# Patient Record
Sex: FEMALE | Race: WHITE | NOT HISPANIC OR LATINO | URBAN - METROPOLITAN AREA
[De-identification: names, ages, dates, MRNs, and addresses within clinical notes are randomized per-mention and may not be internally consistent; named-entity substitution may affect disease eponyms.]

---

## 2017-04-11 ENCOUNTER — EMERGENCY (EMERGENCY)
Facility: HOSPITAL | Age: 47
LOS: 1 days | Discharge: HOME | End: 2017-04-11
Attending: EMERGENCY MEDICINE | Admitting: EMERGENCY MEDICINE
Payer: COMMERCIAL

## 2017-04-11 VITALS
OXYGEN SATURATION: 97 % | DIASTOLIC BLOOD PRESSURE: 78 MMHG | RESPIRATION RATE: 16 BRPM | TEMPERATURE: 98 F | HEART RATE: 66 BPM | WEIGHT: 115.08 LBS | SYSTOLIC BLOOD PRESSURE: 119 MMHG

## 2017-04-11 VITALS
TEMPERATURE: 98 F | OXYGEN SATURATION: 97 % | DIASTOLIC BLOOD PRESSURE: 70 MMHG | SYSTOLIC BLOOD PRESSURE: 122 MMHG | RESPIRATION RATE: 15 BRPM | HEART RATE: 78 BPM
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
## 2017-04-11 DIAGNOSIS — S13.4XXA SPRAIN OF LIGAMENTS OF CERVICAL SPINE, INITIAL ENCOUNTER: ICD-10-CM
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
## 2017-04-11 DIAGNOSIS — S09.90XA UNSPECIFIED INJURY OF HEAD, INITIAL ENCOUNTER: ICD-10-CM

## 2017-04-11 DIAGNOSIS — F43.10 POST-TRAUMATIC STRESS DISORDER, UNSPECIFIED: ICD-10-CM

## 2017-04-11 DIAGNOSIS — Z41.1 ENCOUNTER FOR COSMETIC SURGERY: Chronic | ICD-10-CM

## 2017-04-11 DIAGNOSIS — K46.9 UNSPECIFIED ABDOMINAL HERNIA WITHOUT OBSTRUCTION OR GANGRENE: Chronic | ICD-10-CM

## 2017-04-11 DIAGNOSIS — Z98.89 OTHER SPECIFIED POSTPROCEDURAL STATES: Chronic | ICD-10-CM

## 2017-04-11 DIAGNOSIS — F41.9 ANXIETY DISORDER, UNSPECIFIED: ICD-10-CM

## 2017-04-11 PROCEDURE — 99284 EMERGENCY DEPT VISIT MOD MDM: CPT

## 2017-04-11 RX ORDER — ACETAMINOPHEN 500 MG
650 TABLET ORAL ONCE
Qty: 0 | Refills: 0 | Status: DISCONTINUED | OUTPATIENT
Start: 2017-04-11 | End: 2017-04-11

## 2017-04-11 RX ORDER — IBUPROFEN 200 MG
600 TABLET ORAL ONCE
Qty: 0 | Refills: 0 | Status: COMPLETED | OUTPATIENT
Start: 2017-04-11 | End: 2017-04-11

## 2017-04-11 RX ORDER — ACETAMINOPHEN 500 MG
650 TABLET ORAL ONCE
Qty: 0 | Refills: 0 | Status: COMPLETED | OUTPATIENT
Start: 2017-04-11 | End: 2017-04-11
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
## 2017-04-12 PROCEDURE — 70450 CT HEAD/BRAIN W/O DYE: CPT | Mod: 26

## 2017-04-12 PROCEDURE — 70450 CT HEAD/BRAIN W/O DYE: CPT

## 2017-04-12 PROCEDURE — 72125 CT NECK SPINE W/O DYE: CPT | Mod: 26

## 2017-04-12 PROCEDURE — 72125 CT NECK SPINE W/O DYE: CPT

## 2017-04-12 PROCEDURE — 99284 EMERGENCY DEPT VISIT MOD MDM: CPT | Mod: 25

## 2017-04-12 RX ADMIN — Medication 600 MILLIGRAM(S): at 00:03

## 2017-04-12 RX ADMIN — Medication 650 MILLIGRAM(S): at 00:03

## 2017-06-01 ENCOUNTER — APPOINTMENT (OUTPATIENT)
Dept: MRI IMAGING | Facility: CLINIC | Age: 47
End: 2017-06-01

## 2017-06-01 ENCOUNTER — OUTPATIENT (OUTPATIENT)
Dept: OUTPATIENT SERVICES | Facility: HOSPITAL | Age: 47
LOS: 1 days | End: 2017-06-01
Payer: COMMERCIAL

## 2017-06-01 DIAGNOSIS — Z00.8 ENCOUNTER FOR OTHER GENERAL EXAMINATION: ICD-10-CM

## 2017-06-01 DIAGNOSIS — Z41.1 ENCOUNTER FOR COSMETIC SURGERY: Chronic | ICD-10-CM

## 2017-06-01 DIAGNOSIS — K46.9 UNSPECIFIED ABDOMINAL HERNIA WITHOUT OBSTRUCTION OR GANGRENE: Chronic | ICD-10-CM

## 2017-06-01 DIAGNOSIS — Z98.89 OTHER SPECIFIED POSTPROCEDURAL STATES: Chronic | ICD-10-CM

## 2017-06-01 PROCEDURE — 73721 MRI JNT OF LWR EXTRE W/O DYE: CPT

## 2018-05-10 ENCOUNTER — OUTPATIENT (OUTPATIENT)
Dept: OUTPATIENT SERVICES | Facility: HOSPITAL | Age: 48
LOS: 1 days | End: 2018-05-10
Payer: MEDICAID

## 2018-05-10 ENCOUNTER — APPOINTMENT (OUTPATIENT)
Dept: MAMMOGRAPHY | Facility: CLINIC | Age: 48
End: 2018-05-10
Payer: MEDICAID

## 2018-05-10 ENCOUNTER — APPOINTMENT (OUTPATIENT)
Dept: ULTRASOUND IMAGING | Facility: CLINIC | Age: 48
End: 2018-05-10
Payer: MEDICAID

## 2018-05-10 DIAGNOSIS — R92.2 INCONCLUSIVE MAMMOGRAM: ICD-10-CM

## 2018-05-10 DIAGNOSIS — Z98.89 OTHER SPECIFIED POSTPROCEDURAL STATES: Chronic | ICD-10-CM

## 2018-05-10 DIAGNOSIS — K46.9 UNSPECIFIED ABDOMINAL HERNIA WITHOUT OBSTRUCTION OR GANGRENE: Chronic | ICD-10-CM

## 2018-05-10 DIAGNOSIS — Z00.00 ENCOUNTER FOR GENERAL ADULT MEDICAL EXAMINATION WITHOUT ABNORMAL FINDINGS: ICD-10-CM

## 2018-05-10 DIAGNOSIS — Z41.1 ENCOUNTER FOR COSMETIC SURGERY: Chronic | ICD-10-CM

## 2018-05-10 PROCEDURE — 77067 SCR MAMMO BI INCL CAD: CPT | Mod: 26

## 2018-05-10 PROCEDURE — 76641 ULTRASOUND BREAST COMPLETE: CPT | Mod: 26,50

## 2018-05-10 PROCEDURE — 76641 ULTRASOUND BREAST COMPLETE: CPT

## 2018-05-10 PROCEDURE — 77063 BREAST TOMOSYNTHESIS BI: CPT | Mod: 26

## 2018-05-10 PROCEDURE — 77063 BREAST TOMOSYNTHESIS BI: CPT

## 2018-05-10 PROCEDURE — 77067 SCR MAMMO BI INCL CAD: CPT

## 2019-01-22 ENCOUNTER — RESULT REVIEW (OUTPATIENT)
Age: 49
End: 2019-01-22

## 2019-04-11 ENCOUNTER — OUTPATIENT (OUTPATIENT)
Dept: OUTPATIENT SERVICES | Facility: HOSPITAL | Age: 49
LOS: 1 days | End: 2019-04-11
Payer: MEDICAID

## 2019-04-11 ENCOUNTER — APPOINTMENT (OUTPATIENT)
Dept: MRI IMAGING | Facility: CLINIC | Age: 49
End: 2019-04-11
Payer: MEDICAID

## 2019-04-11 DIAGNOSIS — K46.9 UNSPECIFIED ABDOMINAL HERNIA WITHOUT OBSTRUCTION OR GANGRENE: Chronic | ICD-10-CM

## 2019-04-11 DIAGNOSIS — Z98.89 OTHER SPECIFIED POSTPROCEDURAL STATES: Chronic | ICD-10-CM

## 2019-04-11 DIAGNOSIS — Z00.8 ENCOUNTER FOR OTHER GENERAL EXAMINATION: ICD-10-CM

## 2019-04-11 DIAGNOSIS — Z41.1 ENCOUNTER FOR COSMETIC SURGERY: Chronic | ICD-10-CM

## 2019-04-11 PROCEDURE — 70553 MRI BRAIN STEM W/O & W/DYE: CPT

## 2019-04-11 PROCEDURE — 70553 MRI BRAIN STEM W/O & W/DYE: CPT | Mod: 26

## 2019-04-11 PROCEDURE — A9585: CPT

## 2019-04-14 ENCOUNTER — EMERGENCY (EMERGENCY)
Facility: HOSPITAL | Age: 49
LOS: 1 days | Discharge: HOME | End: 2019-04-14
Attending: EMERGENCY MEDICINE | Admitting: EMERGENCY MEDICINE
Payer: MEDICAID

## 2019-04-14 VITALS
RESPIRATION RATE: 16 BRPM | TEMPERATURE: 98 F | SYSTOLIC BLOOD PRESSURE: 109 MMHG | OXYGEN SATURATION: 100 % | HEART RATE: 64 BPM | DIASTOLIC BLOOD PRESSURE: 69 MMHG

## 2019-04-14 VITALS
WEIGHT: 110.01 LBS | TEMPERATURE: 98 F | RESPIRATION RATE: 18 BRPM | OXYGEN SATURATION: 98 % | SYSTOLIC BLOOD PRESSURE: 104 MMHG | HEIGHT: 62 IN | HEART RATE: 80 BPM | DIASTOLIC BLOOD PRESSURE: 65 MMHG

## 2019-04-14 DIAGNOSIS — Z41.1 ENCOUNTER FOR COSMETIC SURGERY: Chronic | ICD-10-CM

## 2019-04-14 DIAGNOSIS — K46.9 UNSPECIFIED ABDOMINAL HERNIA WITHOUT OBSTRUCTION OR GANGRENE: Chronic | ICD-10-CM

## 2019-04-14 DIAGNOSIS — Z98.89 OTHER SPECIFIED POSTPROCEDURAL STATES: Chronic | ICD-10-CM

## 2019-04-14 PROCEDURE — 99283 EMERGENCY DEPT VISIT LOW MDM: CPT | Mod: 25

## 2019-04-14 PROCEDURE — 99283 EMERGENCY DEPT VISIT LOW MDM: CPT

## 2019-04-14 RX ADMIN — Medication 60 MILLIGRAM(S): at 23:16

## 2019-04-14 NOTE — ED PROVIDER NOTE - OBJECTIVE STATEMENT
49 yo female hx of anxiety, ptsd c/o right upper back rash and right chest rash, itchy and painful, states she was likely exposed to poison ivy [place] days ago, seen by PMD given calamine lotion with mild relief.  No steroid use.  No fever/chills.  Has had shingles before and states it does not feel like her previous shingles.

## 2019-04-14 NOTE — ED ADULT NURSE NOTE - NSIMPLEMENTINTERV_GEN_ALL_ED
Implemented All Universal Safety Interventions:  Ranger to call system. Call bell, personal items and telephone within reach. Instruct patient to call for assistance. Room bathroom lighting operational. Non-slip footwear when patient is off stretcher. Physically safe environment: no spills, clutter or unnecessary equipment. Stretcher in lowest position, wheels locked, appropriate side rails in place.

## 2019-04-14 NOTE — ED ADULT NURSE NOTE - OBJECTIVE STATEMENT
Patient complaining of poison ivy on chest and upper back x3 days. Patient stated she was seen in urgent care and prescribed benadryl and calamine lotion with no relief. Patient complaining of poison ivy on chest and upper back x3 days. Patient stated she was seen in urgent care and prescribed benadryl and calamine lotion with no relief. Pt. stated she was running through the woods at the time and " I think I strained my right thigh".

## 2019-04-14 NOTE — ED PROVIDER NOTE - NSFOLLOWUPINSTRUCTIONS_ED_ALL_ED_FT
1) Follow-up with your Primary Medical Doctor or referred doctor. Call today / next business day for prompt follow-up.  2) Return to Emergency room for any worsening or persistent pain, weakness, fever, or any other concerning symptoms.  3) See attached instruction sheets for additional information, including information regarding signs and symptoms to look out for, reasons to seek immediate care and other important instructions.  4) Take prednisone taper as prescribed.

## 2019-04-14 NOTE — ED PROVIDER NOTE - PHYSICAL EXAMINATION
Gen: Alert, NAD  Head/eyes: NC/AT, PERRL, EOMI, normal lids/conjunctiva, no scleral icterus  ENT: airway patent, no uvula swelling/midline  Neck: supple, no tenderness/meningismus/JVD, Trachea midline  Pulm/lung: Bilateral clear BS, normal resp effort, no wheeze/stridor/retractions  CV/heart: RRR, no M/R/G, +2 dist pulses (radial, pedal DP/PT, popliteal)  GI/Abd: soft, NT/ND, +BS, no guarding/rebound tenderness  Musculoskeletal: no edema/erythema/cyanosis, FROM in all extremities, no C/T/L spine ttp  Skin: +right upper back rash with vesicle/blister and right anterior chest vesicle/blister, not in dermatomal distribution  Neuro: AAOx3, CN 2-12 intact, normal sensation, 5/5 motor strength in all extremities, normal gait, no dysmetria

## 2019-08-16 ENCOUNTER — OUTPATIENT (OUTPATIENT)
Dept: OUTPATIENT SERVICES | Facility: HOSPITAL | Age: 49
LOS: 1 days | End: 2019-08-16
Payer: MEDICAID

## 2019-08-16 ENCOUNTER — APPOINTMENT (OUTPATIENT)
Dept: MRI IMAGING | Facility: CLINIC | Age: 49
End: 2019-08-16
Payer: MEDICAID

## 2019-08-16 DIAGNOSIS — M25.551 PAIN IN RIGHT HIP: ICD-10-CM

## 2019-08-16 DIAGNOSIS — K46.9 UNSPECIFIED ABDOMINAL HERNIA WITHOUT OBSTRUCTION OR GANGRENE: Chronic | ICD-10-CM

## 2019-08-16 DIAGNOSIS — Z98.89 OTHER SPECIFIED POSTPROCEDURAL STATES: Chronic | ICD-10-CM

## 2019-08-16 DIAGNOSIS — Z41.1 ENCOUNTER FOR COSMETIC SURGERY: Chronic | ICD-10-CM

## 2019-08-16 PROCEDURE — 73721 MRI JNT OF LWR EXTRE W/O DYE: CPT

## 2019-08-16 PROCEDURE — 73721 MRI JNT OF LWR EXTRE W/O DYE: CPT | Mod: 26,RT

## 2019-09-16 ENCOUNTER — APPOINTMENT (OUTPATIENT)
Dept: ORTHOPEDIC SURGERY | Facility: CLINIC | Age: 49
End: 2019-09-16
Payer: MEDICAID

## 2019-09-16 VITALS
HEIGHT: 62 IN | DIASTOLIC BLOOD PRESSURE: 65 MMHG | BODY MASS INDEX: 20.24 KG/M2 | WEIGHT: 110 LBS | HEART RATE: 65 BPM | SYSTOLIC BLOOD PRESSURE: 100 MMHG

## 2019-09-16 PROCEDURE — 73502 X-RAY EXAM HIP UNI 2-3 VIEWS: CPT | Mod: RT

## 2019-09-16 PROCEDURE — 99204 OFFICE O/P NEW MOD 45 MIN: CPT
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
## 2019-09-16 NOTE — ADDENDUM
[FreeTextEntry1] : I, Biju Alvarado , acted solely as a scribe for Dr. Kelvin Serrano on this date 09/16/2019.\par All medical record entries made by the Scribe were at my, Dr. Kelvin Serrano, direction and personally dictated by me on 09/16/2019. I have reviewed the chart and agree that the record accurately reflects my personal performance of the history, physical exam, assessment and plan. I have also personally directed, reviewed, and agreed with the chart.  none

## 2019-09-16 NOTE — DISCUSSION/SUMMARY
[de-identified] : The patient presents with right hip OA and pain. The conditions and treatment options have been discussed with the patient. At this time, the patient is a candidate for surgery based on xrays and quality of life. We have discussed the risks, benefits, and alternatives to surgery. Before proceeding with surgery the pt would like to try hyaluronate injections into her right hip.\par \par \par \par Long discussion was had with the patient about total hip replacements. I feel he would greatly benefit from right total hip. A long discussion was had with the patient as what the total joint replacement would entail.  A model was used to demonstrate the operation.  The hospitalization and rehabilitation were discussed. The risks, benefits and alternatives to surgical intervention were discussed at length with the patient. Specific risks discussed included: infection, wound breakdown, numbness and damage to nerves, tendon, muscle, arteries or other blood vessels.  The possibility of recurrent pain, no improvement in pain and actual worsening of the pain were also mentioned in conversation with the patient. Medical complications related to the patient's general medical health including deep vein thrombosis, pulmonary embolus, heart attack, stroke, death and other complications from anesthesia were discussed as well. The patient was told that we will take steps to minimize these risks by using sterile technique, antibiotics and DVT prophylaxis when appropriate and following the patient postoperatively in the clinic setting to monitor progress. The benefits of surgery were discussed with the patient including the potential to improve the current clinical condition throughout operative intervention. Alternatives to surgical intervention include continued conservative management which may yield less than optimal results this particular patient. All questions were answered to the satisfaction of the patient. \par \par \par \par This is an addendum to support documentation for NAT THOMASON. she was seen in my office regarding right hip pain. As part of that visit, i recommended treatment with a cryo ROM Hip Orthosis( ) for  restrictions my patient is experiencing with functional limitations of daily activities.\par \par This ROM Hip Orthosis, in conjunction with other previously prescribed regimens will assist in overall treatment of my patient's condition.

## 2019-09-16 NOTE — PHYSICAL EXAM
[de-identified] : Well-nourished, in no acute distress\par Alert and oriented to time, place and person\par Skin: no lesions discoloration\par Respirations: unlabored\par Cardiac: no leg swelling\par Lymphatic: no groin adenopathy \par Right Lower Extremity\par o Buttock : no tenderness, swelling or deformities\par o Right Hip :\par ¦ Inspection/Palpation : no tenderness, no swelling or deformities, leg lengths equal \par ¦ Range of Motion : 0-130 ° , abduction 60 ° , adduction 30 ° , internal rotation 30 ° , external rotation 50 °Provokes pain\par ¦ Stability : joint stability intact\par ¦ Strength : extension, flexion, adduction 5/5, abduction 5/5, internal rotation and external rotation 5/5 \par Tests: John’s test negative \par \par  [de-identified] : AP pelvis right hip AP lateral demonstrates degenerative joint space narrowing and focal superolateral bone on bone contact\par \par \par MRI of the right hip taken on 08/16/2019 at [facility] in [place] reveal\par 1. Moderate right hip arthrosis. \par 2. Moderate joint effusion with synovitis. \par 3. Mild strain of the right iliopsoas muscle \par 4. Mild gluteus minimus tendinosis. \par \par

## 2019-09-18 ENCOUNTER — APPOINTMENT (OUTPATIENT)
Dept: ORTHOPEDIC SURGERY | Facility: CLINIC | Age: 49
End: 2019-09-18
Payer: MEDICAID

## 2019-09-18 VITALS
DIASTOLIC BLOOD PRESSURE: 66 MMHG | SYSTOLIC BLOOD PRESSURE: 102 MMHG | BODY MASS INDEX: 20.24 KG/M2 | HEIGHT: 62 IN | WEIGHT: 110 LBS | HEART RATE: 89 BPM

## 2019-09-18 DIAGNOSIS — Z82.61 FAMILY HISTORY OF ARTHRITIS: ICD-10-CM

## 2019-09-18 DIAGNOSIS — R56.9 UNSPECIFIED CONVULSIONS: ICD-10-CM

## 2019-09-18 DIAGNOSIS — Z87.39 PERSONAL HISTORY OF OTHER DISEASES OF THE MUSCULOSKELETAL SYSTEM AND CONNECTIVE TISSUE: ICD-10-CM

## 2019-09-18 DIAGNOSIS — Z78.9 OTHER SPECIFIED HEALTH STATUS: ICD-10-CM

## 2019-09-18 DIAGNOSIS — M16.11 UNILATERAL PRIMARY OSTEOARTHRITIS, RIGHT HIP: ICD-10-CM

## 2019-09-18 PROCEDURE — 99204 OFFICE O/P NEW MOD 45 MIN: CPT

## 2019-09-18 RX ORDER — LAMOTRIGINE 150 MG/1
TABLET ORAL
Refills: 0 | Status: ACTIVE | COMMUNITY

## 2019-09-18 RX ORDER — ESCITALOPRAM OXALATE 20 MG/1
20 TABLET, FILM COATED ORAL
Refills: 0 | Status: ACTIVE | COMMUNITY

## 2019-09-18 RX ORDER — HYALURONATE SODIUM 20 MG/2 ML
20 SYRINGE (ML) INTRAARTICULAR
Qty: 1 | Refills: 0 | Status: ACTIVE | OUTPATIENT
Start: 2019-09-18

## 2019-09-18 RX ORDER — IBUPROFEN 200 MG/1
CAPSULE, LIQUID FILLED ORAL
Refills: 0 | Status: ACTIVE | COMMUNITY

## 2019-09-18 NOTE — PHYSICAL EXAM
[de-identified] : Constitutional: Well-nourished, well-developed, No acute distress\par Respiratory:  Good respiratory effort, no SOB\par Lymphatic: No regional lymphadenopathy, no lymphedema\par Psychiatric: Pleasant and normal affect, alert and oriented x3\par Skin: Clean dry and intact B/L UE/LE\par Musculoskeletal: normal except where as noted in regional exam\par \par \par Left Hip:\par \par APPEARANCE: no marked deformities, no swelling or malalignment\par POSITIVE TENDERNESS: none\par NONTENDER greater trochanter, TFL, gluteal region, ischium/proximal hamstring region, hip flexor region, sartorius and pubic symphysis. \par ROM full, painless all planes. \par RESISTIVE TESTING: painless resisted ER/IR/SLR/abduction/adduction. \par SPECIAL TESTS: painless loaded flexion & scouring\par PULSES: 2+ DP/PT pulses\par Neuro:  NL sensation of thigh and lower extremity, DTRs 2+/4 patella and achilles\par \par \par B/L Knees: No asymmetry, malalignment, or swelling, Full ROM, 5/5 strength in flexion/ext, Joints stable\par B/L Ankles: No asymmetry, malalignment, or swelling, Full ROM, 5/5 strength in DF/PF/Inv/Ev, Joints stable\par BIOMECHANICAL EXAM: no marked leg length discrepancy, mild hip abductor weakness b/l, no marked foot pronation, tight hams and ITB b/l.  Normal gait and station\par \par Right Hip:\par \par APPEARANCE: no marked deformities, no swelling or malalignment\par POSITIVE TENDERNESS: Hip flexor region, sartorius, proximal rectus femoris\par NONTENDER: greater trochanter, TFL, gluteal region, ischium/proximal hamstring region, and pubic symphysis. \par ROM: Limited in all planes due to pain. \par RESISTIVE TESTING: MMT 4+/5 and mild pain with hip flexion, painless resisted ER/IR/SLR/abduction/adduction. \par SPECIAL TESTS: + FADIR, neg SHAWN, mild pain with loaded flexion & scouring\par NEURO: Normal sensation of LE, DTRs 2+/4 patella and achilles\par PULSES: 2+ DP/PT pulses

## 2019-09-18 NOTE — HISTORY OF PRESENT ILLNESS
[de-identified] : Patient is here for right hip pain. She was evaluated by Dr. Serrano who determined that she had hip arthritis. She has had several opinions regarding her treatment and was recommended to try and put off THR until later in life. She has had 2 cortisone injections in the past which provided relief for about 2 weeks. She has done PT in the past as well. Dr. Serrano recommended MAGANA injections. There has been no significant change in her condition since her last evaluation. \par \par The patient's past medical history, past surgical history, medications and allergies were reviewed by me today and documented accordingly. In addition, the patient's family and social history, which were noncontributory to this visit, were reviewed also. The patient has no family history of arthritis.

## 2019-09-18 NOTE — DISCUSSION/SUMMARY
[de-identified] : Patient was seen today for evaluation and management of chronic right hip pain. She was recently seen by orthopedic surgeon and deemed to have moderate to early severe osteoarthritis, after discussing options with the patient she elected to proceed with nonsurgical management and he referred her here for continued nonsurgical management. Patient has been seen by pain management, she has had 2 injections into her hip, she believes they were cortisone injections, they both occurred within the last 6 weeks and did not provide much relief. I advised I would not recommend a repeat cortisone injection as these 2 injections are considered a failed response to cortisone at this time. We discussed that hyaluronic acid could be tried as a more long-term therapeutic option, patient will like to proceed with insurance authorization for this injection therapy. I advised the patient that we do also offer PRP injections, however I would recommend trying hyaluronic acid first, and if she does not have any positive response to hyaluronic acid, would not recommend PRP with failed response to the cortisone and hyaluronic acid prior. 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 This is the underlying etiology of her right hip osteoarthritis. At this time patient is recommended to start a course of physical therapy to focus on hip stabilization exercises and the goal of physical therapy would be to teach her a home exercise program to be maintained.  Patient appreciates and agrees with current plan.\par \par This note was generated using dragon medical dictation software.  A reasonable effort has been made for proofreading its contents, but typos may still remain.  If there are any questions or points of clarification needed please notify my office.\par \par Recommendation: Trial of Viscosupplementation. Will obtain preauthorization prior to injection therapy.\par \par Followup: Once approved for injection therapy.\par \par \par **NB: Medication was Issued under circumstances where the provider (Dr. Jamin Ndiaye) reasonably determined that it would be impractical for the patient to obtain substances prescribed by electronic prescription (e-prescribe) given need for pre-authorization, and such delay would adversely impact the patient's medical condition. An exception letter will be mailed to the [facility] during the authorization process.**\par

## 2019-09-18 NOTE — REASON FOR VISIT
[Initial Visit] : an initial visit for [Family Member] : family member [FreeTextEntry2] : right hip pain

## 2019-09-26 ENCOUNTER — APPOINTMENT (OUTPATIENT)
Dept: ORTHOPEDIC SURGERY | Facility: CLINIC | Age: 49
End: 2019-09-26
Payer: MEDICAID

## 2019-09-26 PROCEDURE — 20611 DRAIN/INJ JOINT/BURSA W/US: CPT | Mod: RT

## 2019-09-26 PROCEDURE — 99213 OFFICE O/P EST LOW 20 MIN: CPT | Mod: 25

## 2019-09-26 RX ORDER — FERRIC OXIDE RED 80; 80 MG/ML; MG/ML
8-8 LOTION TOPICAL
Qty: 177 | Refills: 0 | Status: ACTIVE | COMMUNITY
Start: 2019-04-12

## 2019-09-26 RX ORDER — GABAPENTIN 100 MG/1
100 CAPSULE ORAL
Qty: 90 | Refills: 0 | Status: ACTIVE | COMMUNITY
Start: 2019-04-16

## 2019-09-26 RX ORDER — VALACYCLOVIR 1 G/1
1 TABLET, FILM COATED ORAL
Qty: 20 | Refills: 0 | Status: ACTIVE | COMMUNITY
Start: 2019-04-16

## 2019-09-26 RX ORDER — TRIAMCINOLONE ACETONIDE 1 MG/G
0.1 CREAM TOPICAL
Qty: 30 | Refills: 0 | Status: ACTIVE | COMMUNITY
Start: 2019-05-03

## 2019-09-26 RX ORDER — DIPHENHYDRAMINE HYDROCHLORIDE 25 MG/1
25 CAPSULE ORAL
Qty: 30 | Refills: 0 | Status: ACTIVE | COMMUNITY
Start: 2019-04-12

## 2019-09-26 RX ORDER — CEPHALEXIN 500 MG/1
500 CAPSULE ORAL
Qty: 30 | Refills: 0 | Status: ACTIVE | COMMUNITY
Start: 2019-04-16

## 2019-09-26 RX ORDER — PREDNISONE 10 MG/1
10 TABLET ORAL
Qty: 63 | Refills: 0 | Status: ACTIVE | COMMUNITY
Start: 2019-04-15

## 2019-09-26 RX ORDER — MINOCYCLINE HYDROCHLORIDE 100 MG/1
100 CAPSULE ORAL
Qty: 14 | Refills: 0 | Status: ACTIVE | COMMUNITY
Start: 2019-05-21

## 2019-09-26 RX ORDER — ESTRADIOL AND NORETHINDRONE ACETATE .5; .1 MG/1; MG/1
0.5-0.1 TABLET, FILM COATED ORAL
Qty: 28 | Refills: 0 | Status: ACTIVE | COMMUNITY
Start: 2019-04-24

## 2019-09-26 RX ORDER — CYCLOBENZAPRINE HYDROCHLORIDE 5 MG/1
5 TABLET, FILM COATED ORAL
Qty: 30 | Refills: 0 | Status: ACTIVE | COMMUNITY
Start: 2019-09-04

## 2019-09-26 RX ORDER — LAMOTRIGINE 300 MG/1
300 TABLET, EXTENDED RELEASE ORAL
Qty: 30 | Refills: 0 | Status: ACTIVE | COMMUNITY
Start: 2019-04-23

## 2019-09-26 RX ORDER — BETAMETHASONE DIPROPIONATE 0.5 MG/G
0.05 OINTMENT, AUGMENTED TOPICAL
Qty: 45 | Refills: 0 | Status: ACTIVE | COMMUNITY
Start: 2019-05-21

## 2019-09-26 RX ORDER — ESCITALOPRAM OXALATE 10 MG/1
10 TABLET ORAL
Qty: 30 | Refills: 0 | Status: ACTIVE | COMMUNITY
Start: 2019-02-11

## 2019-09-26 RX ORDER — METHYLPREDNISOLONE 4 MG/1
4 TABLET ORAL
Qty: 21 | Refills: 0 | Status: ACTIVE | COMMUNITY
Start: 2019-09-05

## 2019-09-26 RX ORDER — LAMOTRIGINE 100 MG/1
100 TABLET, EXTENDED RELEASE ORAL
Qty: 30 | Refills: 0 | Status: ACTIVE | COMMUNITY
Start: 2019-04-23

## 2019-09-26 RX ORDER — NAPROXEN 500 MG/1
500 TABLET ORAL
Qty: 14 | Refills: 0 | Status: ACTIVE | COMMUNITY
Start: 2019-06-18

## 2019-09-27 NOTE — PHYSICAL EXAM
[de-identified] : Constitutional: Well-nourished, well-developed, No acute distress\par Respiratory:  Good respiratory effort, no SOB\par Lymphatic: No regional lymphadenopathy, no lymphedema\par Psychiatric: Pleasant and normal affect, alert and oriented x3\par Skin: Clean dry and intact B/L UE/LE\par Musculoskeletal: normal except where as noted in regional exam\par \par Right Hip:\par \par APPEARANCE: no marked deformities, no swelling or malalignment\par POSITIVE TENDERNESS: Hip flexor region, sartorius, proximal rectus femoris\par NONTENDER: greater trochanter, TFL, gluteal region, ischium/proximal hamstring region, and pubic symphysis. \par ROM: Limited in all planes due to pain. \par RESISTIVE TESTING: MMT 4+/5 and mild pain with hip flexion, painless resisted ER/IR/SLR/abduction/adduction. \par SPECIAL TESTS: + FADIR, neg SHAWN, mild pain with loaded flexion & scouring\par NEURO: Normal sensation of LE, DTRs 2+/4 patella and achilles\par PULSES: 2+ DP/PT pulses

## 2019-09-27 NOTE — PROCEDURE
[de-identified] : Ultrasound-Guided hyaluronic acid Injection: Right Hip Intra-articular Injection.\par \par Indication for U/S Guidance: Ensure placement within the femoroacetabular joint for diagnostic purposes, while avoiding anterior neurovascular structures\par \par Indication for Injection: Osteoarthritis.\par \par A discussion was had with the patient regarding this procedure and all questions were answered. All risks, benefits and alternatives were discussed. These included but were not limited to bleeding, infection, and allergic reaction. A timeout was done to ensure correct side and pt agreed to the procedure. Betadine was used to sterilize and prep the area, and alcohol was used to clean the skin in the anterior aspect of the hip joint. The femoroacetabular joint was visualized utilizing the SoniMall.eute II Edge, the Curvilinear 30cm 5-2 MHz transducer, sterile probe cover and sterile ultrasound gel. The joint was visualized in the longitudinal axis and an in-plane approach was used for the injection. Ultrasound guidance was utilized to ensure accuracy of the intra-articular injection, and avoid the femoral neurovascular structures. A 25-gauge 1.5" needle was first used to inject 3cc of 1% lidocaine without epi into the subcutaneous tissue and muscle for local anesthesia. Following that a 22-gauge 1.5" needle was used to inject 2cc of Visco-3 solution from a prefilled syringe into the femoroacetabular joint. An image confirming the correct location of the needle placement and infusion of the steroid at the end of the injection was saved. A sterile bandage was then applied. The patient tolerated the procedure well and there were no complications. \par \par Lot #: [id number]\par Exp: 6/30/21\par

## 2019-09-27 NOTE — HISTORY OF PRESENT ILLNESS
[de-identified] : Patient is here today for followup of right hip pain. We tried to obtain insurance authorization for hyaluronic acid injection therapy to the right hip or degenerative labral tearing and early osteoarthritis but this was denied by her insurance. She was advised that we have a cash pay option and patient elected to proceed with Visco-3 hyaluronic acid injection series today. No significant interval change in her condition since last evaluation.

## 2019-09-27 NOTE — DISCUSSION/SUMMARY
[de-identified] : Patient was seen today for continued management of chronic right hip pain. We discussed various treatment options and elected to proceed with cash pay hyaluronic acid series of injections.  The first of three Visco-3 injections was given today under sterile conditions into the Right  hip joint without complication (see procedure note). The patient was instructed on modification of activities over the next 48-72 hours. I advised the patient to ice the hip as needed for control of local irritation from the injection. I advised that some patients have immediate benefit from the initial injection therapy, however it usually takes the medication a number of weeks (~8wks) to provide significant relief of osteoarthritic symptoms.  Patient advised she should limit physical activity for the next 48 hours, after which she can gradually start doing physical activity as tolerated.\par \par I  will see the patient back for the second injection in approximately 1 week\par \par This note was generated using dragon medical dictation software.  A reasonable effort has been made for proofreading its contents, but typos may still remain.  If there are any questions or points of clarification needed please notify my office.\par \par

## 2019-10-03 ENCOUNTER — APPOINTMENT (OUTPATIENT)
Dept: ORTHOPEDIC SURGERY | Facility: CLINIC | Age: 49
End: 2019-10-03
Payer: MEDICAID

## 2019-10-03 PROCEDURE — 20611 DRAIN/INJ JOINT/BURSA W/US: CPT | Mod: RT

## 2019-10-03 NOTE — PHYSICAL EXAM
[de-identified] : Constitutional: Well-nourished, well-developed, No acute distress\par Respiratory: Good respiratory effort, no SOB\par Lymphatic: No regional lymphadenopathy, no lymphedema\par Psychiatric: Pleasant and normal affect, alert and oriented x3\par Skin: Clean dry and intact B/L UE/LE\par Musculoskeletal: normal except where as noted in regional exam\par \par Right Hip:\par \par APPEARANCE: no marked deformities, no swelling or malalignment\par POSITIVE TENDERNESS: Hip flexor region, sartorius, proximal rectus femoris\par NONTENDER: greater trochanter, TFL, gluteal region, ischium/proximal hamstring region, and pubic symphysis. \par ROM: Limited in all planes due to pain. \par RESISTIVE TESTING: MMT 4+/5 and mild pain with hip flexion, painless resisted ER/IR/SLR/abduction/adduction. \par SPECIAL TESTS: + FADIR, neg SHAWN, mild pain with loaded flexion & scouring\par NEURO: Normal sensation of LE, DTRs 2+/4 patella and achilles\par PULSES: 2+ DP/PT pulses \par \par

## 2019-10-03 NOTE — HISTORY OF PRESENT ILLNESS
[de-identified] : Patient is here today to follow-up on hip pain.  There has been some mild improvement after the first hyaluronic acid injection performed at last visit.  No adverse reaction thus far.  Patient would like to proceed with the second injection in the series at this time.

## 2019-10-03 NOTE — PROCEDURE
[de-identified] : Ultrasound-Guided hyaluronic acid Injection: Right Hip Intra-articular Injection.\par \par Indication for U/S Guidance: Ensure placement within the femoroacetabular joint for diagnostic purposes, while avoiding anterior neurovascular structures\par \par Indication for Injection: Osteoarthritis.\par \par A discussion was had with the patient regarding this procedure and all questions were answered. All risks, benefits and alternatives were discussed. These included but were not limited to bleeding, infection, and allergic reaction. A timeout was done to ensure correct side and pt agreed to the procedure. Betadine was used to sterilize and prep the area, and alcohol was used to clean the skin in the anterior aspect of the hip joint. The femoroacetabular joint was visualized utilizing the SonGOintegrote II Edge, the Curvilinear 30cm 5-2 MHz transducer, sterile probe cover and sterile ultrasound gel. The joint was visualized in the longitudinal axis and an in-plane approach was used for the injection. Ultrasound guidance was utilized to ensure accuracy of the intra-articular injection, and avoid the femoral neurovascular structures. A 25-gauge 1.5" needle was first used to inject 3cc of 1% lidocaine without epi into the subcutaneous tissue and muscle for local anesthesia. Following that a 22-gauge 1.5" needle was used to inject 2cc of Visco-3 solution from a prefilled syringe into the femoroacetabular joint. An image confirming the correct location of the needle placement and infusion of the steroid at the end of the injection was saved. A sterile bandage was then applied. The patient tolerated the procedure well and there were no complications. \par \par Lot #: [id number]\par Exp: 6/30/21\par

## 2019-10-10 ENCOUNTER — APPOINTMENT (OUTPATIENT)
Dept: ORTHOPEDIC SURGERY | Facility: CLINIC | Age: 49
End: 2019-10-10
Payer: MEDICAID

## 2019-10-10 PROCEDURE — 20611 DRAIN/INJ JOINT/BURSA W/US: CPT | Mod: RT

## 2019-10-10 NOTE — PROCEDURE
[de-identified] : Ultrasound-Guided hyaluronic acid Injection: Right Hip Intra-articular Injection.\par \par Indication for U/S Guidance: Ensure placement within the femoroacetabular joint for diagnostic purposes, while avoiding anterior neurovascular structures\par \par Indication for Injection: Osteoarthritis.\par \par A discussion was had with the patient regarding this procedure and all questions were answered. All risks, benefits and alternatives were discussed. These included but were not limited to bleeding, infection, and allergic reaction. A timeout was done to ensure correct side and pt agreed to the procedure. Betadine was used to sterilize and prep the area, and alcohol was used to clean the skin in the anterior aspect of the hip joint. The femoroacetabular joint was visualized utilizing the SonFarehelperte II Edge, the Curvilinear 30cm 5-2 MHz transducer, sterile probe cover and sterile ultrasound gel. The joint was visualized in the longitudinal axis and an in-plane approach was used for the injection. Ultrasound guidance was utilized to ensure accuracy of the intra-articular injection, and avoid the femoral neurovascular structures. A 25-gauge 1.5" needle was first used to inject 3cc of 1% lidocaine without epi into the subcutaneous tissue and muscle for local anesthesia. Following that a 22-gauge 1.5" needle was used to inject 2cc of Visco-3 solution from a prefilled syringe into the femoroacetabular joint. An image confirming the correct location of the needle placement and infusion of the steroid at the end of the injection was saved. A sterile bandage was then applied. The patient tolerated the procedure well and there were no complications. \par \par Lot #: [id number]\par Exp: 6/30/21\par . \par

## 2019-10-10 NOTE — PHYSICAL EXAM
[de-identified] : Constitutional: Well-nourished, well-developed, No acute distress\par Respiratory: Good respiratory effort, no SOB\par Lymphatic: No regional lymphadenopathy, no lymphedema\par Psychiatric: Pleasant and normal affect, alert and oriented x3\par Skin: Clean dry and intact B/L UE/LE\par Musculoskeletal: normal except where as noted in regional exam\par \par \par \par \par Right Hip:\par \par APPEARANCE: no marked deformities, no swelling or malalignment\par POSITIVE TENDERNESS: Hip flexor region, sartorius, proximal rectus femoris\par NONTENDER: greater trochanter, TFL, gluteal region, ischium/proximal hamstring region, and pubic symphysis. \par ROM: Limited in all planes due to pain. \par RESISTIVE TESTING: MMT 4+/5 and mild pain with hip flexion, painless resisted ER/IR/SLR/abduction/adduction. \par SPECIAL TESTS: + FADIR, neg SHAWN, mild pain with loaded flexion & scouring\par NEURO: Normal sensation of LE, DTRs 2+/4 patella and achilles\par PULSES: 2+ DP/PT pulses \par \par

## 2019-10-10 NOTE — HISTORY OF PRESENT ILLNESS
[de-identified] : Patient is here today to follow-up on hip pain.  There has been some mild improvement after the second hyaluronic acid injection performed at last visit.  No adverse reaction thus far.  Patient would like to proceed with the last injection in the series at this time.

## 2019-10-10 NOTE — DISCUSSION/SUMMARY
[de-identified] : The final Visco 3 injection was given today under sterile conditions into the right hip joint without complication (see procedure note). I discussed the effects of this medication and how long it may provide benefit. Patient has obtained moderate immediate improvement. If no significant long-term benefit, the patient may elect for additional treatment strategies as previously discussed. However, if the patient obtains good relief of symptoms, the injection therapy series can be repeated over the next 6-12 months.\par \par Will have the patient followup on an as-needed basis at this time.  Patient appreciates and agrees with current plan.\par \par This note was generated using dragon medical dictation software. A reasonable effort has been made for proofreading its contents, but typos may still remain. If there are any questions or points of clarification needed please notify my office.

## 2019-11-25 ENCOUNTER — APPOINTMENT (OUTPATIENT)
Dept: ORTHOPEDIC SURGERY | Facility: CLINIC | Age: 49
End: 2019-11-25
Payer: MEDICAID

## 2019-11-25 PROCEDURE — 99212 OFFICE O/P EST SF 10 MIN: CPT

## 2019-12-12 ENCOUNTER — APPOINTMENT (OUTPATIENT)
Dept: ORTHOPEDIC SURGERY | Facility: CLINIC | Age: 49
End: 2019-12-12
Payer: MEDICAID

## 2019-12-12 VITALS
WEIGHT: 110 LBS | DIASTOLIC BLOOD PRESSURE: 68 MMHG | BODY MASS INDEX: 20.24 KG/M2 | HEIGHT: 62 IN | SYSTOLIC BLOOD PRESSURE: 101 MMHG | HEART RATE: 78 BPM

## 2019-12-12 DIAGNOSIS — G89.29 PAIN IN RIGHT KNEE: ICD-10-CM

## 2019-12-12 DIAGNOSIS — M25.561 PAIN IN RIGHT KNEE: ICD-10-CM

## 2019-12-12 PROCEDURE — 99213 OFFICE O/P EST LOW 20 MIN: CPT

## 2019-12-12 PROCEDURE — 73562 X-RAY EXAM OF KNEE 3: CPT | Mod: RT

## 2019-12-13 PROBLEM — M25.561 CHRONIC PAIN OF RIGHT KNEE: Noted: 2019-12-12

## 2019-12-13 RX ORDER — HYALURONATE SODIUM 20 MG/2 ML
20 SYRINGE (ML) INTRAARTICULAR
Qty: 1 | Refills: 0 | Status: ACTIVE | OUTPATIENT
Start: 2019-12-13

## 2019-12-13 NOTE — PHYSICAL EXAM
[de-identified] : Constitutional: Well-nourished, well-developed, No acute distress\par Respiratory: Good respiratory effort, no SOB\par Lymphatic: No regional lymphadenopathy, no lymphedema\par Psychiatric: Pleasant and normal affect, alert and oriented x3\par Skin: Clean dry and intact B/L UE/LE\par Musculoskeletal: normal except where as noted in regional exam\par \par Right Hip:\par \par APPEARANCE: no marked deformities, no swelling or malalignment\par POSITIVE TENDERNESS: Hip flexor region, sartorius, proximal rectus femoris\par NONTENDER: greater trochanter, TFL, gluteal region, ischium/proximal hamstring region, and pubic symphysis. \par ROM: Limited in all planes due to pain. \par RESISTIVE TESTING: MMT 4+/5 and mild pain with hip flexion, painless resisted ER/IR/SLR/abduction/adduction. \par SPECIAL TESTS: + FADIR, neg SHAWN, mild pain with loaded flexion & scouring\par NEURO: Normal sensation of LE, DTRs 2+/4 patella and achilles\par PULSES: 2+ DP/PT pulses \par \par Right Knee:\par APPEARANCE: no marked deformities, no swelling or malalignment\par POSITIVE TENDERNESS:  + crepitus of the anterior knee, and tenderness of patellar retinaculum\par NONTENDER: jt lines b/l, patellar & quadriceps tendons, MCL/LCL, ITB at the lateral femoral condyle & Gerdy's tubercle, pes bursa. \par ROM: full & painless, although some discomfort in deep knee flexion\par RESISTIVE TESTING: + discomfort with knee ext from deep knee flexion (stretched position), painless knee flexion. \par SPECIAL TESTS: stable v/v stress. painless grind. neg Lachman's. neg ant/post drawer. neg Jaye's. neg Thessaly test. neg John's & Malacrae's\par NEURO: Normal sensation of LE, DTRs 2+/4 patella and achilles\par PULSES: 2+ DP/PT pulses\par \par  [de-identified] : The following radiographs were ordered and read by me during this patient's visit. I reviewed each radiograph in detail with the patient and discussed the findings as highlighted below. \par \par 3 views of the right knee were obtained today that show degenerative changes and evidence of very mild osteoarthritis in the patellofemoral compartment.  No fracture, or dislocation seen at this time. There is no malalignment. No other obvious osseous abnormality. Otherwise unremarkable.

## 2019-12-13 NOTE — DISCUSSION/SUMMARY
[de-identified] : Patient was seen today for reevaluation of right hip pain. She had about 2 months of relief from the hyaluronic acid injections performed at last evaluation. Advised that this is a limited response to these medication injections, it would be more helpful to have 6-12 months of relief from these injections, however based on the patient's level of arthritis within the hip it may be the best we can obtain with hyaluronic acid. Patient has already had followup with her hip surgeon, and they are further discussing possible hip replacement surgery.\par Patient was also seen today for evaluation of chronic right knee pain. X-rays today shows she has mild patellofemoral compartment osteoarthritis. She has MRI from 2017 which was reviewed today showing mild cartilage degradation in the patellofemoral and medial compartments. Patient has full range of motion and full strength, no acute injury, I do not see any reason for repeat MRI as she has no surgical indications based on history and clinical exam. We discussed various treatment options, and patient would like to proceed with insurance authorization for hyaluronic acid as she would likely have much greater benefit in the right knee than she had in her hip.  Patient appreciates and agrees with current plan.\par \par This note was generated using dragon medical dictation software.  A reasonable effort has been made for proofreading its contents, but typos may still remain.  If there are any questions or points of clarification needed please notify my office.\par \par Recommendation: Trial of Viscosupplementation. Will obtain preauthorization prior to injection therapy.\par \par Followup: Once approved for injection therapy.\par \par \par **NB: Medication was Issued under circumstances where the provider (Dr. Jamin Ndiaye) reasonably determined that it would be impractical for the patient to obtain substances prescribed by electronic prescription (e-prescribe) given need for pre-authorization, and such delay would adversely impact the patient's medical condition. An exception letter will be mailed to the [place] [employer] during the authorization process.**\par

## 2019-12-13 NOTE — REASON FOR VISIT
[Follow-Up Visit] : a follow-up visit for [Family Member] : family member [FreeTextEntry2] : right hip and knee pain

## 2019-12-13 NOTE — HISTORY OF PRESENT ILLNESS
[de-identified] : Patient is here for re-eval of right hip pain.  She was seen in late Sept and Oct at which time she had HA injections into the hip which helped 1-2 months.  She has a return of the pain and associated knee pain as well.  Her pain is not exacerbated by any specific movements.  She notes some numbness and tingling in the RLE.  She hears a clicking in her knee, but denies locking or catching.  She was taking Oxycodone but it was making her too drowsy so she has started Tramadol which helps.  She has not been doing PT.

## 2019-12-16 ENCOUNTER — APPOINTMENT (OUTPATIENT)
Dept: ORTHOPEDIC SURGERY | Facility: CLINIC | Age: 49
End: 2019-12-16
Payer: MEDICAID

## 2019-12-16 PROCEDURE — 99212 OFFICE O/P EST SF 10 MIN: CPT

## 2019-12-16 NOTE — DISCUSSION/SUMMARY
[de-identified] : The patient presents with right hip OA and pain possible AVN possible subchondral fracture . The condition and treatment options have been discussed with the patient. At this time, the patient is a candidate for a total hip replacement based on end-stage OA on xrays and compromised quality of life and no relief form medical management to date. We have discussed the risks, benefits and alternatives to surgery. she will consider my recommendation for surgery and advise The patient expressed understanding. for now MRI right hip\par \par 1) I reviewed the plan of care as well as a model of a hip implant equivalent to the one that will be used for her right total hip joint replacement\par 2) The patient agreed to the plan of care as well as the use of implants for their total hip replacement.\par \par The patient was seen at my clinic for surgery on 9/16/19. The cryo ROM hip orthosis () is being prescribed for pain and stability of the right side prior to and/or following the surgical procedure.

## 2019-12-16 NOTE — ADDENDUM
[FreeTextEntry1] : I, Gino Rosario, acted solely as a scribe for Dr. Kelvin Serrano on 12/16/2019  .\par  \par All medical record entries made by the scribe were at my, Dr. Kelvin Serrano, direction and personally dictated by me on 12/16/2019. I have reviewed the chart and agree that the record accurately reflects my personal performance of the history, physical exam, assessment and plan. I have also personally directed, reviewed, and agreed with the chart.  Spontaneous, unlabored and symmetrical

## 2019-12-16 NOTE — HISTORY OF PRESENT ILLNESS
[de-identified] : 48 year old patient presents for follow up of right hip pain, fall 4 days ago fell in kitchen at home, and began to experience acute new onset right lateral hip pain, different than typical groin pain.. She has had  hip pain 3 years ago spontaneous onset hip and groin pain, lateral hip and buttocks pain. She had 3 hyaluronic acid injections by Dr. Ndiaye with minimal improvements to her pain. Her quality of life continues to be compromised. She has had approximately 10 grand mal seizures in the past but it is "well controlled" with medications. Taking Tramadol.\par \par

## 2019-12-16 NOTE — CONSULT LETTER
[Dear  ___] : Dear  [unfilled], [Please see my note below.] : Please see my note below. [Sincerely,] : Sincerely, [FreeTextEntry2] : REGINA KEITA [FreeTextEntry3] : Dr. Serrano

## 2019-12-16 NOTE — PHYSICAL EXAM
[de-identified] : AP Pelvis and AP/Lateral Xrays of the right  hip taken in the office today demonstrates superolateral joint space narrowing, subchondral sclerosis, subchondral fx with minimal depression femoral head, lateral acetabular minimally displaced fracture. query osteophyte versus pincer lesion \par \par MRI of the right hip taken on 08/16/2019 at [facility] in [place] reveal\par 1. Moderate right hip arthrosis. \par 2. Moderate joint effusion with synovitis. \par 3. Mild strain of the right iliopsoas muscle \par 4. Mild gluteus minimus tendinosis. \par \par xray  12/11/2019- AP pelvis and right hip \par Dr. Franklin \par Impression:\par severe right hip degenerative arthropathy\par possible osteonecrosis right femoral head\par There is a 1 cm irregular jagged, somewhat triangular-shaped ossific fragment immediately lateral to the right superior acetabular margin; cannot exclude a fracture here, of indeterminate chronicity. \par  [de-identified] : Well-nourished, in no acute distress\par Alert and oriented to time, place and person\par Skin: no lesions discoloration\par Respirations: unlabored\par Cardiac: no leg swelling\par Lymphatic: no groin adenopathy\par Right hip: passive rotation provokes pain and global prom is limited nv intact no local tenderness

## 2019-12-30 ENCOUNTER — OUTPATIENT (OUTPATIENT)
Dept: OUTPATIENT SERVICES | Facility: HOSPITAL | Age: 49
LOS: 1 days | End: 2019-12-30
Payer: MEDICAID

## 2019-12-30 VITALS
HEIGHT: 62 IN | DIASTOLIC BLOOD PRESSURE: 60 MMHG | SYSTOLIC BLOOD PRESSURE: 90 MMHG | RESPIRATION RATE: 14 BRPM | OXYGEN SATURATION: 99 % | TEMPERATURE: 98 F | WEIGHT: 110.01 LBS | HEART RATE: 77 BPM

## 2019-12-30 DIAGNOSIS — K46.9 UNSPECIFIED ABDOMINAL HERNIA WITHOUT OBSTRUCTION OR GANGRENE: Chronic | ICD-10-CM

## 2019-12-30 DIAGNOSIS — Z41.1 ENCOUNTER FOR COSMETIC SURGERY: Chronic | ICD-10-CM

## 2019-12-30 DIAGNOSIS — M19.90 UNSPECIFIED OSTEOARTHRITIS, UNSPECIFIED SITE: ICD-10-CM

## 2019-12-30 DIAGNOSIS — Z98.89 OTHER SPECIFIED POSTPROCEDURAL STATES: Chronic | ICD-10-CM

## 2019-12-30 DIAGNOSIS — Z98.890 OTHER SPECIFIED POSTPROCEDURAL STATES: Chronic | ICD-10-CM

## 2019-12-30 DIAGNOSIS — Z01.818 ENCOUNTER FOR OTHER PREPROCEDURAL EXAMINATION: ICD-10-CM

## 2019-12-30 DIAGNOSIS — M16.11 UNILATERAL PRIMARY OSTEOARTHRITIS, RIGHT HIP: ICD-10-CM

## 2019-12-30 LAB
ALBUMIN SERPL ELPH-MCNC: 3.8 G/DL — SIGNIFICANT CHANGE UP (ref 3.3–5)
ALP SERPL-CCNC: 46 U/L — SIGNIFICANT CHANGE UP (ref 30–120)
ALT FLD-CCNC: 29 U/L DA — SIGNIFICANT CHANGE UP (ref 10–60)
ANION GAP SERPL CALC-SCNC: 2 MMOL/L — LOW (ref 5–17)
APTT BLD: 34.2 SEC — SIGNIFICANT CHANGE UP (ref 28.5–37)
AST SERPL-CCNC: 21 U/L — SIGNIFICANT CHANGE UP (ref 10–40)
BILIRUB SERPL-MCNC: 0.4 MG/DL — SIGNIFICANT CHANGE UP (ref 0.2–1.2)
BLD GP AB SCN SERPL QL: SIGNIFICANT CHANGE UP
BUN SERPL-MCNC: 17 MG/DL — SIGNIFICANT CHANGE UP (ref 7–23)
CALCIUM SERPL-MCNC: 8.7 MG/DL — SIGNIFICANT CHANGE UP (ref 8.4–10.5)
CHLORIDE SERPL-SCNC: 103 MMOL/L — SIGNIFICANT CHANGE UP (ref 96–108)
CO2 SERPL-SCNC: 32 MMOL/L — HIGH (ref 22–31)
CREAT SERPL-MCNC: 0.81 MG/DL — SIGNIFICANT CHANGE UP (ref 0.5–1.3)
GLUCOSE SERPL-MCNC: 110 MG/DL — HIGH (ref 70–99)
HCT VFR BLD CALC: 37.9 % — SIGNIFICANT CHANGE UP (ref 34.5–45)
HGB BLD-MCNC: 12.7 G/DL — SIGNIFICANT CHANGE UP (ref 11.5–15.5)
INR BLD: 1.12 RATIO — SIGNIFICANT CHANGE UP (ref 0.88–1.16)
MCHC RBC-ENTMCNC: 30.1 PG — SIGNIFICANT CHANGE UP (ref 27–34)
MCHC RBC-ENTMCNC: 33.5 GM/DL — SIGNIFICANT CHANGE UP (ref 32–36)
MCV RBC AUTO: 89.8 FL — SIGNIFICANT CHANGE UP (ref 80–100)
MRSA PCR RESULT.: SIGNIFICANT CHANGE UP
NRBC # BLD: 0 /100 WBCS — SIGNIFICANT CHANGE UP (ref 0–0)
PLATELET # BLD AUTO: 223 K/UL — SIGNIFICANT CHANGE UP (ref 150–400)
POTASSIUM SERPL-MCNC: 5 MMOL/L — SIGNIFICANT CHANGE UP (ref 3.5–5.3)
POTASSIUM SERPL-SCNC: 5 MMOL/L — SIGNIFICANT CHANGE UP (ref 3.5–5.3)
PROT SERPL-MCNC: 7.3 G/DL — SIGNIFICANT CHANGE UP (ref 6–8.3)
PROTHROM AB SERPL-ACNC: 12.2 SEC — SIGNIFICANT CHANGE UP (ref 10–12.9)
RBC # BLD: 4.22 M/UL — SIGNIFICANT CHANGE UP (ref 3.8–5.2)
RBC # FLD: 11.8 % — SIGNIFICANT CHANGE UP (ref 10.3–14.5)
S AUREUS DNA NOSE QL NAA+PROBE: SIGNIFICANT CHANGE UP
SODIUM SERPL-SCNC: 137 MMOL/L — SIGNIFICANT CHANGE UP (ref 135–145)
WBC # BLD: 4.73 K/UL — SIGNIFICANT CHANGE UP (ref 3.8–10.5)
WBC # FLD AUTO: 4.73 K/UL — SIGNIFICANT CHANGE UP (ref 3.8–10.5)

## 2019-12-30 PROCEDURE — 93010 ELECTROCARDIOGRAM REPORT: CPT

## 2019-12-30 PROCEDURE — G0463: CPT

## 2019-12-30 PROCEDURE — 93005 ELECTROCARDIOGRAM TRACING: CPT

## 2019-12-30 NOTE — H&P PST ADULT - NSICDXFAMILYHX_GEN_ALL_CORE_FT
FAMILY HISTORY:  Father  Still living? Unknown  Family history of cancer, Age at diagnosis: Age Unknown    Mother  Still living? Unknown  Family history of arthritis, Age at diagnosis: Age Unknown

## 2019-12-30 NOTE — H&P PST ADULT - NSICDXPROBLEM_GEN_ALL_CORE_FT
PROBLEM DIAGNOSES  Problem: Osteoarthritis  Assessment and Plan: Right hip replacement   Medical clearance   Neuroclearance   Pre op instructions

## 2019-12-30 NOTE — H&P PST ADULT - NSANTHOSAYNRD_GEN_A_CORE
No. KATLIN screening performed.  STOP BANG Legend: 0-2 = LOW Risk; 3-4 = INTERMEDIATE Risk; 5-8 = HIGH Risk

## 2019-12-30 NOTE — H&P PST ADULT - MUSCULOSKELETAL
details… detailed exam decreased ROM due to pain/joint swelling no calf tenderness/joint swelling/decreased ROM due to pain

## 2019-12-30 NOTE — H&P PST ADULT - HISTORY OF PRESENT ILLNESS
This is 48 y/o female who presents with complaint of 1 year history of progressively worsening right hip pain with radiation to right knee . pain is intermittent and increased pain with activity . scjheduled for right hip replacement This is 50 y/o female who presents with complaint of 1 year history of progressively worsening right hip pain with radiation to right knee . pain is intermittent and increased pain with activity . scjheduled for right hip replacement on 1/9/20

## 2019-12-30 NOTE — H&P PST ADULT - NSSUBSTANCEUSE_GEN_ALL_CORE_SD
Left message for Ms. Ang, form is completed and ready for  at the front office.
caffeine/street drug/inhalant/medication abuse

## 2019-12-30 NOTE — H&P PST ADULT - NSICDXPASTMEDICALHX_GEN_ALL_CORE_FT
PAST MEDICAL HISTORY:  Anxiety     Osteoarthritis     PTSD (post-traumatic stress disorder)     Seizure first episode 2009 , second episode 2015 PAST MEDICAL HISTORY:  Anxiety     Osteoarthritis     PTSD (post-traumatic stress disorder)     Seizure first episode 2009 , second episode 2016    Shingles 2018

## 2020-01-02 ENCOUNTER — APPOINTMENT (OUTPATIENT)
Dept: ORTHOPEDIC SURGERY | Facility: CLINIC | Age: 50
End: 2020-01-02
Payer: MEDICAID

## 2020-01-02 PROCEDURE — 99213 OFFICE O/P EST LOW 20 MIN: CPT | Mod: 25

## 2020-01-02 PROCEDURE — 20610 DRAIN/INJ JOINT/BURSA W/O US: CPT | Mod: RT

## 2020-01-03 NOTE — REASON FOR VISIT
[Follow-Up Visit] : a follow-up visit for [Family Member] : family member [FreeTextEntry2] : right knee pain

## 2020-01-03 NOTE — DISCUSSION/SUMMARY
[de-identified] : Patient was seen today for reevaluation of right knee pain secondary to underlying osteoarthritis. We received denial of hyaluronic acid injection therapy by her insurance. Patient followed up today and called her insurance while she was in the office, she was able to start an appeal process.  However, the patient is currently in acute pain, and looking for more short-term pain relief. We discussed various treatment options and she elected to proceed with right knee cortisone injection today (see procedure note).  Informed the patient that the numbing medicine in today's injection will last for about 4-6 hours. The steroid that was injected will start to work in 1 to 2 days, peak at 1-2 weeks, and may last up to 1-2 months. If patient obtains insurance authorization for hyaluronic acid injection therapy she may follow up in 4 weeks or greater to start that injection office. If she does not receive authorization from her insurance, she has been advised that we do have a cash pay option available when insurance does not cover the injection therapy.  Follow up as needed.  Patient appreciates and agrees with current plan.\par \par This note was generated using dragon medical dictation software.  A reasonable effort has been made for proofreading its contents, but typos may still remain.  If there are any questions or points of clarification needed please notify my office.

## 2020-01-03 NOTE — PROCEDURE
[de-identified] : Injection: Right knee joint.\par Indication: Osteoarthritis.\par \par A discussion was had with the patient regarding this procedure and all questions were answered. All risks, benefits and alternatives were discussed. These included but were not limited to bleeding, infection, allergic reaction and reaccumulation of fluid. A timeout was done to ensure correct side and pt agreed to the procedure.  Alcohol was used to clean the skin, and betadine was used to sterilize and prep the area in the lateral joint line aspect of the knee. Ethyl chloride spray was then used as a topical anesthetic. A 22-gauge needle was used to inject 2cc of 0.25% bupivacaine without epinephrine and 1cc of 40mg/ml methylprednisolone into the knee. A sterile bandage was then applied. The patient tolerated the procedure well.\par

## 2020-01-03 NOTE — PHYSICAL EXAM
[de-identified] : Constitutional: Well-nourished, well-developed, No acute distress\par Respiratory: Good respiratory effort, no SOB\par Lymphatic: No regional lymphadenopathy, no lymphedema\par Psychiatric: Pleasant and normal affect, alert and oriented x3\par Skin: Clean dry and intact B/L UE/LE\par Musculoskeletal: normal except where as noted in regional exam\par \par Right Knee:\par APPEARANCE: no marked deformities, no swelling or malalignment\par POSITIVE TENDERNESS:  + crepitus of the anterior knee, and tenderness of patellar retinaculum\par NONTENDER: jt lines b/l, patellar & quadriceps tendons, MCL/LCL, ITB at the lateral femoral condyle & Gerdy's tubercle, pes bursa. \par ROM: full & painless, although some discomfort in deep knee flexion\par RESISTIVE TESTING: + discomfort with knee ext from deep knee flexion (stretched position), painless knee flexion. \par SPECIAL TESTS: stable v/v stress. painless grind. neg Lachman's. neg ant/post drawer. neg Jaye's. neg Thessaly test. neg John's & Malacrae's\par NEURO: Normal sensation of LE, DTRs 2+/4 patella and achilles\par PULSES: 2+ DP/PT pulses\par \par

## 2020-01-03 NOTE — HISTORY OF PRESENT ILLNESS
[de-identified] : Patient is here for right knee pain follow up. She states that she never received the denial from her insurance company regarding the HA injection for her knee. She is here to discuss what her options are. She is planning on having hip surgery. There has been no significant change in her condition since her last evaluation.

## 2020-01-06 ENCOUNTER — APPOINTMENT (OUTPATIENT)
Dept: ORTHOPEDIC SURGERY | Facility: CLINIC | Age: 50
End: 2020-01-06

## 2020-01-06 PROBLEM — M19.90 UNSPECIFIED OSTEOARTHRITIS, UNSPECIFIED SITE: Chronic | Status: ACTIVE | Noted: 2019-12-30

## 2020-01-06 PROBLEM — B02.9 ZOSTER WITHOUT COMPLICATIONS: Chronic | Status: ACTIVE | Noted: 2019-12-30

## 2020-01-07 ENCOUNTER — APPOINTMENT (OUTPATIENT)
Dept: ORTHOPEDIC SURGERY | Facility: CLINIC | Age: 50
End: 2020-01-07
Payer: MEDICAID

## 2020-01-07 VITALS
HEIGHT: 62 IN | SYSTOLIC BLOOD PRESSURE: 100 MMHG | WEIGHT: 110 LBS | DIASTOLIC BLOOD PRESSURE: 62 MMHG | BODY MASS INDEX: 20.24 KG/M2 | HEART RATE: 75 BPM

## 2020-01-07 DIAGNOSIS — M16.11 UNILATERAL PRIMARY OSTEOARTHRITIS, RIGHT HIP: ICD-10-CM

## 2020-01-07 PROCEDURE — 99215 OFFICE O/P EST HI 40 MIN: CPT

## 2020-01-07 PROCEDURE — 73502 X-RAY EXAM HIP UNI 2-3 VIEWS: CPT | Mod: RT

## 2020-01-07 NOTE — DISCUSSION/SUMMARY
[de-identified] : This patient has severe right hip osteoarthritis.  She is failed a course of conservative management and would like to proceed with a direct anterior approach right total hip arthroplasty.\par \par The patient is an appropriate candidate for consideration of right total hip replacement. An extensive discussion was conducted of the natural history of the disease and the variety of surgical and non-surgical treatment options available to the patient. A risk/benefit analysis was discussed with the patient reviewing the advantages and disadvantages of surgical intervention at this time. Both the level and length of the patient's pain have made additional conservative treatment measures consisting of physical therapy, and/or corticosteroids contraindicated. A full explanation was given of the nature and the purpose of the procedure and anesthesia, its benefits, possible alternative methods of diagnosis or treatment, the risks involved, the possibility of complications, the foreseeable consequences of the procedure and the possible results of the non-treatment. I reviewed the plan of care as well as used a model of a total hip implant equivalent to the one that will be used for their total hip joint replacement. The ability to secure the implant utilizing cement or cementless (press-fit) was discussed with the patient. The patient agrees with the plan of care, as well as the use of implants for their total hip replacement. \par \par No guarantee or assurance was made as to the results that may be obtained. Specifically, the risks were identified to include, but are not limited to the following: Infection, phlebitis, pulmonary embolism, death, paralysis, dislocation, pain, stiffness, instability, limp, weakness, breakage, leg-length inequality, uncontrolled bleeding, nerve injury, blood vessel injury, pressure sores, anesthetic risks, delayed healing of wound and bone, and wear and loosening.  Risk of femoral nerve injury was discussed.  Risk of lateral femoral cutaneous nerve injury was discussed.  Implications of these injuries were discussed.  Further discussion was undertaken with the patient about the details of surgical preparation, treatment, and postoperative rehabilitation including medical clearance, autotransfusion, the hospital course, and the postoperative rehabilitation involved. All in all, I feel that this patient is a good candidate for surgical reconstruction.\par \par At length over the course of 1.5 hours I did discuss that there will be certain limitations and restrictions on the patient after surgery such as horseback riding and mogul skiing.  I made no guarantee that she would be able to return to running.  She does understand that running will wear out the joint faster.  Patient is a very active athletic individual.  She does understand that if she overdoes it after surgery this can lead to a periprosthetic fracture or hip dislocation or failure of the implant at a young age.  She understands that she is young for this type of surgery and the implant may fail over time which may require 1 or 2 or more revisions in her lifetime.  She was quite emotional during this discussion.  She does express understanding of the limitations after the surgery.\par

## 2020-01-07 NOTE — HISTORY OF PRESENT ILLNESS
[de-identified] : This is very nice 49-year-old female experiencing right hip and groin and thigh pain, which is severe in intensity. The pain substantially limits activities of daily living. Walking tolerance is reduced. Medication including NSAIDs and activity modification have been minimally effective for a period lasting greater than three months in duration. Assistive devices and external support were not deemed by the patient to be helpful in improving their function.  She is previously tried a right hip intra-articular cortisone injection, the last injection was October 2019 which did not help to improve the pain.  She is previously completed a course of physical therapy.  Due to the severity of osteoarthritis and level of pain, further physical therapy is contraindicated. Pain and restriction of function are intolerable at this time.

## 2020-01-07 NOTE — REASON FOR VISIT
[Initial Visit] : an initial visit for [Osteoarthritis, Hip] : osteoarthritis, hip [Spouse] : spouse

## 2020-01-07 NOTE — PHYSICAL EXAM
[de-identified] : Patient is well nourished, well-developed, in no acute distress, with appropriate mood and affect. The patient is oriented to time, place, and person. Respirations are even and unlabored. Gait evaluation reveals a limp. There is no inguinal adenopathy. Examination of the contralateral hip shows normal range of motion, strength, no tenderness, and intact skin. The affected limb is well-perfused, shows a grossly normal motor and sensory examination. Examination of the hip shows no skin lesions. Hip motion is reduced and causes pain. Leg lengths are approximately short on the right by 1 cm. Stinchfield test is positive. Both hips are stable and muscle strength is normal. Pedal pulses are palpable.\par  [de-identified] : AP and lateral x-rays of the right hip, pelvis, and femur were ordered and taken in the office and demonstrate severe degenerative joint disease of the hip with joint space narrowing, osteophyte formation, and subchondral sclerosis.\par

## 2020-01-09 ENCOUNTER — APPOINTMENT (OUTPATIENT)
Dept: ORTHOPEDIC SURGERY | Facility: HOSPITAL | Age: 50
End: 2020-01-09

## 2020-01-15 RX ORDER — MELOXICAM 15 MG/1
15 TABLET ORAL
Qty: 30 | Refills: 2 | Status: ACTIVE | COMMUNITY
Start: 2020-01-15 | End: 1900-01-01

## 2020-01-22 ENCOUNTER — APPOINTMENT (OUTPATIENT)
Dept: ORTHOPEDIC SURGERY | Facility: CLINIC | Age: 50
End: 2020-01-22
Payer: MEDICAID

## 2020-01-22 PROCEDURE — 20610 DRAIN/INJ JOINT/BURSA W/O US: CPT | Mod: RT

## 2020-01-23 NOTE — DISCUSSION/SUMMARY
[de-identified] : The first of three Visco 3 injections was given today under sterile conditions into the Right  knee joint without complication (see procedure note). The patient was instructed on modification of activities over the next 48-72 hours. I advised the patient to ice the knee as needed for control of local irritation from the injection. I advised that some patients have immediate benefit from the initial injection therapy, however it usually takes the medication a number of weeks (~8wks) to provide significant relief of osteoarthritic symptoms. \par \par I  will see the patient back for the second injection in approximately 1 week.\par \par This note was generated using dragon medical dictation software. A reasonable effort has been made for proofreading its contents, but typos may still remain. If there are any questions or points of clarification needed please notify my office.

## 2020-01-23 NOTE — PHYSICAL EXAM
[de-identified] : Constitutional: Well-nourished, well-developed, No acute distress\par Respiratory: Good respiratory effort, no SOB\par Lymphatic: No regional lymphadenopathy, no lymphedema\par Psychiatric: Pleasant and normal affect, alert and oriented x3\par Skin: Clean dry and intact B/L UE/LE\par Musculoskeletal: normal except where as noted in regional exam\par \par Right Knee:\par APPEARANCE: no marked deformities, no swelling or malalignment\par POSITIVE TENDERNESS: + crepitus of the anterior knee, and tenderness of patellar retinaculum\par NONTENDER: jt lines b/l, patellar & quadriceps tendons, MCL/LCL, ITB at the lateral femoral condyle & Gerdy's tubercle, pes bursa. \par ROM: full & painless, although some discomfort in deep knee flexion\par RESISTIVE TESTING: + discomfort with knee ext from deep knee flexion (stretched position), painless knee flexion. \par SPECIAL TESTS: stable v/v stress. painless grind. neg Lachman's. neg ant/post drawer. neg Jaye's. neg Thessaly test. neg John's & Malacrae's\par NEURO: Normal sensation of LE, DTRs 2+/4 patella and achilles\par PULSES: 2+ DP/PT pulses\par

## 2020-01-23 NOTE — HISTORY OF PRESENT ILLNESS
[de-identified] : Patient is here for right knee pain follow up. She has elected to proceed with HA injections. There has been no recent injury or change in her condition.

## 2020-01-23 NOTE — PROCEDURE
[de-identified] : Injection: Right  knee joint.\par Indication: Osteoarthritis.\par \par A discussion was had with the patient regarding this procedure and all questions were answered. All risks, benefits and alternatives were discussed. These included but were not limited to bleeding, infection, and allergic reaction. Alcohol was used to clean the skin, and betadine was used to sterilize and prep the area in the lateral joint line aspect of the knee. Ethyl chloride spray was then used as a topical anesthetic. A 22-gauge needle was used to inject 2.5 cc of Visco 3 into the knee with ease. A sterile bandage was then applied. The patient tolerated the procedure well and there were no complications. \par \par Lot #:  [id number]\par Exp:  6/30/21\par

## 2020-01-27 ENCOUNTER — APPOINTMENT (OUTPATIENT)
Dept: ORTHOPEDIC SURGERY | Facility: CLINIC | Age: 50
End: 2020-01-27
Payer: MEDICAID

## 2020-01-27 DIAGNOSIS — M16.11 UNILATERAL PRIMARY OSTEOARTHRITIS, RIGHT HIP: ICD-10-CM

## 2020-01-27 PROCEDURE — 99211 OFF/OP EST MAY X REQ PHY/QHP: CPT

## 2020-01-27 NOTE — PHYSICAL EXAM
[de-identified] : Well-nourished, in no acute distress\par Alert and oriented to time, place and person\par Skin: no lesions discoloration\par Respirations: unlabored\par Cardiac: no leg swelling\par Lymphatic: no groin adenopathy \par Right hip: passive rotation provokes pain and global passive range of motion is limited nv intact no local tenderness \par  [de-identified] : AP Pelvis and AP/Lateral Xrays of the right hip taken in the office 12/16/2019 demonstrates superolateral joint space narrowing, subchondral sclerosis, subchondral fx with minimal depression femoral head, lateral acetabular minimally displaced fracture. query osteophyte versus pincer lesion

## 2020-01-27 NOTE — DISCUSSION/SUMMARY
[de-identified] : The patient presents with right hip OA and pain possible AVN possible subchondral fracture . The condition and treatment options have been discussed with the patient. At this time, the patient is a candidate for a total hip replacement based on end-stage OA on xrays and compromised quality of life and no relief form medical management to date. We have discussed the risks, benefits and alternatives to surgery. She will consider my recommendation for surgery and advise The patient expressed understanding.\par \par 1) I reviewed the plan of care as well as a model of a hip implant equivalent to the one that will be used for her right total hip joint replacement\par 2) The patient agreed to the plan of care as well as the use of implants for their total hip replacement.\par \par The patient was seen at my clinic for surgery on 1/27/2020. The cryo ROM hip orthosis () is being prescribed for pain and stability of the right side prior to and/or following the surgical procedure.

## 2020-01-27 NOTE — CONSULT LETTER
[Dear  ___] : Dear  [unfilled], [Consult Letter:] : I had the pleasure of evaluating your patient, [unfilled]. [Please see my note below.] : Please see my note below. [Consult Closing:] : Thank you very much for allowing me to participate in the care of this patient.  If you have any questions, please do not hesitate to contact me. [Sincerely,] : Sincerely, [FreeTextEntry3] : Dr. Serrano

## 2020-01-27 NOTE — ADDENDUM
[FreeTextEntry1] : I, Star Albarado, acted solely as a scribe for Dr. Jerardo Serrano on this date 01/27/2020 .\par All medical record entries made by the Scribe were at my, Dr. Jerardo Serrano, direction and personally dictated by me on 01/27/2020 . I have reviewed the chart and agree that the record accurately reflects my personal performance of the history, physical exam, assessment and plan. I have also personally directed, reviewed, and agreed with the chart.

## 2020-01-27 NOTE — HISTORY OF PRESENT ILLNESS
[de-identified] : 49 year old patient presents for follow up of right hip pain, fell in kitchen at home, and began to experience acute new onset right lateral hip pain, different than typical groin pain. She has had hip pain 3 years ago spontaneous onset hip and groin pain, lateral hip and buttocks pain. She had 3 hyaluronic acid injections by Dr. Ndiaye with minimal improvements to her pain. Her quality of life continues to be compromised. She has had approximately 10 grand mal seizures in the past but it is "well controlled" with medications. Taking Tramadol.

## 2020-01-30 ENCOUNTER — OTHER (OUTPATIENT)
Age: 50
End: 2020-01-30

## 2020-01-30 ENCOUNTER — OUTPATIENT (OUTPATIENT)
Dept: OUTPATIENT SERVICES | Facility: HOSPITAL | Age: 50
LOS: 1 days | End: 2020-01-30
Payer: MEDICAID

## 2020-01-30 VITALS
TEMPERATURE: 98 F | WEIGHT: 110.23 LBS | OXYGEN SATURATION: 98 % | SYSTOLIC BLOOD PRESSURE: 134 MMHG | HEIGHT: 61 IN | DIASTOLIC BLOOD PRESSURE: 88 MMHG | HEART RATE: 87 BPM | RESPIRATION RATE: 16 BRPM

## 2020-01-30 DIAGNOSIS — Z98.890 OTHER SPECIFIED POSTPROCEDURAL STATES: Chronic | ICD-10-CM

## 2020-01-30 DIAGNOSIS — Z41.1 ENCOUNTER FOR COSMETIC SURGERY: Chronic | ICD-10-CM

## 2020-01-30 DIAGNOSIS — M16.11 UNILATERAL PRIMARY OSTEOARTHRITIS, RIGHT HIP: ICD-10-CM

## 2020-01-30 DIAGNOSIS — Z01.818 ENCOUNTER FOR OTHER PREPROCEDURAL EXAMINATION: ICD-10-CM

## 2020-01-30 LAB
ALBUMIN SERPL ELPH-MCNC: 4 G/DL — SIGNIFICANT CHANGE UP (ref 3.3–5)
ALP SERPL-CCNC: 60 U/L — SIGNIFICANT CHANGE UP (ref 30–120)
ALT FLD-CCNC: 24 U/L DA — SIGNIFICANT CHANGE UP (ref 10–60)
ANION GAP SERPL CALC-SCNC: 8 MMOL/L — SIGNIFICANT CHANGE UP (ref 5–17)
APTT BLD: 35.1 SEC — SIGNIFICANT CHANGE UP (ref 28.5–37)
AST SERPL-CCNC: 25 U/L — SIGNIFICANT CHANGE UP (ref 10–40)
BILIRUB SERPL-MCNC: 0.3 MG/DL — SIGNIFICANT CHANGE UP (ref 0.2–1.2)
BLD GP AB SCN SERPL QL: SIGNIFICANT CHANGE UP
BUN SERPL-MCNC: 21 MG/DL — SIGNIFICANT CHANGE UP (ref 7–23)
CALCIUM SERPL-MCNC: 9.4 MG/DL — SIGNIFICANT CHANGE UP (ref 8.4–10.5)
CHLORIDE SERPL-SCNC: 105 MMOL/L — SIGNIFICANT CHANGE UP (ref 96–108)
CO2 SERPL-SCNC: 29 MMOL/L — SIGNIFICANT CHANGE UP (ref 22–31)
CREAT SERPL-MCNC: 0.84 MG/DL — SIGNIFICANT CHANGE UP (ref 0.5–1.3)
GLUCOSE SERPL-MCNC: 90 MG/DL — SIGNIFICANT CHANGE UP (ref 70–99)
HCT VFR BLD CALC: 39.8 % — SIGNIFICANT CHANGE UP (ref 34.5–45)
HGB BLD-MCNC: 13.6 G/DL — SIGNIFICANT CHANGE UP (ref 11.5–15.5)
INR BLD: 1.03 RATIO — SIGNIFICANT CHANGE UP (ref 0.88–1.16)
MCHC RBC-ENTMCNC: 30.6 PG — SIGNIFICANT CHANGE UP (ref 27–34)
MCHC RBC-ENTMCNC: 34.2 GM/DL — SIGNIFICANT CHANGE UP (ref 32–36)
MCV RBC AUTO: 89.4 FL — SIGNIFICANT CHANGE UP (ref 80–100)
MRSA PCR RESULT.: SIGNIFICANT CHANGE UP
NRBC # BLD: 0 /100 WBCS — SIGNIFICANT CHANGE UP (ref 0–0)
PLATELET # BLD AUTO: 257 K/UL — SIGNIFICANT CHANGE UP (ref 150–400)
POTASSIUM SERPL-MCNC: 4.2 MMOL/L — SIGNIFICANT CHANGE UP (ref 3.5–5.3)
POTASSIUM SERPL-SCNC: 4.2 MMOL/L — SIGNIFICANT CHANGE UP (ref 3.5–5.3)
PROT SERPL-MCNC: 7.8 G/DL — SIGNIFICANT CHANGE UP (ref 6–8.3)
PROTHROM AB SERPL-ACNC: 11.5 SEC — SIGNIFICANT CHANGE UP (ref 10–12.9)
RBC # BLD: 4.45 M/UL — SIGNIFICANT CHANGE UP (ref 3.8–5.2)
RBC # FLD: 11.6 % — SIGNIFICANT CHANGE UP (ref 10.3–14.5)
S AUREUS DNA NOSE QL NAA+PROBE: SIGNIFICANT CHANGE UP
SODIUM SERPL-SCNC: 142 MMOL/L — SIGNIFICANT CHANGE UP (ref 135–145)
WBC # BLD: 4.99 K/UL — SIGNIFICANT CHANGE UP (ref 3.8–10.5)
WBC # FLD AUTO: 4.99 K/UL — SIGNIFICANT CHANGE UP (ref 3.8–10.5)

## 2020-01-30 PROCEDURE — G0463: CPT

## 2020-01-30 NOTE — H&P PST ADULT - NSICDXPASTMEDICALHX_GEN_ALL_CORE_FT
PAST MEDICAL HISTORY:  Anxiety     Osteoarthritis     PTSD (post-traumatic stress disorder)     Seizure first episode 2009 , second episode 2016    Shingles 2018

## 2020-01-30 NOTE — H&P PST ADULT - HISTORY OF PRESENT ILLNESS
This is 50 y/o female who presents with complaint of 1 year history of progressively worsening right hip pain with radiation to right knee . pain is intermittent and increased pain with activity . scheduled for right hip replacement on 1/9/20. Cancelled surgery from January 2020  and is rescheduled

## 2020-01-30 NOTE — H&P PST ADULT - PAIN, FACTORS THAT RELIEVE, PROFILE
Date of visit: 3/27/2019    Chief complaint:   Chief Complaint   Patient presents with   • Back Pain       Interval history:  Taya Terrell is a 61 year old female last seen by me on 3/7/2019    Recommendations/plan at the last visit included:  1. Physical Therapy:  Continue PT  2. Clinical Health Psychologist to address issues of relaxation, behavioral change, coping style, and other factors important to improvement.  Schedule appointment as able  3. Diagnostic Studies:  Lumbar MRI w/o contrast for further evaluation of L2-3 stenosis, L2 pars defect  4. Medication Management:    1. Continue Voltaren gel  5. Further procedures recommended:   1. Scheduled for bilateral SI joint injections on 3/11/19  2. Consider L2-3 ILESI in the future pending results of MRI    6. Follow up: procedure/2 weeks procedure  7. Release of information: Will call Dr. Myers's office and find out what was the plan in regards to implanted bone stimulator device (hx of s/p L3-S1 fusion with internal bone stimulator device 7/9/2015 [Revision of previous L4-5 PLDF with L3-S1 PLDF and TLIF x2])    Since her last visit, Taya Terrell reports:  She is s/p bilateral SI joint injections on 3/11/19 which provided 100% relief of her back and buttock pain  Today she has dull, achy pain in her upper back  The pain is worse with standing   It improves with laying down  She describes this as an achy, tingling, pressure, numb type of pain   She has not yet had her MRI done because of uncertainty regarding her biomet bone stimulator device MRI compatability    Pain scores:  Pain intensity on average is 1.5-2 on a scale of 0-10.     Current pain treatments:   Voltaren gel - helping    Past pain treatments:  Moprhine 15 mg q12hrs  Lyrica  Norco  Lidocaine patches  Nortriptyline  Gabapentin  Baclofen  Naproxen  Tizandine    Side Effects: none    Medications:  Current Outpatient Medications   Medication Sig Dispense Refill   • diclofenac (VOLTAREN) 1 % gel  Apply 2-4 g topically 4 times daily as needed (for pain). Apply to the painful area. 300 g 2   • atorvastatin (LIPITOR) 10 MG tablet Take 1 tablet by mouth daily. 90 tablet 3   • pantoprazole (PROTONIX) 40 MG tablet Take 1 tablet by mouth daily. 30 tablet 11   • fluticasone-salmeterol (ADVAIR DISKUS) 100-50 MCG/DOSE inhaler Inhale 1 puff into the lungs two times daily. 1 each 11   • albuterol (PROAIR HFA) 108 (90 Base) MCG/ACT inhaler Inhale 2 puffs into the lungs every 4 hours as needed for Shortness of Breath or Wheezing. 1 Inhaler 12     No current facility-administered medications for this visit.        Medical History: any changes in medical history since they were last seen? No    Review of Systems:  The 14 system ROS was reviewed from the intake questionnaire, and I agree with documentation per RN's notes.  Any bowel or bladder problems: None  Mood: NAD    Physical Exam:  Visit Vitals  /79   Pulse 74   SpO2 99%     General: NAD  Gait: Normal  MSK exam: deferred for convservation    Assessment:   Taya is a 61 year old female with a past medical history of GERD, depression, arthritis, and hx of s/p L3-S1 fusion with internal bone stimulator device who presents for a follow up after SI joint injections.    She had bilateral SI joint injections on 3/11/19 which have provided 100% relief of her buttock pain. She now has pain in her upper torso that does radiate into     1. Spinal stenosis, lumbar region, without neurogenic claudication    2. Pars defect of lumbar spine    3. Status post lumbar spinal fusion    4. Sacroiliac joint dysfunction of both sides    5. Spondylosis without myelopathy or radiculopathy, thoracolumbar region         CT reviewed with patient in great detail. No pseudoarthrosis, however L2 pars fracture and L2-3 stenosis noted. She is symptomatic. Will obtain lumbar MRI for further evaluation of L2 pars fracture and L2-3 stenosis. Will call [facility] to determine status of MRI compatibility  of bone stimulator. Will schedule for L2-3 ILESI after results of MRI.     Will reach out to Dr. Myers's office to determine what the plan was in regards to implanted bone stimulator device.     Plan:  1. Physical Therapy:  Continue PT  2. Clinical Health Psychologist to address issues of relaxation, behavioral change, coping style, and other factors important to improvement.  Schedule appointment as able  3. Diagnostic Studies:  Lumbar MRI w/o contrast for further evaluation of L2-3 stenosis, L2 pars defect  4. Medication Management:    1. Continue Voltaren gel  5. Further procedures recommended:   1. Schedule for L2-3 ILESI in the future pending results of MRI  6. Follow up: 2 weeks to review MRI  7. Release of information: Will call Dr. Myers's office and find out what was the plan in regards to implanted bone stimulator device (hx of s/p L3-S1 fusion with internal bone stimulator device (STIMULATOR SPNL 60FR MINI OSTGN SPF IMPL FSN STRL - [id number]) 7/9/2015 [Revision of previous L4-5 PLDF with L3-S1 PLDF and TLIF x2])      I spent 25 minutes of time face to face with the patient.  Greater than 50% of this time was spent in patient counseling  and/or coordination of care.    Eunice Simpson MD  Interventional Pain Management  [facility]   rest

## 2020-02-05 ENCOUNTER — TRANSCRIPTION ENCOUNTER (OUTPATIENT)
Age: 50
End: 2020-02-05

## 2020-02-06 ENCOUNTER — APPOINTMENT (OUTPATIENT)
Dept: ORTHOPEDIC SURGERY | Facility: HOSPITAL | Age: 50
End: 2020-02-06

## 2020-02-06 ENCOUNTER — INPATIENT (INPATIENT)
Facility: HOSPITAL | Age: 50
LOS: 3 days | Discharge: HOME | DRG: 469 | End: 2020-02-10
Attending: ORTHOPAEDIC SURGERY | Admitting: ORTHOPAEDIC SURGERY
Payer: MEDICAID

## 2020-02-06 ENCOUNTER — RESULT REVIEW (OUTPATIENT)
Age: 50
End: 2020-02-06

## 2020-02-06 ENCOUNTER — TRANSCRIPTION ENCOUNTER (OUTPATIENT)
Age: 50
End: 2020-02-06

## 2020-02-06 VITALS
RESPIRATION RATE: 16 BRPM | HEIGHT: 62 IN | WEIGHT: 109.79 LBS | TEMPERATURE: 98 F | SYSTOLIC BLOOD PRESSURE: 102 MMHG | OXYGEN SATURATION: 100 % | DIASTOLIC BLOOD PRESSURE: 66 MMHG | HEART RATE: 73 BPM

## 2020-02-06 DIAGNOSIS — Z01.818 ENCOUNTER FOR OTHER PREPROCEDURAL EXAMINATION: ICD-10-CM

## 2020-02-06 DIAGNOSIS — Z98.890 OTHER SPECIFIED POSTPROCEDURAL STATES: Chronic | ICD-10-CM

## 2020-02-06 DIAGNOSIS — M16.11 UNILATERAL PRIMARY OSTEOARTHRITIS, RIGHT HIP: ICD-10-CM

## 2020-02-06 DIAGNOSIS — Z41.1 ENCOUNTER FOR COSMETIC SURGERY: Chronic | ICD-10-CM

## 2020-02-06 LAB
ANION GAP SERPL CALC-SCNC: 5 MMOL/L — SIGNIFICANT CHANGE UP (ref 5–17)
BUN SERPL-MCNC: 7 MG/DL — SIGNIFICANT CHANGE UP (ref 7–23)
CALCIUM SERPL-MCNC: 7.5 MG/DL — LOW (ref 8.4–10.5)
CHLORIDE SERPL-SCNC: 103 MMOL/L — SIGNIFICANT CHANGE UP (ref 96–108)
CO2 SERPL-SCNC: 28 MMOL/L — SIGNIFICANT CHANGE UP (ref 22–31)
CREAT SERPL-MCNC: 0.77 MG/DL — SIGNIFICANT CHANGE UP (ref 0.5–1.3)
GLUCOSE SERPL-MCNC: 182 MG/DL — HIGH (ref 70–99)
HCG UR QL: NEGATIVE — SIGNIFICANT CHANGE UP
HCT VFR BLD CALC: 21.2 % — LOW (ref 34.5–45)
HCT VFR BLD CALC: 29.8 % — LOW (ref 34.5–45)
HGB BLD-MCNC: 10.4 G/DL — LOW (ref 11.5–15.5)
HGB BLD-MCNC: 7.5 G/DL — LOW (ref 11.5–15.5)
MCHC RBC-ENTMCNC: 30.9 PG — SIGNIFICANT CHANGE UP (ref 27–34)
MCHC RBC-ENTMCNC: 34.9 GM/DL — SIGNIFICANT CHANGE UP (ref 32–36)
MCV RBC AUTO: 88.4 FL — SIGNIFICANT CHANGE UP (ref 80–100)
NRBC # BLD: 0 /100 WBCS — SIGNIFICANT CHANGE UP (ref 0–0)
PLATELET # BLD AUTO: 141 K/UL — LOW (ref 150–400)
POTASSIUM SERPL-MCNC: 4.5 MMOL/L — SIGNIFICANT CHANGE UP (ref 3.5–5.3)
POTASSIUM SERPL-SCNC: 4.5 MMOL/L — SIGNIFICANT CHANGE UP (ref 3.5–5.3)
RBC # BLD: 3.37 M/UL — LOW (ref 3.8–5.2)
RBC # FLD: 12 % — SIGNIFICANT CHANGE UP (ref 10.3–14.5)
SODIUM SERPL-SCNC: 136 MMOL/L — SIGNIFICANT CHANGE UP (ref 135–145)
WBC # BLD: 10.83 K/UL — HIGH (ref 3.8–10.5)
WBC # FLD AUTO: 10.83 K/UL — HIGH (ref 3.8–10.5)

## 2020-02-06 PROCEDURE — 27130 TOTAL HIP ARTHROPLASTY: CPT | Mod: AS,RT

## 2020-02-06 PROCEDURE — 88305 TISSUE EXAM BY PATHOLOGIST: CPT | Mod: 26

## 2020-02-06 PROCEDURE — 99222 1ST HOSP IP/OBS MODERATE 55: CPT

## 2020-02-06 PROCEDURE — 27130 TOTAL HIP ARTHROPLASTY: CPT | Mod: RT

## 2020-02-06 PROCEDURE — 88311 DECALCIFY TISSUE: CPT | Mod: 26

## 2020-02-06 RX ORDER — SENNA PLUS 8.6 MG/1
2 TABLET ORAL AT BEDTIME
Refills: 0 | Status: DISCONTINUED | OUTPATIENT
Start: 2020-02-06 | End: 2020-02-07

## 2020-02-06 RX ORDER — PANTOPRAZOLE SODIUM 20 MG/1
40 TABLET, DELAYED RELEASE ORAL
Refills: 0 | Status: DISCONTINUED | OUTPATIENT
Start: 2020-02-06 | End: 2020-02-10

## 2020-02-06 RX ORDER — APREPITANT 80 MG/1
40 CAPSULE ORAL ONCE
Refills: 0 | Status: COMPLETED | OUTPATIENT
Start: 2020-02-06 | End: 2020-02-06

## 2020-02-06 RX ORDER — DIPHENHYDRAMINE HCL 50 MG
25 CAPSULE ORAL ONCE
Refills: 0 | Status: DISCONTINUED | OUTPATIENT
Start: 2020-02-06 | End: 2020-02-06

## 2020-02-06 RX ORDER — ONDANSETRON 8 MG/1
4 TABLET, FILM COATED ORAL ONCE
Refills: 0 | Status: DISCONTINUED | OUTPATIENT
Start: 2020-02-06 | End: 2020-02-06

## 2020-02-06 RX ORDER — POLYETHYLENE GLYCOL 3350 17 G/17G
17 POWDER, FOR SOLUTION ORAL AT BEDTIME
Refills: 0 | Status: DISCONTINUED | OUTPATIENT
Start: 2020-02-06 | End: 2020-02-10

## 2020-02-06 RX ORDER — ESCITALOPRAM OXALATE 10 MG/1
10 TABLET, FILM COATED ORAL DAILY
Refills: 0 | Status: DISCONTINUED | OUTPATIENT
Start: 2020-02-06 | End: 2020-02-10

## 2020-02-06 RX ORDER — TRANEXAMIC ACID 100 MG/ML
1000 INJECTION, SOLUTION INTRAVENOUS ONCE
Refills: 0 | Status: COMPLETED | OUTPATIENT
Start: 2020-02-06 | End: 2020-02-06

## 2020-02-06 RX ORDER — CEFAZOLIN SODIUM 1 G
2000 VIAL (EA) INJECTION EVERY 8 HOURS
Refills: 0 | Status: COMPLETED | OUTPATIENT
Start: 2020-02-06 | End: 2020-02-07

## 2020-02-06 RX ORDER — SODIUM CHLORIDE 9 MG/ML
1000 INJECTION, SOLUTION INTRAVENOUS
Refills: 0 | Status: DISCONTINUED | OUTPATIENT
Start: 2020-02-06 | End: 2020-02-07

## 2020-02-06 RX ORDER — MAGNESIUM HYDROXIDE 400 MG/1
30 TABLET, CHEWABLE ORAL AT BEDTIME
Refills: 0 | Status: DISCONTINUED | OUTPATIENT
Start: 2020-02-06 | End: 2020-02-10

## 2020-02-06 RX ORDER — CELECOXIB 200 MG/1
200 CAPSULE ORAL EVERY 12 HOURS
Refills: 0 | Status: DISCONTINUED | OUTPATIENT
Start: 2020-02-06 | End: 2020-02-10

## 2020-02-06 RX ORDER — CHLORHEXIDINE GLUCONATE 213 G/1000ML
1 SOLUTION TOPICAL ONCE
Refills: 0 | Status: COMPLETED | OUTPATIENT
Start: 2020-02-06 | End: 2020-02-06

## 2020-02-06 RX ORDER — ONDANSETRON 8 MG/1
4 TABLET, FILM COATED ORAL EVERY 6 HOURS
Refills: 0 | Status: DISCONTINUED | OUTPATIENT
Start: 2020-02-06 | End: 2020-02-07

## 2020-02-06 RX ORDER — OXYCODONE HYDROCHLORIDE 5 MG/1
10 TABLET ORAL
Refills: 0 | Status: DISCONTINUED | OUTPATIENT
Start: 2020-02-06 | End: 2020-02-10

## 2020-02-06 RX ORDER — HYDROMORPHONE HYDROCHLORIDE 2 MG/ML
0.5 INJECTION INTRAMUSCULAR; INTRAVENOUS; SUBCUTANEOUS
Refills: 0 | Status: DISCONTINUED | OUTPATIENT
Start: 2020-02-06 | End: 2020-02-06

## 2020-02-06 RX ORDER — DIPHENHYDRAMINE HCL 50 MG
25 CAPSULE ORAL ONCE
Refills: 0 | Status: DISCONTINUED | OUTPATIENT
Start: 2020-02-06 | End: 2020-02-10

## 2020-02-06 RX ORDER — ACETAMINOPHEN 500 MG
1000 TABLET ORAL EVERY 8 HOURS
Refills: 0 | Status: DISCONTINUED | OUTPATIENT
Start: 2020-02-07 | End: 2020-02-10

## 2020-02-06 RX ORDER — CEFAZOLIN SODIUM 1 G
2000 VIAL (EA) INJECTION ONCE
Refills: 0 | Status: COMPLETED | OUTPATIENT
Start: 2020-02-06 | End: 2020-02-06

## 2020-02-06 RX ORDER — ACETAMINOPHEN 500 MG
1000 TABLET ORAL ONCE
Refills: 0 | Status: COMPLETED | OUTPATIENT
Start: 2020-02-06 | End: 2020-02-06

## 2020-02-06 RX ORDER — MIDODRINE HYDROCHLORIDE 2.5 MG/1
5 TABLET ORAL EVERY 8 HOURS
Refills: 0 | Status: DISCONTINUED | OUTPATIENT
Start: 2020-02-06 | End: 2020-02-07

## 2020-02-06 RX ORDER — ACETAMINOPHEN 500 MG
1000 TABLET ORAL EVERY 6 HOURS
Refills: 0 | Status: COMPLETED | OUTPATIENT
Start: 2020-02-06 | End: 2020-02-07

## 2020-02-06 RX ORDER — SODIUM CHLORIDE 9 MG/ML
1000 INJECTION, SOLUTION INTRAVENOUS
Refills: 0 | Status: DISCONTINUED | OUTPATIENT
Start: 2020-02-06 | End: 2020-02-06

## 2020-02-06 RX ORDER — ALPRAZOLAM 0.25 MG
0.25 TABLET ORAL THREE TIMES A DAY
Refills: 0 | Status: DISCONTINUED | OUTPATIENT
Start: 2020-02-06 | End: 2020-02-10

## 2020-02-06 RX ORDER — HYDROMORPHONE HYDROCHLORIDE 2 MG/ML
0.5 INJECTION INTRAMUSCULAR; INTRAVENOUS; SUBCUTANEOUS
Refills: 0 | Status: DISCONTINUED | OUTPATIENT
Start: 2020-02-06 | End: 2020-02-10

## 2020-02-06 RX ORDER — LAMOTRIGINE 25 MG/1
300 TABLET, ORALLY DISINTEGRATING ORAL
Refills: 0 | Status: DISCONTINUED | OUTPATIENT
Start: 2020-02-06 | End: 2020-02-06

## 2020-02-06 RX ORDER — OXYCODONE HYDROCHLORIDE 5 MG/1
5 TABLET ORAL
Refills: 0 | Status: DISCONTINUED | OUTPATIENT
Start: 2020-02-06 | End: 2020-02-10

## 2020-02-06 RX ORDER — ASPIRIN/CALCIUM CARB/MAGNESIUM 324 MG
81 TABLET ORAL EVERY 12 HOURS
Refills: 0 | Status: DISCONTINUED | OUTPATIENT
Start: 2020-02-07 | End: 2020-02-10

## 2020-02-06 RX ORDER — SODIUM CHLORIDE 9 MG/ML
1000 INJECTION, SOLUTION INTRAVENOUS
Refills: 0 | Status: DISCONTINUED | OUTPATIENT
Start: 2020-02-06 | End: 2020-02-08

## 2020-02-06 RX ADMIN — Medication 400 MILLIGRAM(S): at 22:55

## 2020-02-06 RX ADMIN — Medication 400 MILLIGRAM(S): at 17:11

## 2020-02-06 RX ADMIN — SODIUM CHLORIDE 100 MILLILITER(S): 9 INJECTION, SOLUTION INTRAVENOUS at 13:03

## 2020-02-06 RX ADMIN — CELECOXIB 200 MILLIGRAM(S): 200 CAPSULE ORAL at 21:15

## 2020-02-06 RX ADMIN — Medication 1000 MILLIGRAM(S): at 23:35

## 2020-02-06 RX ADMIN — CELECOXIB 200 MILLIGRAM(S): 200 CAPSULE ORAL at 21:45

## 2020-02-06 RX ADMIN — HYDROMORPHONE HYDROCHLORIDE 0.5 MILLIGRAM(S): 2 INJECTION INTRAMUSCULAR; INTRAVENOUS; SUBCUTANEOUS at 13:03

## 2020-02-06 RX ADMIN — HYDROMORPHONE HYDROCHLORIDE 0.5 MILLIGRAM(S): 2 INJECTION INTRAMUSCULAR; INTRAVENOUS; SUBCUTANEOUS at 22:14

## 2020-02-06 RX ADMIN — HYDROMORPHONE HYDROCHLORIDE 0.5 MILLIGRAM(S): 2 INJECTION INTRAMUSCULAR; INTRAVENOUS; SUBCUTANEOUS at 21:29

## 2020-02-06 RX ADMIN — MIDODRINE HYDROCHLORIDE 5 MILLIGRAM(S): 2.5 TABLET ORAL at 18:32

## 2020-02-06 RX ADMIN — Medication 100 MILLIGRAM(S): at 17:35

## 2020-02-06 RX ADMIN — HYDROMORPHONE HYDROCHLORIDE 0.5 MILLIGRAM(S): 2 INJECTION INTRAMUSCULAR; INTRAVENOUS; SUBCUTANEOUS at 13:30

## 2020-02-06 RX ADMIN — CHLORHEXIDINE GLUCONATE 1 APPLICATION(S): 213 SOLUTION TOPICAL at 07:20

## 2020-02-06 RX ADMIN — APREPITANT 40 MILLIGRAM(S): 80 CAPSULE ORAL at 07:20

## 2020-02-06 RX ADMIN — Medication 1000 MILLIGRAM(S): at 17:45

## 2020-02-06 NOTE — DISCHARGE NOTE PROVIDER - NSDCFUADDAPPT_GEN_ALL_CORE_FT
[facility] office  It is advisable to follow up with your primary care provider within the next 2-3 weeks to ensure your medications are appropriate and there are no underlying problems after your procedure.

## 2020-02-06 NOTE — CONSULT NOTE ADULT - ASSESSMENT
50 yo f pmh anxiety s/p R THR    #S/P R THR  - post op orders per ortho  - pain management  - bowl regimen   - PT eval    #Anxiety  - continue lamotrogine and lexapro    #dvt proph  - per ortho 48 yo f pmh anxiety s/p R THR    #S/P R THR  - post op orders per ortho  - pain management  - bowl regimen   - PT eval    #anemia 2/2 blood loss  obtain baseline labs.  repeat cbc after transfusion    #Anxiety  - continue lexapro    #seizures  -continue lamotrogine    #dvt proph  - per ortho
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
## 2020-02-06 NOTE — DISCHARGE NOTE PROVIDER - INSTRUCTIONS
regular  For Constipation :   • Increase your water intake. Drink at least 8 glasses of water daily.  • Try adding fiber to your diet by eating fruits, vegetables and foods that are rich in grains.  • If you do experience constipation, you may take an over-the-counter stool softener/laxative such as Leena Colace, Senekot, miralax or  Milk of Magnesia. regular  For Constipation :   • Increase your water intake. Drink at least 8 glasses of water daily.  • Try adding fiber to your diet by eating fruits, vegetables and foods that are rich in grains.  • If you do experience constipation, you may take an over-the-counter stool softener/laxative such as Leena Colace, Senokot, Miralax or  Milk of Magnesia.

## 2020-02-06 NOTE — PRE-OP CHECKLIST - STERILIZATION AFFIRMATION
Pt aware and voiced understanding. Informed patient of any recommendations from providers. Will call with any further questions. n/a

## 2020-02-06 NOTE — DISCHARGE NOTE PROVIDER - CARE PROVIDER_API CALL
JACKIE Serrano (MD)  Orthopaedic Surgery  [address], [address]  [place] [address]  Phone: [phone]  Fax: [phone]  Established Patient  Scheduled Appointment: 02/24/2020 02:30 PM

## 2020-02-06 NOTE — DISCHARGE NOTE PROVIDER - NSDCMRMEDTOKEN_GEN_ALL_CORE_FT
escitalopram 10 mg oral tablet: 1 tab(s) orally once a day  ibuprofen 400 mg oral tablet:   lamoTRIgine 300 mg oral tablet, extended release: 1 tab(s) orally once a day  Xanax 0.25 mg oral tablet: 1 tab(s) orally 3 times a day, As Needed for anxiety 3:1 Commode: Dx: s/p Right Anterior THR  acetaminophen 500 mg oral tablet: 2 tab(s) orally every 8 hours  aspirin 81 mg oral delayed release tablet: 1 tab orally every 12 hours. Take 2 hours before Celebrex.  celecoxib 200 mg oral capsule: 1 cap orally every 12 hours (21 days for HO ppx). Take 2 hours after Aspirin  escitalopram 10 mg oral tablet: 1 tab(s) orally once a day  FeroSul 325 mg (65 mg elemental iron) oral tablet: 1 tab orally once a day   rika rolling walker with 5 inch wheels: Dx: s/p Right Anterior THR  lamoTRIgine 300 mg oral tablet, extended release: 1 tab(s) orally once a day  oxyCODONE 5 mg oral tablet: 1 tab orally every 4 hours, As Needed Moderate Pain; 2 tabs orally every 4 hours for severe pain MDD:6   Ref #[id number]  pantoprazole 40 mg oral delayed release tablet: 1 tab(s) orally once a day (before a meal)  polyethylene glycol 3350 oral powder for reconstitution: 17 gram(s) orally once a day (at bedtime)  senna oral tablet: 2 tab(s) orally once a day (at bedtime)  Xanax 0.25 mg oral tablet: 1 tab(s) orally 3 times a day, As Needed for anxiety 3:1 Commode: Dx: s/p Right Anterior THR  acetaminophen 500 mg oral tablet: 2 tab(s) orally every 8 hours  aspirin 81 mg oral delayed release tablet: 1 tab orally every 12 hours. Take 2 hours before Celebrex.  celecoxib 200 mg oral capsule: 1 cap orally every 12 hours (21 days for HO ppx). Take 2 hours after Aspirin  escitalopram 10 mg oral tablet: 1 tab(s) orally once a day  FeroSul 325 mg (65 mg elemental iron) oral tablet: 1 tab orally once a day   rika rolling walker with 5 inch wheels: Dx: s/p Right Anterior THR  lamoTRIgine 300 mg oral tablet, extended release: 1 tab(s) orally once a day  oxyCODONE 5 mg oral tablet: 1 tab orally every 4 hours, As Needed Moderate Pain; 2 tabs orally every 4 hours for severe pain MDD:6   Ref #[id number]  pantoprazole 40 mg oral delayed release tablet: 1 tab(s) orally once a day (before a meal)  polyethylene glycol 3350 oral powder for reconstitution: 17 gram(s) orally once a day (at bedtime)  senna oral tablet: 2 tab(s) orally once a day (at bedtime)  Xanax 0.25 mg oral tablet: 1 tab(s) orally 3 times a day, As Needed for anxiety 3:1 Commode: Dx: s/p Right Anterior THR  acetaminophen 500 mg oral tablet: 2 tab(s) orally every 8 hours  aspirin 81 mg oral delayed release tablet: 1 tab orally every 12 hours. Take 2 hours before Celebrex.  celecoxib 200 mg oral capsule: 1 cap orally every 12 hours (21 days for HO ppx). Take 2 hours after Aspirin  escitalopram 10 mg oral tablet: 1 tab(s) orally once a day  FeroSul 325 mg (65 mg elemental iron) oral tablet: 1 tab orally once a day   rika rolling walker with 5 inch wheels: Dx: s/p Right Anterior THR  lamoTRIgine 300 mg oral tablet, extended release: 1 tab(s) orally once a day  oxyCODONE 5 mg oral tablet: 1 tab orally every 4 hours, As Needed Moderate Pain; 2 tabs orally every 4 hours for severe pain MDD:6   Ref #[id number]  pantoprazole 40 mg oral delayed release tablet: 1 tab(s) orally once a day (before a meal)  polyethylene glycol 3350 oral powder for reconstitution: 17 gram(s) orally once a day (at bedtime)  senna oral tablet: 2 tab(s) orally once a day (at bedtime)  Xanax 0.25 mg oral tablet: 1 tab(s) orally 3 times a day, As Needed for anxiety

## 2020-02-06 NOTE — PROGRESS NOTE ADULT - SUBJECTIVE AND OBJECTIVE BOX
Orthopaedic Post Op Check Note    Procedure: Right anterior total hip replacement  POD 0    49y Female  appears comfortable, without complaints. Patient states pain is well controlled.  Denies N/V, CP, SOB, sensation to RLE still resolving, but improving. Patient with large EBL, receiving second unit of PRBCs at time of post-op check.     PE:  Vital Signs Last 24 Hrs  T(C): 37.2 (06 Feb 2020 12:27), Max: 37.2 (06 Feb 2020 12:27)  T(F): 99 (06 Feb 2020 12:27), Max: 99 (06 Feb 2020 12:27)  HR: 92 (06 Feb 2020 15:45) (70 - 119)  BP: 81/54 (06 Feb 2020 15:45) (80/44 - 117/93)  BP(mean): --  RR: 14 (06 Feb 2020 14:55) (10 - 20)  SpO2: 100% (06 Feb 2020 14:55) (99% - 100%)    General: Pt alert and oriented   Right hip Aquacel Dressing: C/D/I   Bilateral LEs:  Motor:   5/5  TA/GS/ EHL  Quads/hams firing bilaterally  Sensation intact to LT   + DP Pulses  SCDs in place  HV with minimal output    Post Op labs:                              7.5    x     )-----------( x        ( 06 Feb 2020 12:27 )             21.2       A/P: 49y Female POD#0 s/p right anterior total hip replacement  - Stable  - Continue   - Continue to monitor H/H s/p transfusion  - DVT ppx: ASA 81mg q 12hr  - Continue Leena op IV abx per SCIP protocol  - PT, OT consult, WBAT  - F/U AM Labs  - Medicine to follow  - Discharge planning: Home vs Rehab still TBD

## 2020-02-06 NOTE — DISCHARGE NOTE PROVIDER - NSDCCPCAREPLAN_GEN_ALL_CORE_FT
PRINCIPAL DISCHARGE DIAGNOSIS  Diagnosis: Primary osteoarthritis of right hip  Assessment and Plan of Treatment: right anterior KATERYNA  Physical Therapy/Occupational Therapy for: Ambulation, transfers, stairs, & ADLs, isometrics.   Full weight bearing on both legs; Walker/cane use as instructed by Physical therapy/Occupational therapy. Anterior Total Hip Replacement precautions for  6 weeks: No straight leg raise; No external rotation of hip when extended-standing or lying flat; No hyperextension of hip when standing (kickback).   Ice packs to hip for 30 min. every 3 hours and after physical therapy.   Keep incision clean and dry. May shower 5 days after surgery if no drainage from incision.  Prineo tape removal on/near after Post Op Day # 14 in Surgeon's office.  • Do not take a tub bath yet.   • Do not resume driving until you have your surgeon’s permission.      SECONDARY DISCHARGE DIAGNOSES  Diagnosis: Anxiety  Assessment and Plan of Treatment: lexapro, xanax    Diagnosis: Seizure disorder  Assessment and Plan of Treatment: Lamotrignine PRINCIPAL DISCHARGE DIAGNOSIS  Diagnosis: Primary osteoarthritis of right hip  Assessment and Plan of Treatment: right anterior KATERYNA  Physical Therapy/Occupational Therapy for: Ambulation, transfers, stairs, & ADLs, isometrics.   Full weight bearing on both legs; Walker/cane use as instructed by Physical therapy/Occupational therapy. Anterior Total Hip Replacement precautions for  6 weeks: No straight leg raise; No external rotation of hip when extended-standing or lying flat; No hyperextension of hip when standing (kickback).   Ice packs to hip for 30 min. every 3 hours and after physical therapy.   Keep incision clean and dry. May shower 5 days after surgery if no drainage from   Prineo tape removal on/near after Post Op Day # 14 in Surgeon's office.  • Do not take a tub bath yet.   • Do not resume driving until you have your surgeon’s permission.  STOP LOPREEZA X 1 MONTH  STOP VAPING X 6 WEEKS      SECONDARY DISCHARGE DIAGNOSES  Diagnosis: Anxiety  Assessment and Plan of Treatment: lexapro, xanax    Diagnosis: Seizure disorder  Assessment and Plan of Treatment: Lamotrignine

## 2020-02-06 NOTE — DISCHARGE NOTE PROVIDER - PROVIDER TOKENS
PROVIDER:[TOKEN:[[id number]],SCHEDULEDAPPT:[02/24/2020],SCHEDULEDAPPTTIME:[02:30 PM],ESTABLISHEDPATIENT:[T]]

## 2020-02-06 NOTE — PROGRESS NOTE ADULT - SUBJECTIVE AND OBJECTIVE BOX
Ortho Post Op Check    Procedure:right anterior KATERYNA  Surgeon: Zach    Pt comfortable without complaints, pain controlled  Denies CP, SOB, N/V, numbness/tingling   Pain Rx:  acetaminophen  IVPB .. 1000 milliGRAM(s) IV Intermittent every 6 hours  HYDROmorphone  Injectable 0.5 milliGRAM(s) IV Push every 10 minutes PRN  lamoTRIgine  milliGRAM(s) Oral <User Schedule>  ondansetron Injectable 4 milliGRAM(s) IV Push once PRN  promethazine IVPB 6.25 milliGRAM(s) IV Intermittent once      General Exam:  Vital Signs Last 24 Hrs  T(C): 37.2 (02-06-20 @ 12:27), Max: 37.2 (02-06-20 @ 12:27)  T(F): 99 (02-06-20 @ 12:27), Max: 99 (02-06-20 @ 12:27)  HR: 92 (02-06-20 @ 15:45) (70 - 119)  BP: 81/54 (02-06-20 @ 15:45) (80/44 - 117/93)  BP(mean): --  RR: 14 (02-06-20 @ 14:55) (10 - 20)  SpO2: 100% (02-06-20 @ 14:55) (99% - 100%)    General: Pt Alert and oriented, NAD, controlled pain.  Heart:RRR no murmur  Lungs: clear  Abdomen: BS+ soft  Right hip dressing dry.  hemovac in place.  rodriguez in place   Neuro/Vasc: Feet toes warm, pink. DP = 2+ No calf tenderness bilat.. Has sensation over feet & toes bilat. Full AROM bilat feet & toes. EHL = 5/5  VTEP: On Venodynes Bilat + BID Ecotrin       A/P: 49yFemale POD#0 s/p right ant KATERYNA  - Stable  - Pain Control  - DVT ppx:  - Post op abx:  - PT eval pending  - Weight bearing status: wbat  -d/c plan -- home (poss rehab)

## 2020-02-06 NOTE — BRIEF OPERATIVE NOTE - COMMENTS
implatns ; acet 50 mm alteon G4 cup w/ 1 screw and 0 degree XLE vit E liner, femur #8 ciorail standard offset doc stem w/ 36+5 biolox head

## 2020-02-06 NOTE — DISCHARGE NOTE PROVIDER - HOSPITAL COURSE
The patient is a 49  y.o. female with severe osteoarthritis of the right hip.  She was seen in the PST dept at [facility] and obtained the appropriate medical clearances.    After admission on 2/6/20 and receiving pre-operative parenteral prophylactic antibiotics (ancef), the patient  underwent an  uncomplicated right anterior KATERYNA by orthopedic surgeon Dr. Serrano.      A medical consultation from the Hospitalist service was obtained for post-operative medical co-management. Typical Physical & occupational therapy modalities post anterior KATERYNA were performed including ambulation training, range of motion, ADL's, and transfers. Ecotrin 81 mg every 12 hrs, along with bilat venodynes, was given for DVT prophylaxis.    The patient had a clean appearing surgical incision with no sign of surgical site infections and had a stable neuro / vascular exam of the operated extremity.  After progression of mobility guided by the PT/ OT staff,  the patient was felt to benefit from further rehabilitative care for restoration to level of function. This was felt to best be accomplished in a ________________.  Discharge and Orthopedic Care instructions were delineated in the Discharge Plan and reviewed with the patient. All medications were delineated in the medication reconciliation tool and key points were reviewed with the patient. They were deemed stable from an Orthopedic & medical standpoint for discharge today.    Upon ( discharge from the rehab facility ) or (completion of Home care ) they will be  following up with Dr. Serrano for office  follow up Orthopedic care. The patient is a 49  y.o. female with severe osteoarthritis of the right hip.  She was seen in the PST dept at [facility] and obtained the appropriate medical clearances.    After admission on 2/6/20 and receiving pre-operative parenteral prophylactic antibiotics (ancef), the patient  underwent an  uncomplicated right anterior KATERYNA by orthopedic surgeon Dr. Serrano.  Due to blood loss during surgery and hypotension while in PACU, she was transfused 2 units PRBC (H/H= 7.5/21)    A medical consultation from the Hospitalist service was obtained for post-operative medical co-management. Typical Physical & occupational therapy modalities post anterior KATERYNA were performed including ambulation training, range of motion, ADL's, and transfers. Ecotrin 81 mg every 12 hrs, along with bilat venodynes, was given for DVT prophylaxis.    The patient had a clean appearing surgical incision with no sign of surgical site infections and had a stable neuro / vascular exam of the operated extremity.  After progression of mobility guided by the PT/ OT staff,  the patient was felt to benefit from further rehabilitative care for restoration to level of function. This was felt to best be accomplished in a ________________.  Discharge and Orthopedic Care instructions were delineated in the Discharge Plan and reviewed with the patient. All medications were delineated in the medication reconciliation tool and key points were reviewed with the patient. They were deemed stable from an Orthopedic & medical standpoint for discharge today.    Upon ( discharge from the rehab facility ) or (completion of Home care ) they will be  following up with Dr. Serrano for office  follow up Orthopedic care. The patient is a 49  y.o. female with severe osteoarthritis of the right hip.  She was seen in the PST dept at [facility] and obtained the appropriate medical clearances.    After admission on 2/6/20 and receiving pre-operative parenteral prophylactic antibiotics (ancef), the patient  underwent right anterior KATERYNA by orthopedic surgeon Dr. Serrano.  Due to blood loss during surgery and hypotension while in PACU, she was transfused 2 units PRBC (H/H= 7.5/21).  Patient was transferred to SPCU post-op for hemodynamic monitoring secondary to blood loss anemia, hypotension and tachycardia. A medical consultation from the Hospitalist service and Critical Care was obtained for post-operative medical co-management. Typical Physical & occupational therapy modalities post anterior KATERYNA were performed including ambulation training, range of motion, ADL's, and transfers. Ecotrin 81 mg every 12 hrs, along with bilat venodynes, was given for DVT prophylaxis. The patient had a clean appearing surgical incision with no sign of surgical site infections and had a stable neuro / vascular exam of the operated extremity.  After progression of mobility guided by the PT/ OT staff,  the patient was felt to benefit from further rehabilitative care for restoration to level of function. This was felt to best be accomplished at home. Discharge and Orthopedic Care instructions were delineated in the Discharge Plan and reviewed with the patient. All medications were delineated in the medication reconciliation tool and key points were reviewed with the patient. She was deemed stable from an Orthopedic & medical standpoint for discharge today.

## 2020-02-06 NOTE — DISCHARGE NOTE PROVIDER - NSDCFUSCHEDAPPT_GEN_ALL_CORE_FT
NAT THOMASON ; 02/24/2020 ; [facility] NAT THOMASON ; 02/24/2020 ; [facility] NAT THOMASON ; 02/24/2020 ; [facility] NAT THOMASON ; 02/24/2020 ; [facility] NAT THOMASON ; 02/24/2020 ; [facility] NAT THOMASON ; 02/24/2020 ; [facility] NAT THOMASON ; 02/24/2020 ; [facility]

## 2020-02-06 NOTE — CHART NOTE - NSCHARTNOTEFT_GEN_A_CORE
Patient had EBL of 1000mL during elective Hip replacement surgery.  She has acute blood loss anemia on labs.  Hgb is currently 7.5 from baseline of 13. Currently tachycardic and BP 80/50.  She is s/p transfusion 2U PRBC.  Denies chest pain but has headaches and light headedness. Good Urine output.  Reviewed H&P from PST and her BP normally runs with SBP in the 90's. She excercises frequently and that is normal for her.  Ordered Midodrine for 24 hours and repeat labs at 7PM.  Monitor Hgb and may need transfusion depending on symptoms due to needing to participate in rehab.   Will transfer to SPCU for overnight monitoring of hemodynamics.

## 2020-02-06 NOTE — DISCHARGE NOTE PROVIDER - NSDCCPTREATMENT_GEN_ALL_CORE_FT
PRINCIPAL PROCEDURE  Procedure: Arthroplasty of right hip by anterior approach  Findings and Treatment: osteoarthritis right hip

## 2020-02-07 DIAGNOSIS — R56.9 UNSPECIFIED CONVULSIONS: ICD-10-CM

## 2020-02-07 DIAGNOSIS — R57.8 OTHER SHOCK: ICD-10-CM

## 2020-02-07 DIAGNOSIS — M16.11 UNILATERAL PRIMARY OSTEOARTHRITIS, RIGHT HIP: ICD-10-CM

## 2020-02-07 DIAGNOSIS — F41.9 ANXIETY DISORDER, UNSPECIFIED: ICD-10-CM

## 2020-02-07 DIAGNOSIS — D50.0 IRON DEFICIENCY ANEMIA SECONDARY TO BLOOD LOSS (CHRONIC): ICD-10-CM

## 2020-02-07 LAB
ANION GAP SERPL CALC-SCNC: 6 MMOL/L — SIGNIFICANT CHANGE UP (ref 5–17)
BUN SERPL-MCNC: 9 MG/DL — SIGNIFICANT CHANGE UP (ref 7–23)
CALCIUM SERPL-MCNC: 7.8 MG/DL — LOW (ref 8.4–10.5)
CHLORIDE SERPL-SCNC: 112 MMOL/L — HIGH (ref 96–108)
CO2 SERPL-SCNC: 28 MMOL/L — SIGNIFICANT CHANGE UP (ref 22–31)
CREAT SERPL-MCNC: 0.81 MG/DL — SIGNIFICANT CHANGE UP (ref 0.5–1.3)
GLUCOSE SERPL-MCNC: 106 MG/DL — HIGH (ref 70–99)
HCT VFR BLD CALC: 26.1 % — LOW (ref 34.5–45)
HGB BLD-MCNC: 8.8 G/DL — LOW (ref 11.5–15.5)
MCHC RBC-ENTMCNC: 29.7 PG — SIGNIFICANT CHANGE UP (ref 27–34)
MCHC RBC-ENTMCNC: 33.7 GM/DL — SIGNIFICANT CHANGE UP (ref 32–36)
MCV RBC AUTO: 88.2 FL — SIGNIFICANT CHANGE UP (ref 80–100)
NRBC # BLD: 0 /100 WBCS — SIGNIFICANT CHANGE UP (ref 0–0)
PLATELET # BLD AUTO: 132 K/UL — LOW (ref 150–400)
POTASSIUM SERPL-MCNC: 4.1 MMOL/L — SIGNIFICANT CHANGE UP (ref 3.5–5.3)
POTASSIUM SERPL-SCNC: 4.1 MMOL/L — SIGNIFICANT CHANGE UP (ref 3.5–5.3)
RBC # BLD: 2.96 M/UL — LOW (ref 3.8–5.2)
RBC # FLD: 12.2 % — SIGNIFICANT CHANGE UP (ref 10.3–14.5)
SODIUM SERPL-SCNC: 146 MMOL/L — HIGH (ref 135–145)
WBC # BLD: 7.26 K/UL — SIGNIFICANT CHANGE UP (ref 3.8–10.5)
WBC # FLD AUTO: 7.26 K/UL — SIGNIFICANT CHANGE UP (ref 3.8–10.5)

## 2020-02-07 PROCEDURE — 99233 SBSQ HOSP IP/OBS HIGH 50: CPT

## 2020-02-07 RX ORDER — ASPIRIN/CALCIUM CARB/MAGNESIUM 324 MG
1 TABLET ORAL
Qty: 82 | Refills: 0
Start: 2020-02-07 | End: 2020-03-18

## 2020-02-07 RX ORDER — ACETAMINOPHEN 500 MG
2 TABLET ORAL
Qty: 0 | Refills: 0 | DISCHARGE
Start: 2020-02-07

## 2020-02-07 RX ORDER — PANTOPRAZOLE SODIUM 20 MG/1
1 TABLET, DELAYED RELEASE ORAL
Qty: 30 | Refills: 1
Start: 2020-02-07 | End: 2020-04-06

## 2020-02-07 RX ORDER — ONDANSETRON 8 MG/1
4 TABLET, FILM COATED ORAL ONCE
Refills: 0 | Status: COMPLETED | OUTPATIENT
Start: 2020-02-07 | End: 2020-02-07

## 2020-02-07 RX ORDER — IRON SUCROSE 20 MG/ML
100 INJECTION, SOLUTION INTRAVENOUS EVERY 24 HOURS
Refills: 0 | Status: DISCONTINUED | OUTPATIENT
Start: 2020-02-07 | End: 2020-02-07

## 2020-02-07 RX ORDER — SENNA PLUS 8.6 MG/1
2 TABLET ORAL AT BEDTIME
Refills: 0 | Status: DISCONTINUED | OUTPATIENT
Start: 2020-02-07 | End: 2020-02-10

## 2020-02-07 RX ORDER — LIDOCAINE 4 G/100G
1 CREAM TOPICAL DAILY
Refills: 0 | Status: DISCONTINUED | OUTPATIENT
Start: 2020-02-07 | End: 2020-02-10

## 2020-02-07 RX ORDER — ONDANSETRON 8 MG/1
4 TABLET, FILM COATED ORAL EVERY 8 HOURS
Refills: 0 | Status: DISCONTINUED | OUTPATIENT
Start: 2020-02-07 | End: 2020-02-10

## 2020-02-07 RX ORDER — PANTOPRAZOLE SODIUM 20 MG/1
40 TABLET, DELAYED RELEASE ORAL ONCE
Refills: 0 | Status: DISCONTINUED | OUTPATIENT
Start: 2020-02-07 | End: 2020-02-07

## 2020-02-07 RX ORDER — MIDODRINE HYDROCHLORIDE 2.5 MG/1
10 TABLET ORAL EVERY 8 HOURS
Refills: 0 | Status: DISCONTINUED | OUTPATIENT
Start: 2020-02-07 | End: 2020-02-08

## 2020-02-07 RX ORDER — FERROUS SULFATE 325(65) MG
325 TABLET ORAL DAILY
Refills: 0 | Status: DISCONTINUED | OUTPATIENT
Start: 2020-02-07 | End: 2020-02-10

## 2020-02-07 RX ORDER — CELECOXIB 200 MG/1
1 CAPSULE ORAL
Qty: 60 | Refills: 0
Start: 2020-02-07 | End: 2020-03-07

## 2020-02-07 RX ORDER — SENNA PLUS 8.6 MG/1
2 TABLET ORAL
Qty: 0 | Refills: 0 | DISCHARGE
Start: 2020-02-07

## 2020-02-07 RX ORDER — POLYETHYLENE GLYCOL 3350 17 G/17G
17 POWDER, FOR SOLUTION ORAL
Qty: 0 | Refills: 0 | DISCHARGE
Start: 2020-02-07

## 2020-02-07 RX ORDER — IRON SUCROSE 20 MG/ML
100 INJECTION, SOLUTION INTRAVENOUS EVERY 24 HOURS
Refills: 0 | Status: COMPLETED | OUTPATIENT
Start: 2020-02-07 | End: 2020-02-09

## 2020-02-07 RX ADMIN — OXYCODONE HYDROCHLORIDE 10 MILLIGRAM(S): 5 TABLET ORAL at 20:16

## 2020-02-07 RX ADMIN — OXYCODONE HYDROCHLORIDE 10 MILLIGRAM(S): 5 TABLET ORAL at 19:56

## 2020-02-07 RX ADMIN — Medication 81 MILLIGRAM(S): at 08:15

## 2020-02-07 RX ADMIN — CELECOXIB 200 MILLIGRAM(S): 200 CAPSULE ORAL at 22:12

## 2020-02-07 RX ADMIN — CELECOXIB 200 MILLIGRAM(S): 200 CAPSULE ORAL at 21:34

## 2020-02-07 RX ADMIN — Medication 100 MILLIGRAM(S): at 00:09

## 2020-02-07 RX ADMIN — PANTOPRAZOLE SODIUM 40 MILLIGRAM(S): 20 TABLET, DELAYED RELEASE ORAL at 06:53

## 2020-02-07 RX ADMIN — HYDROMORPHONE HYDROCHLORIDE 0.5 MILLIGRAM(S): 2 INJECTION INTRAMUSCULAR; INTRAVENOUS; SUBCUTANEOUS at 11:01

## 2020-02-07 RX ADMIN — Medication 400 MILLIGRAM(S): at 05:07

## 2020-02-07 RX ADMIN — Medication 1000 MILLIGRAM(S): at 22:10

## 2020-02-07 RX ADMIN — OXYCODONE HYDROCHLORIDE 10 MILLIGRAM(S): 5 TABLET ORAL at 03:02

## 2020-02-07 RX ADMIN — LIDOCAINE 1 PATCH: 4 CREAM TOPICAL at 19:23

## 2020-02-07 RX ADMIN — POLYETHYLENE GLYCOL 3350 17 GRAM(S): 17 POWDER, FOR SOLUTION ORAL at 21:36

## 2020-02-07 RX ADMIN — ESCITALOPRAM OXALATE 10 MILLIGRAM(S): 10 TABLET, FILM COATED ORAL at 14:37

## 2020-02-07 RX ADMIN — MIDODRINE HYDROCHLORIDE 10 MILLIGRAM(S): 2.5 TABLET ORAL at 21:36

## 2020-02-07 RX ADMIN — HYDROMORPHONE HYDROCHLORIDE 0.5 MILLIGRAM(S): 2 INJECTION INTRAMUSCULAR; INTRAVENOUS; SUBCUTANEOUS at 23:02

## 2020-02-07 RX ADMIN — Medication 1000 MILLIGRAM(S): at 13:10

## 2020-02-07 RX ADMIN — LIDOCAINE 1 PATCH: 4 CREAM TOPICAL at 23:49

## 2020-02-07 RX ADMIN — Medication 0.25 MILLIGRAM(S): at 02:39

## 2020-02-07 RX ADMIN — SENNA PLUS 2 TABLET(S): 8.6 TABLET ORAL at 21:36

## 2020-02-07 RX ADMIN — CELECOXIB 200 MILLIGRAM(S): 200 CAPSULE ORAL at 08:15

## 2020-02-07 RX ADMIN — Medication 1000 MILLIGRAM(S): at 05:48

## 2020-02-07 RX ADMIN — ONDANSETRON 4 MILLIGRAM(S): 8 TABLET, FILM COATED ORAL at 14:36

## 2020-02-07 RX ADMIN — OXYCODONE HYDROCHLORIDE 10 MILLIGRAM(S): 5 TABLET ORAL at 15:06

## 2020-02-07 RX ADMIN — OXYCODONE HYDROCHLORIDE 10 MILLIGRAM(S): 5 TABLET ORAL at 14:36

## 2020-02-07 RX ADMIN — OXYCODONE HYDROCHLORIDE 10 MILLIGRAM(S): 5 TABLET ORAL at 08:15

## 2020-02-07 RX ADMIN — LIDOCAINE 1 PATCH: 4 CREAM TOPICAL at 12:18

## 2020-02-07 RX ADMIN — Medication 1000 MILLIGRAM(S): at 21:35

## 2020-02-07 RX ADMIN — Medication 325 MILLIGRAM(S): at 13:10

## 2020-02-07 RX ADMIN — SODIUM CHLORIDE 125 MILLILITER(S): 9 INJECTION, SOLUTION INTRAVENOUS at 12:17

## 2020-02-07 RX ADMIN — OXYCODONE HYDROCHLORIDE 10 MILLIGRAM(S): 5 TABLET ORAL at 09:15

## 2020-02-07 RX ADMIN — MIDODRINE HYDROCHLORIDE 5 MILLIGRAM(S): 2.5 TABLET ORAL at 08:15

## 2020-02-07 RX ADMIN — SODIUM CHLORIDE 125 MILLILITER(S): 9 INJECTION, SOLUTION INTRAVENOUS at 21:36

## 2020-02-07 RX ADMIN — HYDROMORPHONE HYDROCHLORIDE 0.5 MILLIGRAM(S): 2 INJECTION INTRAMUSCULAR; INTRAVENOUS; SUBCUTANEOUS at 23:20

## 2020-02-07 RX ADMIN — CELECOXIB 200 MILLIGRAM(S): 200 CAPSULE ORAL at 09:15

## 2020-02-07 RX ADMIN — Medication 0.25 MILLIGRAM(S): at 16:41

## 2020-02-07 RX ADMIN — HYDROMORPHONE HYDROCHLORIDE 0.5 MILLIGRAM(S): 2 INJECTION INTRAMUSCULAR; INTRAVENOUS; SUBCUTANEOUS at 10:31

## 2020-02-07 RX ADMIN — ONDANSETRON 4 MILLIGRAM(S): 8 TABLET, FILM COATED ORAL at 16:41

## 2020-02-07 RX ADMIN — OXYCODONE HYDROCHLORIDE 10 MILLIGRAM(S): 5 TABLET ORAL at 02:39

## 2020-02-07 RX ADMIN — Medication 1000 MILLIGRAM(S): at 14:10

## 2020-02-07 RX ADMIN — Medication 81 MILLIGRAM(S): at 19:48

## 2020-02-07 RX ADMIN — MIDODRINE HYDROCHLORIDE 5 MILLIGRAM(S): 2.5 TABLET ORAL at 00:09

## 2020-02-07 NOTE — PROGRESS NOTE ADULT - ASSESSMENT
49 year old female PMHx OA,  POD 0 s/p R Anterior Total Hip Replacement.    High EBL with ABLA and Vasoplegia post-op.     2 units PRBC's with appropriate response   to 10.4 / 29.8  This is still below base line admission Hgb   Remains Hypotensive MAP 55-60   Mentating well - UOP > 1200 cc overnight   No signs of over shock despite post-operative vasoplegia  Cont Midodrine   F/U AM labs - H/H 49 year old female PMHx OA,  POD 0 s/p R Anterior Total Hip Replacement.    High EBL with ABLA and Vasoplegia post-op.     2 units PRBC's with appropriate response   to 10.4 / 29.8  This is still below base line admission Hgb   Remains Hypotensive MAP 55-60   Mentating well - UOP > 1200 cc overnight   Hemovac only 28cc out overnight   No signs of over shock despite post-operative vasoplegia  Cont Midodrine   F/U AM labs - H/H

## 2020-02-07 NOTE — OCCUPATIONAL THERAPY INITIAL EVALUATION ADULT - NS ASR FOLLOW COMMAND OT EVAL
Patient educated regarding fall prevention and home/hospital safety. Pt educated on use of AE/DME recommended for safety & the need to call for assistance. Patient with good understanding. Role of OT and patient goals discussed. Patient educated regarding diagnosis specific precautions and provided with educational folder including goals, expectations and ther ex. Pt educated on role and goal of OT. Discharge planning and recommendations reviewed with the patient. Case management/ Social work notified ./100% of the time
100% of the time

## 2020-02-07 NOTE — PROGRESS NOTE ADULT - SUBJECTIVE AND OBJECTIVE BOX
HPI: 48 y/o F w/ pmh of OA, seizure d/o, anxiety, PTSD, now s/p R. anterior THR on 2/6/2020 hospital course c/b high EBL w/ ABLA and vasoplegia post-op received appx 2U PRBC and admitted to the SPCU for hypotension. Pt now POD#1 reports having pain to the R. hip but otherwise denies any other medical complains. Pt reports no BM or passing gas yet but tolerated po this morning.     24 hour events: No major overnight events.    Review of Systems:  Constitutional: No fever, chills, fatigue  Neuro: No headache, numbness, weakness  Resp: No cough, wheezing, shortness of breath  CVS: No chest pain, palpitations, leg swelling  GI: No abdominal pain, nausea, vomiting, diarrhea   : No dysuria, frequency, incontinence  Skin: No itching, burning, rashes, or lesions   Msk: No joint pain or swelling, +R. hip pain     T(F): 98.6 (02-07-20 @ 08:00), Max: 99.2 (02-07-20 @ 00:02)  HR: 98 (02-07-20 @ 08:00) (70 - 119)  BP: 80/53 (02-07-20 @ 08:00) (70/44 - 117/93)  RR: 17 (02-07-20 @ 08:00) (10 - 27)  SpO2: 98% (02-07-20 @ 08:00) (97% - 100%)  Wt(kg): --      02-06-20 @ 07:01  -  02-07-20 @ 07:00  --------------------------------------------------------  IN: 7474 mL / OUT: 4228 mL / NET: 3246 mL    02-07-20 @ 07:01  -  02-07-20 @ 10:02  --------------------------------------------------------  IN: 125 mL / OUT: 0 mL / NET: 125 mL        CAPILLARY BLOOD GLUCOSE          I&O's Summary    06 Feb 2020 07:01  -  07 Feb 2020 07:00  --------------------------------------------------------  IN: 7474 mL / OUT: 4228 mL / NET: 3246 mL    07 Feb 2020 07:01  -  07 Feb 2020 10:02  --------------------------------------------------------  IN: 125 mL / OUT: 0 mL / NET: 125 mL        Physical Exam:   Gen: Comfortable in bed in NAD, speaking in full and clear sentences  Neuro: AAOx3  HEENT: PERRL  Resp: CTA b/l  CVS: +RRR  Abd: BSx4, soft, nt/nd  Ext:  Skin:    Meds:    midodrine Oral      diphenhydrAMINE   Injectable IV Push    acetaminophen   Tablet .. Oral  ALPRAZolam Oral PRN  celecoxib Oral  escitalopram Oral  HYDROmorphone  Injectable IV Push PRN  ondansetron Injectable IV Push PRN  oxyCODONE    IR Oral PRN  oxyCODONE    IR Oral PRN  promethazine IVPB IV Intermittent      aspirin enteric coated Oral    aluminum hydroxide/magnesium hydroxide/simethicone Suspension Oral PRN  aluminum hydroxide/magnesium hydroxide/simethicone Suspension Oral PRN  magnesium hydroxide Suspension Oral PRN  pantoprazole    Tablet Oral  polyethylene glycol 3350 Oral  senna Oral PRN      ferrous    sulfate Oral  lactated ringers. IV Continuous        lamotrigine 300mg ER tablet 1 Tablet(s) Oral                            8.8    7.26  )-----------( 132      ( 07 Feb 2020 06:11 )             26.1       02-07    146<H>  |  112<H>  |  9   ----------------------------<  106<H>  4.1   |  28  |  0.81    Ca    7.8<L>      07 Feb 2020 06:11        Radiology: ***  Bedside ultrasound: ***    CENTRAL LINE: N/Y          DATE INSERTED:              REMOVE: Y/N  FIELD: N/Y                       DATE INSERTED:              REMOVE: Y/N  A-LINE: N/Y                       DATE INSERTED:              REMOVE: Y/N    GLOBAL ISSUE/BEST PRACTICE:  Analgesia:  Sedation:  CAM-ICU:   HOB elevation: yes  Stress ulcer prophylaxis:  VTE prophylaxis:  Glycemic control:  Nutrition:    CODE STATUS: *** HPI: 48 y/o F w/ pmh of OA, seizure d/o, anxiety, PTSD, now s/p R. anterior THR on 2/6/2020 hospital course c/b high EBL w/ ABLA and vasoplegia post-op received appx 2U PRBC and admitted to the SPCU for hypotension. Pt now POD#1 reports having pain to the R. hip but otherwise denies any other medical complains. Pt reports no BM or passing gas yet but tolerated po this morning.     24 hour events: No major overnight events.    Review of Systems:  Constitutional: No fever, chills, fatigue  Neuro: No headache, numbness, weakness  Resp: No cough, wheezing, shortness of breath  CVS: No chest pain, palpitations, leg swelling  GI: No abdominal pain, nausea, vomiting, diarrhea   : No dysuria, frequency, incontinence  Skin: No itching, burning, rashes, or lesions   Msk: No joint pain or swelling, +R. hip pain     T(F): 98.6 (02-07-20 @ 08:00), Max: 99.2 (02-07-20 @ 00:02)  HR: 98 (02-07-20 @ 08:00) (70 - 119)  BP: 80/53 (02-07-20 @ 08:00) (70/44 - 117/93)  RR: 17 (02-07-20 @ 08:00) (10 - 27)  SpO2: 98% (02-07-20 @ 08:00) (97% - 100%)  Wt(kg): --      02-06-20 @ 07:01  -  02-07-20 @ 07:00  --------------------------------------------------------  IN: 7474 mL / OUT: 4228 mL / NET: 3246 mL    02-07-20 @ 07:01  -  02-07-20 @ 10:02  --------------------------------------------------------  IN: 125 mL / OUT: 0 mL / NET: 125 mL        CAPILLARY BLOOD GLUCOSE          I&O's Summary    06 Feb 2020 07:01  -  07 Feb 2020 07:00  --------------------------------------------------------  IN: 7474 mL / OUT: 4228 mL / NET: 3246 mL    07 Feb 2020 07:01  -  07 Feb 2020 10:02  --------------------------------------------------------  IN: 125 mL / OUT: 0 mL / NET: 125 mL        Physical Exam:   Gen: Comfortable in bed in NAD, speaking in full and clear sentences  Neuro: AAOx3  HEENT: PERRL  Resp: CTA b/l  CVS: +RRR  Abd: BSx4, soft, nt/nd  Ext: R. hip with dressing c/d/i, no edema  Skin: warn/dry    Meds:    midodrine Oral      diphenhydrAMINE   Injectable IV Push    acetaminophen   Tablet .. Oral  ALPRAZolam Oral PRN  celecoxib Oral  escitalopram Oral  HYDROmorphone  Injectable IV Push PRN  ondansetron Injectable IV Push PRN  oxyCODONE    IR Oral PRN  oxyCODONE    IR Oral PRN  promethazine IVPB IV Intermittent      aspirin enteric coated Oral    aluminum hydroxide/magnesium hydroxide/simethicone Suspension Oral PRN  aluminum hydroxide/magnesium hydroxide/simethicone Suspension Oral PRN  magnesium hydroxide Suspension Oral PRN  pantoprazole    Tablet Oral  polyethylene glycol 3350 Oral  senna Oral PRN      ferrous    sulfate Oral  lactated ringers. IV Continuous        lamotrigine 300mg ER tablet 1 Tablet(s) Oral                            8.8    7.26  )-----------( 132      ( 07 Feb 2020 06:11 )             26.1       02-07    146<H>  |  112<H>  |  9   ----------------------------<  106<H>  4.1   |  28  |  0.81    Ca    7.8<L>      07 Feb 2020 06:11          CENTRAL LINE: N  FIELD: Y  A-LINE: N    GLOBAL ISSUE/BEST PRACTICE:  Analgesia: Y  Sedation: N  CAM-ICU: n/a  HOB elevation: Y  Stress ulcer prophylaxis: Y  VTE prophylaxis: Y  Glycemic control: Y  Nutrition: Y    CODE STATUS: FULL CODE

## 2020-02-07 NOTE — OCCUPATIONAL THERAPY INITIAL EVALUATION ADULT - PLANNED THERAPY INTERVENTIONS, OT EVAL
transfer training/bed mobility training/ADL retraining
ADL retraining/IADL retraining/transfer training

## 2020-02-07 NOTE — PROGRESS NOTE ADULT - SUBJECTIVE AND OBJECTIVE BOX
Procedure: Right / Left Anterior THR  POD#: 1    S: Pt without complaints. No SOB,CP, N/V. Tolerated Fluids / Diet well.   Pain comfortable on Interval Rx  + Tylenol + Celebrex. No BM yet  + flatus, No abdominal pain.  Says she doesn't want to walk today.   Pain Rx:   acetaminophen   Tablet .. 1000 milliGRAM(s) Oral every 8 hours  ALPRAZolam 0.25 milliGRAM(s) Oral three times a day PRN  celecoxib 200 milliGRAM(s) Oral every 12 hours  escitalopram 10 milliGRAM(s) Oral daily  HYDROmorphone  Injectable 0.5 milliGRAM(s) IV Push every 3 hours PRN  ondansetron Injectable 4 milliGRAM(s) IV Push every 6 hours PRN  oxyCODONE    IR 5 milliGRAM(s) Oral every 3 hours PRN  oxyCODONE    IR 10 milliGRAM(s) Oral every 3 hours PRN  promethazine IVPB 6.25 milliGRAM(s) IV Intermittent once    O: General: Pt Alert and oriented, On exam NAD,   VS: Vital Signs Last 24 Hrs  T(C): 37.2 (07 Feb 2020 04:02), Max: 37.3 (07 Feb 2020 00:02)  T(F): 98.9 (07 Feb 2020 04:02), Max: 99.2 (07 Feb 2020 00:02)  HR: 98 (07 Feb 2020 06:00) (70 - 119)  BP: 78/47 (07 Feb 2020 06:00) (70/44 - 117/93)  BP(mean): 56 (07 Feb 2020 06:00) (54 - 71)  RR: 26 (07 Feb 2020 06:00) (10 - 27)  SpO2: 100% (07 Feb 2020 06:00) (97% - 100%)  Heart: RRR  Lungs: BS clear bilat.  Abdomen: Soft; no distention, benign exam    Ext: Right Ant. Hip: Aquacel  Dressing ; clean, dry, & intact, thigh soft, tender to light palpation  Neurologic: Has sensation bilat. feet & toes ;  Full AROM bilat feet & toes. EHL / AT  = Bilat: 5/5 ; Sensation Present mid lateral thigh  Vascular: Feet toes warm, pink. DP = 2+. Calves soft ; w/o tenderness bilat..   Exercises reviewed and performed by pt  VTEP: On Bilat. Venodynes +   aspirin enteric coated 81 milliGRAM(s) Oral every 12 hours      H.O Prophylaxis: Celebrex 200mg BID - Goal 21 Days                           8.8    7.26  )-----------( 132      ( 07 Feb 2020 06:11 )             26.1     02-07    146<H>  |  112<H>  |  9   ----------------------------<  106<H>  4.1   |  28  |  0.81    Ca    7.8<L>      07 Feb 2020 06:11        Hospitalist input noted    Primary Orthopedic Assessment:  • Stable from Orthopedic perspective  • Neuro motor exam stable:   • Labs: post op anemia  -- hypotension       Plan:   • Continue:  PT/OT/Weightbearing as tolerated with assistance of a walker/Anterior THR precautions/Ice to hip/          Incentive spirometry encouraged / Celebrex for HO PPX  • Continue DVT prophylaxis as prescribed, including use of compression devices and ankle pumps  • Continue Pain Rx  • Anterior hip precautions reviewed with patient  • Plans per Medicine   • Discharge planning – anticipated discharge is Home D/C with home care & home PT  when medically stable & cleared by PT/OT  -- importance of ambulation stressed.

## 2020-02-07 NOTE — OCCUPATIONAL THERAPY INITIAL EVALUATION ADULT - ADL RETRAINING, OT EVAL
Patient will dress lower body I'ly , AE as needed within 2-3 sessions.
Patient will dress lower body with minimal assistance, AE as needed with in 3-5 sessions.

## 2020-02-07 NOTE — OCCUPATIONAL THERAPY INITIAL EVALUATION ADULT - ADDITIONAL COMMENTS
Pt lives alone in an apartment off her parents home. +stall shower shower chair PTA pt used a SAC for ambulation.
Lives alone in a building with elevator. Tub in her bathroom. Has a cane and a higher toilet.   is in long term care . Family is not able to assist

## 2020-02-07 NOTE — PROGRESS NOTE ADULT - ASSESSMENT
ASSESSMENT   1.   2.   3.   4.   5.     PLAN     NEURO:     PULM:      CV:     GI:      :     ENDO:     ID:     HEME/DVT PPX:     ETHICS        CODE STATUS: Full Code       Care discussed with bedside provider and eICU attending.   Time Spent:30 min ASSESSMENT   49 year old female PMHx OA,  POD 0 s/p R Anterior Total Hip Replacement. Pt admitted to the SPCU post operatively for high EBL with ABLA with hypotension and Vasoplegia    1. ABLA   2. Hypotension   3. Dizziness     PLAN     -ON pt feeling dizzy and having pain. Pt morning Hbg dropped from 10.4/29.8 to 8.8/26.1 in 6 hours after 2 units.   -Pt ordered for a repeat CBC ON and refused blood draw, does not want to be stuck with needle  -Pt was ordered for PO and IV Fe and does not want any meds through the IV including Dilaudid  -Spoke with patient at the bedside and advised that although dizziness may be from gurwinder medication, that it also may be possible she us experience symptomatic anemia, and that it is important that we repeat her CBC.   -Pt still refusing blood draw at this time and imaging study, and will think about it  -Pt receiving PO oxycodone and tylenol for pain at this time. Pain controlled ON. SBP 90-80, pt states SBP normally in the 90's. ON increased pt midodrine from 5  to 10 mg q8h   - L SCD  -out of bed ti chair   -ASA    ETHICS        CODE STATUS: Full Code       Care discussed with bedside provider and eICU attending.   Time Spent:30 min

## 2020-02-07 NOTE — PROGRESS NOTE ADULT - SUBJECTIVE AND OBJECTIVE BOX
Patient is a 49y old  Female who presents with a chief complaint of high EBL w/ ABLA and vasoplegia and post-op (07 Feb 2020 10:00)      INTERVAL HPI/OVERNIGHT EVENTS:    saw pt at bedside.  Overnight pt was hypotensive and transfused 2 units PRBC.  She continued to be hypotensive so she was upgraded to SPCU for continued monitoring.  Midodrine was started q8h.    Pt feeling fine but at times nauseas and doesn't want to start PT yet.      MEDICATIONS  (STANDING):  acetaminophen   Tablet .. 1000 milliGRAM(s) Oral every 8 hours  aspirin enteric coated 81 milliGRAM(s) Oral every 12 hours  celecoxib 200 milliGRAM(s) Oral every 12 hours  diphenhydrAMINE   Injectable 25 milliGRAM(s) IV Push once  escitalopram 10 milliGRAM(s) Oral daily  ferrous    sulfate 325 milliGRAM(s) Oral daily  iron sucrose Injectable 100 milliGRAM(s) IV Push every 24 hours  lactated ringers. 1000 milliLiter(s) (125 mL/Hr) IV Continuous <Continuous>  lamotrigine 300mg ER tablet 1 Tablet(s) 1 Tablet(s) Oral <User Schedule>  lidocaine   Patch 1 Patch Transdermal daily  midodrine 5 milliGRAM(s) Oral every 8 hours  pantoprazole    Tablet 40 milliGRAM(s) Oral before breakfast  polyethylene glycol 3350 17 Gram(s) Oral at bedtime  promethazine IVPB 6.25 milliGRAM(s) IV Intermittent once    MEDICATIONS  (PRN):  ALPRAZolam 0.25 milliGRAM(s) Oral three times a day PRN anxiety  aluminum hydroxide/magnesium hydroxide/simethicone Suspension 30 milliLiter(s) Oral every 4 hours PRN Dyspepsia  aluminum hydroxide/magnesium hydroxide/simethicone Suspension 30 milliLiter(s) Oral four times a day PRN Indigestion  HYDROmorphone  Injectable 0.5 milliGRAM(s) IV Push every 3 hours PRN Severe Pain (7 - 10)  magnesium hydroxide Suspension 30 milliLiter(s) Oral at bedtime PRN Constipation  ondansetron Injectable 4 milliGRAM(s) IV Push every 6 hours PRN Nausea and/or Vomiting  oxyCODONE    IR 5 milliGRAM(s) Oral every 3 hours PRN Mild Pain (1 - 3)  oxyCODONE    IR 10 milliGRAM(s) Oral every 3 hours PRN Moderate Pain (4 - 6)  senna 2 Tablet(s) Oral at bedtime PRN Constipation      Allergies    No Known Allergies    Intolerances        REVIEW OF SYSTEMS:  CONSTITUTIONAL: No fever, weight loss, or fatigue  EYES: No eye pain, visual disturbances, or discharge  ENMT:  No difficulty hearing, tinnitus, vertigo; No sinus or throat pain  RESPIRATORY: No cough, wheezing, chills or hemoptysis; No shortness of breath  CARDIOVASCULAR: No chest pain, palpitations, lightheadedness, or leg swelling  GASTROINTESTINAL: No abdominal or epigastric pain. No nausea, vomiting, or hematemesis; No diarrhea or constipation. No melena or hematochezia.  GENITOURINARY: No dysuria, frequency, hematuria, or incontinence  NEUROLOGICAL: No headaches, memory loss, vertigo, loss of strength, numbness, or tremors  SKIN: No itching, burning, rashes, or lesions   LYMPH NODES: No enlarged glands  ENDOCRINE: No heat or cold intolerance; No hair loss; No polydipsia or polyuria  MUSCULOSKELETAL: No joint pain or swelling; No muscle, back, or extremity pain    Vital Signs Last 24 Hrs  T(C): 37 (07 Feb 2020 08:00), Max: 37.3 (07 Feb 2020 00:02)  T(F): 98.6 (07 Feb 2020 08:00), Max: 99.2 (07 Feb 2020 00:02)  HR: 84 (07 Feb 2020 11:00) (70 - 119)  BP: 80/45 (07 Feb 2020 11:00) (70/44 - 117/93)  BP(mean): 57 (07 Feb 2020 11:00) (54 - 71)  RR: 20 (07 Feb 2020 11:00) (10 - 27)  SpO2: 98% (07 Feb 2020 11:00) (97% - 100%)    PHYSICAL EXAM:  GENERAL: NAD, well-groomed, well-developed  HEAD:  Atraumatic, Normocephalic  EYES: EOMI, PERRLA, conjunctiva and sclera clear  ENMT: Moist mucous membranes, Good dentition, No lesions; No tonsillar erythema, exudates, or enlargement  NECK: Supple, No JVD, Normal thyroid  NERVOUS SYSTEM:  Alert & Oriented X3, Good concentration; All 4 extremities mobile, no gross sensory deficits.   CHEST/LUNG: Clear to auscultation bilaterally; No rales, rhonchi, wheezing, or rubs  HEART: Regular rate and rhythm; No murmurs, rubs, or gallops  ABDOMEN: Soft, Nontender, Nondistended; Bowel sounds present  EXTREMITIES:  2+ Peripheral Pulses, No clubbing, cyanosis, or edema      LABS:                        8.8    7.26  )-----------( 132      ( 07 Feb 2020 06:11 )             26.1     07 Feb 2020 06:11    146    |  112    |  9      ----------------------------<  106    4.1     |  28     |  0.81     Ca    7.8        07 Feb 2020 06:11          CAPILLARY BLOOD GLUCOSE          RADIOLOGY & ADDITIONAL TESTS:    Imaging Personally Reviewed:  [ ] YES     Consultant(s) Notes Reviewed:      Care Discussed with Consultants/Other Providers:    Advanced Directives: [ ] DNR  [ ] No feeding tube  [ ] MOLST in chart  [ ] MOLST completed today  [ ] Unknown

## 2020-02-07 NOTE — PROGRESS NOTE ADULT - SUBJECTIVE AND OBJECTIVE BOX
CC:  Patient is a 49y old  Female who presents with a chief complaint of s/p right THR (07 Feb 2020 11:48)      HPI/BRIEF HOSPITAL COURSE: ***    Events last 24 hours: ***    PAST MEDICAL & SURGICAL HISTORY:  Shingles: 2018  Osteoarthritis  Seizure: first episode 2009 , second episode 2016  Anxiety  PTSD (post-traumatic stress disorder)  H/O umbilical hernia repair: 2016  H/O rhinoplasty: 1984    Allergies    No Known Allergies    Intolerances      FAMILY HISTORY:  Family history of arthritis (Mother)  Family history of cancer (Father): melanoma      Review of Systems:  CONSTITUTIONAL: No fever, chills, or fatigue  EYES: No eye pain, visual disturbances, or discharge  ENMT:  No difficulty hearing, tinnitus, vertigo; No sinus or throat pain  NECK: No pain or stiffness  RESPIRATORY: No cough, wheezing, chills or hemoptysis; No shortness of breath  CARDIOVASCULAR: No chest pain, palpitations, dizziness, or leg swelling  GASTROINTESTINAL: No abdominal or epigastric pain. No nausea, vomiting, or hematemesis; No diarrhea or constipation. No melena or hematochezia.  GENITOURINARY: No dysuria, frequency, hematuria, or incontinence  NEUROLOGICAL: No headaches, memory loss, loss of strength, numbness, or tremors  SKIN: No itching, burning, rashes, or lesions   MUSCULOSKELETAL: No joint pain or swelling; No muscle, back, or extremity pain  PSYCHIATRIC: No depression, anxiety, mood swings, or difficulty sleeping      Medications:    midodrine 10 milliGRAM(s) Oral every 8 hours    diphenhydrAMINE   Injectable 25 milliGRAM(s) IV Push once    acetaminophen   Tablet .. 1000 milliGRAM(s) Oral every 8 hours  ALPRAZolam 0.25 milliGRAM(s) Oral three times a day PRN  celecoxib 200 milliGRAM(s) Oral every 12 hours  escitalopram 10 milliGRAM(s) Oral daily  HYDROmorphone  Injectable 0.5 milliGRAM(s) IV Push every 3 hours PRN  ondansetron    Tablet 4 milliGRAM(s) Oral every 8 hours PRN  oxyCODONE    IR 5 milliGRAM(s) Oral every 3 hours PRN  oxyCODONE    IR 10 milliGRAM(s) Oral every 3 hours PRN  promethazine IVPB 6.25 milliGRAM(s) IV Intermittent once      aspirin enteric coated 81 milliGRAM(s) Oral every 12 hours    aluminum hydroxide/magnesium hydroxide/simethicone Suspension 30 milliLiter(s) Oral every 4 hours PRN  aluminum hydroxide/magnesium hydroxide/simethicone Suspension 30 milliLiter(s) Oral four times a day PRN  magnesium hydroxide Suspension 30 milliLiter(s) Oral at bedtime PRN  pantoprazole    Tablet 40 milliGRAM(s) Oral before breakfast  polyethylene glycol 3350 17 Gram(s) Oral at bedtime  senna 2 Tablet(s) Oral at bedtime        ferrous    sulfate 325 milliGRAM(s) Oral daily  iron sucrose Injectable 100 milliGRAM(s) IV Push every 24 hours  lactated ringers. 1000 milliLiter(s) IV Continuous <Continuous>      lidocaine   Patch 1 Patch Transdermal daily    lamotrigine 300mg ER tablet 1 Tablet(s) 1 Tablet(s) Oral <User Schedule>          ICU Vital Signs Last 24 Hrs  T(C): 36.9 (07 Feb 2020 16:22), Max: 37.3 (07 Feb 2020 00:02)  T(F): 98.4 (07 Feb 2020 16:22), Max: 99.2 (07 Feb 2020 00:02)  HR: 78 (07 Feb 2020 20:00) (72 - 98)  BP: 88/66 (07 Feb 2020 20:00) (70/44 - 101/48)  BP(mean): 75 (07 Feb 2020 20:00) (54 - 75)  ABP: --  ABP(mean): --  RR: 13 (07 Feb 2020 20:00) (13 - 27)  SpO2: 100% (07 Feb 2020 20:00) (97% - 100%)    Vital Signs Last 24 Hrs  T(C): 36.9 (07 Feb 2020 16:22), Max: 37.3 (07 Feb 2020 00:02)  T(F): 98.4 (07 Feb 2020 16:22), Max: 99.2 (07 Feb 2020 00:02)  HR: 78 (07 Feb 2020 20:00) (72 - 98)  BP: 88/66 (07 Feb 2020 20:00) (70/44 - 101/48)  BP(mean): 75 (07 Feb 2020 20:00) (54 - 75)  RR: 13 (07 Feb 2020 20:00) (13 - 27)  SpO2: 100% (07 Feb 2020 20:00) (97% - 100%)        I&O's Detail    06 Feb 2020 07:01  -  07 Feb 2020 07:00  --------------------------------------------------------  IN:    IV PiggyBack: 400 mL    lactated ringers.: 3500 mL    lactated ringers.: 1375 mL    Oral Fluid: 1480 mL    Packed Red Blood Cells: 719 mL  Total IN: 7474 mL    OUT:    Estimated Blood Loss: 1028 mL    Indwelling Catheter - Urethral: 3200 mL  Total OUT: 4228 mL    Total NET: 3246 mL      07 Feb 2020 07:01  -  07 Feb 2020 20:58  --------------------------------------------------------  IN:    lactated ringers.: 1750 mL    Oral Fluid: 610 mL  Total IN: 2360 mL    OUT:    Estimated Blood Loss: 140 mL    Indwelling Catheter - Urethral: 1500 mL    Voided: 600 mL  Total OUT: 2240 mL    Total NET: 120 mL            LABS:                        8.8    7.26  )-----------( 132      ( 07 Feb 2020 06:11 )             26.1     02-07    146<H>  |  112<H>  |  9   ----------------------------<  106<H>  4.1   |  28  |  0.81    Ca    7.8<L>      07 Feb 2020 06:11            CAPILLARY BLOOD GLUCOSE            CULTURES:        Physical Examination:  General: No acute distress.  Alert, oriented, interactive, nonfocal  NEURO: A&O X3, motor function 5/5 BL UE/LE  HEENT: Pupils equal, reactive to light.  Symmetric.  PULM: CTA BL, no significant sputum production, no wheezes, rales, rhonchi  CVS: Regular rate and rhythm, no murmurs, rubs, or gallops  ABD: Soft, nondistended, nontender, normoactive bowel sounds, no masses  EXT: No edema, nontender  SKIN: Warm and well perfused, no rashes noted      EKG: ***    RADIOLOGY: ***      CENTRAL LINE: N          DATE INSERTED:                  FIELD: N                        DATE INSERTED:                  A-LINE: N                       DATE INSERTED:                  GLOBAL ISSUE/BEST PRACTICE:  Analgesia: N/A  Sedation: N/A  HOB elevation: yes  Stress ulcer prophylaxis: protonix   VTE prophylaxis: SCD/Heparin SQ/Lovenox SQ  Glycemic control: N/A  Nutrition: NPO/Diet/TF CC:  Patient is a 49y old  Female who presents with a chief complaint of s/p right THR (07 Feb 2020 11:48)      HPI/BRIEF HOSPITAL COURSE:   49 year old female PMHx OA,  presents with a chief complaint of right hip pain-- for right anterior KATERYNA (06 Feb 2020 16:53)    POD 1 s/p R Anterior Total Hip Replacement. Pt admitted to the SPCU post operatively for high EBL with ABLA with hypotension and Vasoplegia        Events last 24 hours:   ON pt feeling dizzy and having pain. Pt morning Hbg dropped from 10.4/29.8 to 8.8/26.1 in 6 hours after 2 units. Pt ordered for a repeat CBC ON and refused blood draw, does not want to be stuck with needle. Pt was ordered for PO and IV Fe and does not want any meds through the IV including Dilaudid. Spoke with patient at the bedside and advised that although dizziness may be from gurwinder medication, that it also may be possible she us experience symptomatic anemia, and that it is important that we repeat her CBC. Pt still refusing blood draw at this time and imaging study, and will think about it. Pt receiving PO oxycodone and tylenol for pain at this time. Pain controlled ON. SBP 90-80, pt states SBP normally in the 90's. ON increased pt midodrine frm 5  to 10 mg q8h      PAST MEDICAL & SURGICAL HISTORY:  Shingles: 2018  Osteoarthritis  Seizure: first episode 2009 , second episode 2016  Anxiety  PTSD (post-traumatic stress disorder)  H/O umbilical hernia repair: 2016  H/O rhinoplasty: 1984    Allergies    No Known Allergies    Intolerances      FAMILY HISTORY:  Family history of arthritis (Mother)  Family history of cancer (Father): melanoma      Review of Systems:  CONSTITUTIONAL: No fever, chills, or fatigue  EYES: No visual disturbances  ENMT:  No sinus or throat pain  NECK: No pain  RESPIRATORY: No shortness of breath  CARDIOVASCULAR: No chest pain, palpitations, +dizziness,  GASTROINTESTINAL: No abdominal pain. + nausea, No vomiting,; No diarrhea or constipation. No melena   GENITOURINARY: No dysuria, frequency, hematuria, or incontinence  NEUROLOGICAL: + headaches, No  loss of strength, numbness, or tremors  SKIN: No itching, burning, rashes, or lesions   MUSCULOSKELETAL: No muscle, back, or extremity pain  PSYCHIATRIC: No difficulty sleeping      Medications:  midodrine 10 milliGRAM(s) Oral every 8 hours  diphenhydrAMINE   Injectable 25 milliGRAM(s) IV Push once  acetaminophen   Tablet .. 1000 milliGRAM(s) Oral every 8 hours  ALPRAZolam 0.25 milliGRAM(s) Oral three times a day PRN  celecoxib 200 milliGRAM(s) Oral every 12 hours  escitalopram 10 milliGRAM(s) Oral daily  HYDROmorphone  Injectable 0.5 milliGRAM(s) IV Push every 3 hours PRN  ondansetron    Tablet 4 milliGRAM(s) Oral every 8 hours PRN  oxyCODONE    IR 5 milliGRAM(s) Oral every 3 hours PRN  oxyCODONE    IR 10 milliGRAM(s) Oral every 3 hours PRN  promethazine IVPB 6.25 milliGRAM(s) IV Intermittent once  aspirin enteric coated 81 milliGRAM(s) Oral every 12 hours  aluminum hydroxide/magnesium hydroxide/simethicone Suspension 30 milliLiter(s) Oral every 4 hours PRN  aluminum hydroxide/magnesium hydroxide/simethicone Suspension 30 milliLiter(s) Oral four times a day PRN  magnesium hydroxide Suspension 30 milliLiter(s) Oral at bedtime PRN  pantoprazole    Tablet 40 milliGRAM(s) Oral before breakfast  polyethylene glycol 3350 17 Gram(s) Oral at bedtime  senna 2 Tablet(s) Oral at bedtime  ferrous    sulfate 325 milliGRAM(s) Oral daily  iron sucrose Injectable 100 milliGRAM(s) IV Push every 24 hours  lactated ringers. 1000 milliLiter(s) IV Continuous <Continuous>  lidocaine   Patch 1 Patch Transdermal daily  lamotrigine 300mg ER tablet 1 Tablet(s) 1 Tablet(s) Oral <User Schedule>      ICU Vital Signs Last 24 Hrs  T(C): 36.9 (07 Feb 2020 16:22), Max: 37.3 (07 Feb 2020 00:02)  T(F): 98.4 (07 Feb 2020 16:22), Max: 99.2 (07 Feb 2020 00:02)  HR: 78 (07 Feb 2020 20:00) (72 - 98)  BP: 88/66 (07 Feb 2020 20:00) (70/44 - 101/48)  BP(mean): 75 (07 Feb 2020 20:00) (54 - 75)  ABP: --  ABP(mean): --  RR: 13 (07 Feb 2020 20:00) (13 - 27)  SpO2: 100% (07 Feb 2020 20:00) (97% - 100%)    Vital Signs Last 24 Hrs  T(C): 36.9 (07 Feb 2020 16:22), Max: 37.3 (07 Feb 2020 00:02)  T(F): 98.4 (07 Feb 2020 16:22), Max: 99.2 (07 Feb 2020 00:02)  HR: 78 (07 Feb 2020 20:00) (72 - 98)  BP: 88/66 (07 Feb 2020 20:00) (70/44 - 101/48)  BP(mean): 75 (07 Feb 2020 20:00) (54 - 75)  RR: 13 (07 Feb 2020 20:00) (13 - 27)  SpO2: 100% (07 Feb 2020 20:00) (97% - 100%)        I&O's Detail    06 Feb 2020 07:01  -  07 Feb 2020 07:00  --------------------------------------------------------  IN:    IV PiggyBack: 400 mL    lactated ringers.: 3500 mL    lactated ringers.: 1375 mL    Oral Fluid: 1480 mL    Packed Red Blood Cells: 719 mL  Total IN: 7474 mL    OUT:    Estimated Blood Loss: 1028 mL    Indwelling Catheter - Urethral: 3200 mL  Total OUT: 4228 mL    Total NET: 3246 mL      07 Feb 2020 07:01  -  07 Feb 2020 20:58  --------------------------------------------------------  IN:    lactated ringers.: 1750 mL    Oral Fluid: 610 mL  Total IN: 2360 mL    OUT:    Estimated Blood Loss: 140 mL    Indwelling Catheter - Urethral: 1500 mL    Voided: 600 mL  Total OUT: 2240 mL    Total NET: 120 mL            LABS:                        8.8    7.26  )-----------( 132      ( 07 Feb 2020 06:11 )             26.1     02-07    146<H>  |  112<H>  |  9   ----------------------------<  106<H>  4.1   |  28  |  0.81    Ca    7.8<L>      07 Feb 2020 06:11        CAPILLARY BLOOD GLUCOSE    CULTURES:    Physical Examination:  General: No acute distress.  Alert, oriented, interactive, nonfocal  NEURO: A&O X3, motor function 5/5 BL UE/LE  HEENT: Pupils equal, reactive to light.  Symmetric.  PULM: CTA BL, no significant sputum production, no wheezes, rales, rhonchi  CVS: Regular rate and rhythm, no murmurs, rubs, or gallops  ABD: Soft, nondistended, nontender, normoactive bowel sounds, no masses  EXT: No edema, nontender R hip hemovac in place, NVI   SKIN: Warm and well perfused, no rashes noted      EKG: NSR     RADIOLOGY: < from: Xray Fluoro up to 1 Hr in OR (02.06.20 @ 12:13) >  INTERPRETATION:  Imaging guidance was provided by the Department of Radiology for a procedure performed by a physician from another clinical department. This study was performed and will be interpreted by that physician and therefore the department of radiology will not render an interpretation of these images.    This report was generated by an  in the department of radiology.    < end of copied text >        CENTRAL LINE: N          DATE INSERTED:                  FIELD: N                        DATE INSERTED:                  A-LINE: N                       DATE INSERTED:                  GLOBAL ISSUE/BEST PRACTICE:  Analgesia: oxycodone and tylenol   Sedation: N/A  HOB elevation: yes  Stress ulcer prophylaxis: protonix   VTE prophylaxis: L SCD  Glycemic control: N/A  Nutrition: Diet

## 2020-02-07 NOTE — OCCUPATIONAL THERAPY INITIAL EVALUATION ADULT - TRANSFER TRAINING, PT EVAL
Patient will transfer to toilet with DME as needed I'ly within 2-3 sessions.
Patient will transfer to toilet with DME as needed with minimal assistance with in 3-5 sessions.

## 2020-02-07 NOTE — ANESTHESIA FOLLOW-UP NOTE - NSEVALATIONFT_GEN_ALL_CORE
Patient's BP still 86/52 (starting bp 92/68), however he H/H is still 8/26, so she is receiving an additional unit of blood.

## 2020-02-07 NOTE — PROGRESS NOTE ADULT - ASSESSMENT
48 yo f pmh anxiety s/p R THR    #S/P R THR  - post op orders per ortho  - pain management  - bowl regimen   - PT eval    #Hypotension  continues to be hypotensive.  Baseline SBP is usually in the low 90s prior to procedure  currently on midodrine, went pt starts to improve, then will dc it.     #anemia 2/2 blood loss.  ~1L lost during procedure  repeat labwork shows improved Hb but went down, likely from IVF.   on venofar.    Will repeat labs in AM.     #Anxiety  - continue lexapro    #seizures  -continue lamotrogine  -monitor for seizures     #dvt proph  - per ortho

## 2020-02-07 NOTE — PROGRESS NOTE ADULT - SUBJECTIVE AND OBJECTIVE BOX
Patient is a 49y old  Female who presents with a chief complaint of right hip pain-- for right anterior KATERYNA (06 Feb 2020 16:53)      BRIEF HOSPITAL COURSE: 49 year old female PMHx OA, Seizure, Anxiety, PTSD, Post -op POD 0 s/p R Anterior Total Hip Replacement.    Events last 24 hours: High EBL with ABLA and Vasoplegia post-op.      PAST MEDICAL & SURGICAL HISTORY:  Shingles: 2018  Osteoarthritis  Seizure: first episode 2009 , second episode 2016  Anxiety  PTSD (post-traumatic stress disorder)  H/O umbilical hernia repair: 2016  H/O rhinoplasty: 1984      Review of Systems:  CONSTITUTIONAL: No fever, chills, or fatigue  EYES: No eye pain, visual disturbances, or discharge  ENMT:  No difficulty hearing, tinnitus, vertigo; No sinus or throat pain  NECK: No pain or stiffness  RESPIRATORY: No cough, wheezing, chills or hemoptysis; No shortness of breath  CARDIOVASCULAR: No chest pain, palpitations, dizziness, or leg swelling  GASTROINTESTINAL: No abdominal or epigastric pain. No nausea, vomiting, or hematemesis; No diarrhea or constipation. No melena or hematochezia.  GENITOURINARY: No dysuria, frequency, hematuria, or incontinence  NEUROLOGICAL: No headaches, memory loss, loss of strength, numbness, or tremors  SKIN: No itching, burning, rashes, or lesions   MUSCULOSKELETAL: Post op pain from above   PSYCHIATRIC: No depression, anxiety, mood swings, or difficulty sleeping      Medications:  midodrine 5 milliGRAM(s) Oral every 8 hours  diphenhydrAMINE   Injectable 25 milliGRAM(s) IV Push once  acetaminophen   Tablet .. 1000 milliGRAM(s) Oral every 8 hours  ALPRAZolam 0.25 milliGRAM(s) Oral three times a day PRN  celecoxib 200 milliGRAM(s) Oral every 12 hours  escitalopram 10 milliGRAM(s) Oral daily  HYDROmorphone  Injectable 0.5 milliGRAM(s) IV Push every 3 hours PRN  ondansetron Injectable 4 milliGRAM(s) IV Push every 6 hours PRN  oxyCODONE    IR 5 milliGRAM(s) Oral every 3 hours PRN  oxyCODONE    IR 10 milliGRAM(s) Oral every 3 hours PRN  promethazine IVPB 6.25 milliGRAM(s) IV Intermittent once  aspirin enteric coated 81 milliGRAM(s) Oral every 12 hours  aluminum hydroxide/magnesium hydroxide/simethicone Suspension 30 milliLiter(s) Oral every 4 hours PRN  aluminum hydroxide/magnesium hydroxide/simethicone Suspension 30 milliLiter(s) Oral four times a day PRN  magnesium hydroxide Suspension 30 milliLiter(s) Oral at bedtime PRN  pantoprazole    Tablet 40 milliGRAM(s) Oral before breakfast  polyethylene glycol 3350 17 Gram(s) Oral at bedtime  senna 2 Tablet(s) Oral at bedtime PRN  lactated ringers. 1000 milliLiter(s) IV Continuous <Continuous>  lamotrigine 300mg ER tablet 1 Tablet(s) 1 Tablet(s) Oral <User Schedule>          ICU Vital Signs Last 24 Hrs  T(C): 37.2 (07 Feb 2020 04:02), Max: 37.3 (07 Feb 2020 00:02)  T(F): 98.9 (07 Feb 2020 04:02), Max: 99.2 (07 Feb 2020 00:02)  HR: 72 (07 Feb 2020 03:00) (70 - 119)  BP: 84/48 (07 Feb 2020 03:00) (75/51 - 117/93)  BP(mean): 57 (07 Feb 2020 03:00) (56 - 71)  RR: 18 (07 Feb 2020 03:00) (10 - 27)  SpO2: 99% (07 Feb 2020 03:00) (97% - 100%)          I&O's Detail    06 Feb 2020 07:01  -  07 Feb 2020 06:10  --------------------------------------------------------  IN:    IV PiggyBack: 300 mL    lactated ringers.: 625 mL    lactated ringers.: 3500 mL    Oral Fluid: 960 mL    Packed Red Blood Cells: 719 mL  Total IN: 6104 mL    OUT:    Estimated Blood Loss: 1000 mL    Indwelling Catheter - Urethral: 1200 mL  Total OUT: 2200 mL    Total NET: 3904 mL            LABS:                        10.4   10.83 )-----------( 141      ( 06 Feb 2020 21:07 )             29.8     02-06    136  |  103  |  7   ----------------------------<  182<H>  4.5   |  28  |  0.77    Ca    7.5<L>      06 Feb 2020 21:07            CAPILLARY BLOOD GLUCOSE            CULTURES:      Physical Examination:    General:  Awake.  Alert, oriented, interactive, nonfocal    HEENT: Pupils equal, reactive to light.  Symmetric. No JVD.    PULM: Clear to auscultation bilaterally, no significant sputum production    CVS: Regular rate and rhythm, no murmurs, rubs, or gallops    ABD: Soft, nondistended, nontender, normoactive bowel sounds, no masses    EXT: No edema, nontender R hip hemovac in place, NVI     SKIN: Warm and well perfused, no rashes noted.          CRITICAL CARE TIME SPENT: 35 minutes   (Assessing presenting problems of acute illness, which pose high probability of life threatening deterioration or end organ damage/dysfunction, as well as medical decision making including initiating plan of care, reviewing data, reviewing radiologic exams, discussing with multidisciplinary team,  discussing goals of care with patient/family, and writing this note.  Non-inclusive of procedures performed)

## 2020-02-07 NOTE — PHYSICAL THERAPY INITIAL EVALUATION ADULT - ADDITIONAL COMMENTS
Pt lives in apartment in parents house. No steps to enter, no steps inside. Owns a cane. Parents and boyfriend can assist at home upon d/c.

## 2020-02-07 NOTE — CONSULT NOTE ADULT - ASSESSMENT
Pt with right THR had intraop bleeding, anemia, hypotension.   Now improved with transfusion and fluids.  Likely low baseline bp.   Appears hemodynamically stable, but still anemic. No further significant bleeding.

## 2020-02-07 NOTE — PROGRESS NOTE ADULT - ASSESSMENT
48 y/o F w/ pmh of OA, seizure d/o, anxiety, PTSD, now s/p R. anterior THR on 2/6/2020 hospital course c/b high EBL w/ ABLA and vasoplegia post-op received appx 2U PRBC and admitted to the [facility] for hypotension.      -Neuro: Mental status stable otherwise no acute process, PTSD/seizure d/o cont xanax  -Cardiac: Hypotension likely 2/2 to ABLA intra-op now s/p 2U of PRBC w/ improvement in BP, maintain MAP >65  -Resp: No acute isuses, maintain MAP >65  -GI: cont diet as ordered/tolerated  -Renal: Renal function stable, lytes stable, cont to monitor and replete prn  -ID: no acute infectious process  -Heme: ABLA w/ vasoplegia now w/ improvement and s/p 2U PRBC, h/h 13.6/38.8--->7.5/21.2---->10.4/29.8---->8.8/26.1 will order repeat at 4pm and start on IV venofer, DVT ppx w/ asa/SCD for now  -Endo: No acute process  -Dispo: Pt remains in the SPCU for close observation for hypotension w/ overall improvement, will monitor pt for now, case d/w hospitalize and dr. Damon input noted
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
## 2020-02-07 NOTE — PHYSICAL THERAPY INITIAL EVALUATION ADULT - ACTIVE RANGE OF MOTION EXAMINATION, REHAB EVAL
Right Le decrease at hip due to sx. knee/ankle foot WNL/LLE Active ROM was WNL (within normal limits)/deficits as listed below/isidra. upper extremity Active ROM was WNL (within normal limits)

## 2020-02-08 ENCOUNTER — TRANSCRIPTION ENCOUNTER (OUTPATIENT)
Age: 50
End: 2020-02-08

## 2020-02-08 LAB
ALBUMIN SERPL ELPH-MCNC: 2.2 G/DL — LOW (ref 3.3–5)
ALP SERPL-CCNC: 33 U/L — SIGNIFICANT CHANGE UP (ref 30–120)
ALT FLD-CCNC: 12 U/L DA — SIGNIFICANT CHANGE UP (ref 10–60)
ANION GAP SERPL CALC-SCNC: 3 MMOL/L — LOW (ref 5–17)
AST SERPL-CCNC: 21 U/L — SIGNIFICANT CHANGE UP (ref 10–40)
BASOPHILS # BLD AUTO: 0.04 K/UL — SIGNIFICANT CHANGE UP (ref 0–0.2)
BASOPHILS NFR BLD AUTO: 0.7 % — SIGNIFICANT CHANGE UP (ref 0–2)
BILIRUB SERPL-MCNC: 0.2 MG/DL — SIGNIFICANT CHANGE UP (ref 0.2–1.2)
BUN SERPL-MCNC: 6 MG/DL — LOW (ref 7–23)
CALCIUM SERPL-MCNC: 8.4 MG/DL — SIGNIFICANT CHANGE UP (ref 8.4–10.5)
CHLORIDE SERPL-SCNC: 111 MMOL/L — HIGH (ref 96–108)
CO2 SERPL-SCNC: 30 MMOL/L — SIGNIFICANT CHANGE UP (ref 22–31)
CREAT SERPL-MCNC: 0.67 MG/DL — SIGNIFICANT CHANGE UP (ref 0.5–1.3)
EOSINOPHIL # BLD AUTO: 0.13 K/UL — SIGNIFICANT CHANGE UP (ref 0–0.5)
EOSINOPHIL NFR BLD AUTO: 2.2 % — SIGNIFICANT CHANGE UP (ref 0–6)
GLUCOSE SERPL-MCNC: 106 MG/DL — HIGH (ref 70–99)
HCT VFR BLD CALC: 27.5 % — LOW (ref 34.5–45)
HGB BLD-MCNC: 9 G/DL — LOW (ref 11.5–15.5)
IMM GRANULOCYTES NFR BLD AUTO: 0.3 % — SIGNIFICANT CHANGE UP (ref 0–1.5)
IRON SATN MFR SERPL: 14 UG/DL — LOW (ref 30–160)
LYMPHOCYTES # BLD AUTO: 1.43 K/UL — SIGNIFICANT CHANGE UP (ref 1–3.3)
LYMPHOCYTES # BLD AUTO: 24.2 % — SIGNIFICANT CHANGE UP (ref 13–44)
MAGNESIUM SERPL-MCNC: 1.6 MG/DL — SIGNIFICANT CHANGE UP (ref 1.6–2.6)
MCHC RBC-ENTMCNC: 30.3 PG — SIGNIFICANT CHANGE UP (ref 27–34)
MCHC RBC-ENTMCNC: 32.7 GM/DL — SIGNIFICANT CHANGE UP (ref 32–36)
MCV RBC AUTO: 92.6 FL — SIGNIFICANT CHANGE UP (ref 80–100)
MONOCYTES # BLD AUTO: 0.7 K/UL — SIGNIFICANT CHANGE UP (ref 0–0.9)
MONOCYTES NFR BLD AUTO: 11.8 % — SIGNIFICANT CHANGE UP (ref 2–14)
NEUTROPHILS # BLD AUTO: 3.59 K/UL — SIGNIFICANT CHANGE UP (ref 1.8–7.4)
NEUTROPHILS NFR BLD AUTO: 60.8 % — SIGNIFICANT CHANGE UP (ref 43–77)
NRBC # BLD: 0 /100 WBCS — SIGNIFICANT CHANGE UP (ref 0–0)
PHOSPHATE SERPL-MCNC: 3.1 MG/DL — SIGNIFICANT CHANGE UP (ref 2.5–4.5)
PLATELET # BLD AUTO: 126 K/UL — LOW (ref 150–400)
POTASSIUM SERPL-MCNC: 4.3 MMOL/L — SIGNIFICANT CHANGE UP (ref 3.5–5.3)
POTASSIUM SERPL-SCNC: 4.3 MMOL/L — SIGNIFICANT CHANGE UP (ref 3.5–5.3)
PROT SERPL-MCNC: 4.8 G/DL — LOW (ref 6–8.3)
RBC # BLD: 2.97 M/UL — LOW (ref 3.8–5.2)
RBC # FLD: 12.7 % — SIGNIFICANT CHANGE UP (ref 10.3–14.5)
SODIUM SERPL-SCNC: 144 MMOL/L — SIGNIFICANT CHANGE UP (ref 135–145)
WBC # BLD: 5.91 K/UL — SIGNIFICANT CHANGE UP (ref 3.8–10.5)
WBC # FLD AUTO: 5.91 K/UL — SIGNIFICANT CHANGE UP (ref 3.8–10.5)

## 2020-02-08 PROCEDURE — 99233 SBSQ HOSP IP/OBS HIGH 50: CPT

## 2020-02-08 PROCEDURE — 73503 X-RAY EXAM HIP UNI 4/> VIEWS: CPT | Mod: 26,RT

## 2020-02-08 RX ORDER — MAGNESIUM OXIDE 400 MG ORAL TABLET 241.3 MG
400 TABLET ORAL
Refills: 0 | Status: DISCONTINUED | OUTPATIENT
Start: 2020-02-08 | End: 2020-02-10

## 2020-02-08 RX ORDER — MIDODRINE HYDROCHLORIDE 2.5 MG/1
10 TABLET ORAL EVERY 8 HOURS
Refills: 0 | Status: DISCONTINUED | OUTPATIENT
Start: 2020-02-08 | End: 2020-02-09

## 2020-02-08 RX ADMIN — CELECOXIB 200 MILLIGRAM(S): 200 CAPSULE ORAL at 20:48

## 2020-02-08 RX ADMIN — MIDODRINE HYDROCHLORIDE 10 MILLIGRAM(S): 2.5 TABLET ORAL at 17:54

## 2020-02-08 RX ADMIN — POLYETHYLENE GLYCOL 3350 17 GRAM(S): 17 POWDER, FOR SOLUTION ORAL at 20:50

## 2020-02-08 RX ADMIN — Medication 81 MILLIGRAM(S): at 20:43

## 2020-02-08 RX ADMIN — LIDOCAINE 1 PATCH: 4 CREAM TOPICAL at 19:30

## 2020-02-08 RX ADMIN — MAGNESIUM OXIDE 400 MG ORAL TABLET 400 MILLIGRAM(S): 241.3 TABLET ORAL at 08:49

## 2020-02-08 RX ADMIN — LIDOCAINE 1 PATCH: 4 CREAM TOPICAL at 23:47

## 2020-02-08 RX ADMIN — MIDODRINE HYDROCHLORIDE 10 MILLIGRAM(S): 2.5 TABLET ORAL at 06:40

## 2020-02-08 RX ADMIN — OXYCODONE HYDROCHLORIDE 10 MILLIGRAM(S): 5 TABLET ORAL at 20:50

## 2020-02-08 RX ADMIN — Medication 1000 MILLIGRAM(S): at 20:43

## 2020-02-08 RX ADMIN — CELECOXIB 200 MILLIGRAM(S): 200 CAPSULE ORAL at 09:19

## 2020-02-08 RX ADMIN — Medication 1000 MILLIGRAM(S): at 06:40

## 2020-02-08 RX ADMIN — MIDODRINE HYDROCHLORIDE 10 MILLIGRAM(S): 2.5 TABLET ORAL at 20:47

## 2020-02-08 RX ADMIN — Medication 1000 MILLIGRAM(S): at 20:48

## 2020-02-08 RX ADMIN — PANTOPRAZOLE SODIUM 40 MILLIGRAM(S): 20 TABLET, DELAYED RELEASE ORAL at 06:25

## 2020-02-08 RX ADMIN — OXYCODONE HYDROCHLORIDE 10 MILLIGRAM(S): 5 TABLET ORAL at 21:45

## 2020-02-08 RX ADMIN — OXYCODONE HYDROCHLORIDE 10 MILLIGRAM(S): 5 TABLET ORAL at 02:20

## 2020-02-08 RX ADMIN — OXYCODONE HYDROCHLORIDE 10 MILLIGRAM(S): 5 TABLET ORAL at 10:04

## 2020-02-08 RX ADMIN — LIDOCAINE 1 PATCH: 4 CREAM TOPICAL at 12:34

## 2020-02-08 RX ADMIN — Medication 81 MILLIGRAM(S): at 08:49

## 2020-02-08 RX ADMIN — ESCITALOPRAM OXALATE 10 MILLIGRAM(S): 10 TABLET, FILM COATED ORAL at 12:35

## 2020-02-08 RX ADMIN — CELECOXIB 200 MILLIGRAM(S): 200 CAPSULE ORAL at 08:49

## 2020-02-08 RX ADMIN — OXYCODONE HYDROCHLORIDE 10 MILLIGRAM(S): 5 TABLET ORAL at 02:55

## 2020-02-08 RX ADMIN — SENNA PLUS 2 TABLET(S): 8.6 TABLET ORAL at 20:50

## 2020-02-08 RX ADMIN — Medication 1000 MILLIGRAM(S): at 07:10

## 2020-02-08 RX ADMIN — Medication 1000 MILLIGRAM(S): at 13:00

## 2020-02-08 RX ADMIN — Medication 1000 MILLIGRAM(S): at 12:35

## 2020-02-08 RX ADMIN — Medication 325 MILLIGRAM(S): at 12:35

## 2020-02-08 RX ADMIN — OXYCODONE HYDROCHLORIDE 10 MILLIGRAM(S): 5 TABLET ORAL at 10:30

## 2020-02-08 RX ADMIN — MAGNESIUM OXIDE 400 MG ORAL TABLET 400 MILLIGRAM(S): 241.3 TABLET ORAL at 17:54

## 2020-02-08 RX ADMIN — CELECOXIB 200 MILLIGRAM(S): 200 CAPSULE ORAL at 20:43

## 2020-02-08 RX ADMIN — Medication 0.25 MILLIGRAM(S): at 02:20

## 2020-02-08 RX ADMIN — IRON SUCROSE 100 MILLIGRAM(S): 20 INJECTION, SOLUTION INTRAVENOUS at 18:07

## 2020-02-08 NOTE — PROGRESS NOTE ADULT - REASON FOR ADMISSION
high EBL w/ ABLA and vasoplegia and post-op
right hip pain-- for right anterior KATERYNA
right hip pain-- for right anterior KATERYNA
ELDON
s/p right THR

## 2020-02-08 NOTE — DISCHARGE NOTE NURSING/CASE MANAGEMENT/SOCIAL WORK - NSSCNAMETXT_GEN_ALL_CORE
[facility] -Nurse to visit the day after hospital discharge; physical therapist to follow. Please contact the home care agency if you have not heard from them by 12 noon on the day after your hospital discharge.

## 2020-02-08 NOTE — PROGRESS NOTE ADULT - ASSESSMENT
48 y/o F w/ pmh of OA, seizure d/o, anxiety, PTSD, now s/p R. anterior THR on 2/6/2020 hospital course c/b high EBL w/ ABLA and vasoplegia post-op received appx 2U PRBC and admitted to the SPCU for hypotension.      -Neuro: Mental status stable otherwise no acute process, PTSD/seizure d/o cont xanax  -Cardiac: Hypotension likely 2/2 to ABLA intra-op now s/p 2U of PRBC w/ improvement in BP cont midodrine for now, maintain MAP >65  -Resp: No acute issues, maintain o2 >95%  -GI: cont diet as ordered/tolerated  -Renal: Renal function stable, lytes stable, cont to monitor and replete prn  -ID: no acute infectious process  -Heme: ABLA w/ vasoplegia now w/ improvement and s/p 2U PRBC, h/h 13.6/38.8--->7.5/21.2---->10.4/29.8---->8.8/26.1--->9/27.5 pt refused IV venofer cont po ferrous sulfate, DVT ppx w/ asa/SCD   -Endo: No acute process  -Dispo: Pt remains HDS w/ overall improvement and currently w/out any medical complains, will monitor pt for now, case d/w hospitalize and dr. Damon in agreement to transfer out of floor

## 2020-02-08 NOTE — DIETITIAN INITIAL EVALUATION ADULT. - OTHER INFO
Pt is 49 year old female with hx anxiety, PTSD, seizures, OA admitted for THR. Pt had concerns re nutritional status. She is a  and is very conscious of healthy lifestyle. She states that yesterday she did not eat well 2/2 nausea s/p sx. Today she is feeling better and was able to consume breakfast. Briefly reviewed basics of healthy, balanced diet and stressed importance of adequate protein intake to promote healing. Reviewed menu options available.

## 2020-02-08 NOTE — PROGRESS NOTE ADULT - ASSESSMENT
Pt with right THR had intraop bleeding, anemia, hypotension.   Doing well. Can start PT.   Unclear need for Midodrine.  Now improved with transfusion and fluids.  Likely low baseline bp.   Appears hemodynamically stable, but still anemic. No further significant bleeding.   Will need Fe.

## 2020-02-08 NOTE — PROGRESS NOTE ADULT - SUBJECTIVE AND OBJECTIVE BOX
CC:  Patient is a 49y old  Female who presents with a chief complaint of s/p right THR (07 Feb 2020 11:48)      HPI/BRIEF HOSPITAL COURSE:   49 year old female PMHx OA,  presents with a chief complaint of right hip pain-- for right anterior KATERYNA (06 Feb 2020 16:53)    POD 1 s/p R Anterior Total Hip Replacement. Pt admitted to the [facility] post operatively for high EBL with ABLA with hypotension and Vasoplegia        Events last 24 hours:   ON pt feeling dizzy and having pain. Pt morning Hbg dropped from 10.4/29.8 to 8.8/26.1 in 6 hours after 2 units. Pt ordered for a repeat CBC ON and refused blood draw, does not want to be stuck with needle. Pt was ordered for PO and IV Fe and does not want any meds through the IV including Dilaudid. Spoke with patient at the bedside and advised that although dizziness may be from gurwinder medication, that it also may be possible she us experience symptomatic anemia, and that it is important that we repeat her CBC. Pt still refusing blood draw at this time and imaging study, and will think about it. Pt receiving PO oxycodone and tylenol for pain at this time. Pain controlled ON. SBP 90-80, pt states SBP normally in the 90's. ON increased pt midodrine frm 5  to 10 mg q8h      PAST MEDICAL & SURGICAL HISTORY:  Shingles: 2018  Osteoarthritis  Seizure: first episode 2009 , second episode 2016  Anxiety  PTSD (post-traumatic stress disorder)  H/O umbilical hernia repair: 2016  H/O rhinoplasty: 1984    Allergies    No Known Allergies    Intolerances      FAMILY HISTORY:  Family history of arthritis (Mother)  Family history of cancer (Father): melanoma      Review of Systems:  CONSTITUTIONAL: No fever, chills, or fatigue  EYES: No visual disturbances  ENMT:  No sinus or throat pain  NECK: No pain  RESPIRATORY: No shortness of breath  CARDIOVASCULAR: No chest pain, palpitations, +dizziness,  GASTROINTESTINAL: No abdominal pain. + nausea, No vomiting,; No diarrhea or constipation. No melena   GENITOURINARY: No dysuria, frequency, hematuria, or incontinence  NEUROLOGICAL: + headaches, No  loss of strength, numbness, or tremors  SKIN: No itching, burning, rashes, or lesions   MUSCULOSKELETAL: No muscle, back, or extremity pain  PSYCHIATRIC: No difficulty sleeping    Medications:  midodrine 10 milliGRAM(s) Oral every 8 hours  diphenhydrAMINE   Injectable 25 milliGRAM(s) IV Push once  acetaminophen   Tablet .. 1000 milliGRAM(s) Oral every 8 hours  ALPRAZolam 0.25 milliGRAM(s) Oral three times a day PRN  celecoxib 200 milliGRAM(s) Oral every 12 hours  escitalopram 10 milliGRAM(s) Oral daily  HYDROmorphone  Injectable 0.5 milliGRAM(s) IV Push every 3 hours PRN  ondansetron    Tablet 4 milliGRAM(s) Oral every 8 hours PRN  oxyCODONE    IR 5 milliGRAM(s) Oral every 3 hours PRN  oxyCODONE    IR 10 milliGRAM(s) Oral every 3 hours PRN  promethazine IVPB 6.25 milliGRAM(s) IV Intermittent once  aspirin enteric coated 81 milliGRAM(s) Oral every 12 hours  aluminum hydroxide/magnesium hydroxide/simethicone Suspension 30 milliLiter(s) Oral every 4 hours PRN  aluminum hydroxide/magnesium hydroxide/simethicone Suspension 30 milliLiter(s) Oral four times a day PRN  bisacodyl Suppository 10 milliGRAM(s) Rectal daily PRN  magnesium hydroxide Suspension 30 milliLiter(s) Oral at bedtime PRN  pantoprazole    Tablet 40 milliGRAM(s) Oral before breakfast  polyethylene glycol 3350 17 Gram(s) Oral at bedtime  senna 2 Tablet(s) Oral at bedtime  ferrous    sulfate 325 milliGRAM(s) Oral daily  iron sucrose Injectable 100 milliGRAM(s) IV Push every 24 hours  lactated ringers. 1000 milliLiter(s) IV Continuous <Continuous>  lidocaine   Patch 1 Patch Transdermal daily  lamotrigine 300mg ER tablet 1 Tablet(s) 1 Tablet(s) Oral <User Schedule>    ICU Vital Signs Last 24 Hrs  T(C): 36.9 (08 Feb 2020 00:00), Max: 37 (07 Feb 2020 08:00)  T(F): 98.5 (08 Feb 2020 00:00), Max: 98.6 (07 Feb 2020 08:00)  HR: 73 (08 Feb 2020 04:00) (68 - 100)  BP: 89/50 (08 Feb 2020 04:00) (77/45 - 101/48)  BP(mean): 63 (08 Feb 2020 04:00) (56 - 75)  ABP: --  ABP(mean): --  RR: 18 (08 Feb 2020 04:00) (13 - 26)  SpO2: 95% (08 Feb 2020 04:00) (95% - 100%)    Vital Signs Last 24 Hrs  T(C): 36.9 (08 Feb 2020 00:00), Max: 37 (07 Feb 2020 08:00)  T(F): 98.5 (08 Feb 2020 00:00), Max: 98.6 (07 Feb 2020 08:00)  HR: 73 (08 Feb 2020 04:00) (68 - 100)  BP: 89/50 (08 Feb 2020 04:00) (77/45 - 101/48)  BP(mean): 63 (08 Feb 2020 04:00) (56 - 75)  RR: 18 (08 Feb 2020 04:00) (13 - 26)  SpO2: 95% (08 Feb 2020 04:00) (95% - 100%)        I&O's Detail    06 Feb 2020 07:01  -  07 Feb 2020 07:00  --------------------------------------------------------  IN:    IV PiggyBack: 400 mL    lactated ringers.: 3500 mL    lactated ringers.: 1375 mL    Oral Fluid: 1480 mL    Packed Red Blood Cells: 719 mL  Total IN: 7474 mL    OUT:    Estimated Blood Loss: 1028 mL    Indwelling Catheter - Urethral: 3200 mL  Total OUT: 4228 mL    Total NET: 3246 mL      07 Feb 2020 07:01  -  08 Feb 2020 06:13  --------------------------------------------------------  IN:    lactated ringers.: 2750 mL    Oral Fluid: 960 mL  Total IN: 3710 mL    OUT:    Estimated Blood Loss: 140 mL    Indwelling Catheter - Urethral: 1500 mL    Voided: 2300 mL  Total OUT: 3940 mL    Total NET: -230 mL            LABS:                        8.8    7.26  )-----------( 132      ( 07 Feb 2020 06:11 )             26.1     02-07    146<H>  |  112<H>  |  9   ----------------------------<  106<H>  4.1   |  28  |  0.81    Ca    7.8<L>      07 Feb 2020 06:11            CAPILLARY BLOOD GLUCOSE            CULTURES:        Physical Examination:  General: No acute distress.  Alert, oriented, interactive, nonfocal  NEURO: A&O X3, motor function 5/5 BL UE/LE  HEENT: Pupils equal, reactive to light.  Symmetric.  PULM: CTA BL, no significant sputum production, no wheezes, rales, rhonchi  CVS: Regular rate and rhythm, no murmurs, rubs, or gallops  ABD: Soft, nondistended, nontender, normoactive bowel sounds, no masses  EXT: No edema, nontender R hip hemovac in place, NVI   SKIN: Warm and well perfused, no rashes noted      EKG: NSR     RADIOLOGY: < from: Xray Fluoro up to 1 Hr in OR (02.06.20 @ 12:13) >  INTERPRETATION:  Imaging guidance was provided by the Department of Radiology for a procedure performed by a physician from another clinical department. This study was performed and will be interpreted by that physician and therefore the department of radiology will not render an interpretation of these images.    This report was generated by an  in the department of radiology.    < end of copied text >        CENTRAL LINE: N          DATE INSERTED:                  FIELD: N                        DATE INSERTED:                  A-LINE: N                       DATE INSERTED:                  GLOBAL ISSUE/BEST PRACTICE:  Analgesia: oxycodone and tylenol   Sedation: N/A  HOB elevation: yes  Stress ulcer prophylaxis: protonix   VTE prophylaxis: L SCD  Glycemic control: N/A  Nutrition: Diet CC:  Patient is a 49y old  Female who presents with a chief complaint of s/p right THR (07 Feb 2020 11:48)      HPI/BRIEF HOSPITAL COURSE:   49 year old female PMHx OA,  presents with a chief complaint of right hip pain-- for right anterior KATERYNA (06 Feb 2020 16:53)    POD 1 s/p R Anterior Total Hip Replacement. Pt admitted to the [facility] post operatively for high EBL with ABLA with hypotension and Vasoplegia        Events last 24 hours:   ON pt feeling dizzy and having pain. Pt morning Hbg dropped from 10.4/29.8 to 8.8/26.1 in 6 hours after 2 units. Pt ordered for a repeat CBC ON and refused blood draw, does not want to be stuck with needle. Pt was ordered for PO and IV Fe and does not want any meds through the IV including Dilaudid. Spoke with patient at the bedside and advised that although dizziness may be from gurwinder medication, that it also may be possible she us experience symptomatic anemia, and that it is important that we repeat her CBC. Pt still refusing blood draw at this time and imaging study, and will think about it. Pt receiving PO oxycodone and tylenol for pain at this time. Pain controlled ON. SBP 90-80, pt states SBP normally in the 90's. ON increased pt midodrine frm 5  to 10 mg q8h      Later in night pt reports dizziness and HA resolved and improved, pt states she thinks she feels strong enough to get outta bed and ambulate as tolerated  in AM. Pt allowing for AM blood draw will follow up ith AM CBC     PAST MEDICAL & SURGICAL HISTORY:  Shingles: 2018  Osteoarthritis  Seizure: first episode 2009 , second episode 2016  Anxiety  PTSD (post-traumatic stress disorder)  H/O umbilical hernia repair: 2016  H/O rhinoplasty: 1984    Allergies    No Known Allergies    Intolerances      FAMILY HISTORY:  Family history of arthritis (Mother)  Family history of cancer (Father): melanoma      Review of Systems:  CONSTITUTIONAL: No fever, chills, or fatigue  EYES: No visual disturbances  ENMT:  No sinus or throat pain  NECK: No pain  RESPIRATORY: No shortness of breath  CARDIOVASCULAR: No chest pain, palpitations, +dizziness,  GASTROINTESTINAL: No abdominal pain. + nausea, No vomiting,; No diarrhea or constipation. No melena   GENITOURINARY: No dysuria, frequency, hematuria, or incontinence  NEUROLOGICAL: + headaches, No  loss of strength, numbness, or tremors  SKIN: No itching, burning, rashes, or lesions   MUSCULOSKELETAL: No muscle, back, or extremity pain  PSYCHIATRIC: No difficulty sleeping    Medications:  midodrine 10 milliGRAM(s) Oral every 8 hours  diphenhydrAMINE   Injectable 25 milliGRAM(s) IV Push once  acetaminophen   Tablet .. 1000 milliGRAM(s) Oral every 8 hours  ALPRAZolam 0.25 milliGRAM(s) Oral three times a day PRN  celecoxib 200 milliGRAM(s) Oral every 12 hours  escitalopram 10 milliGRAM(s) Oral daily  HYDROmorphone  Injectable 0.5 milliGRAM(s) IV Push every 3 hours PRN  ondansetron    Tablet 4 milliGRAM(s) Oral every 8 hours PRN  oxyCODONE    IR 5 milliGRAM(s) Oral every 3 hours PRN  oxyCODONE    IR 10 milliGRAM(s) Oral every 3 hours PRN  promethazine IVPB 6.25 milliGRAM(s) IV Intermittent once  aspirin enteric coated 81 milliGRAM(s) Oral every 12 hours  aluminum hydroxide/magnesium hydroxide/simethicone Suspension 30 milliLiter(s) Oral every 4 hours PRN  aluminum hydroxide/magnesium hydroxide/simethicone Suspension 30 milliLiter(s) Oral four times a day PRN  bisacodyl Suppository 10 milliGRAM(s) Rectal daily PRN  magnesium hydroxide Suspension 30 milliLiter(s) Oral at bedtime PRN  pantoprazole    Tablet 40 milliGRAM(s) Oral before breakfast  polyethylene glycol 3350 17 Gram(s) Oral at bedtime  senna 2 Tablet(s) Oral at bedtime  ferrous    sulfate 325 milliGRAM(s) Oral daily  iron sucrose Injectable 100 milliGRAM(s) IV Push every 24 hours  lactated ringers. 1000 milliLiter(s) IV Continuous <Continuous>  lidocaine   Patch 1 Patch Transdermal daily  lamotrigine 300mg ER tablet 1 Tablet(s) 1 Tablet(s) Oral <User Schedule>    ICU Vital Signs Last 24 Hrs  T(C): 36.9 (08 Feb 2020 00:00), Max: 37 (07 Feb 2020 08:00)  T(F): 98.5 (08 Feb 2020 00:00), Max: 98.6 (07 Feb 2020 08:00)  HR: 73 (08 Feb 2020 04:00) (68 - 100)  BP: 89/50 (08 Feb 2020 04:00) (77/45 - 101/48)  BP(mean): 63 (08 Feb 2020 04:00) (56 - 75)  ABP: --  ABP(mean): --  RR: 18 (08 Feb 2020 04:00) (13 - 26)  SpO2: 95% (08 Feb 2020 04:00) (95% - 100%)    Vital Signs Last 24 Hrs  T(C): 36.9 (08 Feb 2020 00:00), Max: 37 (07 Feb 2020 08:00)  T(F): 98.5 (08 Feb 2020 00:00), Max: 98.6 (07 Feb 2020 08:00)  HR: 73 (08 Feb 2020 04:00) (68 - 100)  BP: 89/50 (08 Feb 2020 04:00) (77/45 - 101/48)  BP(mean): 63 (08 Feb 2020 04:00) (56 - 75)  RR: 18 (08 Feb 2020 04:00) (13 - 26)  SpO2: 95% (08 Feb 2020 04:00) (95% - 100%)        I&O's Detail    06 Feb 2020 07:01  -  07 Feb 2020 07:00  --------------------------------------------------------  IN:    IV PiggyBack: 400 mL    lactated ringers.: 3500 mL    lactated ringers.: 1375 mL    Oral Fluid: 1480 mL    Packed Red Blood Cells: 719 mL  Total IN: 7474 mL    OUT:    Estimated Blood Loss: 1028 mL    Indwelling Catheter - Urethral: 3200 mL  Total OUT: 4228 mL    Total NET: 3246 mL      07 Feb 2020 07:01  -  08 Feb 2020 06:13  --------------------------------------------------------  IN:    lactated ringers.: 2750 mL    Oral Fluid: 960 mL  Total IN: 3710 mL    OUT:    Estimated Blood Loss: 140 mL    Indwelling Catheter - Urethral: 1500 mL    Voided: 2300 mL  Total OUT: 3940 mL    Total NET: -230 mL            LABS:                        8.8    7.26  )-----------( 132      ( 07 Feb 2020 06:11 )             26.1     02-07    146<H>  |  112<H>  |  9   ----------------------------<  106<H>  4.1   |  28  |  0.81    Ca    7.8<L>      07 Feb 2020 06:11            CAPILLARY BLOOD GLUCOSE            CULTURES:        Physical Examination:  General: No acute distress.  Alert, oriented, interactive, nonfocal  NEURO: A&O X3, motor function 5/5 BL UE/LE  HEENT: Pupils equal, reactive to light.  Symmetric.  PULM: CTA BL, no significant sputum production, no wheezes, rales, rhonchi  CVS: Regular rate and rhythm, no murmurs, rubs, or gallops  ABD: Soft, nondistended, nontender, normoactive bowel sounds, no masses  EXT: No edema, nontender R hip hemovac in place, NVI   SKIN: Warm and well perfused, no rashes noted      EKG: NSR     RADIOLOGY: < from: Xray Fluoro up to 1 Hr in OR (02.06.20 @ 12:13) >  INTERPRETATION:  Imaging guidance was provided by the Department of Radiology for a procedure performed by a physician from another clinical department. This study was performed and will be interpreted by that physician and therefore the department of radiology will not render an interpretation of these images.    This report was generated by an  in the department of radiology.    < end of copied text >        CENTRAL LINE: N          DATE INSERTED:                  FIELD: N                        DATE INSERTED:                  A-LINE: N                       DATE INSERTED:                  GLOBAL ISSUE/BEST PRACTICE:  Analgesia: oxycodone and tylenol   Sedation: N/A  HOB elevation: yes  Stress ulcer prophylaxis: protonix   VTE prophylaxis: L SCD  Glycemic control: N/A  Nutrition: Diet

## 2020-02-08 NOTE — PROGRESS NOTE ADULT - ASSESSMENT
48 yo f pmh anxiety s/p R THR    #S/P R THR  - post op orders per ortho  - pain management  - bowl regimen   - PT     #Hypotension  improved  d/c midodrine    #anemia 2/2 blood loss.  ~1L lost during procedure  repeat labwork shows improved Hb but went down, likely from IVF.   on venofar.    improving    #Anxiety  - continue lexapro    #seizures  -continue lamotrogine  -monitor for seizures     #dvt proph  - per ortho
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
## 2020-02-08 NOTE — PROGRESS NOTE ADULT - SUBJECTIVE AND OBJECTIVE BOX
Patient is a 49y old  Female who presents with a chief complaint of ABLA (08 Feb 2020 09:49)      INTERVAL HPI/OVERNIGHT EVENTS:    BP improved to SBP 110s.  Ambulating with walker to bathroom and back.      MEDICATIONS  (STANDING):  acetaminophen   Tablet .. 1000 milliGRAM(s) Oral every 8 hours  aspirin enteric coated 81 milliGRAM(s) Oral every 12 hours  celecoxib 200 milliGRAM(s) Oral every 12 hours  diphenhydrAMINE   Injectable 25 milliGRAM(s) IV Push once  escitalopram 10 milliGRAM(s) Oral daily  ferrous    sulfate 325 milliGRAM(s) Oral daily  iron sucrose Injectable 100 milliGRAM(s) IV Push every 24 hours  lamotrigine 300mg ER tablet 1 Tablet(s) 1 Tablet(s) Oral <User Schedule>  lidocaine   Patch 1 Patch Transdermal daily  magnesium oxide 400 milliGRAM(s) Oral three times a day with meals  midodrine 10 milliGRAM(s) Oral every 8 hours  pantoprazole    Tablet 40 milliGRAM(s) Oral before breakfast  polyethylene glycol 3350 17 Gram(s) Oral at bedtime  promethazine IVPB 6.25 milliGRAM(s) IV Intermittent once  senna 2 Tablet(s) Oral at bedtime    MEDICATIONS  (PRN):  ALPRAZolam 0.25 milliGRAM(s) Oral three times a day PRN anxiety  aluminum hydroxide/magnesium hydroxide/simethicone Suspension 30 milliLiter(s) Oral every 4 hours PRN Dyspepsia  aluminum hydroxide/magnesium hydroxide/simethicone Suspension 30 milliLiter(s) Oral four times a day PRN Indigestion  bisacodyl Suppository 10 milliGRAM(s) Rectal daily PRN If no bowel movement by POD#2  HYDROmorphone  Injectable 0.5 milliGRAM(s) IV Push every 3 hours PRN Severe Pain (7 - 10)  magnesium hydroxide Suspension 30 milliLiter(s) Oral at bedtime PRN Constipation  ondansetron    Tablet 4 milliGRAM(s) Oral every 8 hours PRN Nausea  oxyCODONE    IR 5 milliGRAM(s) Oral every 3 hours PRN Mild Pain (1 - 3)  oxyCODONE    IR 10 milliGRAM(s) Oral every 3 hours PRN Moderate Pain (4 - 6)      Allergies    No Known Allergies    Intolerances        REVIEW OF SYSTEMS:  CONSTITUTIONAL: No fever, weight loss, or fatigue  RESPIRATORY: No cough, wheezing, chills or hemoptysis; No shortness of breath  CARDIOVASCULAR: No chest pain, palpitations, lightheadedness, or leg swelling  GASTROINTESTINAL: No abdominal or epigastric pain. No nausea, vomiting, or hematemesis; No diarrhea or constipation. No melena or hematochezia.  GENITOURINARY: No dysuria, frequency, hematuria, or incontinence  MUSCULOSKELETAL: No joint pain or swelling; No muscle, back, or extremity pain      Vital Signs Last 24 Hrs  T(C): 36.9 (08 Feb 2020 00:00), Max: 37 (07 Feb 2020 13:02)  T(F): 98.5 (08 Feb 2020 00:00), Max: 98.6 (07 Feb 2020 13:02)  HR: 86 (08 Feb 2020 10:00) (58 - 100)  BP: 97/53 (08 Feb 2020 10:00) (79/49 - 101/48)  BP(mean): 65 (08 Feb 2020 10:00) (56 - 75)  RR: 15 (08 Feb 2020 10:00) (13 - 27)  SpO2: 100% (08 Feb 2020 10:00) (95% - 100%)    PHYSICAL EXAM:  GENERAL: NAD, well-groomed, well-developed  NECK: Supple, No JVD, Normal thyroid  NERVOUS SYSTEM:  Alert & Oriented X3, Good concentration; All 4 extremities mobile, no gross sensory deficits.   CHEST/LUNG: Clear to auscultation bilaterally; No rales, rhonchi, wheezing, or rubs  HEART: Regular rate and rhythm; No murmurs, rubs, or gallops  ABDOMEN: Soft, Nontender, Nondistended; Bowel sounds present  EXTREMITIES:  2+ Peripheral Pulses, No clubbing, cyanosis, or edema    LABS:                        9.0    5.91  )-----------( 126      ( 08 Feb 2020 06:59 )             27.5     08 Feb 2020 06:59    144    |  111    |  6      ----------------------------<  106    4.3     |  30     |  0.67     Ca    8.4        08 Feb 2020 06:59  Phos  3.1       08 Feb 2020 06:59  Mg     1.6       08 Feb 2020 06:59    TPro  4.8    /  Alb  2.2    /  TBili  0.2    /  DBili  x      /  AST  21     /  ALT  12     /  AlkPhos  33     08 Feb 2020 06:59        CAPILLARY BLOOD GLUCOSE          RADIOLOGY & ADDITIONAL TESTS:    Imaging Personally Reviewed:  [ ] YES     Consultant(s) Notes Reviewed:      Care Discussed with Consultants/Other Providers:    Advanced Directives: [ ] DNR  [ ] No feeding tube  [ ] MOLST in chart  [ ] MOLST completed today  [ ] Unknown

## 2020-02-08 NOTE — PROGRESS NOTE ADULT - SUBJECTIVE AND OBJECTIVE BOX
50 y/o F w/ pmh of OA, seizure d/o, anxiety, PTSD, now s/p R. anterior THR on 2/6/2020 hospital course c/b high EBL w/ ABLA and vasoplegia post-op received appx 2U PRBC and admitted to the SPCU for hypotension. Pt now POD#1 reports having pain to the R. hip but otherwise denies any other medical complains. Pt reports no BM or passing gas yet but tolerated po this morning.     24 hour events: No major overnight events. Pt reports feeling better refusing IV meds including iron and zofran. Pt otherwise denies any medical complains at this time. Pt BP trend reviewed and acceptable given she normally runs with her SBP in the 90s and reports being an athelic.     Review of Systems:  Constitutional: No fever, chills, fatigue  Neuro: No headache, numbness, weakness  Resp: No cough, wheezing, shortness of breath  CVS: No chest pain, palpitations, leg swelling  GI: No abdominal pain, nausea, vomiting, diarrhea   : No dysuria, frequency, incontinence  Skin: No itching, burning, rashes, or lesions   Msk: No joint pain or swelling  Psych: No depression, anxiety, mood swings    T(F): 98.5 (02-08-20 @ 00:00), Max: 98.6 (02-07-20 @ 13:02)  HR: 58 (02-08-20 @ 08:00) (58 - 100)  BP: 82/44 (02-08-20 @ 08:00) (79/49 - 101/48)  RR: 16 (02-08-20 @ 08:00) (13 - 27)  SpO2: 97% (02-08-20 @ 08:00) (95% - 100%)  Wt(kg): --      02-07-20 @ 07:01  -  02-08-20 @ 07:00  --------------------------------------------------------  IN: 4060 mL / OUT: 5110 mL / NET: -1050 mL        CAPILLARY BLOOD GLUCOSE          I&O's Summary    07 Feb 2020 07:01 - 08 Feb 2020 07:00  --------------------------------------------------------  IN: 4060 mL / OUT: 5110 mL / NET: -1050 mL        Physical Exam:   Gen: Comfortable in bed in NAD, speaking in full and clear sentences  Neuro: AAOx3  HEENT: PERRL  Resp: CTA b/l  CVS: +RRR  Abd: BSx4, soft, nt/nd  Ext: R. hip with dressing c/d/i, no edema  Skin: warn/dry      Meds:    midodrine Oral      diphenhydrAMINE   Injectable IV Push    acetaminophen   Tablet .. Oral  ALPRAZolam Oral PRN  celecoxib Oral  escitalopram Oral  HYDROmorphone  Injectable IV Push PRN  ondansetron    Tablet Oral PRN  oxyCODONE    IR Oral PRN  oxyCODONE    IR Oral PRN  promethazine IVPB IV Intermittent      aspirin enteric coated Oral    aluminum hydroxide/magnesium hydroxide/simethicone Suspension Oral PRN  aluminum hydroxide/magnesium hydroxide/simethicone Suspension Oral PRN  bisacodyl Suppository Rectal PRN  magnesium hydroxide Suspension Oral PRN  pantoprazole    Tablet Oral  polyethylene glycol 3350 Oral  senna Oral      ferrous    sulfate Oral  iron sucrose Injectable IV Push  magnesium oxide Oral      lidocaine   Patch Transdermal    lamotrigine 300mg ER tablet 1 Tablet(s) Oral                            9.0    5.91  )-----------( 126      ( 08 Feb 2020 06:59 )             27.5       02-08    144  |  111<H>  |  6<L>  ----------------------------<  106<H>  4.3   |  30  |  0.67    Ca    8.4      08 Feb 2020 06:59  Phos  3.1     02-08  Mg     1.6     02-08    TPro  4.8<L>  /  Alb  2.2<L>  /  TBili  0.2  /  DBili  x   /  AST  21  /  ALT  12  /  AlkPhos  33  02-08      Radiology:     CENTRAL LINE: N  FIELD: Y  A-LINE: N    GLOBAL ISSUE/BEST PRACTICE:  Analgesia: Y  Sedation: N  CAM-ICU: n/a  HOB elevation: Y  Stress ulcer prophylaxis: Y  VTE prophylaxis: Y  Glycemic control: Y  Nutrition: Y    CODE STATUS: FULL CODE

## 2020-02-08 NOTE — PROGRESS NOTE ADULT - ASSESSMENT
ASSESSMENT   49 year old female PMHx OA,  POD 0 s/p R Anterior Total Hip Replacement. Pt admitted to the SPCU post operatively for high EBL with ABLA with hypotension and Vasoplegia    1. ABLA   2. Hypotension   3. Dizziness     PLAN     -ON pt feeling dizzy and having pain. Pt morning Hbg dropped from 10.4/29.8 to 8.8/26.1 in 6 hours after 2 units.   -Pt ordered for a repeat CBC ON and refused blood draw, does not want to be stuck with needle  -Pt was ordered for PO and IV Fe and does not want any meds through the IV including Dilaudid  -Spoke with patient at the bedside and advised that although dizziness may be from gurwinder medication, that it also may be possible she us experience symptomatic anemia, and that it is important that we repeat her CBC.   -Pt still refusing blood draw at this time and imaging study, and will think about it  -Pt receiving PO oxycodone and tylenol for pain at this time. Pain controlled ON. SBP 90-80, pt states SBP normally in the 90's. ON increased pt midodrine from 5  to 10 mg q8h   - L SCD  -out of bed ti chair   -ASA    ETHICS        CODE STATUS: Full Code       Care discussed with bedside provider and eICU attending.   Time Spent:30 min

## 2020-02-08 NOTE — DISCHARGE NOTE NURSING/CASE MANAGEMENT/SOCIAL WORK - PATIENT PORTAL LINK FT
You can access the FollowMyHealth Patient Portal offered by [facility] [place] by registering at the following website: [email or web address]. By joining [facility]’s FollowMyHealth portal, you will also be able to view your health information using other applications (apps) compatible with our system.

## 2020-02-09 LAB
ANION GAP SERPL CALC-SCNC: 8 MMOL/L — SIGNIFICANT CHANGE UP (ref 5–17)
BUN SERPL-MCNC: 8 MG/DL — SIGNIFICANT CHANGE UP (ref 7–23)
CALCIUM SERPL-MCNC: 8.9 MG/DL — SIGNIFICANT CHANGE UP (ref 8.4–10.5)
CHLORIDE SERPL-SCNC: 108 MMOL/L — SIGNIFICANT CHANGE UP (ref 96–108)
CO2 SERPL-SCNC: 28 MMOL/L — SIGNIFICANT CHANGE UP (ref 22–31)
CREAT SERPL-MCNC: 0.88 MG/DL — SIGNIFICANT CHANGE UP (ref 0.5–1.3)
GLUCOSE SERPL-MCNC: 121 MG/DL — HIGH (ref 70–99)
HCT VFR BLD CALC: 30.2 % — LOW (ref 34.5–45)
HGB BLD-MCNC: 9.9 G/DL — LOW (ref 11.5–15.5)
MAGNESIUM SERPL-MCNC: 1.8 MG/DL — SIGNIFICANT CHANGE UP (ref 1.6–2.6)
MCHC RBC-ENTMCNC: 30.4 PG — SIGNIFICANT CHANGE UP (ref 27–34)
MCHC RBC-ENTMCNC: 32.8 GM/DL — SIGNIFICANT CHANGE UP (ref 32–36)
MCV RBC AUTO: 92.6 FL — SIGNIFICANT CHANGE UP (ref 80–100)
NRBC # BLD: 0 /100 WBCS — SIGNIFICANT CHANGE UP (ref 0–0)
PHOSPHATE SERPL-MCNC: 3.5 MG/DL — SIGNIFICANT CHANGE UP (ref 2.5–4.5)
PLATELET # BLD AUTO: 158 K/UL — SIGNIFICANT CHANGE UP (ref 150–400)
POTASSIUM SERPL-MCNC: 4.5 MMOL/L — SIGNIFICANT CHANGE UP (ref 3.5–5.3)
POTASSIUM SERPL-SCNC: 4.5 MMOL/L — SIGNIFICANT CHANGE UP (ref 3.5–5.3)
RBC # BLD: 3.26 M/UL — LOW (ref 3.8–5.2)
RBC # FLD: 12.6 % — SIGNIFICANT CHANGE UP (ref 10.3–14.5)
SODIUM SERPL-SCNC: 144 MMOL/L — SIGNIFICANT CHANGE UP (ref 135–145)
WBC # BLD: 7.12 K/UL — SIGNIFICANT CHANGE UP (ref 3.8–10.5)
WBC # FLD AUTO: 7.12 K/UL — SIGNIFICANT CHANGE UP (ref 3.8–10.5)

## 2020-02-09 PROCEDURE — 99232 SBSQ HOSP IP/OBS MODERATE 35: CPT

## 2020-02-09 RX ORDER — MIDODRINE HYDROCHLORIDE 2.5 MG/1
5 TABLET ORAL THREE TIMES A DAY
Refills: 0 | Status: DISCONTINUED | OUTPATIENT
Start: 2020-02-09 | End: 2020-02-09

## 2020-02-09 RX ADMIN — MAGNESIUM OXIDE 400 MG ORAL TABLET 400 MILLIGRAM(S): 241.3 TABLET ORAL at 17:39

## 2020-02-09 RX ADMIN — MIDODRINE HYDROCHLORIDE 10 MILLIGRAM(S): 2.5 TABLET ORAL at 06:25

## 2020-02-09 RX ADMIN — IRON SUCROSE 100 MILLIGRAM(S): 20 INJECTION, SOLUTION INTRAVENOUS at 12:46

## 2020-02-09 RX ADMIN — Medication 1000 MILLIGRAM(S): at 21:14

## 2020-02-09 RX ADMIN — Medication 81 MILLIGRAM(S): at 06:24

## 2020-02-09 RX ADMIN — MAGNESIUM OXIDE 400 MG ORAL TABLET 400 MILLIGRAM(S): 241.3 TABLET ORAL at 21:14

## 2020-02-09 RX ADMIN — Medication 81 MILLIGRAM(S): at 17:39

## 2020-02-09 RX ADMIN — Medication 325 MILLIGRAM(S): at 10:01

## 2020-02-09 RX ADMIN — CELECOXIB 200 MILLIGRAM(S): 200 CAPSULE ORAL at 10:01

## 2020-02-09 RX ADMIN — CELECOXIB 200 MILLIGRAM(S): 200 CAPSULE ORAL at 20:45

## 2020-02-09 RX ADMIN — Medication 1000 MILLIGRAM(S): at 21:35

## 2020-02-09 RX ADMIN — Medication 1000 MILLIGRAM(S): at 12:33

## 2020-02-09 RX ADMIN — ESCITALOPRAM OXALATE 10 MILLIGRAM(S): 10 TABLET, FILM COATED ORAL at 10:01

## 2020-02-09 RX ADMIN — LIDOCAINE 1 PATCH: 4 CREAM TOPICAL at 18:36

## 2020-02-09 RX ADMIN — OXYCODONE HYDROCHLORIDE 10 MILLIGRAM(S): 5 TABLET ORAL at 20:45

## 2020-02-09 RX ADMIN — PANTOPRAZOLE SODIUM 40 MILLIGRAM(S): 20 TABLET, DELAYED RELEASE ORAL at 06:25

## 2020-02-09 RX ADMIN — CELECOXIB 200 MILLIGRAM(S): 200 CAPSULE ORAL at 20:27

## 2020-02-09 RX ADMIN — LIDOCAINE 1 PATCH: 4 CREAM TOPICAL at 12:19

## 2020-02-09 RX ADMIN — Medication 1000 MILLIGRAM(S): at 06:24

## 2020-02-09 RX ADMIN — CELECOXIB 200 MILLIGRAM(S): 200 CAPSULE ORAL at 10:02

## 2020-02-09 RX ADMIN — MAGNESIUM OXIDE 400 MG ORAL TABLET 400 MILLIGRAM(S): 241.3 TABLET ORAL at 12:19

## 2020-02-09 RX ADMIN — OXYCODONE HYDROCHLORIDE 10 MILLIGRAM(S): 5 TABLET ORAL at 20:26

## 2020-02-09 RX ADMIN — Medication 1000 MILLIGRAM(S): at 06:27

## 2020-02-09 RX ADMIN — Medication 1000 MILLIGRAM(S): at 12:46

## 2020-02-09 NOTE — PROGRESS NOTE ADULT - SUBJECTIVE AND OBJECTIVE BOX
ORTHOPEDIC PA PROGRESS NOTE  NAT THOMASON      49y Female                                 SY 2WST 223 02                                                                                                                           POD #    3d    STATUS POST:       Procedure: Arthroplasty of right hip by anterior approach             Patient seen and examined at bedside.      Current Pain Management:    acetaminophen   Tablet .. 1000 milliGRAM(s) Oral every 8 hours  ALPRAZolam 0.25 milliGRAM(s) Oral three times a day PRN  celecoxib 200 milliGRAM(s) Oral every 12 hours  escitalopram 10 milliGRAM(s) Oral daily  HYDROmorphone  Injectable 0.5 milliGRAM(s) IV Push every 3 hours PRN  ondansetron    Tablet 4 milliGRAM(s) Oral every 8 hours PRN  oxyCODONE    IR 5 milliGRAM(s) Oral every 3 hours PRN  oxyCODONE    IR 10 milliGRAM(s) Oral every 3 hours PRN  promethazine IVPB 6.25 milliGRAM(s) IV Intermittent once      T(F): --  HR: --  BP: --  RR: --  SpO2: --               Physical Exam :    -   Dressing changed sterile.   -   Wound C/D/I.   -   Distal Neurvascular status intact grossly.   -   Warm well perfused; capillary refill <3 seconds   -   (+)EHL/FHL   -   (+) Sensation to light touch  -   (-) Calf tenderness Bilaterally      A/P: 49y Female s/p Arthroplasty of right hip by anterior approach     -   Ortho Stable  -   Pain control:  acetaminophen   Tablet .. 1000 milliGRAM(s) Oral every 8 hours  ALPRAZolam 0.25 milliGRAM(s) Oral three times a day PRN  celecoxib 200 milliGRAM(s) Oral every 12 hours  escitalopram 10 milliGRAM(s) Oral daily  HYDROmorphone  Injectable 0.5 milliGRAM(s) IV Push every 3 hours PRN  ondansetron    Tablet 4 milliGRAM(s) Oral every 8 hours PRN  oxyCODONE    IR 5 milliGRAM(s) Oral every 3 hours PRN  oxyCODONE    IR 10 milliGRAM(s) Oral every 3 hours PRN  promethazine IVPB 6.25 milliGRAM(s) IV Intermittent once    -   Medicine to follow  -   DVT ppx:    PAS +  aspirin enteric coated: 81 milliGRAM(s) Oral, aspirin enteric coated: 81 milliGRAM(s) Oral  -   PT/OT OOB,  Weight bearing status: WBAT   -  Dispo: Home/MAGDI   -   Prescribed Medications:  3:1 Commode: Dx: s/p Right Anterior THR  aspirin 81 mg oral delayed release tablet: 1 tab orally every 12 hours. Take 2 hours before Celebrex.  celecoxib 200 mg oral capsule: 1 cap orally every 12 hours (21 days for HO ppx). Take 2 hours after Aspirin  rika rolling walker with 5 inch wheels: Dx: s/p Right Anterior THR  pantoprazole 40 mg oral delayed release tablet: 1 tab(s) orally once a day (before a meal)

## 2020-02-09 NOTE — PROGRESS NOTE ADULT - SUBJECTIVE AND OBJECTIVE BOX
Patient is a 49y old  Female who presents with a chief complaint of ABLA (08 Feb 2020 09:49)      INTERVAL HPI/OVERNIGHT EVENTS:    Ambulating down the [place] well.  PT said pt was doing well and is ready to be discharge.  Pt feels like she needs a couple of more days.  Feels light headed.  She had refused bloodwork  in the AM and blood pressure in the AM.         Allergies    No Known Allergies    Intolerances        REVIEW OF SYSTEMS:  CONSTITUTIONAL: +feels lightheaded.  No fever, weight loss, or fatigue  RESPIRATORY: No cough, wheezing, chills or hemoptysis; No shortness of breath  CARDIOVASCULAR: No chest pain, palpitations, lightheadedness, or leg swelling  GASTROINTESTINAL: No abdominal or epigastric pain. No nausea, vomiting, or hematemesis; No diarrhea or constipation. No melena or hematochezia.  GENITOURINARY: No dysuria, frequency, hematuria, or incontinence  NEUROLOGICAL: No headaches, memory loss, vertigo, loss of strength, numbness, or tremors  ENDOCRINE: No heat or cold intolerance; No hair loss; No polydipsia or polyuria  MUSCULOSKELETAL: No joint pain or swelling; No muscle, back, or extremity pain    Vital Signs Last 24 Hrs  T(C): 36.9 (08 Feb 2020 23:30), Max: 36.9 (08 Feb 2020 15:25)  T(F): 98.4 (08 Feb 2020 23:30), Max: 98.4 (08 Feb 2020 15:25)  HR: 86 (09 Feb 2020 11:58) (58 - 86)  BP: 95/61 (09 Feb 2020 11:58) (75/49 - 95/61)  BP(mean): --  RR: 18 (09 Feb 2020 11:58) (15 - 18)  SpO2: 95% (08 Feb 2020 23:30) (95% - 98%)    PHYSICAL EXAM:  GENERAL: NAD, well-groomed, well-developed  HEAD:  Atraumatic, Normocephalic  EYES: EOMI, PERRLA, conjunctiva and sclera clear  ENMT: Moist mucous membranes, Good dentition, No lesions; No tonsillar erythema, exudates, or enlargement  NECK: Supple, No JVD, Normal thyroid  NERVOUS SYSTEM:  Alert & Oriented X3, Good concentration; All 4 extremities mobile, no gross sensory deficits.   CHEST/LUNG: Clear to auscultation bilaterally; No rales, rhonchi, wheezing, or rubs  HEART: Regular rate and rhythm; No murmurs, rubs, or gallops  ABDOMEN: Soft, Nontender, Nondistended; Bowel sounds present  EXTREMITIES:  2+ Peripheral Pulses, No clubbing, cyanosis, or edema  LYMPH: No lymphadenopathy noted  SKIN: No rashes or lesions    LABS:      Ca    8.4        08 Feb 2020 06:59          CAPILLARY BLOOD GLUCOSE          RADIOLOGY & ADDITIONAL TESTS:    Imaging Personally Reviewed:  [ ] YES     Consultant(s) Notes Reviewed:      Care Discussed with Consultants/Other Providers:    Advanced Directives: [ ] DNR  [ ] No feeding tube  [ ] MOLST in chart  [ ] MOLST completed today  [ ] Unknown
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
## 2020-02-09 NOTE — PROGRESS NOTE ADULT - ASSESSMENT
Pt with right THR had intraop bleeding, anemia, hypotension.   Doing well. Can wean off Midodrine.  Doing well. Can continue PT.     Now improved with transfusion and fluids.  Likely low baseline bp.   Appears hemodynamically stable, but still anemic. No further significant bleeding.   Will need Fe.

## 2020-02-10 VITALS
HEART RATE: 89 BPM | OXYGEN SATURATION: 100 % | DIASTOLIC BLOOD PRESSURE: 54 MMHG | RESPIRATION RATE: 16 BRPM | SYSTOLIC BLOOD PRESSURE: 100 MMHG | TEMPERATURE: 98 F

## 2020-02-10 PROCEDURE — 83735 ASSAY OF MAGNESIUM: CPT

## 2020-02-10 PROCEDURE — 85018 HEMOGLOBIN: CPT

## 2020-02-10 PROCEDURE — 80048 BASIC METABOLIC PNL TOTAL CA: CPT

## 2020-02-10 PROCEDURE — 97161 PT EVAL LOW COMPLEX 20 MIN: CPT

## 2020-02-10 PROCEDURE — 97535 SELF CARE MNGMENT TRAINING: CPT

## 2020-02-10 PROCEDURE — 72170 X-RAY EXAM OF PELVIS: CPT

## 2020-02-10 PROCEDURE — 88311 DECALCIFY TISSUE: CPT

## 2020-02-10 PROCEDURE — 97110 THERAPEUTIC EXERCISES: CPT

## 2020-02-10 PROCEDURE — 97116 GAIT TRAINING THERAPY: CPT

## 2020-02-10 PROCEDURE — C1776: CPT

## 2020-02-10 PROCEDURE — 85027 COMPLETE CBC AUTOMATED: CPT

## 2020-02-10 PROCEDURE — 97530 THERAPEUTIC ACTIVITIES: CPT

## 2020-02-10 PROCEDURE — 36415 COLL VENOUS BLD VENIPUNCTURE: CPT

## 2020-02-10 PROCEDURE — 99232 SBSQ HOSP IP/OBS MODERATE 35: CPT

## 2020-02-10 PROCEDURE — 85014 HEMATOCRIT: CPT

## 2020-02-10 PROCEDURE — 88305 TISSUE EXAM BY PATHOLOGIST: CPT

## 2020-02-10 PROCEDURE — 83540 ASSAY OF IRON: CPT

## 2020-02-10 PROCEDURE — 36430 TRANSFUSION BLD/BLD COMPNT: CPT

## 2020-02-10 PROCEDURE — C1889: CPT

## 2020-02-10 PROCEDURE — 73502 X-RAY EXAM HIP UNI 2-3 VIEWS: CPT

## 2020-02-10 PROCEDURE — 80053 COMPREHEN METABOLIC PANEL: CPT

## 2020-02-10 PROCEDURE — 84100 ASSAY OF PHOSPHORUS: CPT

## 2020-02-10 PROCEDURE — 97165 OT EVAL LOW COMPLEX 30 MIN: CPT

## 2020-02-10 PROCEDURE — P9016: CPT

## 2020-02-10 PROCEDURE — 81025 URINE PREGNANCY TEST: CPT

## 2020-02-10 PROCEDURE — C1713: CPT

## 2020-02-10 PROCEDURE — 76000 FLUOROSCOPY <1 HR PHYS/QHP: CPT

## 2020-02-10 RX ORDER — IBUPROFEN 200 MG
0 TABLET ORAL
Qty: 0 | Refills: 0 | DISCHARGE

## 2020-02-10 RX ORDER — FERROUS SULFATE 325(65) MG
1 TABLET ORAL
Qty: 30 | Refills: 0
Start: 2020-02-10 | End: 2020-03-10

## 2020-02-10 RX ORDER — OXYCODONE HYDROCHLORIDE 5 MG/1
1 TABLET ORAL
Qty: 42 | Refills: 0
Start: 2020-02-10 | End: 2020-02-16

## 2020-02-10 RX ADMIN — Medication 1000 MILLIGRAM(S): at 13:28

## 2020-02-10 RX ADMIN — MAGNESIUM OXIDE 400 MG ORAL TABLET 400 MILLIGRAM(S): 241.3 TABLET ORAL at 09:22

## 2020-02-10 RX ADMIN — Medication 1000 MILLIGRAM(S): at 13:27

## 2020-02-10 RX ADMIN — CELECOXIB 200 MILLIGRAM(S): 200 CAPSULE ORAL at 09:22

## 2020-02-10 RX ADMIN — LIDOCAINE 1 PATCH: 4 CREAM TOPICAL at 13:25

## 2020-02-10 RX ADMIN — Medication 325 MILLIGRAM(S): at 13:24

## 2020-02-10 RX ADMIN — Medication 1000 MILLIGRAM(S): at 06:50

## 2020-02-10 RX ADMIN — MAGNESIUM OXIDE 400 MG ORAL TABLET 400 MILLIGRAM(S): 241.3 TABLET ORAL at 13:24

## 2020-02-10 RX ADMIN — ESCITALOPRAM OXALATE 10 MILLIGRAM(S): 10 TABLET, FILM COATED ORAL at 13:24

## 2020-02-10 RX ADMIN — Medication 1000 MILLIGRAM(S): at 07:00

## 2020-02-10 RX ADMIN — Medication 81 MILLIGRAM(S): at 06:49

## 2020-02-10 RX ADMIN — CELECOXIB 200 MILLIGRAM(S): 200 CAPSULE ORAL at 09:23

## 2020-02-10 RX ADMIN — LIDOCAINE 1 PATCH: 4 CREAM TOPICAL at 00:00

## 2020-02-10 RX ADMIN — PANTOPRAZOLE SODIUM 40 MILLIGRAM(S): 20 TABLET, DELAYED RELEASE ORAL at 06:50

## 2020-02-10 NOTE — PROGRESS NOTE ADULT - SUBJECTIVE AND OBJECTIVE BOX
Post Op     NAT THOMASON      49y        Female                                                                                                                 T(C): 37 (02-10-20 @ 07:34), Max: 37 (02-10-20 @ 07:34)  HR: 74 (02-10-20 @ 07:34) (59 - 86)  BP: 100/60 (02-10-20 @ 07:34) (92/52 - 104/82)  RR: 16 (02-10-20 @ 07:34) (16 - 18)  SpO2: 98% (02-10-20 @ 07:34) (95% - 98%)  Wt(kg): --    S/P  total hip replacement anterior     Patient denies shortness of breath, chest pain, dyspnea, No complaints  Pain is 3 /10    Physical Exam    Extremity: Bilaterally:  No holmon , mild swelling no discharge no redness                                            No Cord                                          PAS on b/l                                           Neurovascular intact                                          Motor intact EHL/FHL                                          Sensation intact                                          Pulses intact DP/PT                      Aquacell intact                                         Calves Soft                                         Dressing Clean / Dry / Intact                                         Capillary refill with 5 seconds                          9.9    7.12  )-----------( 158      ( 09 Feb 2020 15:06 )             30.2       02-09    144  |  108  |  8   ----------------------------<  121<H>  4.5   |  28  |  0.88    Ca    8.9      09 Feb 2020 15:06  Phos  3.5     02-09  Mg     1.8     02-09        A/P  -- S/P total hip replacement anterior    -  Medicine To Follow   - DVT prophylaxis PAS [id number] po bid  - PT & OT   - Analagesia  - Incentive Spirometry  - Discharge Planning  - Safety Precautions  -  CBC , BMP daily Post Op     NAT THOMASON      49y        Female                                                                                                                 T(C): 37 (02-10-20 @ 07:34), Max: 37 (02-10-20 @ 07:34)  HR: 74 (02-10-20 @ 07:34) (59 - 86)  BP: 100/60 (02-10-20 @ 07:34) (92/52 - 104/82)  RR: 16 (02-10-20 @ 07:34) (16 - 18)  SpO2: 98% (02-10-20 @ 07:34) (95% - 98%)  Wt(kg): --    S/P  total hip replacement anterior     Patient denies shortness of breath, chest pain, dyspnea, No complaints  Pain is 3 /10    Physical Exam    Extremity: Bilaterally:  No holmon , mild swelling no discharge no redness                                            No Cord                                          PAS on b/l                                           Neurovascular intact                                          Motor intact EHL/FHL                                          Sensation intact                                          Pulses intact DP/PT                      Aquacell intact                                         Calves Soft                                         Dressing Clean / Dry / Intact                                         Capillary refill with 5 seconds                          9.9    7.12  )-----------( 158      ( 09 Feb 2020 15:06 )             30.2       02-09    144  |  108  |  8   ----------------------------<  121<H>  4.5   |  28  |  0.88    Ca    8.9      09 Feb 2020 15:06  Phos  3.5     02-09  Mg     1.8     02-09        A/P  -- S/P total hip replacement anterior  - patient seen and examined with Nurse Jared Benito in room  -  Medicine To Follow   - DVT prophylaxis PAS [id number] po bid  - PT & OT   - Analagesia  - Incentive Spirometry  - Discharge Planning  - Safety Precautions  -  CBC , BMP daily

## 2020-02-10 NOTE — PROGRESS NOTE ADULT - ASSESSMENT
Pt with right THR had intraop bleeding, anemia, hypotension.   Dc parasite.  Doing well. Can continue PT.     Now improved with transfusion and fluids.  Likely low baseline bp.   Appears hemodynamically stable, but still anemic. No further significant bleeding.   Will need Fe.
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
## 2020-02-10 NOTE — PROGRESS NOTE ADULT - ASSESSMENT
48 yo f pmh anxiety s/p R THR    #S/P R THR  - post op orders per ortho  - pain management  - bowl regimen   - Physical Therapy says ready to be discharged  - Pt is medically cleared to be discharged today.  BP has improved.  H and H is trending up.  No reason to keep her    #Hypotension  improved  d/c midodrine    #anemia 2/2 blood loss.  ~1L lost during procedure  repeat labwork shows improved Hb but went down, likely from IVF.   on venofar.    no cbc today   repeat CBC in am, pt said she will do blood draw in the AM.     #Anxiety  - continue lexapro    #seizures  -continue lamotrogine  -monitor for seizures     #dvt proph  - per ortho 48 yo f pmh anxiety s/p R THR    #S/P R THR  - post op orders per ortho  - pain management  - bowl regimen   - Physical Therapy says ready to be discharged  - Pt is medically cleared to be discharged today.  BP has improved.  H and H is trending up.  No reason to keep her.     #Hypotension  improved  d/c midodrine    #anemia 2/2 blood loss.  ~1L lost during procedure  repeat labwork shows improved Hb but went down, likely from IVF.   refusing bloodwork this AM but H and H was trending up.      #Anxiety  - continue lexapro    #seizures  -continue lamotrogine  -monitor for seizures     #dvt proph  - per ortho

## 2020-02-10 NOTE — PROGRESS NOTE ADULT - SUBJECTIVE AND OBJECTIVE BOX
Patient is a 49y old  Female who presents with a chief complaint of ABLA (08 Feb 2020 09:49)      INTERVAL HPI/OVERNIGHT EVENTS:    BP has improved without midodrine.  Refusing bloodwork in AM.            Allergies    No Known Allergies    Intolerances        REVIEW OF SYSTEMS:    CONSTITUTIONAL: +feels lightheaded.  No fever, weight loss, or fatigue  RESPIRATORY: No cough, wheezing, chills or hemoptysis; No shortness of breath  CARDIOVASCULAR: No chest pain, palpitations, lightheadedness, or leg swelling  GASTROINTESTINAL: No abdominal or epigastric pain. No nausea, vomiting, or hematemesis; No diarrhea or constipation. No melena or hematochezia.  GENITOURINARY: No dysuria, frequency, hematuria, or incontinence  NEUROLOGICAL: No headaches, memory loss, vertigo, loss of strength, numbness, or tremors  ENDOCRINE: No heat or cold intolerance; No hair loss; No polydipsia or polyuria  MUSCULOSKELETAL: No joint pain or swelling; No muscle, back, or extremity pain    Vital Signs Last 24 Hrs  T(C): 37 (10 Feb 2020 07:34), Max: 37 (10 Feb 2020 07:34)  T(F): 98.6 (10 Feb 2020 07:34), Max: 98.6 (10 Feb 2020 07:34)  HR: 74 (10 Feb 2020 07:34) (59 - 86)  BP: 100/60 (10 Feb 2020 07:34) (92/52 - 104/82)    RR: 16 (10 Feb 2020 07:34) (16 - 18)  SpO2: 98% (10 Feb 2020 07:34) (95% - 98%)    PHYSICAL EXAM:  GENERAL: NAD, well-groomed, well-developed  HEAD:  Atraumatic, Normocephalic  EYES: EOMI, PERRLA, conjunctiva and sclera clear  ENMT: Moist mucous membranes, Good dentition, No lesions; No tonsillar erythema, exudates, or enlargement  NECK: Supple, No JVD, Normal thyroid  NERVOUS SYSTEM:  Alert & Oriented X3, Good concentration; All 4 extremities mobile, no gross sensory deficits.   CHEST/LUNG: Clear to auscultation bilaterally; No rales, rhonchi, wheezing, or rubs  HEART: Regular rate and rhythm; No murmurs, rubs, or gallops  ABDOMEN: Soft, Nontender, Nondistended; Bowel sounds present  EXTREMITIES:  2+ Peripheral Pulses, No clubbing, cyanosis, or edema    LABS:                        9.9    7.12  )-----------( 158      ( 09 Feb 2020 15:06 )             30.2     09 Feb 2020 15:06    144    |  108    |  8      ----------------------------<  121    4.5     |  28     |  0.88     Ca    8.9        09 Feb 2020 15:06  Phos  3.5       09 Feb 2020 15:08  Mg     1.8       09 Feb 2020 15:08          CAPILLARY BLOOD GLUCOSE          RADIOLOGY & ADDITIONAL TESTS:    Imaging Personally Reviewed:  [ ] YES     Consultant(s) Notes Reviewed:      Care Discussed with Consultants/Other Providers:    Advanced Directives: [ ] DNR  [ ] No feeding tube  [ ] MOLST in chart  [ ] MOLST completed today  [ ] Unknown Patient is a 49y old  Female who presents with a chief complaint of ABLA (08 Feb 2020 09:49)      INTERVAL HPI/OVERNIGHT EVENTS:    BP has improved without midodrine.  Refusing bloodwork in AM.      Spoke to pt regarding hx of possible neoplasm of the brain, pt denied that she had history.  She just has hx of seizures.      She's ambulating up and down the matson without any issues.    Allergies    No Known Allergies    Intolerances        REVIEW OF SYSTEMS:    CONSTITUTIONAL: +feeling tired,  No fever, weight loss  RESPIRATORY: No cough, wheezing, chills or hemoptysis; No shortness of breath  CARDIOVASCULAR: No chest pain, palpitations, lightheadedness, or leg swelling  GASTROINTESTINAL: No abdominal or epigastric pain. No nausea, vomiting, or hematemesis; No diarrhea or constipation. No melena or hematochezia.  GENITOURINARY: No dysuria, frequency, hematuria, or incontinence  NEUROLOGICAL: No headaches, memory loss, vertigo, loss of strength, numbness, or tremors  MUSCULOSKELETAL: No joint pain or swelling; No muscle, back, or extremity pain    Vital Signs Last 24 Hrs  T(C): 37 (10 Feb 2020 07:34), Max: 37 (10 Feb 2020 07:34)  T(F): 98.6 (10 Feb 2020 07:34), Max: 98.6 (10 Feb 2020 07:34)  HR: 74 (10 Feb 2020 07:34) (59 - 86)  BP: 100/60 (10 Feb 2020 07:34) (92/52 - 104/82)    RR: 16 (10 Feb 2020 07:34) (16 - 18)  SpO2: 98% (10 Feb 2020 07:34) (95% - 98%)    PHYSICAL EXAM:  GENERAL: NAD, well-groomed, well-developed  HEAD:  Atraumatic, Normocephalic  EYES: EOMI, PERRLA, conjunctiva and sclera clear  ENMT: Moist mucous membranes, Good dentition, No lesions; No tonsillar erythema, exudates, or enlargement  NECK: Supple, No JVD, Normal thyroid  NERVOUS SYSTEM:  Alert & Oriented X3, Good concentration; All 4 extremities mobile, no gross sensory deficits.   CHEST/LUNG: Clear to auscultation bilaterally; No rales, rhonchi, wheezing, or rubs  HEART: Regular rate and rhythm; No murmurs, rubs, or gallops  ABDOMEN: Soft, Nontender, Nondistended; Bowel sounds present  EXTREMITIES:  2+ Peripheral Pulses, No clubbing, cyanosis, or edema    LABS:                        9.9    7.12  )-----------( 158      ( 09 Feb 2020 15:06 )             30.2     09 Feb 2020 15:06    144    |  108    |  8      ----------------------------<  121    4.5     |  28     |  0.88     Ca    8.9        09 Feb 2020 15:06  Phos  3.5       09 Feb 2020 15:08  Mg     1.8       09 Feb 2020 15:08          CAPILLARY BLOOD GLUCOSE          RADIOLOGY & ADDITIONAL TESTS:    Imaging Personally Reviewed:  [ ] YES     Consultant(s) Notes Reviewed:      Care Discussed with Consultants/Other Providers:    Advanced Directives: [ ] DNR  [ ] No feeding tube  [ ] MOLST in chart  [ ] MOLST completed today  [ ] Unknown

## 2020-02-24 ENCOUNTER — APPOINTMENT (OUTPATIENT)
Dept: ORTHOPEDIC SURGERY | Facility: CLINIC | Age: 50
End: 2020-02-24
Payer: MEDICAID

## 2020-02-24 PROCEDURE — 73501 X-RAY EXAM HIP UNI 1 VIEW: CPT | Mod: RT

## 2020-02-24 PROCEDURE — 99024 POSTOP FOLLOW-UP VISIT: CPT

## 2020-02-24 NOTE — CONSULT LETTER
[Dear  ___] : Dear  [unfilled], [I had the pleasure of evaluating your patient, [unfilled].] : I had the pleasure of evaluating your patient, [unfilled]. [Sincerely,] : Sincerely, [Please see my note below.] : Please see my note below. [FreeTextEntry3] : Dr. Serrano

## 2020-02-24 NOTE — HISTORY OF PRESENT ILLNESS
[de-identified] : 48 y/o female present s/p right total hip replacement. Experiences significant pain relief. Denies fever or chills. Ambulating freely. [de-identified] : s/p right total hip replacement 02/06/2020 [de-identified] : Well nourished and in no distress\par right hip: incision well healed prom pain free\par Motor Power: Peroneals, EHL, and tibialis anterior strength 5/5 bilaterally \par Reflexes:Patella 1+ bilaterally, Achilles 2+ bilaterally.  [de-identified] : s/p right total hip replacement [de-identified] : AP Pelvis and AP/Lateral Xrays of the right hip taken in the office today demonstrates satisfactory appearance of total hip replacement components [de-identified] : PT no squats, no lunges, no SLR. FU 1 month

## 2020-02-26 ENCOUNTER — APPOINTMENT (OUTPATIENT)
Dept: ORTHOPEDIC SURGERY | Facility: CLINIC | Age: 50
End: 2020-02-26
Payer: MEDICAID

## 2020-02-26 PROCEDURE — 20610 DRAIN/INJ JOINT/BURSA W/O US: CPT | Mod: RT,79

## 2020-02-26 NOTE — ED ADULT NURSE NOTE - NS ED NOTE ABUSE SUSPICION NEGLECT YN
Constitutional Symptoms: No weakness, fevers, chills, weight loss  Eyes: No visual changes, headache, eye pain, double vision  Ears, Nose, Mouth, Throat: No runny nose, sinus pain, ear pain, tinnitus, sore throat, dysphagia, odynophagia  Cardiovascular: No chest pain, palpitations, edema  Respiratory: +shortness of breath +cough, wheezing, hemoptysis  Gastrointestinal: + loose stools + nausea/vomiting + epigastric pain No dysphagia, anorexia, diarrhea/constipation, hematemesis, BRBPR, melena  Genitourinary: No dysuria, frequency, hematuria  Musculoskeletal: No joint pain, joint swelling, decreased ROM  Skin: No pruritus, rashes, lesions, wounds  Neurologic:  No seizures, headache, paraesthesia, numbness, limb weakness    Positives and pertinent negatives noted and all other systems negative.
no

## 2020-03-03 ENCOUNTER — RESULT REVIEW (OUTPATIENT)
Age: 50
End: 2020-03-03

## 2020-03-04 ENCOUNTER — APPOINTMENT (OUTPATIENT)
Dept: ORTHOPEDIC SURGERY | Facility: CLINIC | Age: 50
End: 2020-03-04

## 2020-03-04 ENCOUNTER — APPOINTMENT (OUTPATIENT)
Dept: ORTHOPEDIC SURGERY | Facility: CLINIC | Age: 50
End: 2020-03-04
Payer: MEDICAID

## 2020-03-04 PROCEDURE — 20610 DRAIN/INJ JOINT/BURSA W/O US: CPT | Mod: 79,RT

## 2020-03-04 NOTE — PROCEDURE
[de-identified] : Injection: Right  knee joint.\par Indication: Osteoarthritis.\par \par A discussion was had with the patient regarding this procedure and all questions were answered. All risks, benefits and alternatives were discussed. These included but were not limited to bleeding, infection, and allergic reaction. Alcohol was used to clean the skin, and betadine was used to sterilize and prep the area in the lateral joint line aspect of the knee. Ethyl chloride spray was then used as a topical anesthetic. A 22-gauge needle was used to inject 2.5 cc of Visco 3 into the knee with ease. A sterile bandage was then applied. The patient tolerated the procedure well and there were no complications. \par \par Lot #:  [id number]\par Exp:  6/30/21\par

## 2020-03-04 NOTE — PHYSICAL EXAM
[de-identified] : Constitutional: Well-nourished, well-developed, No acute distress\par Respiratory: Good respiratory effort, no SOB\par Lymphatic: No regional lymphadenopathy, no lymphedema\par Psychiatric: Pleasant and normal affect, alert and oriented x3\par Skin: Clean dry and intact B/L UE/LE\par Musculoskeletal: normal except where as noted in regional exam\par \par Right Knee:\par APPEARANCE: no marked deformities, no swelling or malalignment\par POSITIVE TENDERNESS: + crepitus of the anterior knee, and tenderness of patellar retinaculum\par NONTENDER: jt lines b/l, patellar & quadriceps tendons, MCL/LCL, ITB at the lateral femoral condyle & Gerdy's tubercle, pes bursa. \par ROM: full & painless, although some discomfort in deep knee flexion\par RESISTIVE TESTING: + discomfort with knee ext from deep knee flexion (stretched position), painless knee flexion. \par SPECIAL TESTS: stable v/v stress. painless grind. neg Lachman's. neg ant/post drawer. neg Jaye's. neg Thessaly test. neg John's & Malacrae's\par NEURO: Normal sensation of LE, DTRs 2+/4 patella and achilles\par PULSES: 2+ DP/PT pulses\par

## 2020-03-04 NOTE — DISCUSSION/SUMMARY
[de-identified] : The final Visco-3 injection was given today under sterile conditions into the right knee joint without complication (see procedure note). I discussed the effects of this medication and how long it may provide benefit. Patient has obtained moderate immediate improvement. If no significant long-term benefit, the patient may elect for additional treatment strategies as previously discussed. However, if the patient obtains good relief of symptoms, the injection therapy series can be repeated over the next 6-12 months.\par \par Will have the patient followup on an as-needed basis at this time.  Patient appreciates and agrees with current plan.\par \par This note was generated using dragon medical dictation software. A reasonable effort has been made for proofreading its contents, but typos may still remain. If there are any questions or points of clarification needed please notify my office.

## 2020-03-04 NOTE — HISTORY OF PRESENT ILLNESS
[de-identified] : Patient is here today to follow-up on knee pain.  There has been some mild improvement after the first hyaluronic acid injection performed at last visit.  No adverse reaction thus far.  Patient would like to proceed with the second injection in the series at this time.

## 2020-03-04 NOTE — PHYSICAL EXAM
[de-identified] : Constitutional: Well-nourished, well-developed, No acute distress\par Respiratory: Good respiratory effort, no SOB\par Lymphatic: No regional lymphadenopathy, no lymphedema\par Psychiatric: Pleasant and normal affect, alert and oriented x3\par Skin: Clean dry and intact B/L UE/LE\par Musculoskeletal: normal except where as noted in regional exam\par \par Right Knee:\par APPEARANCE: no marked deformities, no swelling or malalignment\par POSITIVE TENDERNESS: + crepitus of the anterior knee, and tenderness of patellar retinaculum\par NONTENDER: jt lines b/l, patellar & quadriceps tendons, MCL/LCL, ITB at the lateral femoral condyle & Gerdy's tubercle, pes bursa. \par ROM: full & painless, although some discomfort in deep knee flexion\par RESISTIVE TESTING: + discomfort with knee ext from deep knee flexion (stretched position), painless knee flexion. \par SPECIAL TESTS: stable v/v stress. painless grind. neg Lachman's. neg ant/post drawer. neg Jaye's. neg Thessaly test. neg John's & Malacrae's\par NEURO: Normal sensation of LE, DTRs 2+/4 patella and achilles\par PULSES: 2+ DP/PT pulses\par

## 2020-03-04 NOTE — HISTORY OF PRESENT ILLNESS
[de-identified] : Patient is here today to follow-up on knee pain.  There has been some mild improvement after the second hyaluronic acid injection performed at last visit.  No adverse reaction thus far.  Patient would like to proceed with the last injection in the series at this time.

## 2020-03-04 NOTE — PROCEDURE
[de-identified] : Injection: Right  knee joint.\par Indication: Osteoarthritis.\par \par A discussion was had with the patient regarding this procedure and all questions were answered. All risks, benefits and alternatives were discussed. These included but were not limited to bleeding, infection, and allergic reaction. Alcohol was used to clean the skin, and betadine was used to sterilize and prep the area in the lateral joint line aspect of the knee. Ethyl chloride spray was then used as a topical anesthetic. A 22-gauge needle was used to inject 2.5 cc of Visco 3 into the knee with ease. A sterile bandage was then applied. The patient tolerated the procedure well and there were no complications. \par \par Lot #:  [id number]\par Exp:  6/30/21\par

## 2020-03-04 NOTE — DISCUSSION/SUMMARY
[de-identified] : The second of three Visco 3 injections was given today under sterile conditions into the right knee joint without complication (see procedure note). I again discussed the role of activity modification/icing following the injection to treat any local irritation from the injection. \par \par I will have the patient followup for the final injection in approximately 1 week.\par \par This note was generated using dragon medical dictation software. A reasonable effort has been made for proofreading its contents, but typos may still remain. If there are any questions or points of clarification needed please notify my office.

## 2020-03-23 ENCOUNTER — APPOINTMENT (OUTPATIENT)
Dept: ORTHOPEDIC SURGERY | Facility: CLINIC | Age: 50
End: 2020-03-23

## 2020-03-23 NOTE — PHYSICAL EXAM
[de-identified] : Well-nourished, in no acute distress \par Alert and oriented to time, place and person \par Skin: no lesions discoloration \par Respirations: unlabored\par Cardiac: no leg swelling\par Lymphatic: no groin adenopathy\par total hip:  \par Internal  degrees, external degrees, adduction degrees, abduction  degrees.

## 2020-03-23 NOTE — END OF VISIT
[FreeTextEntry3] : This note was written by Tim Levin on 03/11/2020 acting solely as a scribe for Dr. Serrano. All recorded entries made by the scribe were at my, Dr. Serrano, direction and personally dictated by me on 03/11/2020. I have personally reviewed the chart and agree that the record accurately reflects my personal performance of history, physical exam, assessment and plan.

## 2020-03-23 NOTE — HISTORY OF PRESENT ILLNESS
[de-identified] : Ms. THOMASON is a pleasant  49 year old female who presents today for follow up status post total hip replacement performed February 2020 . Significant improvement in preoperative pain. Denies fever, chills, groin pain, thigh pain. Ambulating _________ at this time. \par

## 2020-03-25 ENCOUNTER — APPOINTMENT (OUTPATIENT)
Dept: ORTHOPEDIC SURGERY | Facility: CLINIC | Age: 50
End: 2020-03-25

## 2020-07-01 ENCOUNTER — EMERGENCY (EMERGENCY)
Facility: HOSPITAL | Age: 50
LOS: 1 days | Discharge: HOME | End: 2020-07-01
Attending: EMERGENCY MEDICINE | Admitting: EMERGENCY MEDICINE
Payer: MEDICAID

## 2020-07-01 VITALS
DIASTOLIC BLOOD PRESSURE: 60 MMHG | HEART RATE: 71 BPM | TEMPERATURE: 98 F | HEIGHT: 62 IN | RESPIRATION RATE: 18 BRPM | WEIGHT: 110.01 LBS | OXYGEN SATURATION: 100 % | SYSTOLIC BLOOD PRESSURE: 99 MMHG

## 2020-07-01 DIAGNOSIS — Z98.890 OTHER SPECIFIED POSTPROCEDURAL STATES: Chronic | ICD-10-CM

## 2020-07-01 DIAGNOSIS — Z41.1 ENCOUNTER FOR COSMETIC SURGERY: Chronic | ICD-10-CM

## 2020-07-01 LAB
ALBUMIN SERPL ELPH-MCNC: 3.8 G/DL — SIGNIFICANT CHANGE UP (ref 3.3–5)
ALP SERPL-CCNC: 64 U/L — SIGNIFICANT CHANGE UP (ref 40–120)
ALT FLD-CCNC: 23 U/L — SIGNIFICANT CHANGE UP (ref 12–78)
ANION GAP SERPL CALC-SCNC: 8 MMOL/L — SIGNIFICANT CHANGE UP (ref 5–17)
AST SERPL-CCNC: 26 U/L — SIGNIFICANT CHANGE UP (ref 15–37)
BASOPHILS # BLD AUTO: 0.05 K/UL — SIGNIFICANT CHANGE UP (ref 0–0.2)
BASOPHILS NFR BLD AUTO: 0.6 % — SIGNIFICANT CHANGE UP (ref 0–2)
BILIRUB SERPL-MCNC: 0.4 MG/DL — SIGNIFICANT CHANGE UP (ref 0.2–1.2)
BUN SERPL-MCNC: 23 MG/DL — SIGNIFICANT CHANGE UP (ref 7–23)
CALCIUM SERPL-MCNC: 8.9 MG/DL — SIGNIFICANT CHANGE UP (ref 8.5–10.1)
CHLORIDE SERPL-SCNC: 108 MMOL/L — SIGNIFICANT CHANGE UP (ref 96–108)
CO2 SERPL-SCNC: 25 MMOL/L — SIGNIFICANT CHANGE UP (ref 22–31)
CREAT SERPL-MCNC: 0.8 MG/DL — SIGNIFICANT CHANGE UP (ref 0.5–1.3)
EOSINOPHIL # BLD AUTO: 0.08 K/UL — SIGNIFICANT CHANGE UP (ref 0–0.5)
EOSINOPHIL NFR BLD AUTO: 1 % — SIGNIFICANT CHANGE UP (ref 0–6)
ETHANOL SERPL-MCNC: <10 MG/DL — SIGNIFICANT CHANGE UP (ref 0–10)
GLUCOSE SERPL-MCNC: 125 MG/DL — HIGH (ref 70–99)
HCT VFR BLD CALC: 35.1 % — SIGNIFICANT CHANGE UP (ref 34.5–45)
HGB BLD-MCNC: 12.1 G/DL — SIGNIFICANT CHANGE UP (ref 11.5–15.5)
IMM GRANULOCYTES NFR BLD AUTO: 0.4 % — SIGNIFICANT CHANGE UP (ref 0–1.5)
LACTATE SERPL-SCNC: 0.7 MMOL/L — SIGNIFICANT CHANGE UP (ref 0.7–2)
LYMPHOCYTES # BLD AUTO: 1.09 K/UL — SIGNIFICANT CHANGE UP (ref 1–3.3)
LYMPHOCYTES # BLD AUTO: 13.9 % — SIGNIFICANT CHANGE UP (ref 13–44)
MAGNESIUM SERPL-MCNC: 2.1 MG/DL — SIGNIFICANT CHANGE UP (ref 1.6–2.6)
MCHC RBC-ENTMCNC: 29.9 PG — SIGNIFICANT CHANGE UP (ref 27–34)
MCHC RBC-ENTMCNC: 34.5 GM/DL — SIGNIFICANT CHANGE UP (ref 32–36)
MCV RBC AUTO: 86.7 FL — SIGNIFICANT CHANGE UP (ref 80–100)
MONOCYTES # BLD AUTO: 0.34 K/UL — SIGNIFICANT CHANGE UP (ref 0–0.9)
MONOCYTES NFR BLD AUTO: 4.3 % — SIGNIFICANT CHANGE UP (ref 2–14)
NEUTROPHILS # BLD AUTO: 6.23 K/UL — SIGNIFICANT CHANGE UP (ref 1.8–7.4)
NEUTROPHILS NFR BLD AUTO: 79.8 % — HIGH (ref 43–77)
NRBC # BLD: 0 /100 WBCS — SIGNIFICANT CHANGE UP (ref 0–0)
PLATELET # BLD AUTO: 249 K/UL — SIGNIFICANT CHANGE UP (ref 150–400)
POTASSIUM SERPL-MCNC: 3.2 MMOL/L — LOW (ref 3.5–5.3)
POTASSIUM SERPL-SCNC: 3.2 MMOL/L — LOW (ref 3.5–5.3)
PROT SERPL-MCNC: 7.2 G/DL — SIGNIFICANT CHANGE UP (ref 6–8.3)
RBC # BLD: 4.05 M/UL — SIGNIFICANT CHANGE UP (ref 3.8–5.2)
RBC # FLD: 12 % — SIGNIFICANT CHANGE UP (ref 10.3–14.5)
SODIUM SERPL-SCNC: 141 MMOL/L — SIGNIFICANT CHANGE UP (ref 135–145)
WBC # BLD: 7.82 K/UL — SIGNIFICANT CHANGE UP (ref 3.8–10.5)
WBC # FLD AUTO: 7.82 K/UL — SIGNIFICANT CHANGE UP (ref 3.8–10.5)

## 2020-07-01 PROCEDURE — 93010 ELECTROCARDIOGRAM REPORT: CPT

## 2020-07-01 PROCEDURE — 99284 EMERGENCY DEPT VISIT MOD MDM: CPT

## 2020-07-01 PROCEDURE — 70450 CT HEAD/BRAIN W/O DYE: CPT | Mod: 26

## 2020-07-01 RX ORDER — DIPHENHYDRAMINE HCL 50 MG
25 CAPSULE ORAL ONCE
Refills: 0 | Status: COMPLETED | OUTPATIENT
Start: 2020-07-01 | End: 2020-07-01

## 2020-07-01 RX ORDER — LAMOTRIGINE 25 MG/1
1 TABLET, ORALLY DISINTEGRATING ORAL
Qty: 0 | Refills: 0 | DISCHARGE

## 2020-07-01 RX ORDER — LAMOTRIGINE 25 MG/1
300 TABLET, ORALLY DISINTEGRATING ORAL ONCE
Refills: 0 | Status: COMPLETED | OUTPATIENT
Start: 2020-07-01 | End: 2020-07-01

## 2020-07-01 RX ORDER — METOCLOPRAMIDE HCL 10 MG
10 TABLET ORAL ONCE
Refills: 0 | Status: COMPLETED | OUTPATIENT
Start: 2020-07-01 | End: 2020-07-01

## 2020-07-01 RX ORDER — ALPRAZOLAM 0.25 MG
1 TABLET ORAL
Qty: 0 | Refills: 0 | DISCHARGE

## 2020-07-01 RX ORDER — ACETAMINOPHEN 500 MG
650 TABLET ORAL ONCE
Refills: 0 | Status: COMPLETED | OUTPATIENT
Start: 2020-07-01 | End: 2020-07-01

## 2020-07-01 RX ORDER — ESCITALOPRAM OXALATE 10 MG/1
1 TABLET, FILM COATED ORAL
Qty: 0 | Refills: 0 | DISCHARGE

## 2020-07-01 RX ADMIN — Medication 10 MILLIGRAM(S): at 21:55

## 2020-07-01 RX ADMIN — Medication 1 MILLIGRAM(S): at 21:56

## 2020-07-01 RX ADMIN — Medication 25 MILLIGRAM(S): at 21:56

## 2020-07-01 RX ADMIN — Medication 650 MILLIGRAM(S): at 21:55

## 2020-07-01 RX ADMIN — LAMOTRIGINE 300 MILLIGRAM(S): 25 TABLET, ORALLY DISINTEGRATING ORAL at 23:07

## 2020-07-01 NOTE — ED PROVIDER NOTE - CLINICAL SUMMARY MEDICAL DECISION MAKING FREE TEXT BOX
BIBA c/o headache/vomiting/dizziness upon triage. Upon my evaluation, patient confused, unable to recall events of today. Conflicting stories, notes following with neuro today. pt reporting not knowing where she is, why she is in the hospital or what year it is. pt noting she typically gets seizures, and typically takes her 30 minutes to get back to baseline. Pt reported initially on triage that she takes Lamotrigine at home for seizures, pt reports to me taking Keppra. c/o headache currently. No witnessed seizure in ED. AMS, possibly post ictal. plan includes labs, EKG, Ct head r/o CVA, re-assess BIBA c/o headache/vomiting/dizziness upon triage. Upon my evaluation, patient confused, unable to recall events of today. Conflicting stories, notes following with neuro today. pt reporting not knowing where she is, why she is in the hospital or what year it is. pt noting she typically gets seizures, and typically takes her 30 minutes to get back to baseline. Pt reported initially on triage that she takes Lamotrigine at home for seizures, pt reports to me taking Keppra. c/o headache currently. No witnessed seizure in ED. AMS, possibly post ictal. Pt walking in ED talking on phone with no one on the other line. Pouring tylenol and water in purse. plan includes labs, EKG, Ct head r/o CVA, call pt neurologist

## 2020-07-01 NOTE — ED ADULT NURSE REASSESSMENT NOTE - NS ED NURSE REASSESS COMMENT FT1
Patient on cell phone having conversation with phone off. Clear sentences but conversation not following a thought process. Sent to CT urgently now with anupama

## 2020-07-01 NOTE — ED ADULT NURSE NOTE - NSIMPLEMENTINTERV_GEN_ALL_ED
Implemented All Universal Safety Interventions:  Ancram to call system. Call bell, personal items and telephone within reach. Instruct patient to call for assistance. Room bathroom lighting operational. Non-slip footwear when patient is off stretcher. Physically safe environment: no spills, clutter or unnecessary equipment. Stretcher in lowest position, wheels locked, appropriate side rails in place.

## 2020-07-01 NOTE — ED PROVIDER NOTE - PATIENT PORTAL LINK FT
You can access the FollowMyHealth Patient Portal offered by [facility] by registering at the following website: [email or web address]. By joining [facility]’s FollowMyHealth portal, you will also be able to view your health information using other applications (apps) compatible with our system.

## 2020-07-01 NOTE — ED PROVIDER NOTE - PROGRESS NOTE DETAILS
SILVER - Ct showing no acute changes of known lesion.  Attempted to contact her psyhicatrist Dr Payan without success.  Called telepsych since behaviors are not consistent with seizure behavior (post ictal state).  They stated would need to be seen and cleared by neuro prior to their evaluation.  Consult placed for neuro to see patient in the morning.  Pt went to restroom and urinated on the floor.  Since that time has been sleeping under blanket in bed SILVER - On way to CT the patient was having a conversation with her cell phone that was not calling anyone.  Also when returned to room took a tray of food and threw most on the floor while smearing some on the walls. SILVER - Discussed case with Dr Miller (her neurologist from [facility]) who she saw today.  Has a complex seizure history.  Per his report boyfriend reports that she has been having 30 second staring spells after which she returns immediately to baseline MS.  This is what he found concerning and wanted to get an EEG for.  He denies her ever having behaviors such as this and the BF did not relay any concerns similar to this. SILVER - Ct showing no acute changes of known lesion.  Attempted to contact her psychiatrist Dr Payan without success.  Called telepsych since behaviors are not consistent with seizure behavior (post ictal state).  They stated would need to be seen and cleared by neuro prior to their evaluation.  Consult placed for neuro to see patient in the morning.  Pt went to restroom and urinated on the floor.  Since that time has been sleeping under blanket in bed SILVER - Discussed case with Dr Miller (her neurologist from [facility]) who she saw today.  Has a complex seizure history.  Per his report boyfriend reports that she has been having 30 second staring spells after which she returns immediately to baseline MS.  This is what he found concerning and wanted to get an EEG for.  He denies her ever having behaviors such as this and the BF did not relay any concerns similar to this.  If can be cleared will be able to follow up her Lamictal in the morning and adjust meds as needed SILVER - pt is currently awake and lucid.  States that she was very stressed and anxious and gets worse when she is worried about her seizures.  She is able to recall many of the events last night and apoligized for them.  Denies any SI HI AH VH.  Steady gait and clear speech.  Has insight into her condition and the events of the evening.  Stable for dc home with her own psych and neuro follow up as previous documented.

## 2020-07-01 NOTE — ED PROVIDER NOTE - PHYSICAL EXAMINATION
Constitutional: Awake, Alert, non-toxic. NAD.   HEAD: Normocephalic, atraumatic.   EYES: PERRL, EOM intact, conjunctiva and sclera are clear bilaterally. No raccoon eyes.   ENT: No rodriguez sign. No rhinorrhea, normal pharynx, patent, no tonsillar exudate or enlargement, mucous membranes pink/moist, no erythema, no drooling or stridor.   NECK: Supple, non-tender  BACK: No midline or paraspinal TTP of cervical/thoracic/lumbar spine, FROM. No ecchymosis or hematomas.   CARDIOVASCULAR: Normal S1, S2; regular rate and rhythm.  RESPIRATORY: Normal respiratory effort; breath sounds CTAB, no wheezes, rhonchi, or rales. Speaking in full sentences. No accessory muscle use.   ABDOMEN: Soft; non-tender, non-distended  EXTREMITIES: Full passive and active ROM in all extremities; non-tender to palpation; distal pulses palpable and symmetric, no edema, no crepitus or step off  SKIN: Warm, dry; good skin turgor, no apparent lesions or rashes, no ecchymosis, brisk capillary refill.  NEURO: A&O x1, only aware of name. Sensory and motor functions are grossly intact. Speech is normal. Appearance and judgement seem appropriate for gender and age.  Neurovascular sensation intact motor function 5/5 of upper and lower extremities, CN II-XII grossly intact, absent pronator drift, intact cerebellar function. Speech clear, without articulation or word-finding difficulties. Eyes- PERRL bilaterally. EOMs in tact. No nystagmus. No facial droop.

## 2020-07-01 NOTE — ED PROVIDER NOTE - ATTENDING CONTRIBUTION TO CARE
48 yo with a history of PTSD, anxiety, seizures presenting with HA, dizziness and vomiting.  States that she was been dizzy for the last few days and today had a HA that was in the front of her head and then had NBNB emesis.  States saw her neurologist today who was concerned about her dizziness and if she was having seizures and wants her to get a 48 hour video EEG.  During history pt would perseverate on different topics relating to her seizure meds and would require redirection.    Gen: Well appearing in NAD  Head: NC/AT  Neck: trachea midline  Resp:  No distress  Ext: no deformities  Neuro:  A&O appears non focal  Skin:  Warm and dry as visualized  Psych:  Normal affect and mood     48 yo with sz disorder presenting with HA and dizziness.  When questioned about her seizure meds she says its Lamictal and that she takes 300mg daily however her pill bottle is for 100mg tabs to be taken once daily as prescribed by her psychiatrist and not neurologist (states ran out of pills from the neurologist).  During her initial time in the ED the patient displayed a number of different inappropriate behaviors including cursing at staff that was not an MD, placing tylenol pills into her purse and then pouring the water for the pills into her purse as well.  Will get head imaging to ensure no etiology as well as tox and lytes.  Post ictal state is on differential as well as psychosis or other psychiatric disease.

## 2020-07-01 NOTE — ED ADULT NURSE NOTE - OBJECTIVE STATEMENT
Received patient in bed 14B. AOX2 with disoriented conversation. Primo Horton made aware. Patient unable to provide identification for blood. IV left AC in place from EMS. Moving all extremities without difficulty. EKG/FS/stat CT sent. Speech clear. Seizure precautions place and cardiac monitor

## 2020-07-01 NOTE — ED PROVIDER NOTE - OBJECTIVE STATEMENT
50 y/o female BIBA today c/o headache, dizziness, vomiting upon triage. Upon my evaluation, patient confused, unable to recall events of today. Conflicting stories, reporting following with Neuro this morning. pt reporting not knowing where she is, why she is in the hospital or what year it is. pt noting she typically gets seizures, and typically takes her 30 minutes to get back to baseline. Pt reported initially on triage that she takes Lamotrigine at home for seizures, pt reports to me taking Keppra. Pt notes having a frontotemporal headache currently 6/10. Pt denies chest pain, SOB, numbness/weakness, visual change, or any other complaints currently.

## 2020-07-01 NOTE — ED ADULT NURSE REASSESSMENT NOTE - NS ED NURSE REASSESS COMMENT FT1
Patient ambulated with steady gait to bathroom. Asked to provide urine specimen and did not. Returned to room and placed specimen cup in purse. Tylenol provided as ordered and patient placed the meds in her purse while pouring the cup of water inside. PA made aware. Tylenol provided and witnessed drinking at this time. Food provided and witnessed eating with  no s/s of aspirations. Pt resting comfortably at this time. No s/s of pain noted.

## 2020-07-01 NOTE — ED ADULT NURSE REASSESSMENT NOTE - NS ED NURSE REASSESS COMMENT FT1
Patient speaking to PA at this time. Continued altered behavior. Seizure precautions continued. Awaiting CT

## 2020-07-01 NOTE — ED PROVIDER NOTE - PMH
Anxiety    Osteoarthritis    PTSD (post-traumatic stress disorder)    Seizure  first episode 2009 , second episode 2016  Shingles  2018

## 2020-07-01 NOTE — ED PROVIDER NOTE - NSFOLLOWUPINSTRUCTIONS_ED_ALL_ED_FT
Headache    A headache is pain or discomfort felt around the head or neck area. The specific cause of a headache may not be found as there are many types including tension headaches, migraine headaches, and cluster headaches. Watch your condition for any changes. Things you can do to manage your pain include taking over the counter and prescription medications as instructed by your health care provider, lying down in a dark quiet room, limiting stress, getting regular sleep, and refraining from alcohol and tobacco products.    SEEK IMMEDIATE MEDICAL CARE IF YOU HAVE ANY OF THE FOLLOWING SYMPTOMS: fever, vomiting, stiff neck, loss of vision, problems with speech, muscle weakness, loss of balance, trouble walking, passing out, or confusion.     Seizure    A seizure is abnormal electrical activity in the brain; the specific cause may or may not be found. Prior to a seizure you may experience a warning sensation (aura) that may include fear, nausea, dizziness, and visual changes such as flashing lights of spots. Common symptoms during the seizure may include an altered mental status, rhythmic jerking movements, drooling, grunting, loss of bladder or bowel control, or tongue biting. After a seizure, you may feel confused and sleepy.     Do not swim, drive, operate machinery, or engage in any risky activity during which a seizure could cause further injury to you or others. Teach friends and family what to do if you HAVE a seizure which includes laying you on the ground with your head on a cushion and turning you to the side to keep your breathing passages clear in case of vomiting.    SEEK IMMEDIATE MEDICAL CARE IF YOU HAVE ANY OF THE FOLLOWING SYMPTOMS: seizure lasting over 5 minutes, not waking up or persistent altered mental status after the seizure, or more frequent or worsening seizures.     You need to follow up with Dr Garcia in the next few days.  He will follow up your Lamictal level and coordinate your seizure care

## 2020-07-02 VITALS
HEART RATE: 78 BPM | TEMPERATURE: 98 F | OXYGEN SATURATION: 98 % | RESPIRATION RATE: 16 BRPM | SYSTOLIC BLOOD PRESSURE: 105 MMHG | DIASTOLIC BLOOD PRESSURE: 72 MMHG

## 2020-07-02 LAB
APPEARANCE UR: CLEAR — SIGNIFICANT CHANGE UP
BILIRUB UR-MCNC: NEGATIVE — SIGNIFICANT CHANGE UP
COLOR SPEC: SIGNIFICANT CHANGE UP
DIFF PNL FLD: ABNORMAL
GLUCOSE UR QL: NEGATIVE — SIGNIFICANT CHANGE UP
KETONES UR-MCNC: ABNORMAL
LEUKOCYTE ESTERASE UR-ACNC: NEGATIVE — SIGNIFICANT CHANGE UP
NITRITE UR-MCNC: NEGATIVE — SIGNIFICANT CHANGE UP
PCP SPEC-MCNC: SIGNIFICANT CHANGE UP
PH UR: 7 — SIGNIFICANT CHANGE UP (ref 5–8)
PROT UR-MCNC: NEGATIVE — SIGNIFICANT CHANGE UP
SP GR SPEC: 1 — LOW (ref 1.01–1.02)
UROBILINOGEN FLD QL: NEGATIVE — SIGNIFICANT CHANGE UP

## 2020-07-02 PROCEDURE — 83735 ASSAY OF MAGNESIUM: CPT

## 2020-07-02 PROCEDURE — 36415 COLL VENOUS BLD VENIPUNCTURE: CPT

## 2020-07-02 PROCEDURE — 99285 EMERGENCY DEPT VISIT HI MDM: CPT | Mod: 25

## 2020-07-02 PROCEDURE — 96374 THER/PROPH/DIAG INJ IV PUSH: CPT

## 2020-07-02 PROCEDURE — 81001 URINALYSIS AUTO W/SCOPE: CPT

## 2020-07-02 PROCEDURE — 80053 COMPREHEN METABOLIC PANEL: CPT

## 2020-07-02 PROCEDURE — 80177 DRUG SCRN QUAN LEVETIRACETAM: CPT

## 2020-07-02 PROCEDURE — 80307 DRUG TEST PRSMV CHEM ANLYZR: CPT

## 2020-07-02 PROCEDURE — 85027 COMPLETE CBC AUTOMATED: CPT

## 2020-07-02 PROCEDURE — 93005 ELECTROCARDIOGRAM TRACING: CPT

## 2020-07-02 PROCEDURE — 80175 DRUG SCREEN QUAN LAMOTRIGINE: CPT

## 2020-07-02 PROCEDURE — 83605 ASSAY OF LACTIC ACID: CPT

## 2020-07-02 PROCEDURE — 82962 GLUCOSE BLOOD TEST: CPT

## 2020-07-02 PROCEDURE — 96375 TX/PRO/DX INJ NEW DRUG ADDON: CPT

## 2020-07-02 PROCEDURE — 82550 ASSAY OF CK (CPK): CPT

## 2020-07-02 PROCEDURE — 70450 CT HEAD/BRAIN W/O DYE: CPT

## 2020-07-02 NOTE — ED ADULT NURSE REASSESSMENT NOTE - NS ED NURSE REASSESS COMMENT FT1
Patient AOX4 at this time. Very anxious still. Requesting to go home, Now requesting to stay because she "is tired". Patient ambulating with steady gait and no pants. CO with safety precautions in place. States in "immense stress". Having some family difficulties and verbalized that they are "causing so much stress". At nurse's station with Dr. López and denies any SI when asked X3. Offered TelePsych and refused at this timeX4. DC plan discussed with patient at this time and need to FU with Neuro.

## 2020-07-03 LAB — LAMOTRIGINE SERPL-MCNC: 4.3 MCG/ML — SIGNIFICANT CHANGE UP (ref 4–18)

## 2020-07-29 ENCOUNTER — RESULT REVIEW (OUTPATIENT)
Age: 50
End: 2020-07-29

## 2020-07-30 ENCOUNTER — RESULT REVIEW (OUTPATIENT)
Age: 50
End: 2020-07-30

## 2020-09-14 ENCOUNTER — APPOINTMENT (OUTPATIENT)
Dept: ORTHOPEDIC SURGERY | Facility: CLINIC | Age: 50
End: 2020-09-14

## 2020-11-09 ENCOUNTER — APPOINTMENT (OUTPATIENT)
Dept: ORTHOPEDIC SURGERY | Facility: CLINIC | Age: 50
End: 2020-11-09
Payer: MEDICAID

## 2020-11-09 DIAGNOSIS — Z96.641 PRESENCE OF RIGHT ARTIFICIAL HIP JOINT: ICD-10-CM

## 2020-11-09 PROCEDURE — 99072 ADDL SUPL MATRL&STAF TM PHE: CPT

## 2020-11-09 PROCEDURE — 73502 X-RAY EXAM HIP UNI 2-3 VIEWS: CPT | Mod: RT

## 2020-11-09 PROCEDURE — 99213 OFFICE O/P EST LOW 20 MIN: CPT

## 2020-11-09 NOTE — HISTORY OF PRESENT ILLNESS
[de-identified] : Patient reports decrease in preop related pain. 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 She does report that after walking a half an hour she experiences aching discomfort in the proximal thigh. Denies neurovascular symptoms. Denies back pain

## 2020-11-09 NOTE — DISCUSSION/SUMMARY
[de-identified] : Right total hip replacement. Patient's symptoms may be related to decrease offset with muscle fatigue.\par Plan for now continue strengthening followup in one year post surgery

## 2020-11-09 NOTE — PHYSICAL EXAM
[de-identified] : Well-nourished, in no acute distress\par Alert and oriented to time, place and person\par Skin: no lesions discoloration\par Respirations: unlabored\par Cardiac: no leg swelling\par Lymphatic: no groin adenopathy \par Right hip: Satisfactory gait leg lengths equal passive range of motion flexion 120 internal 45 external 60 abduction 50 adduction 45 resisted hip flexion 4/5 pain-free left 5/5 [de-identified] : X-ray AP pelvis and AP lateral reveal satisfactory alignment right total hip components no interval change. There is decrease offset of right hip compared to left

## 2021-02-14 ENCOUNTER — EMERGENCY (EMERGENCY)
Facility: HOSPITAL | Age: 51
LOS: 1 days | Discharge: HOME | End: 2021-02-14
Attending: EMERGENCY MEDICINE | Admitting: EMERGENCY MEDICINE
Payer: MEDICAID

## 2021-02-14 VITALS
TEMPERATURE: 98 F | RESPIRATION RATE: 16 BRPM | HEART RATE: 85 BPM | WEIGHT: 110.01 LBS | HEIGHT: 62 IN | OXYGEN SATURATION: 99 % | SYSTOLIC BLOOD PRESSURE: 107 MMHG | DIASTOLIC BLOOD PRESSURE: 43 MMHG

## 2021-02-14 VITALS
DIASTOLIC BLOOD PRESSURE: 65 MMHG | HEART RATE: 74 BPM | SYSTOLIC BLOOD PRESSURE: 104 MMHG | RESPIRATION RATE: 18 BRPM | OXYGEN SATURATION: 100 % | TEMPERATURE: 97 F

## 2021-02-14 DIAGNOSIS — Z98.890 OTHER SPECIFIED POSTPROCEDURAL STATES: Chronic | ICD-10-CM

## 2021-02-14 DIAGNOSIS — Z41.1 ENCOUNTER FOR COSMETIC SURGERY: Chronic | ICD-10-CM

## 2021-02-14 PROCEDURE — 99284 EMERGENCY DEPT VISIT MOD MDM: CPT

## 2021-02-14 PROCEDURE — 99283 EMERGENCY DEPT VISIT LOW MDM: CPT

## 2021-02-14 RX ADMIN — Medication 1 MILLIGRAM(S): at 22:11

## 2021-02-14 NOTE — ED PROVIDER NOTE - ATTENDING CONTRIBUTION TO CARE
49 y/o F with hx of epilepsy with concern for possible seizure.  pt was on the phone during a "heated conversation" and states she feels she "blanked out." pt called 911.  pt was not postictal.  In the ED pt is agitated, aggressive and anxious.  no verbalization of SI/HI    well appearing, agitated  heent wnl  heart/lungs/neuro wnl    dc home

## 2021-02-14 NOTE — ED PROVIDER NOTE - OBJECTIVE STATEMENT
50 year old female with history of OA, seizure, anxiety, and PTSD BIBA for possible seizure and anxiety. patient reports feeling very anxious and upset. states she has been dealing with a stalker for the past 10 years (works as a  and he had her number). states he is a 600 lb schizophrenic person. states she has been having increased anxiety recently due to the stalking. called a  2 days ago to open a case. states he called her again today for mcleod's day which distressed her. called the  again and was becoming very upset with the situation. states she was so upset that "she blacked out" (history of same in the past when being upset).  called EMS who came to house which patient states made everything worse. on arrival to ED and talking on phone with family members, starting to feel better but remains anxious. denies HI or SI.   PCP Tracy Vasquez

## 2021-02-14 NOTE — ED PROVIDER NOTE - PATIENT PORTAL LINK FT
You can access the FollowMyHealth Patient Portal offered by [facility] by registering at the following website: [email or web address]. By joining [facility]’s FollowMyHealth portal, you will also be able to view your health information using other applications (apps) compatible with our system.

## 2021-02-14 NOTE — ED PROVIDER NOTE - CARE PROVIDER_API CALL
Tracy Vasquez S  INTERNAL MEDICINE  [address] [place] [id number]  Phone: [phone]  Fax: [phone]  Follow Up Time: 1-3 Days

## 2021-02-14 NOTE — ED ADULT TRIAGE NOTE - CHIEF COMPLAINT QUOTE
Patient brought in by ambulance from home as reported she is under stress while at home talking to a ; was aggravated at that time denies any pain stated she is really stressed/denies any suicidal ideation stated she didn't have a seizure and only a doctor can say that she had a seizure

## 2021-02-14 NOTE — ED ADULT NURSE NOTE - NSIMPLEMENTINTERV_GEN_ALL_ED
Implemented All Universal Safety Interventions:  [place] to call system. Call bell, personal items and telephone within reach. Instruct patient to call for assistance. Room bathroom lighting operational. Non-slip footwear when patient is off stretcher. Physically safe environment: no spills, clutter or unnecessary equipment. Stretcher in lowest position, wheels locked, appropriate side rails in place.

## 2021-02-14 NOTE — ED ADULT NURSE NOTE - OBJECTIVE STATEMENT
49 y/o F patient presents to ED from home via EMS c/o feeling "stressed out." Patient reports she was on the phone with a  and states "I was dealing with a stalker issue and I was very stressed out." Upon arrival to ED patient reports she is feeling better. Patient refusing physical assessment and refusing to answer any further assessment questions and states "I was stressed earlier and now answering questions makes me more stressed." Patient A&Ox4. Safety and comfort measures provided and maintained.

## 2021-02-14 NOTE — ED PROVIDER NOTE - CLINICAL SUMMARY MEDICAL DECISION MAKING FREE TEXT BOX
presents with anxiety and possible seizure. states "blacked out" after becoming very upset (history of same). feeling better on arrival to ED. no HI or SI. requesting to go home. requesting med for anxiety for next 2 days to help with stressful situation. will provide small script of ativan. PCP follow up. no neuro deficits.

## 2021-02-14 NOTE — ED PROVIDER NOTE - NSFOLLOWUPINSTRUCTIONS_ED_ALL_ED_FT
small dose of ativan given to take next 2 days for anxiety  have close follow up with primary care provider       Anxiety    WHAT YOU NEED TO KNOW:    Anxiety is a condition that causes you to feel extremely worried or nervous. The feelings are so strong that they can cause problems with your daily activities or sleep. Anxiety may be triggered by something you fear, or it may happen without a cause. Family or work stress, smoking, caffeine, and alcohol can increase your risk for anxiety. Certain medicines or health conditions can also increase your risk. Anxiety can become a long-term condition if it is not managed or treated.    DISCHARGE INSTRUCTIONS:    Call your local emergency number (911 in the ) if:   •You have chest pain, tightness, or heaviness that may spread to your shoulders, arms, jaw, neck, or back.      •You feel like hurting yourself or someone else.      Call your doctor if:   •Your symptoms get worse or do not get better with treatment.      •Your anxiety keeps you from doing your regular daily activities.      •You have new symptoms since your last visit.      •You have questions or concerns about your condition or care.      Medicines:   •Medicines may be given to help you feel more calm and relaxed, and decrease your symptoms.      •Take your medicine as directed. Contact your healthcare provider if you think your medicine is not helping or if you have side effects. Tell him of her if you are allergic to any medicine. Keep a list of the medicines, vitamins, and herbs you take. Include the amounts, and when and why you take them. Bring the list or the pill bottles to follow-up visits. Carry your medicine list with you in case of an emergency.      Manage anxiety:   •Talk to someone about your anxiety. Your healthcare provider may suggest counseling. Cognitive behavioral therapy can help you understand and change how you react to events that trigger your symptoms. You might feel more comfortable talking with a friend or family member about your anxiety. Choose someone you know will be supportive and encouraging.      •Find ways to relax. Activities such as exercise, meditation, or listening to music can help you relax. Spend time with friends, or do things you enjoy.      •Practice deep breathing. Deep breathing can help you relax when you feel anxious. Focus on taking slow, deep breaths several times a day, or during an anxiety attack. Breathe in through your nose and out through your mouth.      •Create a regular sleep routine. Regular sleep can help you feel calmer during the day. Go to sleep and wake up at the same times every day. Do not watch television or use the computer right before bed. Your room should be comfortable, dark, and quiet.      •Eat a variety of healthy foods. Healthy foods include fruits, vegetables, low-fat dairy products, lean meats, fish, whole-grain breads, and cooked beans. Healthy foods can help you feel less anxious and have more energy.  Healthy Foods           •Exercise regularly. Exercise can increase your energy level. Exercise may also lift your mood and help you sleep better. Your healthcare provider can help you create an exercise plan.  Walking for Exercise           •Do not smoke. Nicotine and other chemicals in cigarettes and cigars can increase anxiety. Ask your healthcare provider for information if you currently smoke and need help to quit. E-cigarettes or smokeless tobacco still contain nicotine. Talk to your healthcare provider before you use these products.      •Do not have caffeine. Caffeine can make your symptoms worse. Do not have foods or drinks that are meant to increase your energy level.      •Limit or do not drink alcohol. Ask your healthcare provider if alcohol is safe for you. You may not be able to drink alcohol if you take certain anxiety or depression medicines. Limit alcohol to 1 drink per day if you are a woman. Limit alcohol to 2 drinks per day if you are a man. A drink of alcohol is 12 ounces of beer, 5 ounces of wine, or 1½ ounces of liquor.      •Do not use drugs. Drugs can make your anxiety worse. It can also make anxiety hard to manage. Talk to your healthcare provider if you use drugs and want help to quit.      Follow up with your doctor within 2 weeks or as directed: Write down your questions so you remember to ask them during your visits.

## 2021-02-22 ENCOUNTER — APPOINTMENT (OUTPATIENT)
Dept: ORTHOPEDIC SURGERY | Facility: CLINIC | Age: 51
End: 2021-02-22

## 2021-08-14 NOTE — ED PROVIDER NOTE - NS ED MD DISPO DISCHARGE CCDA
Patient is a 89y old  Male who presents with a chief complaint of weakness (14 Aug 2021 14:52)    HPI:  89 y.o. male with PMHx of mild Dementia, HTN, HLD, diet controlled DMII, BPH, Glaucoma admitted on  for evaluation of progressive weakness on day of admission, then fevers; patient mildly coughing, history per medical record as patient is unable to provide history        PMH: as above  PSH: as above  Meds: per reconciliation sheet, noted below  MEDICATIONS  (STANDING):  amLODIPine   Tablet 5 milliGRAM(s) Oral daily  atorvastatin 40 milliGRAM(s) Oral at bedtime  cefepime  Injectable. 1000 milliGRAM(s) IV Push daily  enoxaparin Injectable 40 milliGRAM(s) SubCutaneous daily  finasteride 5 milliGRAM(s) Oral daily  timolol 0.5% Solution 1 Drop(s) Both EYES two times a day  valsartan 160 milliGRAM(s) Oral daily    MEDICATIONS  (PRN):    Allergies    penicillins (Other)  tetracyclines (Other)    Intolerances      Social: no smoking, no alcohol, no illegal drugs; no recent travel, no exposure to TB  FAMILY HISTORY:  Known health problems: none       ROS unable to obtain secondary to patient medical condition     Vital Signs Last 24 Hrs  T(C): 36.8 (14 Aug 2021 08:09), Max: 37.9 (14 Aug 2021 03:24)  T(F): 98.2 (14 Aug 2021 08:09), Max: 100.3 (14 Aug 2021 03:24)  HR: 56 (14 Aug 2021 08:09) (56 - 80)  BP: 118/43 (14 Aug 2021 08:09) (109/46 - 131/45)  BP(mean): 60 (13 Aug 2021 23:39) (60 - 60)  RR: 16 (14 Aug 2021 08:09) (16 - 17)  SpO2: 92% (14 Aug 2021 08:09) (92% - 96%)  Daily     Daily     PE:    Constitutional: frail looking  HEENT: NC/AT, EOMI, PERRLA, conjunctivae clear; ears and nose atraumatic; pharynx clear  Neck: supple; thyroid not palpable  Back: no tenderness  Respiratory: respiratory effort normal; clear to auscultation, diminished breath sounds at bases  Cardiovascular: S1S2 regular, no murmurs  Abdomen: soft, not tender, not distended, positive BS; no liver or spleen organomegaly  Genitourinary: no suprapubic tenderness  Musculoskeletal: no muscle tenderness, no joint swelling or tenderness  Neurological/ Psychiatric:  moving all extremities  Skin: no rashes; no palpable lesions    Labs: all available labs reviewed                        10.9   12.07 )-----------( 114      ( 14 Aug 2021 08:10 )             32.9     08-14    140  |  109<H>  |  31<H>  ----------------------------<  109<H>  3.6   |  25  |  1.33<H>    Ca    9.3      14 Aug 2021 08:10    TPro  6.3  /  Alb  3.1<L>  /  TBili  0.6  /  DBili  x   /  AST  30  /  ALT  32  /  AlkPhos  53  08-13     LIVER FUNCTIONS - ( 13 Aug 2021 17:00 )  Alb: 3.1 g/dL / Pro: 6.3 gm/dL / ALK PHOS: 53 U/L / ALT: 32 U/L / AST: 30 U/L / GGT: x           Urinalysis Basic - ( 13 Aug 2021 18:23 )    Color: Yellow / Appearance: Clear / S.010 / pH: x  Gluc: x / Ketone: Negative  / Bili: Negative / Urobili: Negative mg/dL   Blood: x / Protein: 30 mg/dL / Nitrite: Negative   Leuk Esterase: Negative / RBC: 3-5 /HPF / WBC Negative   Sq Epi: x / Non Sq Epi: Negative / Bacteria: Negative    < from: CT Chest No Cont (21 @ 23:29) >  Mild compressive atelectasis at the left base secondary to large hiatal hernia. The lungs are otherwise clear.    < end of copied text >        Radiology: all available radiological tests reviewed    Advanced directives addressed: DNR Patient/Caregiver provided printed discharge information.

## 2021-12-13 NOTE — ED ADULT NURSE NOTE - PMH
Anxiety    Osteoarthritis    PTSD (post-traumatic stress disorder)    Seizure  first episode 2009 , second episode 2016  Shingles  2018
You can access the FollowMyHealth Patient Portal offered by [facility] by registering at the following website: [email or web address]. By joining [facility]’s FollowMyHealth portal, you will also be able to view your health information using other applications (apps) compatible with our system.

## 2022-01-23 ENCOUNTER — EMERGENCY (EMERGENCY)
Facility: HOSPITAL | Age: 52
LOS: 1 days | Discharge: HOME | End: 2022-01-23
Attending: EMERGENCY MEDICINE | Admitting: EMERGENCY MEDICINE
Payer: MEDICAID

## 2022-01-23 VITALS — HEART RATE: 89 BPM | RESPIRATION RATE: 16 BRPM | OXYGEN SATURATION: 100 %

## 2022-01-23 VITALS
OXYGEN SATURATION: 100 % | RESPIRATION RATE: 16 BRPM | HEART RATE: 87 BPM | HEIGHT: 62 IN | SYSTOLIC BLOOD PRESSURE: 116 MMHG | DIASTOLIC BLOOD PRESSURE: 79 MMHG | WEIGHT: 130.07 LBS

## 2022-01-23 DIAGNOSIS — F32.A DEPRESSION, UNSPECIFIED: ICD-10-CM

## 2022-01-23 DIAGNOSIS — G40.909 EPILEPSY, UNSPECIFIED, NOT INTRACTABLE, WITHOUT STATUS EPILEPTICUS: ICD-10-CM

## 2022-01-23 DIAGNOSIS — R69 ILLNESS, UNSPECIFIED: ICD-10-CM

## 2022-01-23 DIAGNOSIS — Z98.890 OTHER SPECIFIED POSTPROCEDURAL STATES: Chronic | ICD-10-CM

## 2022-01-23 DIAGNOSIS — Z41.1 ENCOUNTER FOR COSMETIC SURGERY: Chronic | ICD-10-CM

## 2022-01-23 DIAGNOSIS — F41.9 ANXIETY DISORDER, UNSPECIFIED: ICD-10-CM

## 2022-01-23 DIAGNOSIS — R41.82 ALTERED MENTAL STATUS, UNSPECIFIED: ICD-10-CM

## 2022-01-23 LAB
ALBUMIN SERPL ELPH-MCNC: 3.6 G/DL — SIGNIFICANT CHANGE UP (ref 3.3–5)
ALP SERPL-CCNC: 63 U/L — SIGNIFICANT CHANGE UP (ref 40–120)
ALT FLD-CCNC: 23 U/L — SIGNIFICANT CHANGE UP (ref 12–78)
ANION GAP SERPL CALC-SCNC: 6 MMOL/L — SIGNIFICANT CHANGE UP (ref 5–17)
APAP SERPL-MCNC: <2 UG/ML — LOW (ref 10–30)
AST SERPL-CCNC: 22 U/L — SIGNIFICANT CHANGE UP (ref 15–37)
BILIRUB SERPL-MCNC: 0.7 MG/DL — SIGNIFICANT CHANGE UP (ref 0.2–1.2)
BUN SERPL-MCNC: 16 MG/DL — SIGNIFICANT CHANGE UP (ref 7–23)
CALCIUM SERPL-MCNC: 8.6 MG/DL — SIGNIFICANT CHANGE UP (ref 8.5–10.1)
CHLORIDE SERPL-SCNC: 114 MMOL/L — HIGH (ref 96–108)
CO2 SERPL-SCNC: 26 MMOL/L — SIGNIFICANT CHANGE UP (ref 22–31)
CREAT SERPL-MCNC: 0.95 MG/DL — SIGNIFICANT CHANGE UP (ref 0.5–1.3)
GLUCOSE SERPL-MCNC: 139 MG/DL — HIGH (ref 70–99)
HCG SERPL-ACNC: 6 MIU/ML — SIGNIFICANT CHANGE UP
HCT VFR BLD CALC: 33.8 % — LOW (ref 34.5–45)
HGB BLD-MCNC: 11.9 G/DL — SIGNIFICANT CHANGE UP (ref 11.5–15.5)
MCHC RBC-ENTMCNC: 30.4 PG — SIGNIFICANT CHANGE UP (ref 27–34)
MCHC RBC-ENTMCNC: 35.2 GM/DL — SIGNIFICANT CHANGE UP (ref 32–36)
MCV RBC AUTO: 86.4 FL — SIGNIFICANT CHANGE UP (ref 80–100)
NRBC # BLD: 0 /100 WBCS — SIGNIFICANT CHANGE UP (ref 0–0)
PCP SPEC-MCNC: SIGNIFICANT CHANGE UP
PLATELET # BLD AUTO: 215 K/UL — SIGNIFICANT CHANGE UP (ref 150–400)
POTASSIUM SERPL-MCNC: 3.4 MMOL/L — LOW (ref 3.5–5.3)
POTASSIUM SERPL-SCNC: 3.4 MMOL/L — LOW (ref 3.5–5.3)
PROT SERPL-MCNC: 7.1 G/DL — SIGNIFICANT CHANGE UP (ref 6–8.3)
RBC # BLD: 3.91 M/UL — SIGNIFICANT CHANGE UP (ref 3.8–5.2)
RBC # FLD: 11.9 % — SIGNIFICANT CHANGE UP (ref 10.3–14.5)
SODIUM SERPL-SCNC: 146 MMOL/L — HIGH (ref 135–145)
WBC # BLD: 4.94 K/UL — SIGNIFICANT CHANGE UP (ref 3.8–10.5)
WBC # FLD AUTO: 4.94 K/UL — SIGNIFICANT CHANGE UP (ref 3.8–10.5)

## 2022-01-23 PROCEDURE — 70450 CT HEAD/BRAIN W/O DYE: CPT | Mod: MA

## 2022-01-23 PROCEDURE — 36415 COLL VENOUS BLD VENIPUNCTURE: CPT

## 2022-01-23 PROCEDURE — 80053 COMPREHEN METABOLIC PANEL: CPT

## 2022-01-23 PROCEDURE — 99285 EMERGENCY DEPT VISIT HI MDM: CPT | Mod: 25

## 2022-01-23 PROCEDURE — 96372 THER/PROPH/DIAG INJ SC/IM: CPT

## 2022-01-23 PROCEDURE — 85027 COMPLETE CBC AUTOMATED: CPT

## 2022-01-23 PROCEDURE — 70450 CT HEAD/BRAIN W/O DYE: CPT | Mod: 26,MA

## 2022-01-23 PROCEDURE — 93005 ELECTROCARDIOGRAM TRACING: CPT

## 2022-01-23 PROCEDURE — 80307 DRUG TEST PRSMV CHEM ANLYZR: CPT

## 2022-01-23 PROCEDURE — 99285 EMERGENCY DEPT VISIT HI MDM: CPT

## 2022-01-23 PROCEDURE — 93010 ELECTROCARDIOGRAM REPORT: CPT

## 2022-01-23 PROCEDURE — 82962 GLUCOSE BLOOD TEST: CPT

## 2022-01-23 PROCEDURE — 84702 CHORIONIC GONADOTROPIN TEST: CPT

## 2022-01-23 RX ORDER — SODIUM CHLORIDE 9 MG/ML
1000 INJECTION INTRAMUSCULAR; INTRAVENOUS; SUBCUTANEOUS ONCE
Refills: 0 | Status: COMPLETED | OUTPATIENT
Start: 2022-01-23 | End: 2022-01-23

## 2022-01-23 RX ORDER — HALOPERIDOL DECANOATE 100 MG/ML
5 INJECTION INTRAMUSCULAR ONCE
Refills: 0 | Status: COMPLETED | OUTPATIENT
Start: 2022-01-23 | End: 2022-01-23

## 2022-01-23 RX ADMIN — HALOPERIDOL DECANOATE 5 MILLIGRAM(S): 100 INJECTION INTRAMUSCULAR at 11:23

## 2022-01-23 RX ADMIN — SODIUM CHLORIDE 1000 MILLILITER(S): 9 INJECTION INTRAMUSCULAR; INTRAVENOUS; SUBCUTANEOUS at 09:30

## 2022-01-23 NOTE — ED PROVIDER NOTE - CARE PROVIDER_API CALL
Javier Jones)  Internal Medicine  [address] [place] [address]  Phone: [phone]  Fax: [phone]  Follow Up Time:

## 2022-01-23 NOTE — ED PROVIDER NOTE - PROGRESS NOTE DETAILS
Patient was aggressive and agitated and now she says she is better. Denies any SI or HI. Spoke to mother and patient spoke to mother. No SI at all. Patient now co-operative and states that she believes that her behavior was secondary to seizure.

## 2022-01-23 NOTE — ED ADULT NURSE NOTE - NSIMPLEMENTINTERV_GEN_ALL_ED
Implemented All Fall with Harm Risk Interventions:  [place] to call system. Call bell, personal items and telephone within reach. Instruct patient to call for assistance. Room bathroom lighting operational. Non-slip footwear when patient is off stretcher. Physically safe environment: no spills, clutter or unnecessary equipment. Stretcher in lowest position, wheels locked, appropriate side rails in place. Provide visual cue, wrist band, yellow gown, etc. Monitor gait and stability. Monitor for mental status changes and reorient to person, place, and time. Review medications for side effects contributing to fall risk. Reinforce activity limits and safety measures with patient and family. Provide visual clues: red socks.

## 2022-01-23 NOTE — ED PROVIDER NOTE - PATIENT PORTAL LINK FT
You can access the FollowMyHealth Patient Portal offered by [facility] by registering at the following website: [email or web address]. By joining [facility]’s FollowMyHealth portal, you will also be able to view your health information using other applications (apps) compatible with our system.

## 2022-01-23 NOTE — ED ADULT TRIAGE NOTE - CHIEF COMPLAINT QUOTE
unwitnessed seizure- family heard a scream and found patient flaying and talking saying "I cannot move"- on the way here, patient told ems patient had 3 episodes of seizures- fsbs- 171- ems states patient has been trying to put her hand in her mouth trying to make herself vomit-

## 2022-01-23 NOTE — ED PROVIDER NOTE - CONSTITUTIONAL, MLM
normal... Weak and confused appearing thin white female, awake, oriented to person, in no apparent distress

## 2022-01-23 NOTE — ED ADULT NURSE NOTE - MODE OF DISCHARGE
Spine appears normal, range of motion is not limited, no muscle or joint tenderness Ambulatory Wheelchair

## 2022-01-23 NOTE — ED PROVIDER NOTE - OBJECTIVE STATEMENT
History from Mother on Phone. Patient is a 50 yo white female,  x 10 years with H/O Seizures (Years Ago) and Anxiety/Depression, presently in therapy, lives in basement of mothers house who apparently was banging on walls to get mothers attention. When mom got to basement she found patient-daughter telling her she didn't feel well and to call 911. Per mom, patient was not making a lot of sense. No other information available at this time.

## 2022-01-23 NOTE — ED ADULT NURSE NOTE - OBJECTIVE STATEMENT
Received pt in bed lethargic.  BIBA from home.  As per EMS, patient lives in the basement of family home.  This am family heard screaming and went down stairs and saw patient flaring and saying "she cant move".  On arrival patient not speaking.  Pt poor historian as well as family.  Pt 02 sat 100% ra.  .  Pt pupils responsive.  When pt speaks she states "she doesn't know".  Pt on monitor. Received pt in bed lethargic.  BIBA from home.  As per EMS, patient lives in the basement of family home.  This am family heard screaming and went down stairs and saw patient flaring and saying "she cant move".  On arrival patient not speaking.  Pt poor historian as well as family.  Pt 02 sat 100% ra.  .  Pt pupils responsive.  As per EMS family stated she has a history of seizures. When pt speaks she states "she doesn't know".  Pt on monitor.

## 2022-01-23 NOTE — ED ADULT TRIAGE NOTE - ADDITIONAL SAFETY/BANDS...
GENERAL SURGERY PROGRESS NOTE    SUBJECTIVE  Patient seen and examined. No acute events overnight. Reports tolerating diet without nausea, vomiting, +/+ ostomy (continues on miralax), voiding without issues. Denies fever, chills, SOB, chest pain. Skin surrounding ostomy site with dark discoloration, will attempt bedside debridement today. Is refusing CLINTON, awaiting further options from CM.    OBJECTIVE  PHYSICAL EXAM  General: Morbidly obese, appears well, NAD  CHEST: breathing comfortably  CV: appears well perfused  Abdomen: soft, nontender, nondistended, no rebound or guarding, vac in place, ostomy +/+, L drain site with ostomy appliance draining minimal serous fluid  Extremities: Grossly symmetric    V/S:  Vital Signs Last 24 Hrs  T(C): 36.8 (19 Dec 2019 01:39), Max: 37 (18 Dec 2019 04:55)  T(F): 98.3 (19 Dec 2019 01:39), Max: 98.6 (18 Dec 2019 04:55)  HR: 68 (19 Dec 2019 01:39) (68 - 85)  BP: 147/76 (19 Dec 2019 01:39) (96/58 - 147/76)  BP(mean): --  RR: 18 (19 Dec 2019 01:39) (18 - 18)  SpO2: 91% (19 Dec 2019 01:39) (91% - 97%)    --------------------------------------------------------------------------------------------------  I/Os:    17 Dec 2019 07:01  -  18 Dec 2019 07:00  --------------------------------------------------------  IN:    Oral Fluid: 570 mL  Total IN: 570 mL    OUT:    Colostomy: 1 mL    Drain: 85 mL    VAC (Vacuum Assisted Closure) System: 500 mL    Voided: 800 mL  Total OUT: 1386 mL    Total NET: -816 mL      18 Dec 2019 07:01  -  19 Dec 2019 04:22  --------------------------------------------------------  IN:    Oral Fluid: 360 mL  Total IN: 360 mL    OUT:    Colostomy: 100 mL    Voided: 700 mL  Total OUT: 800 mL    Total NET: -440 mL    --------------------------------------------------------------------------------------------------  MEDICATIONS  (STANDING):  albuterol/ipratropium for Nebulization 3 milliLiter(s) Nebulizer every 6 hours  aspirin  chewable 81 milliGRAM(s) Oral daily  atorvastatin 40 milliGRAM(s) Oral at bedtime  buDESOnide    Inhalation Suspension 0.5 milliGRAM(s) Inhalation every 12 hours  chlorhexidine 4% Liquid 1 Application(s) Topical <User Schedule>  doxazosin 2 milliGRAM(s) Oral daily  enoxaparin Injectable 100 milliGRAM(s) SubCutaneous <User Schedule>  erythromycin     base Tablet 250 milliGRAM(s) Oral two times a day  influenza   Vaccine 0.5 milliLiter(s) IntraMuscular once  methadone    Tablet 17.5 milliGRAM(s) Oral daily  nystatin Powder 1 Application(s) Topical two times a day  pantoprazole    Tablet 40 milliGRAM(s) Oral daily  polyethylene glycol 3350 17 Gram(s) Oral two times a day    MEDICATIONS  (PRN):  acetaminophen   Tablet .. 650 milliGRAM(s) Oral every 6 hours PRN Mild Pain (1 - 3)  aluminum hydroxide/magnesium hydroxide/simethicone Suspension 30 milliLiter(s) Oral every 4 hours PRN Dyspepsia  oxyCODONE    IR 5 milliGRAM(s) Oral every 4 hours PRN Moderate Pain (4 - 6)  sodium chloride 0.9% lock flush 10 milliLiter(s) IV Push every 1 hour PRN Pre/post blood products, medications, blood draw, and to maintain line patency Additional Safety/Bands:

## 2022-04-11 ENCOUNTER — APPOINTMENT (OUTPATIENT)
Dept: ORTHOPEDIC SURGERY | Facility: CLINIC | Age: 52
End: 2022-04-11

## 2024-03-11 ENCOUNTER — APPOINTMENT (OUTPATIENT)
Dept: ORTHOPEDIC SURGERY | Facility: CLINIC | Age: 54
End: 2024-03-11
Payer: MEDICAID

## 2024-03-11 DIAGNOSIS — M17.11 UNILATERAL PRIMARY OSTEOARTHRITIS, RIGHT KNEE: ICD-10-CM

## 2024-03-11 DIAGNOSIS — M17.12 UNILATERAL PRIMARY OSTEOARTHRITIS, LEFT KNEE: ICD-10-CM

## 2024-03-11 PROCEDURE — 99204 OFFICE O/P NEW MOD 45 MIN: CPT

## 2024-03-11 PROCEDURE — 73562 X-RAY EXAM OF KNEE 3: CPT | Mod: 50

## 2024-03-11 RX ORDER — HYALURONATE SODIUM, STABILIZED 60 MG/3 ML
60 SYRINGE (ML) INTRAARTICULAR
Qty: 2 | Refills: 0 | Status: ACTIVE | COMMUNITY
Start: 2024-03-11

## 2024-03-11 NOTE — HISTORY OF PRESENT ILLNESS
[de-identified] : Ms. NAT THOMASON is a 53 year female who presents to office complaining of bilateral knee pain, R > L. She has chronic pain in the knees which she has been doing well with for the most part, until about 1-2 weeks ago, where she noted gradual return of this knee pain. She works as a . Denies recent injury, trauma, or fall to her knees. She describes an intermittent dull, achy, throbbing pain in her knees aggravated with strenuous exercising and activities. She received HA injections to her right knee in 2020, which helped her pain for nearly 4 years as per patient.   All review of systems, family history, social history, surgical history, past medical history, medications, and allergies not previously stated as positive are negative. They were reviewed by me today with the patient and documented accordingly.

## 2024-03-11 NOTE — DISCUSSION/SUMMARY
[de-identified] : Discussed findings of today's exam and possible causes of patient's pain.  Educated patient on their most probable diagnosis of chronic intermittent bilateral knee pain with recent atraumatic exacerbation due to mild patellofemoral compartment osteoarthritis.  Reviewed possible courses of treatment, and we collaboratively decided best course of treatment at this time will include conservative management.  Patient had very long-lasting relief from hyaluronic acid injection therapy into her right knee performed several years ago, she has been doing very well, but is having increased bilateral knee stiffness and occasional pain.  We discussed various treatment options as well as associated risk/benefits/alternatives and patient elected to proceed with insurance authorization for hyaluronic acid injection therapy as most potential long-term preventative treatment option.  Patient and spouse appreciate and agree with current plan.  This note was generated using dragon medical dictation software.  A reasonable effort has been made for proofreading its contents, but typos may still remain.  If there are any questions or points of clarification needed please notify my office.

## 2024-03-11 NOTE — PHYSICAL EXAM
[de-identified] : The following radiographs were ordered and read by me during this patient's visit. I reviewed each radiograph in detail with the patient and discussed the findings as highlighted below.   3 views of the bilateral knees were obtained today that show degenerative changes and evidence of mild patellofemoral compartment osteoarthritis.  No fracture, or dislocation seen at this time. There is no malalignment. No other obvious osseous abnormality. Otherwise unremarkable. [de-identified] : Constitutional: Well-nourished, well-developed, No acute distress Respiratory:  Good respiratory effort, no SOB Lymphatic: No regional lymphadenopathy, no lymphedema Psychiatric: Pleasant and normal affect, alert and oriented x3 Skin: Clean dry and intact B/L LE Musculoskeletal: normal except where as noted in regional exam  Left Knee: APPEARANCE: no marked deformities, no swelling or malalignment POSITIVE TENDERNESS:  + crepitus of the anterior knee, and tenderness of patellar retinaculum NONTENDER: jt lines b/l, patellar & quadriceps tendons, MCL/LCL, ITB at the lateral femoral condyle & Gerdy's tubercle, pes bursa.  ROM: full & painless, although some discomfort in deep knee flexion RESISTIVE TESTING: + discomfort with knee ext from deep knee flexion (stretched position), painless knee flexion.  SPECIAL TESTS: stable v/v stress. painless grind. neg Lachman's. neg ant/post drawer. neg Jaye's.   Right Knee: APPEARANCE: no marked deformities, no swelling or malalignment POSITIVE TENDERNESS:  + crepitus of the anterior knee, and tenderness of patellar retinaculum NONTENDER: jt lines b/l, patellar & quadriceps tendons, MCL/LCL, ITB at the lateral femoral condyle & Gerdy's tubercle, pes bursa.  ROM: full & painless, although some discomfort in deep knee flexion RESISTIVE TESTING: + discomfort with knee ext from deep knee flexion (stretched position), painless knee flexion.  SPECIAL TESTS: stable v/v stress. painless grind. neg Lachman's. neg ant/post drawer. neg Jaye's.

## 2024-03-18 ENCOUNTER — APPOINTMENT (OUTPATIENT)
Dept: ORTHOPEDIC SURGERY | Facility: CLINIC | Age: 54
End: 2024-03-18
Payer: MEDICAID

## 2024-03-18 VITALS — BODY MASS INDEX: 20.61 KG/M2 | WEIGHT: 112 LBS | HEIGHT: 62 IN

## 2024-03-18 DIAGNOSIS — M16.12 UNILATERAL PRIMARY OSTEOARTHRITIS, LEFT HIP: ICD-10-CM

## 2024-03-18 PROCEDURE — 73502 X-RAY EXAM HIP UNI 2-3 VIEWS: CPT | Mod: LT

## 2024-03-18 PROCEDURE — 99214 OFFICE O/P EST MOD 30 MIN: CPT

## 2024-03-18 NOTE — PHYSICAL EXAM
[de-identified] : Constitutional:Well nourished , well developed and in no acute distress Psychiatric: Alert and oriented to time place and person.Appropriate affect  Skin:Head, neck, arms and lower extremities:no lesions or discoloration HEENT: Normocephalic, EOM intact, Nasal septum midline, Respiratory: Unlabored respirations,no audible wheezing ,no tachypnea, no cyanosis Cardiovascular: no leg swelling  no ankle edema no JVD, pulse regular Vascular: no calf or thigh tenderness,  Peripheral pulses; intact Lymphatics:No groin adenopathy,no lymphedema lower  or upper extremities Right hip satisfactory pain-free passive range of motion leg lengths equal Left hip leg passive range of motion satisfactory pain-free 3 flexion 125 internal 35 external 55 abduction 60 adduction 40 Left hip flexion adduction internal rotation provokes typical lateral groin pain Greater trochanter nontender resisted hip abduction 5/5 [de-identified] : X-ray AP pelvis left hip right total hip component alignment satisfactory no interval change left hip joint space well-maintained

## 2024-03-18 NOTE — DISCUSSION/SUMMARY
[de-identified] : Impression left hip possible femoral acetabular impingement possible tear labrum Plan MRI left hip
Pt lives in a house with 4 steps to enter with  rails and 0  stairs inside.   Pt owns medical equipment: RW, SAC   Pt lives with: Daughter: Daughter works, Someone is not always available to provide assist.

## 2024-03-18 NOTE — HISTORY OF PRESENT ILLNESS
[de-identified] : 53-year-old female seen in follow-up with new complaint of left hip pain.  Status post right total hip replacement 2020 with no complaints complains of intermittent lateral left groin pain provoked by doing "too much exercise".  Pain is pinching in character.  Denies chronic back pain but does have "spondylolisthesis".  Denies left leg neurovascular symptoms is walk independently.

## 2024-04-06 ENCOUNTER — OUTPATIENT (OUTPATIENT)
Dept: OUTPATIENT SERVICES | Facility: HOSPITAL | Age: 54
LOS: 1 days | End: 2024-04-06
Payer: MEDICAID

## 2024-04-06 ENCOUNTER — APPOINTMENT (OUTPATIENT)
Dept: MRI IMAGING | Facility: CLINIC | Age: 54
End: 2024-04-06
Payer: MEDICAID

## 2024-04-06 DIAGNOSIS — Z41.1 ENCOUNTER FOR COSMETIC SURGERY: Chronic | ICD-10-CM

## 2024-04-06 DIAGNOSIS — M16.12 UNILATERAL PRIMARY OSTEOARTHRITIS, LEFT HIP: ICD-10-CM

## 2024-04-06 DIAGNOSIS — Z98.890 OTHER SPECIFIED POSTPROCEDURAL STATES: Chronic | ICD-10-CM

## 2024-04-06 DIAGNOSIS — Z00.8 ENCOUNTER FOR OTHER GENERAL EXAMINATION: ICD-10-CM

## 2024-04-06 PROCEDURE — 73721 MRI JNT OF LWR EXTRE W/O DYE: CPT

## 2024-04-06 PROCEDURE — 73721 MRI JNT OF LWR EXTRE W/O DYE: CPT | Mod: 26,LT

## 2024-04-11 ENCOUNTER — NON-APPOINTMENT (OUTPATIENT)
Age: 54
End: 2024-04-11

## 2024-04-15 ENCOUNTER — APPOINTMENT (OUTPATIENT)
Dept: ORTHOPEDIC SURGERY | Facility: CLINIC | Age: 54
End: 2024-04-15

## 2024-04-17 NOTE — ED PROVIDER NOTE - NSFOLLOWUPINSTRUCTIONS_ED_ALL_ED_FT
17-Apr-2024 02:15
Rest  Follow-up with your doctor in the morning  Return here if needed      [employer]® CareNotes®     :  [facility]  	                       ALTERED MENTAL STATUS - AfterCare(R) Instructions(ER/ED)           Altered Mental Status    WHAT YOU NEED TO KNOW:    Altered mental status (AMS) is a disruption in how your brain works that causes a change in behavior. This change can happen suddenly or over days. AMS ranges from slight confusion to total disorientation and increased sleepiness to coma.     DISCHARGE INSTRUCTIONS:    Medicines:   •Antibiotics help fight or prevent infection. Take your antibiotics until they are gone, even if you feel better.       •Take your medicine as directed. Contact your healthcare provider if you think your medicine is not helping or if you have side effects. Tell him of her if you are allergic to any medicine. Keep a list of the medicines, vitamins, and herbs you take. Include the amounts, and when and why you take them. Bring the list or the pill bottles to follow-up visits. Carry your medicine list with you in case of an emergency.      Follow up with your doctor as directed: Write down your questions so you remember to ask them during your visits.     Contact your healthcare provider if:   •You have sudden changes in behavior.       •You are more sleepy or confused than usual.       •You have questions or concerns about your condition or care.       Seek care immediately or call 911 if:   •Your speech is slurred or you are rambling.       •You have a seizure.       •You are not able to move any part of your body freely.       •Someone close to you cannot wake you up.

## 2024-06-13 RX ORDER — HYALURONATE SODIUM, STABILIZED 88 MG/4 ML
88 SYRINGE (ML) INTRAARTICULAR
Qty: 2 | Refills: 0 | Status: ACTIVE | COMMUNITY
Start: 2024-06-13

## 2024-07-16 ENCOUNTER — NON-APPOINTMENT (OUTPATIENT)
Age: 54
End: 2024-07-16

## 2024-07-17 ENCOUNTER — APPOINTMENT (OUTPATIENT)
Dept: ORTHOPEDIC SURGERY | Facility: CLINIC | Age: 54
End: 2024-07-17
Payer: MEDICAID

## 2024-07-17 VITALS — HEIGHT: 62 IN | BODY MASS INDEX: 20.61 KG/M2 | WEIGHT: 112 LBS

## 2024-07-17 DIAGNOSIS — M17.0 BILATERAL PRIMARY OSTEOARTHRITIS OF KNEE: ICD-10-CM

## 2024-07-17 DIAGNOSIS — M17.11 UNILATERAL PRIMARY OSTEOARTHRITIS, RIGHT KNEE: ICD-10-CM

## 2024-07-17 DIAGNOSIS — M17.12 UNILATERAL PRIMARY OSTEOARTHRITIS, LEFT KNEE: ICD-10-CM

## 2024-07-17 PROCEDURE — 20610 DRAIN/INJ JOINT/BURSA W/O US: CPT | Mod: 50

## 2024-09-18 ENCOUNTER — APPOINTMENT (OUTPATIENT)
Dept: ORTHOPEDIC SURGERY | Facility: CLINIC | Age: 54
End: 2024-09-18
Payer: MEDICAID

## 2024-09-18 VITALS — BODY MASS INDEX: 21.16 KG/M2 | HEIGHT: 62 IN | WEIGHT: 115 LBS

## 2024-09-18 DIAGNOSIS — M17.0 BILATERAL PRIMARY OSTEOARTHRITIS OF KNEE: ICD-10-CM

## 2024-09-18 PROCEDURE — 99213 OFFICE O/P EST LOW 20 MIN: CPT

## 2024-09-20 NOTE — PHYSICAL EXAM
[de-identified] : Constitutional: Well-nourished, well-developed, No acute distress Respiratory:  Good respiratory effort, no SOB Lymphatic: No regional lymphadenopathy, no lymphedema Psychiatric: Pleasant and normal affect, alert and oriented x3 Skin: Clean dry and intact B/L LE Musculoskeletal: normal except where as noted in regional exam  Left Knee: APPEARANCE: no marked deformities, no swelling or malalignment POSITIVE TENDERNESS:  + crepitus of the anterior knee, and tenderness of patellar retinaculum NONTENDER: jt lines b/l, patellar & quadriceps tendons, MCL/LCL, ITB at the lateral femoral condyle & Gerdy's tubercle, pes bursa.  ROM: full & painless, although some discomfort in deep knee flexion RESISTIVE TESTING: + discomfort with knee ext from deep knee flexion (stretched position), painless knee flexion.  SPECIAL TESTS: stable v/v stress. painless grind. neg Lachman's. neg ant/post drawer. neg Jaye's.   Right Knee: APPEARANCE: no marked deformities, no swelling or malalignment POSITIVE TENDERNESS:  + crepitus of the anterior knee, and tenderness of patellar retinaculum NONTENDER: jt lines b/l, patellar & quadriceps tendons, MCL/LCL, ITB at the lateral femoral condyle & Gerdy's tubercle, pes bursa.  ROM: full & painless, although some discomfort in deep knee flexion RESISTIVE TESTING: + discomfort with knee ext from deep knee flexion (stretched position), painless knee flexion.  SPECIAL TESTS: stable v/v stress. painless grind. neg Lachman's. neg ant/post drawer. neg Jaye's.

## 2024-09-20 NOTE — HISTORY OF PRESENT ILLNESS
[de-identified] : s/p b/l HA Monovisc injection 7/17/24 presenting today due to concern about recent knee injury patient was working out 2 weeks ago and doing box jumps during which time she had pain and swelling in her right knee, which is when she scheduled her appt at this time no longer has any knee pain, denies difficulties or pain with ROM not currently going to PT

## 2024-09-20 NOTE — PHYSICAL EXAM
[de-identified] : Constitutional: Well-nourished, well-developed, No acute distress Respiratory:  Good respiratory effort, no SOB Lymphatic: No regional lymphadenopathy, no lymphedema Psychiatric: Pleasant and normal affect, alert and oriented x3 Skin: Clean dry and intact B/L LE Musculoskeletal: normal except where as noted in regional exam  Left Knee: APPEARANCE: no marked deformities, no swelling or malalignment POSITIVE TENDERNESS:  + crepitus of the anterior knee, and tenderness of patellar retinaculum NONTENDER: jt lines b/l, patellar & quadriceps tendons, MCL/LCL, ITB at the lateral femoral condyle & Gerdy's tubercle, pes bursa.  ROM: full & painless, although some discomfort in deep knee flexion RESISTIVE TESTING: + discomfort with knee ext from deep knee flexion (stretched position), painless knee flexion.  SPECIAL TESTS: stable v/v stress. painless grind. neg Lachman's. neg ant/post drawer. neg Jaye's.   Right Knee: APPEARANCE: no marked deformities, no swelling or malalignment POSITIVE TENDERNESS:  + crepitus of the anterior knee, and tenderness of patellar retinaculum NONTENDER: jt lines b/l, patellar & quadriceps tendons, MCL/LCL, ITB at the lateral femoral condyle & Gerdy's tubercle, pes bursa.  ROM: full & painless, although some discomfort in deep knee flexion RESISTIVE TESTING: + discomfort with knee ext from deep knee flexion (stretched position), painless knee flexion.  SPECIAL TESTS: stable v/v stress. painless grind. neg Lachman's. neg ant/post drawer. neg Jaye's.

## 2024-09-20 NOTE — HISTORY OF PRESENT ILLNESS
[de-identified] : s/p b/l HA Monovisc injection 7/17/24 presenting today due to concern about recent knee injury patient was working out 2 weeks ago and doing box jumps during which time she had pain and swelling in her right knee, which is when she scheduled her appt at this time no longer has any knee pain, denies difficulties or pain with ROM not currently going to PT

## 2024-09-20 NOTE — DISCUSSION/SUMMARY
Pt resting in bed upon entering room and reports right sided flank pain and nausea for the last couple weeks. Pt reports increase in urinary frequency and urgency. Pt up to bathroom to obtain clean catch urine. Urine sent to lab and pt returned to bed without incident. Call light in reach and pt waiting to discuss the POC with provider.      Ellen Chacon RN  09/13/19 [id number] [de-identified] : Patient was concerned that after her recent work as she had irritated her knees and messed up the hyaluronic acid injection therapy provided 2 months ago.  Patient is advised that there is no evidence of acute internal derangement, and that the hyaluronic acid can still be effective even after a difficult workout in the gym.  She is given reassurance that she can proceed with activities as tolerated.  It is too soon for any other interventions, no indications for any cortisone injections.  We discussed appropriate activity modification regarding her exercise regimen.  Follow up as needed.  Patient appreciates and agrees with current plan.  This note was generated using dragon medical dictation software.  A reasonable effort has been made for proofreading its contents, but typos may still remain.  If there are any questions or points of clarification needed please notify my office.

## 2024-09-20 NOTE — DISCUSSION/SUMMARY
[de-identified] : Patient was concerned that after her recent work as she had irritated her knees and messed up the hyaluronic acid injection therapy provided 2 months ago.  Patient is advised that there is no evidence of acute internal derangement, and that the hyaluronic acid can still be effective even after a difficult workout in the gym.  She is given reassurance that she can proceed with activities as tolerated.  It is too soon for any other interventions, no indications for any cortisone injections.  We discussed appropriate activity modification regarding her exercise regimen.  Follow up as needed.  Patient appreciates and agrees with current plan.  This note was generated using dragon medical dictation software.  A reasonable effort has been made for proofreading its contents, but typos may still remain.  If there are any questions or points of clarification needed please notify my office.

## 2025-01-22 NOTE — PROGRESS NOTE ADULT - ASSESSMENT
50 yo f pmh anxiety s/p R THR    #S/P R THR  - post op orders per ortho  - pain management  - bowl regimen   - PT says ready to be discharged  - Will d/c tomorrow     #Hypotension  improved  d/c midodrine    #anemia 2/2 blood loss.  ~1L lost during procedure  repeat labwork shows improved Hb but went down, likely from IVF.   on venofar.    no cbc today   repeat CBC in am, pt said she will do blood draw in the AM.     #Anxiety  - continue lexapro    #seizures  -continue lamotrogine  -monitor for seizures     #dvt proph  - per ortho PAST SURGICAL HISTORY:  History of lower leg fracture s/p Left Tib/fib fracture repair on 5/5 @ [facility] with hardware in place    S/P ORIF (open reduction internal fixation) fracture tib/fib at [facility]

## 2025-03-23 ENCOUNTER — EMERGENCY (EMERGENCY)
Facility: HOSPITAL | Age: 55
LOS: 1 days | Discharge: HOME | End: 2025-03-23
Attending: STUDENT IN AN ORGANIZED HEALTH CARE EDUCATION/TRAINING PROGRAM | Admitting: STUDENT IN AN ORGANIZED HEALTH CARE EDUCATION/TRAINING PROGRAM
Payer: MEDICAID

## 2025-03-23 VITALS
WEIGHT: 125 LBS | OXYGEN SATURATION: 98 % | RESPIRATION RATE: 14 BRPM | SYSTOLIC BLOOD PRESSURE: 102 MMHG | HEIGHT: 62 IN | DIASTOLIC BLOOD PRESSURE: 69 MMHG | TEMPERATURE: 98 F | HEART RATE: 74 BPM

## 2025-03-23 DIAGNOSIS — Z41.1 ENCOUNTER FOR COSMETIC SURGERY: Chronic | ICD-10-CM

## 2025-03-23 DIAGNOSIS — Z98.890 OTHER SPECIFIED POSTPROCEDURAL STATES: Chronic | ICD-10-CM

## 2025-03-23 LAB
FLUAV AG NPH QL: SIGNIFICANT CHANGE UP
FLUBV AG NPH QL: SIGNIFICANT CHANGE UP
RSV RNA NPH QL NAA+NON-PROBE: SIGNIFICANT CHANGE UP
SARS-COV-2 RNA SPEC QL NAA+PROBE: SIGNIFICANT CHANGE UP
SOURCE RESPIRATORY: SIGNIFICANT CHANGE UP

## 2025-03-23 PROCEDURE — 71045 X-RAY EXAM CHEST 1 VIEW: CPT | Mod: 26

## 2025-03-23 PROCEDURE — 71045 X-RAY EXAM CHEST 1 VIEW: CPT

## 2025-03-23 PROCEDURE — 99284 EMERGENCY DEPT VISIT MOD MDM: CPT

## 2025-03-23 PROCEDURE — 99283 EMERGENCY DEPT VISIT LOW MDM: CPT | Mod: 25

## 2025-03-23 PROCEDURE — 87637 SARSCOV2&INF A&B&RSV AMP PRB: CPT

## 2025-03-23 RX ORDER — IBUPROFEN 200 MG
1 TABLET ORAL
Qty: 15 | Refills: 0
Start: 2025-03-23 | End: 2025-03-27

## 2025-03-23 RX ORDER — FLUTICASONE PROPIONATE 50 UG/1
1 SPRAY, METERED NASAL
Qty: 1 | Refills: 0
Start: 2025-03-23 | End: 2025-03-27

## 2025-03-23 RX ORDER — LORATADINE 5 MG/5ML
1 SOLUTION ORAL
Qty: 7 | Refills: 0
Start: 2025-03-23 | End: 2025-03-29

## 2025-03-23 RX ORDER — AMOXICILLIN AND CLAVULANATE POTASSIUM 500; 125 MG/1; MG/1
875 TABLET, FILM COATED ORAL
Qty: 14 | Refills: 0
Start: 2025-03-23 | End: 2025-03-29

## 2025-03-23 NOTE — ED PROVIDER NOTE - CARE PROVIDER_API CALL
Mark Abdi  Otolaryngology  [address]  [place] [address]  Phone: [phone]  Fax: [phone]  Follow Up Time: 1-3 Days    Shane Archer  Otolaryngology  [address] [place] [address]  Phone: [phone]  Fax: [phone]  Follow Up Time: 1-3 Days

## 2025-03-23 NOTE — ED PROVIDER NOTE - CLINICAL SUMMARY MEDICAL DECISION MAKING FREE TEXT BOX
54-year-old female with past medical history of seizures presents today due to left ear pain.  Patient reports that over the last 2 weeks she has experienced intermittent viral-like symptoms.  Patient is experiencing sinus pressure, nasal congestion, ear pain, sore throat.  Patient reports that she works as a  and commonly exposed to viruses.  Patient reports 2 days ago the symptoms worsen.  Patient admits to a small cough.  Patient denies trouble swallowing, fever, vomiting, abdominal pain, diarrhea, or any other complaints.  agree with above: pt with ear pain, nsal congestion, soer throat, cough x 2 weeks, symptoms worsening  well appearing,e xam wnl, lungs clear, likely viral illness, viral sinusitis, supportive cre

## 2025-03-23 NOTE — ED PROVIDER NOTE - NSFOLLOWUPINSTRUCTIONS_ED_ALL_ED_FT
Sinus Infection, Adult      A person's face, and a person's face showing an internal view of the sinuses. Here the sinuses contain mucus.  A sinus infection, also called sinusitis, is inflammation of your sinuses. Sinuses are hollow spaces in the bones around your face. Your sinuses are located:  Around your eyes.  In the middle of your forehead.  Behind your nose.  In your cheekbones.  Mucus normally drains out of your sinuses. When your nasal tissues become inflamed or swollen, mucus can become trapped or blocked. This allows bacteria, viruses, and fungi to grow, which leads to infection. Most infections of the sinuses are caused by a virus.    A sinus infection can develop quickly. It can last for up to 4 weeks (acute) or for more than 12 weeks (chronic). A sinus infection often develops after a cold.    What are the causes?  This condition is caused by anything that creates swelling in the sinuses or stops mucus from draining. This includes:  Allergies.  Asthma.  Infection from bacteria or viruses.  Deformities or blockages in your nose or sinuses.  Abnormal growths in the nose (nasal polyps).  Pollutants, such as chemicals or irritants in the air.  Infection from fungi. This is rare.  What increases the risk?  You are more likely to develop this condition if you:  Have a weak body defense system (immune system).  Do a lot of swimming or diving.  Overuse nasal sprays.  Smoke.  What are the signs or symptoms?  The main symptoms of this condition are pain and a feeling of pressure around the affected sinuses. Other symptoms include:  Stuffy nose or congestion that makes it difficult to breathe through your nose.  Thick yellow or greenish drainage from your nose.  Tenderness, swelling, and warmth over the affected sinuses.  A cough that may get worse at night.  Decreased sense of smell and taste.  Extra mucus that collects in the throat or the back of the nose (postnasal drip) causing a sore throat or bad breath.  Tiredness (fatigue).  Fever.  How is this diagnosed?  This condition is diagnosed based on:  Your symptoms.  Your medical history.  A physical exam.  Tests to find out if your condition is acute or chronic. This may include:  Checking your nose for nasal polyps.  Viewing your sinuses using a device that has a light (endoscope).  Testing for allergies or bacteria.  Imaging tests, such as an MRI or CT scan.  In rare cases, a bone biopsy may be done to rule out more serious types of fungal sinus disease.    How is this treated?  Treatment for a sinus infection depends on the cause and whether your condition is chronic or acute.  If caused by a virus, your symptoms should go away on their own within 10 days. You may be given medicines to relieve symptoms. They include:  Medicines that shrink swollen nasal passages (decongestants).  A spray that eases inflammation of the nostrils (topical intranasal corticosteroids).  Rinses that help get rid of thick mucus in your nose (nasal saline washes).  Medicines that treat allergies (antihistamines).  Over-the-counter pain relievers.  If caused by bacteria, your health care provider may recommend waiting to see if your symptoms improve. Most bacterial infections will get better without antibiotic medicine. You may be given antibiotics if you have:  A severe infection.  A weak immune system.  If caused by narrow nasal passages or nasal polyps, surgery may be needed.  Follow these instructions at home:  Medicines    Take, use, or apply over-the-counter and prescription medicines only as told by your health care provider. These may include nasal sprays.  If you were prescribed an antibiotic medicine, take it as told by your health care provider. Do not stop taking the antibiotic even if you start to feel better.  Hydrate and humidify    A comparison of three sample cups showing dark yellow, yellow, and pale yellow urine.  Drink enough fluid to keep your urine pale yellow. Staying hydrated will help to thin your mucus.  Use a cool mist humidifier to keep the humidity level in your home above 50%.  Inhale steam for 10–15 minutes, 3–4 times a day, or as told by your health care provider. You can do this in the bathroom while a hot shower is running.  Limit your exposure to cool or dry air.  Rest    Rest as much as possible.  Sleep with your head raised (elevated).  Make sure you get enough sleep each night.  General instructions    A person washing hands with soap and water.  Apply a warm, moist washcloth to your face 3–4 times a day or as told by your health care provider. This will help with discomfort.  Use nasal saline washes as often as told by your health care provider.  Wash your hands often with soap and water to reduce your exposure to germs. If soap and water are not available, use hand .  Do not smoke. Avoid being around people who are smoking (secondhand smoke).  Keep all follow-up visits. This is important.  Contact a health care provider if:  You have a fever.  Your symptoms get worse.  Your symptoms do not improve within 10 days.  Get help right away if:  You have a severe headache.  You have persistent vomiting.  You have severe pain or swelling around your face or eyes.  You have vision problems.  You develop confusion.  Your neck is stiff.  You have trouble breathing.  These symptoms may be an emergency. Get help right away. Call 911.  Do not wait to see if the symptoms will go away.  Do not drive yourself to the hospital.  Summary  A sinus infection is soreness and inflammation of your sinuses. Sinuses are hollow spaces in the bones around your face.  This condition is caused by nasal tissues that become inflamed or swollen. The swelling traps or blocks the flow of mucus. This allows bacteria, viruses, and fungi to grow, which leads to infection.  If you were prescribed an antibiotic medicine, take it as told by your health care provider. Do not stop taking the antibiotic even if you start to feel better.  Keep all follow-up visits. This is important.  This information is not intended to replace advice given to you by your health care provider. Make sure you discuss any questions you have with your health care provider.    Document Revised: 11/22/2022 Document Reviewed: 11/22/2022  [employer] Patient Education © 2025 [employer] Follow up with ENT. Return for fever, vomiting, increased pain, ear discharge, worsening condition. Loratadine, Augmentin, Ibuprofen, Flonase were sent to your pharmacy. Consider symptomatic treatment prior to Augmentin.     Sinus Infection, Adult      A person's face, and a person's face showing an internal view of the sinuses. Here the sinuses contain mucus.  A sinus infection, also called sinusitis, is inflammation of your sinuses. Sinuses are hollow spaces in the bones around your face. Your sinuses are located:  Around your eyes.  In the middle of your forehead.  Behind your nose.  In your cheekbones.  Mucus normally drains out of your sinuses. When your nasal tissues become inflamed or swollen, mucus can become trapped or blocked. This allows bacteria, viruses, and fungi to grow, which leads to infection. Most infections of the sinuses are caused by a virus.    A sinus infection can develop quickly. It can last for up to 4 weeks (acute) or for more than 12 weeks (chronic). A sinus infection often develops after a cold.    What are the causes?  This condition is caused by anything that creates swelling in the sinuses or stops mucus from draining. This includes:  Allergies.  Asthma.  Infection from bacteria or viruses.  Deformities or blockages in your nose or sinuses.  Abnormal growths in the nose (nasal polyps).  Pollutants, such as chemicals or irritants in the air.  Infection from fungi. This is rare.  What increases the risk?  You are more likely to develop this condition if you:  Have a weak body defense system (immune system).  Do a lot of swimming or diving.  Overuse nasal sprays.  Smoke.  What are the signs or symptoms?  The main symptoms of this condition are pain and a feeling of pressure around the affected sinuses. Other symptoms include:  Stuffy nose or congestion that makes it difficult to breathe through your nose.  Thick yellow or greenish drainage from your nose.  Tenderness, swelling, and warmth over the affected sinuses.  A cough that may get worse at night.  Decreased sense of smell and taste.  Extra mucus that collects in the throat or the back of the nose (postnasal drip) causing a sore throat or bad breath.  Tiredness (fatigue).  Fever.  How is this diagnosed?  This condition is diagnosed based on:  Your symptoms.  Your medical history.  A physical exam.  Tests to find out if your condition is acute or chronic. This may include:  Checking your nose for nasal polyps.  Viewing your sinuses using a device that has a light (endoscope).  Testing for allergies or bacteria.  Imaging tests, such as an MRI or CT scan.  In rare cases, a bone biopsy may be done to rule out more serious types of fungal sinus disease.    How is this treated?  Treatment for a sinus infection depends on the cause and whether your condition is chronic or acute.  If caused by a virus, your symptoms should go away on their own within 10 days. You may be given medicines to relieve symptoms. They include:  Medicines that shrink swollen nasal passages (decongestants).  A spray that eases inflammation of the nostrils (topical intranasal corticosteroids).  Rinses that help get rid of thick mucus in your nose (nasal saline washes).  Medicines that treat allergies (antihistamines).  Over-the-counter pain relievers.  If caused by bacteria, your health care provider may recommend waiting to see if your symptoms improve. Most bacterial infections will get better without antibiotic medicine. You may be given antibiotics if you have:  A severe infection.  A weak immune system.  If caused by narrow nasal passages or nasal polyps, surgery may be needed.  Follow these instructions at home:  Medicines    Take, use, or apply over-the-counter and prescription medicines only as told by your health care provider. These may include nasal sprays.  If you were prescribed an antibiotic medicine, take it as told by your health care provider. Do not stop taking the antibiotic even if you start to feel better.  Hydrate and humidify    A comparison of three sample cups showing dark yellow, yellow, and pale yellow urine.  Drink enough fluid to keep your urine pale yellow. Staying hydrated will help to thin your mucus.  Use a cool mist humidifier to keep the humidity level in your home above 50%.  Inhale steam for 10–15 minutes, 3–4 times a day, or as told by your health care provider. You can do this in the bathroom while a hot shower is running.  Limit your exposure to cool or dry air.  Rest    Rest as much as possible.  Sleep with your head raised (elevated).  Make sure you get enough sleep each night.  General instructions    A person washing hands with soap and water.  Apply a warm, moist washcloth to your face 3–4 times a day or as told by your health care provider. This will help with discomfort.  Use nasal saline washes as often as told by your health care provider.  Wash your hands often with soap and water to reduce your exposure to germs. If soap and water are not available, use hand .  Do not smoke. Avoid being around people who are smoking (secondhand smoke).  Keep all follow-up visits. This is important.  Contact a health care provider if:  You have a fever.  Your symptoms get worse.  Your symptoms do not improve within 10 days.  Get help right away if:  You have a severe headache.  You have persistent vomiting.  You have severe pain or swelling around your face or eyes.  You have vision problems.  You develop confusion.  Your neck is stiff.  You have trouble breathing.  These symptoms may be an emergency. Get help right away. Call 911.  Do not wait to see if the symptoms will go away.  Do not drive yourself to the hospital.  Summary  A sinus infection is soreness and inflammation of your sinuses. Sinuses are hollow spaces in the bones around your face.  This condition is caused by nasal tissues that become inflamed or swollen. The swelling traps or blocks the flow of mucus. This allows bacteria, viruses, and fungi to grow, which leads to infection.  If you were prescribed an antibiotic medicine, take it as told by your health care provider. Do not stop taking the antibiotic even if you start to feel better.  Keep all follow-up visits. This is important.  This information is not intended to replace advice given to you by your health care provider. Make sure you discuss any questions you have with your health care provider.    Document Revised: 11/22/2022 Document Reviewed: 11/22/2022  [employer] Patient Education © 2025 [employer]

## 2025-03-23 NOTE — ED PROVIDER NOTE - OBJECTIVE STATEMENT
54-year-old female with past medical history of seizures presents today due to left ear pain.  Patient reports that over the last 2 weeks she has experienced intermittent viral-like symptoms.  Patient is experiencing sinus pressure, nasal congestion, ear pain, sore throat.  Patient reports that she works as a  and commonly exposed to viruses.  Patient reports 2 days ago the symptoms worsen.  Patient admits to a small cough.  Patient denies trouble swallowing, fever, vomiting, abdominal pain, diarrhea, or any other complaints.

## 2025-03-23 NOTE — ED ADULT TRIAGE NOTE - TEMPERATURE IN FAHRENHEIT (DEGREES F)
98 Take Tylenol 650 mg every 6-8 hours or Motrin 600 mg every 8 hours as need for pain  Follow up with our podiatry clinic

## 2025-03-23 NOTE — ED ADULT NURSE NOTE - OBJECTIVE STATEMENT
Pt received in bed alert and oriented and resting in bed with c/o left ear pain along with  sore throat and nonproductive cough. Pt seen evaluated and treated and d/c home.

## 2025-03-23 NOTE — ED PROVIDER NOTE - PHYSICAL EXAMINATION
Constitutional: Awake, Alert, non-toxic. NAD  HEAD: Normocephalic, atraumatic.   EYES: EOM intact, conjunctiva and sclera are clear bilaterally  ENT: Tm and canal WNL. No rhinorrhea, normal pharynx, patent, no tonsillar exudate or enlargement, mucous membranes pink/moist, no erythema, no drooling or stridor.   NECK: Supple, non-tender, no LAD   CARDIOVASCULAR: Normal S1, S2; regular rate and rhythm.  RESPIRATORY: Normal respiratory effort; breath sounds CTAB, no wheezes, rhonchi, or rales. Speaking in full sentences. No accessory muscle use.   EXTREMITIES: Full passive and active ROM in all extremities; non-tender to palpation; distal pulses palpable and symmetric, no edema, no crepitus or step off  SKIN: Warm, dry; good skin turgor, no apparent lesions or rashes, no ecchymosis, brisk capillary refill.  NEURO: A&O x3. Sensory and motor functions are grossly intact. Speech is normal. Appearance and judgement seem appropriate for gender and age

## 2025-03-23 NOTE — ED ADULT NURSE NOTE - BREATH SOUNDS, LEFT
From: Joseline David  To: Lyric Arriola  [phone]. Thanks! clear

## 2025-04-02 ENCOUNTER — APPOINTMENT (OUTPATIENT)
Dept: OTOLARYNGOLOGY | Facility: CLINIC | Age: 55
End: 2025-04-02

## 2025-05-09 NOTE — ED PROVIDER NOTE - WET READ LAUNCH FT
Distress Screening Follow-up    Name: Darlin Zee    : 1957    Diagnosis: Breast cancer    Location of Distress Screening: Breast Surgery     Distress Level: 8 (From AJ visit on ) (2025  7:00 AM)      Emotional Concerns:  Worry or anxiety: Y  Fear: Y       Interventions:       Comments:  Social Work was called to follow up with patient regarding recent distress screen results.  left a voicemail with patient introducing herself, and offered additional support.  provided patient with a call back number, ([phone]) and encouraged patient to reach out at their convenience.      will remain available to offer support.     Yvonne Kovacs   Oncology Social Worker    There are no Wet Read(s) to document. There is 1 Wet Read(s) to document.

## 2025-05-30 NOTE — PATIENT PROFILE ADULT - NSPROMEDSHERBAL_GEN_A_NUR
Prior Authorization for EMGALITY  (Quantity: 2, Days: 30) has been submitted via Cover My Meds: Key ([id number])    Insurance: Medicaid    Will follow up in 24-48 business hours.   
Prior Authorization for EMGALITY  has been denied for a quantity of 2 , day supply 30    Prior authorization was denied per the following:   Pt needs to have tried and failed for at least a 2 months with the following:  -Amitriptyline/venlafaxine  -Atenolol/propranolol/nadolol/timolol or metoprolol  -Depakote/depakote ER/topiramate    Prior Authorization denial reference number: na  Insurance: Medicaid       Next Steps:I will notify the MA      
Received [employer] request via [employer]  for EMGALITY . (Quantity:2, Day Supply:30)     Insurance: MEDICAID   Member ID:  [id number]  BIN: [id number]  PCN: [id number]  [id number]     Ran Test claim via [place] & medication Rejects stating prior authorization is required.      
no

## 2025-06-04 ENCOUNTER — EMERGENCY (EMERGENCY)
Facility: HOSPITAL | Age: 55
LOS: 1 days | End: 2025-06-04
Attending: EMERGENCY MEDICINE | Admitting: EMERGENCY MEDICINE
Payer: MEDICAID

## 2025-06-04 VITALS
DIASTOLIC BLOOD PRESSURE: 69 MMHG | WEIGHT: 119.93 LBS | HEIGHT: 62 IN | TEMPERATURE: 98 F | OXYGEN SATURATION: 100 % | RESPIRATION RATE: 18 BRPM | HEART RATE: 74 BPM | SYSTOLIC BLOOD PRESSURE: 106 MMHG

## 2025-06-04 VITALS
OXYGEN SATURATION: 100 % | SYSTOLIC BLOOD PRESSURE: 104 MMHG | TEMPERATURE: 98 F | RESPIRATION RATE: 18 BRPM | HEART RATE: 87 BPM | DIASTOLIC BLOOD PRESSURE: 63 MMHG

## 2025-06-04 DIAGNOSIS — Z41.1 ENCOUNTER FOR COSMETIC SURGERY: Chronic | ICD-10-CM

## 2025-06-04 DIAGNOSIS — Z98.890 OTHER SPECIFIED POSTPROCEDURAL STATES: Chronic | ICD-10-CM

## 2025-06-04 LAB
ALBUMIN SERPL ELPH-MCNC: 3.9 G/DL — SIGNIFICANT CHANGE UP (ref 3.3–5)
ALP SERPL-CCNC: 67 U/L — SIGNIFICANT CHANGE UP (ref 40–120)
ALT FLD-CCNC: 29 U/L — SIGNIFICANT CHANGE UP (ref 12–78)
AMPHET UR-MCNC: NEGATIVE — SIGNIFICANT CHANGE UP
ANION GAP SERPL CALC-SCNC: 7 MMOL/L — SIGNIFICANT CHANGE UP (ref 5–17)
APAP SERPL-MCNC: 3 UG/ML — LOW (ref 10–30)
APPEARANCE UR: CLEAR — SIGNIFICANT CHANGE UP
APTT BLD: 31.5 SEC — SIGNIFICANT CHANGE UP (ref 26.1–36.8)
AST SERPL-CCNC: 23 U/L — SIGNIFICANT CHANGE UP (ref 15–37)
BARBITURATES UR SCN-MCNC: NEGATIVE — SIGNIFICANT CHANGE UP
BASOPHILS # BLD AUTO: 0.05 K/UL — SIGNIFICANT CHANGE UP (ref 0–0.2)
BASOPHILS NFR BLD AUTO: 0.9 % — SIGNIFICANT CHANGE UP (ref 0–2)
BENZODIAZ UR-MCNC: NEGATIVE — SIGNIFICANT CHANGE UP
BILIRUB SERPL-MCNC: 0.3 MG/DL — SIGNIFICANT CHANGE UP (ref 0.2–1.2)
BILIRUB UR-MCNC: NEGATIVE — SIGNIFICANT CHANGE UP
BUN SERPL-MCNC: 10 MG/DL — SIGNIFICANT CHANGE UP (ref 7–23)
CALCIUM SERPL-MCNC: 8.9 MG/DL — SIGNIFICANT CHANGE UP (ref 8.5–10.1)
CHLORIDE SERPL-SCNC: 103 MMOL/L — SIGNIFICANT CHANGE UP (ref 96–108)
CK MB CFR SERPL CALC: <1 NG/ML — SIGNIFICANT CHANGE UP (ref 0–3.6)
CO2 SERPL-SCNC: 29 MMOL/L — SIGNIFICANT CHANGE UP (ref 22–31)
COCAINE METAB.OTHER UR-MCNC: NEGATIVE — SIGNIFICANT CHANGE UP
COLOR SPEC: YELLOW — SIGNIFICANT CHANGE UP
CREAT SERPL-MCNC: 0.86 MG/DL — SIGNIFICANT CHANGE UP (ref 0.5–1.3)
DIFF PNL FLD: NEGATIVE — SIGNIFICANT CHANGE UP
EGFR: 79 ML/MIN/1.73M2 — SIGNIFICANT CHANGE UP
EGFR: 79 ML/MIN/1.73M2 — SIGNIFICANT CHANGE UP
EOSINOPHIL # BLD AUTO: 0.05 K/UL — SIGNIFICANT CHANGE UP (ref 0–0.5)
EOSINOPHIL NFR BLD AUTO: 0.9 % — SIGNIFICANT CHANGE UP (ref 0–6)
FLUAV AG NPH QL: SIGNIFICANT CHANGE UP
FLUBV AG NPH QL: SIGNIFICANT CHANGE UP
GLUCOSE SERPL-MCNC: 131 MG/DL — HIGH (ref 70–99)
GLUCOSE UR QL: NEGATIVE MG/DL — SIGNIFICANT CHANGE UP
HCG SERPL-ACNC: 5 MIU/ML — SIGNIFICANT CHANGE UP
HCT VFR BLD CALC: 36.8 % — SIGNIFICANT CHANGE UP (ref 34.5–45)
HGB BLD-MCNC: 12.7 G/DL — SIGNIFICANT CHANGE UP (ref 11.5–15.5)
IMM GRANULOCYTES NFR BLD AUTO: 0.4 % — SIGNIFICANT CHANGE UP (ref 0–0.9)
INR BLD: 0.98 RATIO — SIGNIFICANT CHANGE UP (ref 0.85–1.16)
KETONES UR QL: NEGATIVE MG/DL — SIGNIFICANT CHANGE UP
LEUKOCYTE ESTERASE UR-ACNC: NEGATIVE — SIGNIFICANT CHANGE UP
LYMPHOCYTES # BLD AUTO: 1 K/UL — SIGNIFICANT CHANGE UP (ref 1–3.3)
LYMPHOCYTES # BLD AUTO: 17.5 % — SIGNIFICANT CHANGE UP (ref 13–44)
MAGNESIUM SERPL-MCNC: 2.1 MG/DL — SIGNIFICANT CHANGE UP (ref 1.6–2.6)
MCHC RBC-ENTMCNC: 30.5 PG — SIGNIFICANT CHANGE UP (ref 27–34)
MCHC RBC-ENTMCNC: 34.5 G/DL — SIGNIFICANT CHANGE UP (ref 32–36)
MCV RBC AUTO: 88.5 FL — SIGNIFICANT CHANGE UP (ref 80–100)
METHADONE UR-MCNC: NEGATIVE — SIGNIFICANT CHANGE UP
MONOCYTES # BLD AUTO: 0.31 K/UL — SIGNIFICANT CHANGE UP (ref 0–0.9)
MONOCYTES NFR BLD AUTO: 5.4 % — SIGNIFICANT CHANGE UP (ref 2–14)
NEUTROPHILS # BLD AUTO: 4.28 K/UL — SIGNIFICANT CHANGE UP (ref 1.8–7.4)
NEUTROPHILS NFR BLD AUTO: 74.9 % — SIGNIFICANT CHANGE UP (ref 43–77)
NITRITE UR-MCNC: NEGATIVE — SIGNIFICANT CHANGE UP
NRBC BLD AUTO-RTO: 0 /100 WBCS — SIGNIFICANT CHANGE UP (ref 0–0)
OPIATES UR-MCNC: NEGATIVE — SIGNIFICANT CHANGE UP
PCP SPEC-MCNC: SIGNIFICANT CHANGE UP
PCP UR-MCNC: NEGATIVE — SIGNIFICANT CHANGE UP
PH UR: 7.5 — SIGNIFICANT CHANGE UP (ref 5–8)
PLATELET # BLD AUTO: 276 K/UL — SIGNIFICANT CHANGE UP (ref 150–400)
POTASSIUM SERPL-MCNC: 4.3 MMOL/L — SIGNIFICANT CHANGE UP (ref 3.5–5.3)
POTASSIUM SERPL-SCNC: 4.3 MMOL/L — SIGNIFICANT CHANGE UP (ref 3.5–5.3)
PROT SERPL-MCNC: 7.8 G/DL — SIGNIFICANT CHANGE UP (ref 6–8.3)
PROT UR-MCNC: NEGATIVE MG/DL — SIGNIFICANT CHANGE UP
PROTHROM AB SERPL-ACNC: 11.5 SEC — SIGNIFICANT CHANGE UP (ref 9.9–13.4)
RBC # BLD: 4.16 M/UL — SIGNIFICANT CHANGE UP (ref 3.8–5.2)
RBC # FLD: 11.9 % — SIGNIFICANT CHANGE UP (ref 10.3–14.5)
RSV RNA NPH QL NAA+NON-PROBE: SIGNIFICANT CHANGE UP
SALICYLATES SERPL-MCNC: <1.7 MG/DL — LOW (ref 2.8–20)
SARS-COV-2 RNA SPEC QL NAA+PROBE: SIGNIFICANT CHANGE UP
SODIUM SERPL-SCNC: 139 MMOL/L — SIGNIFICANT CHANGE UP (ref 135–145)
SOURCE RESPIRATORY: SIGNIFICANT CHANGE UP
SP GR SPEC: 1.01 — SIGNIFICANT CHANGE UP (ref 1–1.03)
THC UR QL: NEGATIVE — SIGNIFICANT CHANGE UP
TROPONIN I, HIGH SENSITIVITY RESULT: 3.7 NG/L — SIGNIFICANT CHANGE UP
TSH SERPL-MCNC: 3.13 UIU/ML — SIGNIFICANT CHANGE UP (ref 0.36–3.74)
UROBILINOGEN FLD QL: 0.2 MG/DL — SIGNIFICANT CHANGE UP (ref 0.2–1)
WBC # BLD: 5.71 K/UL — SIGNIFICANT CHANGE UP (ref 3.8–10.5)
WBC # FLD AUTO: 5.71 K/UL — SIGNIFICANT CHANGE UP (ref 3.8–10.5)

## 2025-06-04 PROCEDURE — 99285 EMERGENCY DEPT VISIT HI MDM: CPT | Mod: 25

## 2025-06-04 PROCEDURE — 99285 EMERGENCY DEPT VISIT HI MDM: CPT

## 2025-06-04 PROCEDURE — 80307 DRUG TEST PRSMV CHEM ANLYZR: CPT

## 2025-06-04 PROCEDURE — 96375 TX/PRO/DX INJ NEW DRUG ADDON: CPT | Mod: XU

## 2025-06-04 PROCEDURE — 70498 CT ANGIOGRAPHY NECK: CPT | Mod: 26

## 2025-06-04 PROCEDURE — 85610 PROTHROMBIN TIME: CPT

## 2025-06-04 PROCEDURE — 87637 SARSCOV2&INF A&B&RSV AMP PRB: CPT

## 2025-06-04 PROCEDURE — 93005 ELECTROCARDIOGRAM TRACING: CPT

## 2025-06-04 PROCEDURE — 85025 COMPLETE CBC W/AUTO DIFF WBC: CPT

## 2025-06-04 PROCEDURE — 70498 CT ANGIOGRAPHY NECK: CPT

## 2025-06-04 PROCEDURE — 84702 CHORIONIC GONADOTROPIN TEST: CPT

## 2025-06-04 PROCEDURE — 83735 ASSAY OF MAGNESIUM: CPT

## 2025-06-04 PROCEDURE — 96374 THER/PROPH/DIAG INJ IV PUSH: CPT | Mod: XU

## 2025-06-04 PROCEDURE — 70496 CT ANGIOGRAPHY HEAD: CPT

## 2025-06-04 PROCEDURE — 82553 CREATINE MB FRACTION: CPT

## 2025-06-04 PROCEDURE — 93010 ELECTROCARDIOGRAM REPORT: CPT

## 2025-06-04 PROCEDURE — 80053 COMPREHEN METABOLIC PANEL: CPT

## 2025-06-04 PROCEDURE — 70496 CT ANGIOGRAPHY HEAD: CPT | Mod: 26

## 2025-06-04 PROCEDURE — 70450 CT HEAD/BRAIN W/O DYE: CPT | Mod: 26,59

## 2025-06-04 PROCEDURE — 84443 ASSAY THYROID STIM HORMONE: CPT

## 2025-06-04 PROCEDURE — 84484 ASSAY OF TROPONIN QUANT: CPT

## 2025-06-04 PROCEDURE — 70450 CT HEAD/BRAIN W/O DYE: CPT

## 2025-06-04 PROCEDURE — 81003 URINALYSIS AUTO W/O SCOPE: CPT

## 2025-06-04 PROCEDURE — 36415 COLL VENOUS BLD VENIPUNCTURE: CPT

## 2025-06-04 PROCEDURE — 85730 THROMBOPLASTIN TIME PARTIAL: CPT

## 2025-06-04 RX ORDER — DIAZEPAM 5 MG/1
5 TABLET ORAL ONCE
Refills: 0 | Status: DISCONTINUED | OUTPATIENT
Start: 2025-06-04 | End: 2025-06-04

## 2025-06-04 RX ORDER — ACETAMINOPHEN 500 MG/5ML
1000 LIQUID (ML) ORAL ONCE
Refills: 0 | Status: COMPLETED | OUTPATIENT
Start: 2025-06-04 | End: 2025-06-04

## 2025-06-04 RX ORDER — METOCLOPRAMIDE HCL 10 MG
10 TABLET ORAL ONCE
Refills: 0 | Status: COMPLETED | OUTPATIENT
Start: 2025-06-04 | End: 2025-06-04

## 2025-06-04 RX ADMIN — Medication 1000 MILLILITER(S): at 17:32

## 2025-06-04 RX ADMIN — Medication 10 MILLIGRAM(S): at 17:32

## 2025-06-04 RX ADMIN — DIAZEPAM 5 MILLIGRAM(S): 5 TABLET ORAL at 18:11

## 2025-06-04 RX ADMIN — Medication 400 MILLIGRAM(S): at 17:32

## 2025-06-05 ENCOUNTER — INPATIENT (INPATIENT)
Facility: HOSPITAL | Age: 55
LOS: 1 days | Discharge: HOME | DRG: 71 | End: 2025-06-07
Attending: INTERNAL MEDICINE | Admitting: STUDENT IN AN ORGANIZED HEALTH CARE EDUCATION/TRAINING PROGRAM
Payer: MEDICAID

## 2025-06-05 VITALS
HEART RATE: 108 BPM | RESPIRATION RATE: 18 BRPM | SYSTOLIC BLOOD PRESSURE: 115 MMHG | OXYGEN SATURATION: 99 % | HEIGHT: 62 IN | WEIGHT: 131.18 LBS | TEMPERATURE: 98 F | DIASTOLIC BLOOD PRESSURE: 52 MMHG

## 2025-06-05 DIAGNOSIS — Z98.890 OTHER SPECIFIED POSTPROCEDURAL STATES: Chronic | ICD-10-CM

## 2025-06-05 DIAGNOSIS — Z41.1 ENCOUNTER FOR COSMETIC SURGERY: Chronic | ICD-10-CM

## 2025-06-05 LAB
ALBUMIN SERPL ELPH-MCNC: 3.5 G/DL — SIGNIFICANT CHANGE UP (ref 3.3–5)
ALP SERPL-CCNC: 61 U/L — SIGNIFICANT CHANGE UP (ref 40–120)
ALT FLD-CCNC: 30 U/L — SIGNIFICANT CHANGE UP (ref 12–78)
ANION GAP SERPL CALC-SCNC: 10 MMOL/L — SIGNIFICANT CHANGE UP (ref 5–17)
APAP SERPL-MCNC: <2 UG/ML — LOW (ref 10–30)
AST SERPL-CCNC: 29 U/L — SIGNIFICANT CHANGE UP (ref 15–37)
BASOPHILS # BLD AUTO: 0.03 K/UL — SIGNIFICANT CHANGE UP (ref 0–0.2)
BASOPHILS NFR BLD AUTO: 0.3 % — SIGNIFICANT CHANGE UP (ref 0–2)
BILIRUB SERPL-MCNC: 0.5 MG/DL — SIGNIFICANT CHANGE UP (ref 0.2–1.2)
BUN SERPL-MCNC: 6 MG/DL — LOW (ref 7–23)
CALCIUM SERPL-MCNC: 8.5 MG/DL — SIGNIFICANT CHANGE UP (ref 8.5–10.1)
CHLORIDE SERPL-SCNC: 92 MMOL/L — LOW (ref 96–108)
CK SERPL-CCNC: 135 U/L — SIGNIFICANT CHANGE UP (ref 26–192)
CO2 SERPL-SCNC: 25 MMOL/L — SIGNIFICANT CHANGE UP (ref 22–31)
CREAT SERPL-MCNC: 0.73 MG/DL — SIGNIFICANT CHANGE UP (ref 0.5–1.3)
EGFR: 96 ML/MIN/1.73M2 — SIGNIFICANT CHANGE UP
EGFR: 96 ML/MIN/1.73M2 — SIGNIFICANT CHANGE UP
EOSINOPHIL # BLD AUTO: 0.02 K/UL — SIGNIFICANT CHANGE UP (ref 0–0.5)
EOSINOPHIL NFR BLD AUTO: 0.2 % — SIGNIFICANT CHANGE UP (ref 0–6)
GLUCOSE SERPL-MCNC: 133 MG/DL — HIGH (ref 70–99)
HCT VFR BLD CALC: 33.3 % — LOW (ref 34.5–45)
HGB BLD-MCNC: 11.9 G/DL — SIGNIFICANT CHANGE UP (ref 11.5–15.5)
IMM GRANULOCYTES NFR BLD AUTO: 0.7 % — SIGNIFICANT CHANGE UP (ref 0–0.9)
LACTATE SERPL-SCNC: 3.1 MMOL/L — HIGH (ref 0.7–2)
LYMPHOCYTES # BLD AUTO: 1.34 K/UL — SIGNIFICANT CHANGE UP (ref 1–3.3)
LYMPHOCYTES # BLD AUTO: 12.4 % — LOW (ref 13–44)
MCHC RBC-ENTMCNC: 30.4 PG — SIGNIFICANT CHANGE UP (ref 27–34)
MCHC RBC-ENTMCNC: 35.7 G/DL — SIGNIFICANT CHANGE UP (ref 32–36)
MCV RBC AUTO: 84.9 FL — SIGNIFICANT CHANGE UP (ref 80–100)
MONOCYTES # BLD AUTO: 0.73 K/UL — SIGNIFICANT CHANGE UP (ref 0–0.9)
MONOCYTES NFR BLD AUTO: 6.8 % — SIGNIFICANT CHANGE UP (ref 2–14)
NEUTROPHILS # BLD AUTO: 8.57 K/UL — HIGH (ref 1.8–7.4)
NEUTROPHILS NFR BLD AUTO: 79.6 % — HIGH (ref 43–77)
NRBC BLD AUTO-RTO: 0 /100 WBCS — SIGNIFICANT CHANGE UP (ref 0–0)
PLATELET # BLD AUTO: 263 K/UL — SIGNIFICANT CHANGE UP (ref 150–400)
POTASSIUM SERPL-MCNC: 3.7 MMOL/L — SIGNIFICANT CHANGE UP (ref 3.5–5.3)
POTASSIUM SERPL-SCNC: 3.7 MMOL/L — SIGNIFICANT CHANGE UP (ref 3.5–5.3)
PROT SERPL-MCNC: 7 G/DL — SIGNIFICANT CHANGE UP (ref 6–8.3)
RBC # BLD: 3.92 M/UL — SIGNIFICANT CHANGE UP (ref 3.8–5.2)
RBC # FLD: 11.5 % — SIGNIFICANT CHANGE UP (ref 10.3–14.5)
SALICYLATES SERPL-MCNC: <1.7 MG/DL — LOW (ref 2.8–20)
SODIUM SERPL-SCNC: 127 MMOL/L — LOW (ref 135–145)
WBC # BLD: 10.77 K/UL — HIGH (ref 3.8–10.5)
WBC # FLD AUTO: 10.77 K/UL — HIGH (ref 3.8–10.5)

## 2025-06-05 PROCEDURE — 93010 ELECTROCARDIOGRAM REPORT: CPT | Mod: 76

## 2025-06-05 PROCEDURE — 99285 EMERGENCY DEPT VISIT HI MDM: CPT

## 2025-06-05 RX ORDER — ONDANSETRON HCL/PF 4 MG/2 ML
4 VIAL (ML) INJECTION ONCE
Refills: 0 | Status: COMPLETED | OUTPATIENT
Start: 2025-06-05 | End: 2025-06-05

## 2025-06-05 RX ORDER — MIDAZOLAM IN 0.9 % SOD.CHLORID 1 MG/ML
2 PLASTIC BAG, INJECTION (ML) INTRAVENOUS ONCE
Refills: 0 | Status: DISCONTINUED | OUTPATIENT
Start: 2025-06-05 | End: 2025-06-05

## 2025-06-05 RX ADMIN — Medication 1000 MILLILITER(S): at 23:14

## 2025-06-05 RX ADMIN — Medication 2 MILLIGRAM(S): at 23:14

## 2025-06-05 RX ADMIN — Medication 4 MILLIGRAM(S): at 23:36

## 2025-06-05 NOTE — REASON FOR VISIT
[FreeTextEntry2] : Right total hip replacement February 2020
Hide Additional Notes?: No
Detail Level: Detailed

## 2025-06-06 ENCOUNTER — TRANSCRIPTION ENCOUNTER (OUTPATIENT)
Age: 55
End: 2025-06-06

## 2025-06-06 DIAGNOSIS — R56.9 UNSPECIFIED CONVULSIONS: ICD-10-CM

## 2025-06-06 DIAGNOSIS — E87.1 HYPO-OSMOLALITY AND HYPONATREMIA: ICD-10-CM

## 2025-06-06 DIAGNOSIS — Z29.9 ENCOUNTER FOR PROPHYLACTIC MEASURES, UNSPECIFIED: ICD-10-CM

## 2025-06-06 DIAGNOSIS — R11.2 NAUSEA WITH VOMITING, UNSPECIFIED: ICD-10-CM

## 2025-06-06 DIAGNOSIS — F41.9 ANXIETY DISORDER, UNSPECIFIED: ICD-10-CM

## 2025-06-06 DIAGNOSIS — G93.41 METABOLIC ENCEPHALOPATHY: ICD-10-CM

## 2025-06-06 DIAGNOSIS — R41.82 ALTERED MENTAL STATUS, UNSPECIFIED: ICD-10-CM

## 2025-06-06 LAB
ALBUMIN SERPL ELPH-MCNC: 3.3 G/DL — SIGNIFICANT CHANGE UP (ref 3.3–5)
ALP SERPL-CCNC: 59 U/L — SIGNIFICANT CHANGE UP (ref 40–120)
ALT FLD-CCNC: 27 U/L — SIGNIFICANT CHANGE UP (ref 12–78)
AMMONIA BLD-MCNC: 19 UMOL/L — SIGNIFICANT CHANGE UP (ref 11–32)
ANION GAP SERPL CALC-SCNC: 7 MMOL/L — SIGNIFICANT CHANGE UP (ref 5–17)
AST SERPL-CCNC: 17 U/L — SIGNIFICANT CHANGE UP (ref 15–37)
BASE EXCESS BLDV CALC-SCNC: 4.7 MMOL/L — HIGH (ref -2–3)
BASOPHILS # BLD AUTO: 0.02 K/UL — SIGNIFICANT CHANGE UP (ref 0–0.2)
BASOPHILS NFR BLD AUTO: 0.2 % — SIGNIFICANT CHANGE UP (ref 0–2)
BILIRUB SERPL-MCNC: 0.4 MG/DL — SIGNIFICANT CHANGE UP (ref 0.2–1.2)
BLOOD GAS COMMENTS, VENOUS: SIGNIFICANT CHANGE UP
BUN SERPL-MCNC: 5 MG/DL — LOW (ref 7–23)
CALCIUM SERPL-MCNC: 8.5 MG/DL — SIGNIFICANT CHANGE UP (ref 8.5–10.1)
CHLORIDE SERPL-SCNC: 98 MMOL/L — SIGNIFICANT CHANGE UP (ref 96–108)
CO2 SERPL-SCNC: 28 MMOL/L — SIGNIFICANT CHANGE UP (ref 22–31)
CREAT SERPL-MCNC: 0.77 MG/DL — SIGNIFICANT CHANGE UP (ref 0.5–1.3)
EGFR: 90 ML/MIN/1.73M2 — SIGNIFICANT CHANGE UP
EGFR: 90 ML/MIN/1.73M2 — SIGNIFICANT CHANGE UP
EOSINOPHIL # BLD AUTO: 0.01 K/UL — SIGNIFICANT CHANGE UP (ref 0–0.5)
EOSINOPHIL NFR BLD AUTO: 0.1 % — SIGNIFICANT CHANGE UP (ref 0–6)
FOLATE SERPL-MCNC: 13.7 NG/ML — SIGNIFICANT CHANGE UP
GLUCOSE SERPL-MCNC: 145 MG/DL — HIGH (ref 70–99)
HCO3 BLDV-SCNC: 29 MMOL/L — SIGNIFICANT CHANGE UP (ref 22–29)
HCT VFR BLD CALC: 31.2 % — LOW (ref 34.5–45)
HGB BLD-MCNC: 11.2 G/DL — LOW (ref 11.5–15.5)
IMM GRANULOCYTES NFR BLD AUTO: 0.6 % — SIGNIFICANT CHANGE UP (ref 0–0.9)
LACTATE SERPL-SCNC: 2.3 MMOL/L — HIGH (ref 0.7–2)
LACTATE SERPL-SCNC: 2.8 MMOL/L — HIGH (ref 0.7–2)
LACTATE SERPL-SCNC: 4.1 MMOL/L — CRITICAL HIGH (ref 0.7–2)
LYMPHOCYTES # BLD AUTO: 1.14 K/UL — SIGNIFICANT CHANGE UP (ref 1–3.3)
LYMPHOCYTES # BLD AUTO: 9.6 % — LOW (ref 13–44)
MAGNESIUM SERPL-MCNC: 1.6 MG/DL — SIGNIFICANT CHANGE UP (ref 1.6–2.6)
MCHC RBC-ENTMCNC: 31 PG — SIGNIFICANT CHANGE UP (ref 27–34)
MCHC RBC-ENTMCNC: 35.9 G/DL — SIGNIFICANT CHANGE UP (ref 32–36)
MCV RBC AUTO: 86.4 FL — SIGNIFICANT CHANGE UP (ref 80–100)
MONOCYTES # BLD AUTO: 0.85 K/UL — SIGNIFICANT CHANGE UP (ref 0–0.9)
MONOCYTES NFR BLD AUTO: 7.2 % — SIGNIFICANT CHANGE UP (ref 2–14)
NEUTROPHILS # BLD AUTO: 9.77 K/UL — HIGH (ref 1.8–7.4)
NEUTROPHILS NFR BLD AUTO: 82.3 % — HIGH (ref 43–77)
NRBC BLD AUTO-RTO: 0 /100 WBCS — SIGNIFICANT CHANGE UP (ref 0–0)
PCO2 BLDV: 45 MMHG — HIGH (ref 39–42)
PCP SPEC-MCNC: SIGNIFICANT CHANGE UP
PH BLDV: 7.42 — SIGNIFICANT CHANGE UP (ref 7.32–7.43)
PHOSPHATE SERPL-MCNC: 2.1 MG/DL — LOW (ref 2.5–4.5)
PLATELET # BLD AUTO: 241 K/UL — SIGNIFICANT CHANGE UP (ref 150–400)
PO2 BLDV: 46 MMHG — HIGH (ref 25–45)
POTASSIUM SERPL-MCNC: 4.1 MMOL/L — SIGNIFICANT CHANGE UP (ref 3.5–5.3)
POTASSIUM SERPL-SCNC: 4.1 MMOL/L — SIGNIFICANT CHANGE UP (ref 3.5–5.3)
PROT SERPL-MCNC: 6.6 G/DL — SIGNIFICANT CHANGE UP (ref 6–8.3)
RBC # BLD: 3.61 M/UL — LOW (ref 3.8–5.2)
RBC # FLD: 11.4 % — SIGNIFICANT CHANGE UP (ref 10.3–14.5)
SAO2 % BLDV: 82.4 % — SIGNIFICANT CHANGE UP (ref 67–88)
SODIUM SERPL-SCNC: 133 MMOL/L — LOW (ref 135–145)
T3 SERPL-MCNC: 90 NG/DL — SIGNIFICANT CHANGE UP (ref 80–200)
T4 AB SER-ACNC: 5.9 UG/DL — SIGNIFICANT CHANGE UP (ref 4.6–12)
TSH SERPL-MCNC: 5.03 UIU/ML — HIGH (ref 0.36–3.74)
VIT B12 SERPL-MCNC: 1049 PG/ML — SIGNIFICANT CHANGE UP (ref 232–1245)
WBC # BLD: 11.86 K/UL — HIGH (ref 3.8–10.5)
WBC # FLD AUTO: 11.86 K/UL — HIGH (ref 3.8–10.5)

## 2025-06-06 PROCEDURE — 99223 1ST HOSP IP/OBS HIGH 75: CPT | Mod: GC

## 2025-06-06 PROCEDURE — 70551 MRI BRAIN STEM W/O DYE: CPT | Mod: 26

## 2025-06-06 PROCEDURE — 70450 CT HEAD/BRAIN W/O DYE: CPT | Mod: 26

## 2025-06-06 PROCEDURE — 99221 1ST HOSP IP/OBS SF/LOW 40: CPT

## 2025-06-06 RX ORDER — ACETAMINOPHEN 500 MG/5ML
650 LIQUID (ML) ORAL EVERY 6 HOURS
Refills: 0 | Status: DISCONTINUED | OUTPATIENT
Start: 2025-06-06 | End: 2025-06-07

## 2025-06-06 RX ORDER — MELATONIN 5 MG
3 TABLET ORAL AT BEDTIME
Refills: 0 | Status: DISCONTINUED | OUTPATIENT
Start: 2025-06-06 | End: 2025-06-07

## 2025-06-06 RX ORDER — ONDANSETRON HCL/PF 4 MG/2 ML
4 VIAL (ML) INJECTION EVERY 6 HOURS
Refills: 0 | Status: DISCONTINUED | OUTPATIENT
Start: 2025-06-06 | End: 2025-06-07

## 2025-06-06 RX ORDER — ENOXAPARIN SODIUM 100 MG/ML
40 INJECTION SUBCUTANEOUS EVERY 24 HOURS
Refills: 0 | Status: DISCONTINUED | OUTPATIENT
Start: 2025-06-06 | End: 2025-06-06

## 2025-06-06 RX ORDER — ACETAMINOPHEN 500 MG/5ML
1000 LIQUID (ML) ORAL ONCE
Refills: 0 | Status: COMPLETED | OUTPATIENT
Start: 2025-06-06 | End: 2025-06-06

## 2025-06-06 RX ORDER — LAMOTRIGINE 150 MG/1
250 TABLET ORAL EVERY 12 HOURS
Refills: 0 | Status: DISCONTINUED | OUTPATIENT
Start: 2025-06-06 | End: 2025-06-07

## 2025-06-06 RX ORDER — DIAZEPAM 5 MG/1
5 TABLET ORAL AT BEDTIME
Refills: 0 | Status: DISCONTINUED | OUTPATIENT
Start: 2025-06-06 | End: 2025-06-07

## 2025-06-06 RX ORDER — MAGNESIUM SULFATE 500 MG/ML
2 SYRINGE (ML) INJECTION ONCE
Refills: 0 | Status: COMPLETED | OUTPATIENT
Start: 2025-06-06 | End: 2025-06-06

## 2025-06-06 RX ORDER — DIAZEPAM 5 MG/1
5 TABLET ORAL ONCE
Refills: 0 | Status: DISCONTINUED | OUTPATIENT
Start: 2025-06-06 | End: 2025-06-07

## 2025-06-06 RX ORDER — SOD PHOS DI, MONO/K PHOS MONO 250 MG
1 TABLET ORAL ONCE
Refills: 0 | Status: COMPLETED | OUTPATIENT
Start: 2025-06-06 | End: 2025-06-06

## 2025-06-06 RX ORDER — DIAZEPAM 5 MG/1
5 TABLET ORAL ONCE
Refills: 0 | Status: DISCONTINUED | OUTPATIENT
Start: 2025-06-06 | End: 2025-06-06

## 2025-06-06 RX ADMIN — Medication 75 MILLILITER(S): at 06:35

## 2025-06-06 RX ADMIN — Medication 4 MILLIGRAM(S): at 03:29

## 2025-06-06 RX ADMIN — Medication 1 PACKET(S): at 16:55

## 2025-06-06 RX ADMIN — DIAZEPAM 5 MILLIGRAM(S): 5 TABLET ORAL at 13:16

## 2025-06-06 RX ADMIN — Medication 1000 MILLIGRAM(S): at 03:25

## 2025-06-06 RX ADMIN — Medication 3 MILLIGRAM(S): at 22:39

## 2025-06-06 RX ADMIN — Medication 25 GRAM(S): at 16:56

## 2025-06-06 RX ADMIN — LAMOTRIGINE 250 MILLIGRAM(S): 150 TABLET ORAL at 19:02

## 2025-06-06 RX ADMIN — ENOXAPARIN SODIUM 40 MILLIGRAM(S): 100 INJECTION SUBCUTANEOUS at 06:35

## 2025-06-06 RX ADMIN — Medication 400 MILLIGRAM(S): at 00:52

## 2025-06-07 ENCOUNTER — TRANSCRIPTION ENCOUNTER (OUTPATIENT)
Age: 55
End: 2025-06-07

## 2025-06-07 ENCOUNTER — INPATIENT (INPATIENT)
Facility: HOSPITAL | Age: 55
LOS: 3 days | Discharge: ANOTHER HOSPITAL | DRG: 101 | End: 2025-06-11
Attending: STUDENT IN AN ORGANIZED HEALTH CARE EDUCATION/TRAINING PROGRAM | Admitting: STUDENT IN AN ORGANIZED HEALTH CARE EDUCATION/TRAINING PROGRAM
Payer: MEDICAID

## 2025-06-07 VITALS
OXYGEN SATURATION: 97 % | HEART RATE: 80 BPM | DIASTOLIC BLOOD PRESSURE: 72 MMHG | TEMPERATURE: 99 F | SYSTOLIC BLOOD PRESSURE: 108 MMHG | RESPIRATION RATE: 18 BRPM

## 2025-06-07 VITALS
HEART RATE: 74 BPM | OXYGEN SATURATION: 98 % | DIASTOLIC BLOOD PRESSURE: 68 MMHG | TEMPERATURE: 98 F | SYSTOLIC BLOOD PRESSURE: 103 MMHG | RESPIRATION RATE: 16 BRPM

## 2025-06-07 DIAGNOSIS — Z41.1 ENCOUNTER FOR COSMETIC SURGERY: Chronic | ICD-10-CM

## 2025-06-07 DIAGNOSIS — G40: ICD-10-CM

## 2025-06-07 DIAGNOSIS — R56.9 UNSPECIFIED CONVULSIONS: ICD-10-CM

## 2025-06-07 DIAGNOSIS — Z98.890 OTHER SPECIFIED POSTPROCEDURAL STATES: Chronic | ICD-10-CM

## 2025-06-07 LAB
ANION GAP SERPL CALC-SCNC: 6 MMOL/L — SIGNIFICANT CHANGE UP (ref 5–17)
BUN SERPL-MCNC: 9 MG/DL — SIGNIFICANT CHANGE UP (ref 7–23)
CALCIUM SERPL-MCNC: 8.9 MG/DL — SIGNIFICANT CHANGE UP (ref 8.5–10.1)
CHLORIDE SERPL-SCNC: 105 MMOL/L — SIGNIFICANT CHANGE UP (ref 96–108)
CO2 SERPL-SCNC: 27 MMOL/L — SIGNIFICANT CHANGE UP (ref 22–31)
CREAT SERPL-MCNC: 0.82 MG/DL — SIGNIFICANT CHANGE UP (ref 0.5–1.3)
EGFR: 84 ML/MIN/1.73M2 — SIGNIFICANT CHANGE UP
EGFR: 84 ML/MIN/1.73M2 — SIGNIFICANT CHANGE UP
GLUCOSE SERPL-MCNC: 121 MG/DL — HIGH (ref 70–99)
HCT VFR BLD CALC: 35.8 % — SIGNIFICANT CHANGE UP (ref 34.5–45)
HGB BLD-MCNC: 12.4 G/DL — SIGNIFICANT CHANGE UP (ref 11.5–15.5)
LACTATE SERPL-SCNC: 1.5 MMOL/L — SIGNIFICANT CHANGE UP (ref 0.7–2)
MAGNESIUM SERPL-MCNC: 2.4 MG/DL — SIGNIFICANT CHANGE UP (ref 1.6–2.6)
MCHC RBC-ENTMCNC: 30.5 PG — SIGNIFICANT CHANGE UP (ref 27–34)
MCHC RBC-ENTMCNC: 34.6 G/DL — SIGNIFICANT CHANGE UP (ref 32–36)
MCV RBC AUTO: 88 FL — SIGNIFICANT CHANGE UP (ref 80–100)
NRBC BLD AUTO-RTO: 0 /100 WBCS — SIGNIFICANT CHANGE UP (ref 0–0)
PHOSPHATE SERPL-MCNC: 3 MG/DL — SIGNIFICANT CHANGE UP (ref 2.5–4.5)
PLATELET # BLD AUTO: 262 K/UL — SIGNIFICANT CHANGE UP (ref 150–400)
POTASSIUM SERPL-MCNC: 4.2 MMOL/L — SIGNIFICANT CHANGE UP (ref 3.5–5.3)
POTASSIUM SERPL-SCNC: 4.2 MMOL/L — SIGNIFICANT CHANGE UP (ref 3.5–5.3)
RBC # BLD: 4.07 M/UL — SIGNIFICANT CHANGE UP (ref 3.8–5.2)
RBC # FLD: 11.8 % — SIGNIFICANT CHANGE UP (ref 10.3–14.5)
SODIUM SERPL-SCNC: 138 MMOL/L — SIGNIFICANT CHANGE UP (ref 135–145)
WBC # BLD: 11.06 K/UL — HIGH (ref 3.8–10.5)
WBC # FLD AUTO: 11.06 K/UL — HIGH (ref 3.8–10.5)

## 2025-06-07 PROCEDURE — 83735 ASSAY OF MAGNESIUM: CPT

## 2025-06-07 PROCEDURE — 95816 EEG AWAKE AND DROWSY: CPT

## 2025-06-07 PROCEDURE — 80053 COMPREHEN METABOLIC PANEL: CPT

## 2025-06-07 PROCEDURE — 36415 COLL VENOUS BLD VENIPUNCTURE: CPT

## 2025-06-07 PROCEDURE — 84443 ASSAY THYROID STIM HORMONE: CPT

## 2025-06-07 PROCEDURE — 87040 BLOOD CULTURE FOR BACTERIA: CPT

## 2025-06-07 PROCEDURE — 70450 CT HEAD/BRAIN W/O DYE: CPT

## 2025-06-07 PROCEDURE — 93005 ELECTROCARDIOGRAM TRACING: CPT

## 2025-06-07 PROCEDURE — 82803 BLOOD GASES ANY COMBINATION: CPT

## 2025-06-07 PROCEDURE — 80175 DRUG SCREEN QUAN LAMOTRIGINE: CPT

## 2025-06-07 PROCEDURE — 82607 VITAMIN B-12: CPT

## 2025-06-07 PROCEDURE — 85025 COMPLETE CBC W/AUTO DIFF WBC: CPT

## 2025-06-07 PROCEDURE — 70551 MRI BRAIN STEM W/O DYE: CPT

## 2025-06-07 PROCEDURE — 82746 ASSAY OF FOLIC ACID SERUM: CPT

## 2025-06-07 PROCEDURE — 83605 ASSAY OF LACTIC ACID: CPT

## 2025-06-07 PROCEDURE — 96375 TX/PRO/DX INJ NEW DRUG ADDON: CPT

## 2025-06-07 PROCEDURE — 84100 ASSAY OF PHOSPHORUS: CPT

## 2025-06-07 PROCEDURE — 99223 1ST HOSP IP/OBS HIGH 75: CPT

## 2025-06-07 PROCEDURE — 84480 ASSAY TRIIODOTHYRONINE (T3): CPT

## 2025-06-07 PROCEDURE — 97162 PT EVAL MOD COMPLEX 30 MIN: CPT

## 2025-06-07 PROCEDURE — 84436 ASSAY OF TOTAL THYROXINE: CPT

## 2025-06-07 PROCEDURE — 99285 EMERGENCY DEPT VISIT HI MDM: CPT

## 2025-06-07 PROCEDURE — 99239 HOSP IP/OBS DSCHRG MGMT >30: CPT

## 2025-06-07 PROCEDURE — 80048 BASIC METABOLIC PNL TOTAL CA: CPT

## 2025-06-07 PROCEDURE — 80307 DRUG TEST PRSMV CHEM ANLYZR: CPT

## 2025-06-07 PROCEDURE — 82550 ASSAY OF CK (CPK): CPT

## 2025-06-07 PROCEDURE — 85027 COMPLETE CBC AUTOMATED: CPT

## 2025-06-07 PROCEDURE — 96374 THER/PROPH/DIAG INJ IV PUSH: CPT

## 2025-06-07 PROCEDURE — 82140 ASSAY OF AMMONIA: CPT

## 2025-06-07 RX ORDER — OLANZAPINE 10 MG/1
2.5 TABLET ORAL ONCE
Refills: 0 | Status: DISCONTINUED | OUTPATIENT
Start: 2025-06-07 | End: 2025-06-07

## 2025-06-07 RX ORDER — MELATONIN 5 MG
3 TABLET ORAL AT BEDTIME
Refills: 0 | Status: DISCONTINUED | OUTPATIENT
Start: 2025-06-07 | End: 2025-06-11

## 2025-06-07 RX ORDER — ONDANSETRON HCL/PF 4 MG/2 ML
4 VIAL (ML) INJECTION EVERY 8 HOURS
Refills: 0 | Status: DISCONTINUED | OUTPATIENT
Start: 2025-06-07 | End: 2025-06-11

## 2025-06-07 RX ORDER — HEPARIN SODIUM 1000 [USP'U]/ML
5000 INJECTION INTRAVENOUS; SUBCUTANEOUS EVERY 12 HOURS
Refills: 0 | Status: DISCONTINUED | OUTPATIENT
Start: 2025-06-07 | End: 2025-06-11

## 2025-06-07 RX ORDER — LAMOTRIGINE 150 MG/1
1 TABLET ORAL
Refills: 0 | DISCHARGE

## 2025-06-07 RX ORDER — MAGNESIUM, ALUMINUM HYDROXIDE 200-200 MG
30 TABLET,CHEWABLE ORAL EVERY 4 HOURS
Refills: 0 | Status: DISCONTINUED | OUTPATIENT
Start: 2025-06-07 | End: 2025-06-11

## 2025-06-07 RX ORDER — LORAZEPAM 4 MG/ML
1 VIAL (ML) INJECTION ONCE
Refills: 0 | Status: DISCONTINUED | OUTPATIENT
Start: 2025-06-07 | End: 2025-06-07

## 2025-06-07 RX ORDER — FOSPHENYTOIN SODIUM 50 MG/ML
600 INJECTION INTRAMUSCULAR; INTRAVENOUS ONCE
Refills: 0 | Status: COMPLETED | OUTPATIENT
Start: 2025-06-07 | End: 2025-06-07

## 2025-06-07 RX ORDER — KETOROLAC TROMETHAMINE 30 MG/ML
15 INJECTION, SOLUTION INTRAMUSCULAR; INTRAVENOUS ONCE
Refills: 0 | Status: DISCONTINUED | OUTPATIENT
Start: 2025-06-07 | End: 2025-06-07

## 2025-06-07 RX ORDER — DIAZEPAM 5 MG/1
5 TABLET ORAL ONCE
Refills: 0 | Status: DISCONTINUED | OUTPATIENT
Start: 2025-06-07 | End: 2025-06-07

## 2025-06-07 RX ORDER — LORAZEPAM 4 MG/ML
2 VIAL (ML) INJECTION
Refills: 0 | Status: DISCONTINUED | OUTPATIENT
Start: 2025-06-07 | End: 2025-06-11

## 2025-06-07 RX ORDER — ACETAMINOPHEN 500 MG/5ML
650 LIQUID (ML) ORAL EVERY 6 HOURS
Refills: 0 | Status: DISCONTINUED | OUTPATIENT
Start: 2025-06-07 | End: 2025-06-11

## 2025-06-07 RX ORDER — LAMOTRIGINE 150 MG/1
10 TABLET ORAL
Qty: 0 | Refills: 0 | DISCHARGE
Start: 2025-06-07

## 2025-06-07 RX ADMIN — Medication 650 MILLIGRAM(S): at 03:00

## 2025-06-07 RX ADMIN — FOSPHENYTOIN SODIUM 112 MILLIGRAM(S) PE: 50 INJECTION INTRAMUSCULAR; INTRAVENOUS at 16:56

## 2025-06-07 RX ADMIN — KETOROLAC TROMETHAMINE 15 MILLIGRAM(S): 30 INJECTION, SOLUTION INTRAMUSCULAR; INTRAVENOUS at 05:04

## 2025-06-07 RX ADMIN — Medication 650 MILLIGRAM(S): at 02:06

## 2025-06-07 RX ADMIN — LAMOTRIGINE 250 MILLIGRAM(S): 150 TABLET ORAL at 05:00

## 2025-06-07 RX ADMIN — LAMOTRIGINE 250 MILLIGRAM(S): 150 TABLET ORAL at 17:00

## 2025-06-07 RX ADMIN — DIAZEPAM 5 MILLIGRAM(S): 5 TABLET ORAL at 14:49

## 2025-06-07 RX ADMIN — KETOROLAC TROMETHAMINE 15 MILLIGRAM(S): 30 INJECTION, SOLUTION INTRAMUSCULAR; INTRAVENOUS at 06:05

## 2025-06-07 NOTE — H&P ADULT - PROBLEM SELECTOR PLAN 1
History of Partial seizure   - Tele  - Lamictal   - Ativan for seizure PRN  - Seizure precaution   - EEG   - 1:1 observation  - Neuro Consult   - Social work consult History of Partial seizure   - Tele  - Lamictal   - Ativan for seizure PRN  - Seizure precaution   - EEG   - 1:1 observation  - Neuro Consult   - DVT prophylaxis

## 2025-06-07 NOTE — H&P ADULT - ASSESSMENT
54 year old female with a PMH of Left Temporal Lobe Lesion, PNES, Bipolar, Anxiety transfered from [place] to [facility] to be evaluated for Seizure.

## 2025-06-07 NOTE — H&P ADULT - NSHPPHYSICALEXAM_GEN_ALL_CORE
General: Alert, awake, pacing the room, speaking incoherently, but follows commands   HEAD:  Atraumatic, Normocephalic  LUNG: Clear to auscultation bilaterally; No wheezes, rales or rhonchi  HEART: Regular rate and rhythm; No murmurs, rubs, or gallops, (+)S1, S2  ABDOMEN: Soft, Nontender, Nondistended; Normal Bowel sounds   EXTREMITIES:  2+ Peripheral Pulses, No clubbing, cyanosis, or edema  PSYCH: Slightly agitated   SKIN: No rashes or lesions

## 2025-06-07 NOTE — H&P ADULT - HISTORY OF PRESENT ILLNESS
54 year old female with a PMH of Left Temporal Lobe Lesion, PNES, Bipolar, Anxiety transfered from [place] to [facility] to be evaluated for Seizure.   History as per partner (Benson) at bedside. As per partener, over the past few weeks patient has been having multiple episodes of partial seizures more often than usual. Despite being on her normal routine, patient often has multiple seizures weekly but always returned to baseline. On  patient had a seizure and afterwards patiet appeared more confused, was unable to articulate herself did not seek medical intervention. Since then symptoms have worsen, on  patient was found with a bunch of Lamictal pills around her, reported she might have taken more  pills than usual & patient was brought to [facility] ED.   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   As per partner, patient ha been going under a lot of life changing events. In april she lost her job, 3 weeks ago a family member has ; with eveything happening, she has been deterioating.   Also on  patient fell at [facility], got on the plane & went to [place], there she went to the ED and was found to have a concusion.   Pateint used to be on lexapro, but was discontinued in april by her psychiatrist   Upon evalaution, patient is awake, alert, speaking incoherently, pacing around the room, but able to follow commands and re-directable.     Imaging at [facility]:   - CT-head-Unremarkable.   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   - EEG: Revealed evidence of electrographic seizure activity  On  - Pateint was found to be hyponatremic (Na:127), corrected with latest Na:138 ()

## 2025-06-08 DIAGNOSIS — F99 MENTAL DISORDER, NOT OTHERWISE SPECIFIED: ICD-10-CM

## 2025-06-08 DIAGNOSIS — E03.9 HYPOTHYROIDISM, UNSPECIFIED: ICD-10-CM

## 2025-06-08 LAB
A1C WITH ESTIMATED AVERAGE GLUCOSE RESULT: 5.5 % — SIGNIFICANT CHANGE UP (ref 4–5.6)
ALBUMIN SERPL ELPH-MCNC: 4.1 G/DL — SIGNIFICANT CHANGE UP (ref 3.3–5.2)
ALP SERPL-CCNC: 67 U/L — SIGNIFICANT CHANGE UP (ref 40–120)
ALT FLD-CCNC: 102 U/L — HIGH
ANION GAP SERPL CALC-SCNC: 15 MMOL/L — SIGNIFICANT CHANGE UP (ref 5–17)
AST SERPL-CCNC: 68 U/L — HIGH
BILIRUB SERPL-MCNC: 0.2 MG/DL — LOW (ref 0.4–2)
BUN SERPL-MCNC: 13.5 MG/DL — SIGNIFICANT CHANGE UP (ref 8–20)
CALCIUM SERPL-MCNC: 9 MG/DL — SIGNIFICANT CHANGE UP (ref 8.4–10.5)
CHLORIDE SERPL-SCNC: 105 MMOL/L — SIGNIFICANT CHANGE UP (ref 96–108)
CHOLEST SERPL-MCNC: 178 MG/DL — SIGNIFICANT CHANGE UP
CO2 SERPL-SCNC: 22 MMOL/L — SIGNIFICANT CHANGE UP (ref 22–29)
CREAT SERPL-MCNC: 0.71 MG/DL — SIGNIFICANT CHANGE UP (ref 0.5–1.3)
EGFR: 100 ML/MIN/1.73M2 — SIGNIFICANT CHANGE UP
EGFR: 100 ML/MIN/1.73M2 — SIGNIFICANT CHANGE UP
ESTIMATED AVERAGE GLUCOSE: 111 MG/DL — SIGNIFICANT CHANGE UP (ref 68–114)
GLUCOSE BLDC GLUCOMTR-MCNC: 103 MG/DL — HIGH (ref 70–99)
GLUCOSE SERPL-MCNC: 91 MG/DL — SIGNIFICANT CHANGE UP (ref 70–99)
HCT VFR BLD CALC: 38.8 % — SIGNIFICANT CHANGE UP (ref 34.5–45)
HDLC SERPL-MCNC: 69 MG/DL — SIGNIFICANT CHANGE UP
HGB BLD-MCNC: 12.7 G/DL — SIGNIFICANT CHANGE UP (ref 11.5–15.5)
LDLC SERPL-MCNC: 87 MG/DL — SIGNIFICANT CHANGE UP
LIPID PNL WITH DIRECT LDL SERPL: 87 MG/DL — SIGNIFICANT CHANGE UP
MAGNESIUM SERPL-MCNC: 2.2 MG/DL — SIGNIFICANT CHANGE UP (ref 1.6–2.6)
MCHC RBC-ENTMCNC: 30.6 PG — SIGNIFICANT CHANGE UP (ref 27–34)
MCHC RBC-ENTMCNC: 32.7 G/DL — SIGNIFICANT CHANGE UP (ref 32–36)
MCV RBC AUTO: 93.5 FL — SIGNIFICANT CHANGE UP (ref 80–100)
NONHDLC SERPL-MCNC: 109 MG/DL — SIGNIFICANT CHANGE UP
NRBC # BLD AUTO: 0 K/UL — SIGNIFICANT CHANGE UP (ref 0–0)
NRBC # FLD: 0 K/UL — SIGNIFICANT CHANGE UP (ref 0–0)
NRBC BLD AUTO-RTO: 0 /100 WBCS — SIGNIFICANT CHANGE UP (ref 0–0)
PHOSPHATE SERPL-MCNC: 3.2 MG/DL — SIGNIFICANT CHANGE UP (ref 2.4–4.7)
PLATELET # BLD AUTO: 229 K/UL — SIGNIFICANT CHANGE UP (ref 150–400)
PMV BLD: 9.5 FL — SIGNIFICANT CHANGE UP (ref 7–13)
POTASSIUM SERPL-MCNC: 4.2 MMOL/L — SIGNIFICANT CHANGE UP (ref 3.5–5.3)
POTASSIUM SERPL-SCNC: 4.2 MMOL/L — SIGNIFICANT CHANGE UP (ref 3.5–5.3)
PROT SERPL-MCNC: 7 G/DL — SIGNIFICANT CHANGE UP (ref 6.6–8.7)
RBC # BLD: 4.15 M/UL — SIGNIFICANT CHANGE UP (ref 3.8–5.2)
RBC # FLD: 11.9 % — SIGNIFICANT CHANGE UP (ref 10.3–14.5)
SODIUM SERPL-SCNC: 142 MMOL/L — SIGNIFICANT CHANGE UP (ref 135–145)
T3 SERPL-MCNC: 95 NG/DL — SIGNIFICANT CHANGE UP (ref 80–200)
T4 AB SER-ACNC: 5.4 UG/DL — SIGNIFICANT CHANGE UP (ref 4.5–12)
TRIGL SERPL-MCNC: 128 MG/DL — SIGNIFICANT CHANGE UP
TSH SERPL-MCNC: 13.41 UIU/ML — HIGH (ref 0.27–4.2)
WBC # BLD: 8.11 K/UL — SIGNIFICANT CHANGE UP (ref 3.8–10.5)
WBC # FLD AUTO: 8.11 K/UL — SIGNIFICANT CHANGE UP (ref 3.8–10.5)

## 2025-06-08 PROCEDURE — 99223 1ST HOSP IP/OBS HIGH 75: CPT

## 2025-06-08 PROCEDURE — 99233 SBSQ HOSP IP/OBS HIGH 50: CPT

## 2025-06-08 PROCEDURE — 93010 ELECTROCARDIOGRAM REPORT: CPT

## 2025-06-08 PROCEDURE — 95720 EEG PHY/QHP EA INCR W/VEEG: CPT

## 2025-06-08 RX ORDER — LEVOTHYROXINE SODIUM 300 MCG
88 TABLET ORAL DAILY
Refills: 0 | Status: DISCONTINUED | OUTPATIENT
Start: 2025-06-08 | End: 2025-06-11

## 2025-06-08 RX ORDER — LACOSAMIDE 150 MG/1
150 TABLET, FILM COATED ORAL
Refills: 0 | Status: DISCONTINUED | OUTPATIENT
Start: 2025-06-09 | End: 2025-06-11

## 2025-06-08 RX ORDER — DIPHENHYDRAMINE HCL 12.5MG/5ML
25 ELIXIR ORAL ONCE
Refills: 0 | Status: COMPLETED | OUTPATIENT
Start: 2025-06-08 | End: 2025-06-08

## 2025-06-08 RX ORDER — LORAZEPAM 4 MG/ML
1 VIAL (ML) INJECTION ONCE
Refills: 0 | Status: DISCONTINUED | OUTPATIENT
Start: 2025-06-08 | End: 2025-06-08

## 2025-06-08 RX ORDER — LEVETIRACETAM 10 MG/ML
3000 INJECTION, SOLUTION INTRAVENOUS ONCE
Refills: 0 | Status: DISCONTINUED | OUTPATIENT
Start: 2025-06-08 | End: 2025-06-08

## 2025-06-08 RX ORDER — LEVETIRACETAM 10 MG/ML
1000 INJECTION, SOLUTION INTRAVENOUS
Refills: 0 | Status: DISCONTINUED | OUTPATIENT
Start: 2025-06-08 | End: 2025-06-08

## 2025-06-08 RX ORDER — LACOSAMIDE 150 MG/1
200 TABLET, FILM COATED ORAL ONCE
Refills: 0 | Status: DISCONTINUED | OUTPATIENT
Start: 2025-06-08 | End: 2025-06-08

## 2025-06-08 RX ORDER — LEVETIRACETAM 10 MG/ML
1000 INJECTION, SOLUTION INTRAVENOUS
Refills: 0 | Status: DISCONTINUED | OUTPATIENT
Start: 2025-06-09 | End: 2025-06-09

## 2025-06-08 RX ORDER — LAMOTRIGINE 150 MG/1
250 TABLET ORAL EVERY 12 HOURS
Refills: 0 | Status: DISCONTINUED | OUTPATIENT
Start: 2025-06-08 | End: 2025-06-11

## 2025-06-08 RX ADMIN — Medication 25 MILLIGRAM(S): at 01:53

## 2025-06-08 RX ADMIN — Medication 1 MILLIGRAM(S): at 01:05

## 2025-06-08 RX ADMIN — LEVETIRACETAM 3000 MILLIGRAM(S): 10 INJECTION, SOLUTION INTRAVENOUS at 18:03

## 2025-06-08 RX ADMIN — HEPARIN SODIUM 5000 UNIT(S): 1000 INJECTION INTRAVENOUS; SUBCUTANEOUS at 17:29

## 2025-06-08 RX ADMIN — LACOSAMIDE 140 MILLIGRAM(S): 150 TABLET, FILM COATED ORAL at 22:03

## 2025-06-08 RX ADMIN — Medication 88 MICROGRAM(S): at 06:24

## 2025-06-08 RX ADMIN — Medication 2 MILLIGRAM(S): at 20:46

## 2025-06-08 RX ADMIN — Medication 650 MILLIGRAM(S): at 11:14

## 2025-06-08 RX ADMIN — Medication 1 MILLIGRAM(S): at 17:29

## 2025-06-08 RX ADMIN — HEPARIN SODIUM 5000 UNIT(S): 1000 INJECTION INTRAVENOUS; SUBCUTANEOUS at 06:24

## 2025-06-08 RX ADMIN — LAMOTRIGINE 250 MILLIGRAM(S): 150 TABLET ORAL at 17:29

## 2025-06-08 RX ADMIN — Medication 650 MILLIGRAM(S): at 10:44

## 2025-06-08 NOTE — CONSULT NOTE ADULT - SUBJECTIVE AND OBJECTIVE BOX
HISTORY OF PRESENT ILLNESS:   54 year old female with a PMH of Left Temporal Lobe Lesion, PNES, Bipolar, Anxiety transfered from [facility] to [facility] to be evaluated for Seizure.   History as per partner (Benson) and other family members at bedside. Over the past few weeks patient has been having multiple episodes of partial seizures more often than usual. Despite being on her normal medicatioins, patient often has multiple seizures weekly but always returned to baseline. On  patient had a seizure and afterwards patiet appeared more confused, was unable to articulate herself but did not seek immediate medical intervention. Since then symptoms have worsened, on  patient was found with a bunch of Lamictal pills around her, reported she might have taken more pills than usual & patient was brought to [place] ED.   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   As per partner, patient ha been going under a lot of life changing events. In april she lost her job, 3 weeks ago a family member has .   Also on  patient fell at [facility], got on the plane & went to [place], there she went to the ED and was diagnosed with a concussion.     PAST MEDICAL & SURGICAL HISTORY: As noted in HPI    FAMILY HISTORY: unknown      SOCIAL HISTORY: unknown  Tobacco Use:  EtOH use:   Substance:    Allergies    No Known Allergies    Intolerances        REVIEW OF SYSTEMS:  General:	no recent illnesses, no recent wt gain/loss, no chills  Skin/Breast:  no rash, lumps, new moles, erythema, tenderness  Ophthalmologic:  no change in vision, diplopia, pain, redness, tearing, dry eyes	  ENMT:	no hearing loss, tinnitus, ear pain, vertigo, nasal congestion, epistaxis, sore throat  Respiratory and Thorax: no coughing, wheezing, recent URI, shortness of breath	  Cardiovascular: no chest pain, RIZVI, leg swelling, irregular rhythm   Gastrointestinal:	no abd pain, nausea, vomiting, diarrhea, constipation, bloody stool, heartburn  Genitourinary: no frequency, dysuria, hematuria  Musculoskeletal:	no joint pain, no joint swelling, no tenderness  Neurological:	 see HPI  Psychiatric:	no confusion, no anxiousness, no depression   Hematology/Lymphatics:	no brusing, easy bleeding, LAD  Endocrine:  	no excess urination/thirst, heat/cold intolerance  Allergic/Immunologic:  no urticaria, sneezing, recurrent infections      MEDICATIONS:  Antibiotics:    Neuro:  acetaminophen     Tablet .. 650 milliGRAM(s) Oral every 6 hours PRN  lamoTRIgine 250 milliGRAM(s) Oral every 12 hours  LORazepam   Injectable 2 milliGRAM(s) IV Push every 1 hour PRN  melatonin 3 milliGRAM(s) Oral at bedtime PRN  ondansetron Injectable 4 milliGRAM(s) IV Push every 8 hours PRN    Anticoagulation:  heparin   Injectable 5000 Unit(s) SubCutaneous every 12 hours    OTHER:  aluminum hydroxide/magnesium hydroxide/simethicone Suspension 30 milliLiter(s) Oral every 4 hours PRN  levothyroxine 88 MICROGram(s) Oral daily    IVF:      Vital Signs Last 24 Hrs  T(C): 36.9 (2025 09:21), Max: 36.9 (2025 09:21)  T(F): 98.5 (2025 09:21), Max: 98.5 (2025 09:21)  HR: 83 (2025 09:21) (71 - 83)  BP: 119/80 (2025 09:21) (101/60 - 119/80)  BP(mean): --  RR: 18 (2025 09:21) (16 - 18)  SpO2: 97% (2025 09:21) (95% - 98%)    Parameters below as of 2025 09:21  Patient On (Oxygen Delivery Method): room air        PHYSICAL EXAM:  GEN: laying in bed, appears well, NAD, +vEEG in place  NEURO: AOx0. Following simple commands, OE spont, garbled & confused speech, aphasic, face symmetric. CNII-XII grossly intact. EOMI. No pronator drift. MAEx4. Atleast 4/5 strength throughout b/l UE/LE (uncooperative likely d/t aphasia). Sensation grossly intact to light touch throughout.   PULM: Chest rise symmetric   GI: NT/ND  EXT: ext warm, dry, nontender    LABS:                        12.7   8.11  )-----------( 229      ( 2025 07:06 )             38.8     06-08    142  |  105  |  13.5  ----------------------------<  91  4.2   |  22.0  |  0.71    Ca    9.0      2025 07:06  Phos  3.2     06-08  Mg     2.2     06-08    TPro  7.0  /  Alb  4.1  /  TBili  0.2[L]  /  DBili  x   /  AST  68[H]  /  ALT  102[H]  /  AlkPhos  67  06-08      Urinalysis Basic - ( 2025 07:06 )    Color: x / Appearance: x / SG: x / pH: x  Gluc: 91 mg/dL / Ketone: x  / Bili: x / Urobili: x   Blood: x / Protein: x / Nitrite: x   Leuk Esterase: x / RBC: x / WBC x   Sq Epi: x / Non Sq Epi: x / Bacteria: x      RADIOLOGY & ADDITIONAL STUDIES:  MR brain w/o from [facility]   'Left temporal lobe lesion as described stable compared to 2019. Low-grade neoplasm favored.'    Assessment:  54 year old female with a PMH of Left Temporal Lobe Lesion, PNES, Bipolar, Anxiety transfered from [facility] to [facility] for further seizure workup. Neurosurgery consulted for known stable lesion.     Plan:  - Left lateral lobe lesion seen on MR Imaging at [facility] stable from scans dating back to  and   - Pt follows closely with Dr. Nissenbaum (neurologist) outpatient for seizures and MR surveillance of lesion  - Recommend stroke/neurology consult   - No acute neurosurgical intervention at this time, neurosurgery signing off  - D/w Dr. Dumont 
                             [facility]                                     [facility] [place]                                 [facility]                                  [address]                                        [place], [place], [address]                                             [phone]    CC: seizure  HPI:  The patient is a 54y Female who presented as transfer from [facility] with seizures for continuous EEG.  She has history of seizure and PNES per chart and has been having an increased frequency of seizures recently.  She is currently aphasic and con not provide meaningful history.  She has a left temporal lobe lesion that upon review of MRI from now coma pred to 2019 it has been roughly stable in size and configuration (see has a different chart with different MRN from [place]). Per chart there is also question of whether she took too much lamictal prior to [facility] hospitalization. Dr Bhardwaj at [facility] saw seizure activity on EEG from [facility] and asked for continuous EEG monitoring to be done at  [facility].        PAST MEDICAL & SURGICAL HISTORY:      MEDICATIONS  (STANDING):  heparin   Injectable 5000 Unit(s) SubCutaneous every 12 hours  lamoTRIgine 250 milliGRAM(s) Oral every 12 hours  levothyroxine 88 MICROGram(s) Oral daily    MEDICATIONS  (PRN):  acetaminophen     Tablet .. 650 milliGRAM(s) Oral every 6 hours PRN Temp greater or equal to 38C (100.4F), Mild Pain (1 - 3)  aluminum hydroxide/magnesium hydroxide/simethicone Suspension 30 milliLiter(s) Oral every 4 hours PRN Dyspepsia  LORazepam   Injectable 2 milliGRAM(s) IV Push every 1 hour PRN Seizure Activity  melatonin 3 milliGRAM(s) Oral at bedtime PRN Insomnia  ondansetron Injectable 4 milliGRAM(s) IV Push every 8 hours PRN Nausea and/or Vomiting      Allergies    No Known Allergies    Intolerances        SOCIAL HISTORY:  unable to assess    FAMILY HISTORY:  unable to assess      ROS: 14 point ROS negative other than what is present in HPI or below    Vital Signs Last 24 Hrs  T(C): 36.9 (08 Jun 2025 09:21), Max: 36.9 (08 Jun 2025 09:21)  T(F): 98.5 (08 Jun 2025 09:21), Max: 98.5 (08 Jun 2025 09:21)  HR: 83 (08 Jun 2025 09:21) (71 - 83)  BP: 119/80 (08 Jun 2025 09:21) (101/60 - 119/80)  BP(mean): --  RR: 18 (08 Jun 2025 09:21) (16 - 18)  SpO2: 97% (08 Jun 2025 09:21) (95% - 98%)    Parameters below as of 08 Jun 2025 09:21  Patient On (Oxygen Delivery Method): room air      General: NAD    Detailed Neurologic Exam:    Mental status: The patient is awake and alert and has normal attention span.  The patient is aphasic, expressive>>receptive, can follow basic commands intermittently    Cranial nerves: Pupils equal and react symmetrically to light. There is no visual field deficit to threat. Extraocular motion is full with no nystagmus. There is no ptosis. Facial musculature is symmetric. Palate elevates symmetrically. Tongue is midline.    Motor: There is normal bulk and tone.  There is no tremor.  Moves ext with normal power, not able to fully follow instructions to test for confrontational power    Sensation: Intact to light pinch in 4 extremities    Cerebellar: Unable to follow instructions to assess dysmetria on finger to nose testing.    Gait : deferred    LABS:                         12.7   8.11  )-----------( 229      ( 08 Jun 2025 07:06 )             38.8       06-08    142  |  105  |  13.5  ----------------------------<  91  4.2   |  22.0  |  0.71    Ca    9.0      08 Jun 2025 07:06  Phos  3.2     06-08  Mg     2.2     06-08    TPro  7.0  /  Alb  4.1  /  TBili  0.2[L]  /  DBili  x   /  AST  68[H]  /  ALT  102[H]  /  AlkPhos  67  06-08    RADIOLOGY & ADDITIONAL STUDIES (independently reviewed unless otherwise noted):  MRI brain 6/6/25 at [facility]  IMPRESSION:  Left temporal lobe lesion as described stable compared to 4/11/2019. Low-grade neoplasm favored.  Edema and diffusion restriction involving the cortex of the left temporal lobe. Favor related to recent seizure activity. Correlate with presentation and EEG.  Follow-up as clinically warranted.

## 2025-06-08 NOTE — CONSULT NOTE ADULT - ASSESSMENT
The patient is a 54y Female who is followed by neurology because of seizure    Seizure  Was having seizures seen on EEG at [facility] and transferred for continuous EEG  This has been started for her this morning  She was taking lamictal 250 mg po bid at [facility]- this has been re-ordered  There is a stable left temporal lobe lesion on MRI, likely seizure focus    - likely aphasia as resulty of seizure or post ictal phenomenon  Will check CTA head to better characterize this for any vascular component given possible flow voids seen within it  Her MRI brain also showed diffusion over the temporal lobe cortex, likley secondary to seizure    - follow up MRI in month to clarify  ativan prn seizure    Brain lesion  Left temporal lobe lesion   will check CTA head  Suggest non-urgent neurosurgical evaluation    discussed with Dr Platt    will follow with you    Festus Collier MD PhD   [id number]

## 2025-06-09 LAB
ALBUMIN SERPL ELPH-MCNC: 3.9 G/DL — SIGNIFICANT CHANGE UP (ref 3.3–5.2)
ALP SERPL-CCNC: 69 U/L — SIGNIFICANT CHANGE UP (ref 40–120)
ALT FLD-CCNC: 93 U/L — HIGH
ANION GAP SERPL CALC-SCNC: 16 MMOL/L — SIGNIFICANT CHANGE UP (ref 5–17)
AST SERPL-CCNC: 46 U/L — HIGH
BILIRUB SERPL-MCNC: 0.2 MG/DL — LOW (ref 0.4–2)
BUN SERPL-MCNC: 14.4 MG/DL — SIGNIFICANT CHANGE UP (ref 8–20)
CALCIUM SERPL-MCNC: 8.9 MG/DL — SIGNIFICANT CHANGE UP (ref 8.4–10.5)
CHLORIDE SERPL-SCNC: 102 MMOL/L — SIGNIFICANT CHANGE UP (ref 96–108)
CO2 SERPL-SCNC: 22 MMOL/L — SIGNIFICANT CHANGE UP (ref 22–29)
CREAT SERPL-MCNC: 0.78 MG/DL — SIGNIFICANT CHANGE UP (ref 0.5–1.3)
EGFR: 89 ML/MIN/1.73M2 — SIGNIFICANT CHANGE UP
EGFR: 89 ML/MIN/1.73M2 — SIGNIFICANT CHANGE UP
GLUCOSE SERPL-MCNC: 107 MG/DL — HIGH (ref 70–99)
POTASSIUM SERPL-MCNC: 4.2 MMOL/L — SIGNIFICANT CHANGE UP (ref 3.5–5.3)
POTASSIUM SERPL-SCNC: 4.2 MMOL/L — SIGNIFICANT CHANGE UP (ref 3.5–5.3)
PROT SERPL-MCNC: 6.9 G/DL — SIGNIFICANT CHANGE UP (ref 6.6–8.7)
SODIUM SERPL-SCNC: 140 MMOL/L — SIGNIFICANT CHANGE UP (ref 135–145)
TSH SERPL-MCNC: 10.17 UIU/ML — HIGH (ref 0.27–4.2)

## 2025-06-09 PROCEDURE — 99233 SBSQ HOSP IP/OBS HIGH 50: CPT

## 2025-06-09 PROCEDURE — 95720 EEG PHY/QHP EA INCR W/VEEG: CPT

## 2025-06-09 PROCEDURE — 99232 SBSQ HOSP IP/OBS MODERATE 35: CPT

## 2025-06-09 RX ORDER — BRIVARACETAM 75 MG/1
100 TABLET, FILM COATED ORAL
Refills: 0 | Status: DISCONTINUED | OUTPATIENT
Start: 2025-06-09 | End: 2025-06-11

## 2025-06-09 RX ORDER — DEXAMETHASONE 0.5 MG/1
4 TABLET ORAL EVERY 8 HOURS
Refills: 0 | Status: DISCONTINUED | OUTPATIENT
Start: 2025-06-10 | End: 2025-06-11

## 2025-06-09 RX ORDER — DEXAMETHASONE 0.5 MG/1
10 TABLET ORAL ONCE
Refills: 0 | Status: DISCONTINUED | OUTPATIENT
Start: 2025-06-09 | End: 2025-06-09

## 2025-06-09 RX ORDER — CLOBAZAM 20 MG/1
5 TABLET ORAL
Refills: 0 | Status: DISCONTINUED | OUTPATIENT
Start: 2025-06-09 | End: 2025-06-09

## 2025-06-09 RX ORDER — DEXAMETHASONE 0.5 MG/1
10 TABLET ORAL ONCE
Refills: 0 | Status: COMPLETED | OUTPATIENT
Start: 2025-06-09 | End: 2025-06-09

## 2025-06-09 RX ORDER — PERAMPANEL 6 MG/1
2 TABLET ORAL
Refills: 0 | Status: DISCONTINUED | OUTPATIENT
Start: 2025-06-09 | End: 2025-06-11

## 2025-06-09 RX ORDER — ESCITALOPRAM OXALATE 20 MG/1
1 TABLET ORAL
Refills: 0 | DISCHARGE

## 2025-06-09 RX ADMIN — Medication 3 MILLIGRAM(S): at 21:17

## 2025-06-09 RX ADMIN — LACOSAMIDE 130 MILLIGRAM(S): 150 TABLET, FILM COATED ORAL at 07:59

## 2025-06-09 RX ADMIN — LEVETIRACETAM 400 MILLIGRAM(S): 10 INJECTION, SOLUTION INTRAVENOUS at 17:32

## 2025-06-09 RX ADMIN — LEVETIRACETAM 400 MILLIGRAM(S): 10 INJECTION, SOLUTION INTRAVENOUS at 02:22

## 2025-06-09 RX ADMIN — HEPARIN SODIUM 5000 UNIT(S): 1000 INJECTION INTRAVENOUS; SUBCUTANEOUS at 06:20

## 2025-06-09 RX ADMIN — LACOSAMIDE 130 MILLIGRAM(S): 150 TABLET, FILM COATED ORAL at 23:45

## 2025-06-09 RX ADMIN — Medication 88 MICROGRAM(S): at 06:20

## 2025-06-09 RX ADMIN — LAMOTRIGINE 250 MILLIGRAM(S): 150 TABLET ORAL at 06:21

## 2025-06-09 RX ADMIN — HEPARIN SODIUM 5000 UNIT(S): 1000 INJECTION INTRAVENOUS; SUBCUTANEOUS at 17:32

## 2025-06-09 RX ADMIN — LAMOTRIGINE 250 MILLIGRAM(S): 150 TABLET ORAL at 17:32

## 2025-06-09 RX ADMIN — Medication 650 MILLIGRAM(S): at 17:30

## 2025-06-09 RX ADMIN — Medication 650 MILLIGRAM(S): at 18:30

## 2025-06-09 RX ADMIN — CLOBAZAM 5 MILLIGRAM(S): 20 TABLET ORAL at 17:32

## 2025-06-09 RX ADMIN — LEVETIRACETAM 400 MILLIGRAM(S): 10 INJECTION, SOLUTION INTRAVENOUS at 10:21

## 2025-06-09 RX ADMIN — BRIVARACETAM 240 MILLIGRAM(S): 75 TABLET, FILM COATED ORAL at 23:05

## 2025-06-09 RX ADMIN — LACOSAMIDE 130 MILLIGRAM(S): 150 TABLET, FILM COATED ORAL at 13:50

## 2025-06-09 RX ADMIN — DEXAMETHASONE 102 MILLIGRAM(S): 0.5 TABLET ORAL at 22:14

## 2025-06-09 NOTE — PATIENT PROFILE ADULT - DEAF OR HARD OF HEARING?
Anesthesia Type: 2% lidocaine without epinephrine Size Of Lesion In Cm: 0 Destruction After The Procedure: No Lab Facility: [id number] Wound Care: Vaseline Depth Of Biopsy: dermis Cryotherapy Text: The wound bed was treated with cryotherapy after the biopsy was performed. Post-Care Instructions: I reviewed with the patient in detail post-care instructions. Patient is to keep the biopsy site dry overnight, and then apply vaseline or bacitracin twice daily with bandage until healed. Could take 2-3 weeks until completely healed. Please contact office if wound shows any signs of redness, drainage or feels warm to touch. Biopsy Type: H and E Electrodesiccation Text: The wound bed was treated with electrodesiccation after the biopsy was performed. Notification Instructions: Patient will be notified of biopsy results within 7-10 days. However, patient instructed to call the office if not contacted within 2 weeks. Consent: Written consent was obtained and risks were reviewed including but not limited to scarring, infection, bleeding, scabbing, incomplete removal, nerve damage and allergy to anesthesia. Was A Bandage Applied: Yes Dressing: bandage Electrodesiccation And Curettage Text: The wound bed was treated with electrodesiccation and curettage after the biopsy was performed. Type Of Destruction Used: Curettage Anesthesia Volume In Cc (Will Not Render If 0): 0.5 Hemostasis: Drysol Biopsy Method: 15 blade Silver Nitrate Text: The wound bed was treated with silver nitrate after the biopsy was performed. Detail Level: Detailed Billing Type: Third-Party Bill Lab: [id number] Curettage Text: The wound bed was treated with curettage after the biopsy was performed. no

## 2025-06-09 NOTE — PATIENT PROFILE ADULT - FALL HARM RISK - HARM RISK INTERVENTIONS
Assistance with ambulation/Assistance OOB with selected safe patient handling equipment/Communicate Risk of Fall with Harm to all staff/Monitor for mental status changes/Move patient closer to nurses' station/Reinforce activity limits and safety measures with patient and family/Reorient to person, place and time as needed/Tailored Fall Risk Interventions/Toileting schedule using arm’s reach rule for commode and bathroom/Use of alarms - bed, chair and/or voice tab/Visual Cue: Yellow wristband and red socks/Bed in lowest position, wheels locked, appropriate side rails in place/Call bell, personal items and telephone in reach/Instruct patient to call for assistance before getting out of bed or chair/Non-slip footwear when patient is out of bed/[place] to call system/Physically safe environment - no spills, clutter or unnecessary equipment/Purposeful Proactive Rounding/Room/bathroom lighting operational, light cord in reach

## 2025-06-09 NOTE — EEG REPORT - NS EEG TEXT BOX
NAT THOMASON [id number]     Study Date: 06-08-25 09:20 to 06-09-25 08:00  Duration: 22 hr 8 min    --------------------------------------------------------------------------------------------------  History:  CC/ HPI Patient is a 54y old  Female who presents with a chief complaint of Seizure (08 Jun 2025 13:29)    MEDICATIONS  (STANDING):  heparin   Injectable 5000 Unit(s) SubCutaneous every 12 hours  lacosamide IVPB 150 milliGRAM(s) IV Intermittent <User Schedule>  lamoTRIgine 250 milliGRAM(s) Oral every 12 hours  levETIRAcetam  IVPB 1000 milliGRAM(s) IV Intermittent <User Schedule>  levothyroxine 88 MICROGram(s) Oral daily    --------------------------------------------------------------------------------------------------  Study Interpretation:    [[[Abbreviation Key:  PDR=alpha rhythm/posterior dominant rhythm. A-P=anterior posterior gradient.  Amplitude: ‘very low’:<20; ‘low’:20-50; ‘medium’:; ‘high’:>200uV.  Persistence for periodic/rhythmic patterns (% of epoch) ‘rare’:<1%; ‘occasional’:1-10%; ‘frequent’:10-50%; ‘abundant’:50-90%; ‘continuous’:>90%.  Persistence for sporadic discharges: ‘rare’:<1/hr; ‘occasional’:1/min-1/hr; ‘frequent’:>1/min; ‘abundant’:>1/10 sec.  GRDA=generalized rhythmic delta activity; FIRDA=frontal intermittent GRDA; LRDA=lateralized rhythmic delta activity; TIRDA=temporal intermittent rhythmic delta activity;  LPD=PLED=lateralized periodic discharges; GPD=generalized periodic discharges; BiPDs=BiPLEDs=bilateral independent periodic epileptiform discharges; SIRPID=stimulus induced rhythmic, periodic, or ictal appearing discharges; BIRDs=brief potentially ictal rhythmic discharges >4 Hz, lasting .5-10s; PFA=paroxysmal bursts of beta/gamma; LVFA=low voltage fast activity.  Modifiers: +F=with fast component; +S=with spike component; +R=with rhythmic component.  S-B=burst suppression pattern.  Max=maximal. N1-drowsy; N2-stage II sleep; N3-slow wave sleep. SSS/BETS=small sharp spikes/benign epileptiform transients of sleep. HV=hyperventilation; PS=photic stimulation]]]    FINDINGS:      Background:  Continuity: Continuous  Symmetry: Asymmetric  PDR: 12 Hz activity, well formed in the right hemisphere, with amplitude to 40 uV, that attenuated to eye opening.    Reactivity: Present  Voltage: Normal (between 20-150uV)  Anterior Posterior Gradient: Present  Other background findings: None  Breach: Absent    Background Slowing:  Generalized slowing: Diffuse irregular delta and theta activity.  Focal slowing: Continuous left hemispheric polymorphic delta and theta activity was present.    State Changes:   -Drowsiness noted with increased slowing, attenuation of fast activity, vertex transients.  -Present with N2 sleep transients with symmetric spindles and K-complexes.    Interictal Findings:  Frequent sharp waves were present in the left posterior quadrant (P7-O1 maximum).  BIRDS/GPFA seen throughout the recording for 1 - 10 seconds in duration.    Electrographic and Electroclinical seizures:  Frequent focal seizures involving the left posterior quadrant, with seizures frequently seen in the beginning of the recording of 3 - 5 seizures per hour, at times up to 10 times per hour. Around 21:00, seizures slow down to 1 to 3 seizures per hour, with last seizure recorded at 07:54.    Electrographically, the seizures appear similarly with increased fast activity including beta and alpha frequencies in the left posterior quadrant (P7-O1 maximum), with quick involvement of the right hemisphere. There is evolution of frequency and amplitude followed by slowing with sharply contoured and rhythmic theta and alpha activity. Towards the end of the seizure, there is diffuse delta activity which appears at times rhythmic at 2 - 3 Hz with intermixed sharp waves. The seizures last 1 minute in duration.  Clinically, the patient is resting, there is no clinical correlate. During times in the awake state, the patient is seen grabbing at her head occasionally. At other times, she is sleeping and there is no clinical correlate.  ECG showed no changes during these events.    Other Clinical Events:  None    Activation Procedures:   -Hyperventilation was not performed.    -Photic stimulation was not performed.     Artifacts:  Intermittent myogenic and movement artifacts were noted.    ECG:  The heart rate on single channel ECG was predominantly between 80 - 90 BPM.    EEG Classification / Summary:  Abnormal EEG study in the awake / drowsy / asleep states  -Frequent focal seizures, left posterior quadrant, initially 3-5 per hour and sometimes up to 10 times per hour until 2100, and then slowing down to 1-3 seizures per hour  -Frequent sharp waves, left posterior quadrant (P7-O1 maximum)  -BIRDS/GPFA seen throughout the recording for 1 - 10 seconds in duration  -Focal slowing, left hemisphere, continuous  -Background slowing, mild    -----------------------------------------------------------------------------------------------------    Clinical Impression:  Abnormal EEG study  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 Last seizure recorded at 07:54. There is no clinical correlate.  Further risk of focal onset seizures from the left posterior quadrant.  Focal cerebral dysfunction in the left hemisphere, structural or functional in etiology.  Mild diffuse/multi-focal cerebral dysfunction, not specific as to etiology.     This is a preliminary impression still pending attendings attestation.     -------------------------------------------------------------------------------------------------------  EEG reading room: [phone]    After-hours epilepsy service: [phone]    Austen Moreno DO  Epilepsy Fellow   NAT THOMASON [id number]    Study Date: 6/8/25 09:20 - 6/9/25 08:00  Duration: 22 hr 15 min  --------------------------------------------------------------------------------------------------  History:  CC/ HPI Patient is a 54y old  Female who presents with a chief complaint of Seizure (08 Jun 2025 13:29)    MEDICATIONS  (STANDING):  heparin   Injectable 5000 Unit(s) SubCutaneous every 12 hours  lacosamide IVPB 150 milliGRAM(s) IV Intermittent <User Schedule>  lamoTRIgine 250 milliGRAM(s) Oral every 12 hours  levETIRAcetam  IVPB 1000 milliGRAM(s) IV Intermittent <User Schedule>  levothyroxine 88 MICROGram(s) Oral daily    --------------------------------------------------------------------------------------------------  Study Interpretation:    [[[Abbreviation Key:  PDR=alpha rhythm/posterior dominant rhythm. A-P=anterior posterior.  Amplitude: ‘very low’:<20; ‘low’:20-49; ‘medium’:; ‘high’:>150uV.  Persistence for periodic/rhythmic patterns (% of epoch) ‘rare’:<1%; ‘occasional’:1-10%; ‘frequent’:10-50%; ‘abundant’:50-90%; ‘continuous’:>90%.  Persistence for sporadic discharges: ‘rare’:<1/hr; ‘occasional’:1/min-1/hr; ‘frequent’:>1/min; ‘abundant’:>1/10 sec.  RPP=rhythmic and periodic patterns; GRDA=generalized rhythmic delta activity; FIRDA=frontal intermittent GRDA; LRDA=lateralized rhythmic delta activity; TIRDA=temporal intermittent rhythmic delta activity;  LPD=PLED=lateralized periodic discharges; GPD=generalized periodic discharges; BIPDs =bilateral independent periodic discharges; Mf=multifocal; SIRPDs=stimulus induced rhythmic, periodic, or ictal appearing discharges; BIRDs=brief potentially ictal rhythmic discharges >4 Hz, lasting .5-10s; PFA (paroxysmal bursts >13 Hz or =8 Hz <10s).  Modifiers: +F=with fast component; +S=with spike component; +R=with rhythmic component.  S-B=burst suppression pattern.  Max=maximal. N1-drowsy; N2-stage II sleep; N3-slow wave sleep. SSS/BETS=small sharp spikes/benign epileptiform transients of sleep. HV=hyperventilation; PS=photic stimulation]]]    Daily EEG Visual Analysis    FINDINGS:      Background:  Continuity: Continuous  Symmetry: Symmetric  Posterior dominant rhythm (PDR): 13-13.5 Hz, reactive to eye closure. Symmetric low-amplitude frontal beta in wakefulness.  Voltage: Normal  Anterior-Posterior Gradient: Present  Other background findings: Diffuse low-amplitude beta activity  Breach: Absent    Background Slowing:  Generalized slowing: None  Focal slowing: Occasional asymmetric sleep structures as below    State Changes:  Drowsiness is characterized by fragmentation, attenuation, and slowing of the background activity.  Stage 2 sleep is characterized by symmetric K complexes and by sleep spindles that are occasionally less well formed on the left. POSTS and vertex waves are present, less well formed on the left.    Interictal Findings:  Later in recording, occasional bursts of left posterior temporal (P7) fast activity lasting 2-6 seconds, at times repetitive bursts in a fluctuating run without evolution.  Later in recording, rare left frontotemporal LRDA at 1-1.5 Hz.    Electrographic and Electroclinical seizures:  2-6 seizures/hour  Left posterior temporal (P7) fast activity increasing in amplitude, spreading in the left > right hemispheres, then decreasing in frequency and evolving to sharp waves and rhythmic delta activity at 2-4 Hz. There are often multiple seizures in a row, evolving with appearance of fast activity without clear offset of the previous seizure. Later in recording, some seizures are more focal, with evolution in the left hemisphere and minimal to no spread to the right hemisphere, occasionally with evolution mainly in the left posterior temporal region and less prominent evolution in the left hemisphere, fluctuating.  No clear abnormal movements seen on video. Patient is at times awake, answering questions from staff inappropriately (saying "Yeah" to a question that was not a yes/no question), and having difficulty understanding/following commands.    Other Clinical Events:  None    Activation Procedures:  Hyperventilation is not performed.    Photic stimulation is not performed.    Artifacts:  Intermittent myogenic and movement artifacts are present.    Single-lead EKG: Regular rhythm    EEG Classification / Summary:  Abnormal EEG in the awake, drowsy, and asleep states.  -Numerous left posterior temporal focal-onset seizures with bilateral spread, 2-6 seizures/hour, with some improvement later in recording (some seizures become more focal). Patient appears confused and/or aphasic at times.  -Later in recording, occasional bursts/fluctuating runs of left posterior temporal fast activity  -Later in recording, rare left frontotemporal LRDA at 1-1.5 Hz  -Left hemispheric focal slowing (occasional asymmetric sleep structures)    Clinical Impression:  -Numerous left posterior temporal focal-onset seizures with bilateral spread, 2-6 seizures/hour, with some improvement later in recording (some seizures become more focal). Patient appears confused and/or aphasic at times.  -Risk of left posterior temporal and left frontotemporal focal-onset seizures  -Left hemispheric focal cerebral dysfunction can be structural and/functional (such as post-ictal) in etiology.        -------------------------------------------------------------------------------------------------------    Austen Moreno DO  Epilepsy Fellow    Nadege Squires MD  Attending Physician, [facility]    -------------------------------------------------------------------------------------------------------    To reach EEG technologist:  Please use the pager number for the appropriate hospital or contact the .  At [facility] - Pager #: [phone]    To reach EEG-reading physician:  Teams Message, or:  EEG Reading Room Phone #: [phone]  Epilepsy Answering Service after 5PM and before 8:30AM: Phone #: [phone]       NAT THOMASON [id number]    Study Date: 6/8/25 09:20 - 6/9/25 08:00  Duration: 22 hr 15 min  --------------------------------------------------------------------------------------------------  History:  CC/ HPI Patient is a 54y old  Female who presents with a chief complaint of Seizure (08 Jun 2025 13:29)    MEDICATIONS  (STANDING):  heparin   Injectable 5000 Unit(s) SubCutaneous every 12 hours  lacosamide IVPB 150 milliGRAM(s) IV Intermittent <User Schedule>  lamoTRIgine 250 milliGRAM(s) Oral every 12 hours  levETIRAcetam  IVPB 1000 milliGRAM(s) IV Intermittent <User Schedule>  levothyroxine 88 MICROGram(s) Oral daily    --------------------------------------------------------------------------------------------------  Study Interpretation:    [[[Abbreviation Key:  PDR=alpha rhythm/posterior dominant rhythm. A-P=anterior posterior.  Amplitude: ‘very low’:<20; ‘low’:20-49; ‘medium’:; ‘high’:>150uV.  Persistence for periodic/rhythmic patterns (% of epoch) ‘rare’:<1%; ‘occasional’:1-10%; ‘frequent’:10-50%; ‘abundant’:50-90%; ‘continuous’:>90%.  Persistence for sporadic discharges: ‘rare’:<1/hr; ‘occasional’:1/min-1/hr; ‘frequent’:>1/min; ‘abundant’:>1/10 sec.  RPP=rhythmic and periodic patterns; GRDA=generalized rhythmic delta activity; FIRDA=frontal intermittent GRDA; LRDA=lateralized rhythmic delta activity; TIRDA=temporal intermittent rhythmic delta activity;  LPD=PLED=lateralized periodic discharges; GPD=generalized periodic discharges; BIPDs =bilateral independent periodic discharges; Mf=multifocal; SIRPDs=stimulus induced rhythmic, periodic, or ictal appearing discharges; BIRDs=brief potentially ictal rhythmic discharges >4 Hz, lasting .5-10s; PFA (paroxysmal bursts >13 Hz or =8 Hz <10s).  Modifiers: +F=with fast component; +S=with spike component; +R=with rhythmic component.  S-B=burst suppression pattern.  Max=maximal. N1-drowsy; N2-stage II sleep; N3-slow wave sleep. SSS/BETS=small sharp spikes/benign epileptiform transients of sleep. HV=hyperventilation; PS=photic stimulation]]]    Daily EEG Visual Analysis    FINDINGS:      Background:  Continuity: Continuous  Symmetry: Symmetric  Posterior dominant rhythm (PDR): 13-13.5 Hz, reactive to eye closure. Symmetric low-amplitude frontal beta in wakefulness.  Voltage: Normal  Anterior-Posterior Gradient: Present  Other background findings: Diffuse low-amplitude beta activity  Breach: Absent    Background Slowing:  Generalized slowing: None  Focal slowing: Occasional asymmetric sleep structures as below    State Changes:  Drowsiness is characterized by fragmentation, attenuation, and slowing of the background activity.  Stage 2 sleep is characterized by symmetric K complexes and by sleep spindles that are occasionally less well formed on the left. POSTS and vertex waves are present, less well formed on the left.    Interictal Findings:  Later in recording, occasional bursts of left posterior temporal (P7) fast activity lasting 2-6 seconds, at times repetitive bursts in a fluctuating run without evolution.  Later in recording, rare left frontotemporal LRDA at 1-1.5 Hz.    Electrographic and Electroclinical seizures:  2-6 discrete seizures/hour (up to 10 seizures/hour evolving without clear offset in between)  Duration 1.5-6.5 minutes, or up to 17.5 minutes (multiple seizures evolving without clear offset in between). Later in recording, shorter duration of 30 seconds to 2 minutes.  Left posterior temporal (P7) fast activity increasing in amplitude, spreading in the left > right hemispheres, then decreasing in frequency and evolving to sharp waves and rhythmic delta activity at 2-4 Hz. There are often multiple seizures in a row, evolving with appearance of fast activity without clear offset of the previous seizure. Later in recording, some seizures are more focal, with evolution in the left hemisphere and minimal to no spread to the right hemisphere, occasionally with evolution mainly in the left posterior temporal region and less prominent evolution in the left hemisphere, fluctuating.  No clear abnormal movements seen on video. Patient is at times awake, answering questions from staff inappropriately (saying "Yeah" to a question that was not a yes/no question), and having difficulty understanding/following commands.    Other Clinical Events:  None    Activation Procedures:  Hyperventilation is not performed.    Photic stimulation is not performed.    Artifacts:  Intermittent myogenic and movement artifacts are present.    Single-lead EKG: Regular rhythm    EEG Classification / Summary:  Abnormal EEG in the awake, drowsy, and asleep states.  -Numerous left posterior temporal focal-onset seizures with bilateral spread, 2-6 seizures/hour, with some improvement later in recording (some seizures become more focal and shorter in duration). Patient appears confused and/or aphasic at times.  -Later in recording, occasional bursts/fluctuating runs of left posterior temporal fast activity  -Later in recording, rare left frontotemporal LRDA at 1-1.5 Hz  -Left hemispheric focal slowing (occasional asymmetric sleep structures)    Clinical Impression:  -Numerous left posterior temporal focal-onset seizures with bilateral spread, 2-6 seizures/hour, with some improvement later in recording (some seizures become more focal and shorter in duration). Patient appears confused and/or aphasic at times.  -Risk of left posterior temporal and left frontotemporal focal-onset seizures  -Left hemispheric focal cerebral dysfunction can be structural and/functional (such as post-ictal) in etiology.        -------------------------------------------------------------------------------------------------------    Austen Moreno DO  Epilepsy Fellow    Nadege Squires MD  Attending Physician, [facility]    -------------------------------------------------------------------------------------------------------    To reach EEG technologist:  Please use the pager number for the appropriate hospital or contact the .  At [facility] - Pager #: [phone]    To reach EEG-reading physician:  Teams Message, or:  EEG Reading Room Phone #: [phone]  Epilepsy Answering Service after 5PM and before 8:30AM: Phone #: [phone]       NAT THOMASON [id number]    Study Date: 6/8/25 09:20 - 6/9/25 08:00  Duration: 22 hr 15 min  --------------------------------------------------------------------------------------------------  History:  CC/ HPI Patient is a 54y old  Female who presents with a chief complaint of Seizure (08 Jun 2025 13:29)    MEDICATIONS  (STANDING):  heparin   Injectable 5000 Unit(s) SubCutaneous every 12 hours  lacosamide IVPB 150 milliGRAM(s) IV Intermittent <User Schedule>  lamoTRIgine 250 milliGRAM(s) Oral every 12 hours  levETIRAcetam  IVPB 1000 milliGRAM(s) IV Intermittent <User Schedule>  levothyroxine 88 MICROGram(s) Oral daily    --------------------------------------------------------------------------------------------------  Study Interpretation:    [[[Abbreviation Key:  PDR=alpha rhythm/posterior dominant rhythm. A-P=anterior posterior.  Amplitude: ‘very low’:<20; ‘low’:20-49; ‘medium’:; ‘high’:>150uV.  Persistence for periodic/rhythmic patterns (% of epoch) ‘rare’:<1%; ‘occasional’:1-10%; ‘frequent’:10-50%; ‘abundant’:50-90%; ‘continuous’:>90%.  Persistence for sporadic discharges: ‘rare’:<1/hr; ‘occasional’:1/min-1/hr; ‘frequent’:>1/min; ‘abundant’:>1/10 sec.  RPP=rhythmic and periodic patterns; GRDA=generalized rhythmic delta activity; FIRDA=frontal intermittent GRDA; LRDA=lateralized rhythmic delta activity; TIRDA=temporal intermittent rhythmic delta activity;  LPD=PLED=lateralized periodic discharges; GPD=generalized periodic discharges; BIPDs =bilateral independent periodic discharges; Mf=multifocal; SIRPDs=stimulus induced rhythmic, periodic, or ictal appearing discharges; BIRDs=brief potentially ictal rhythmic discharges >4 Hz, lasting .5-10s; PFA (paroxysmal bursts >13 Hz or =8 Hz <10s).  Modifiers: +F=with fast component; +S=with spike component; +R=with rhythmic component.  S-B=burst suppression pattern.  Max=maximal. N1-drowsy; N2-stage II sleep; N3-slow wave sleep. SSS/BETS=small sharp spikes/benign epileptiform transients of sleep. HV=hyperventilation; PS=photic stimulation]]]    Daily EEG Visual Analysis    FINDINGS:      Background:  Continuity: Continuous  Symmetry: Symmetric  Posterior dominant rhythm (PDR): 13-13.5 Hz, reactive to eye closure. Symmetric low-amplitude frontal beta in wakefulness.  Voltage: Normal  Anterior-Posterior Gradient: Present  Other background findings: Diffuse low-amplitude beta activity  Breach: Absent    Background Slowing:  Generalized slowing: None  Focal slowing: Occasional asymmetric sleep structures as below    State Changes:  Drowsiness is characterized by fragmentation, attenuation, and slowing of the background activity.  Stage 2 sleep is characterized by symmetric K complexes and by sleep spindles that are occasionally less well formed on the left. POSTS and vertex waves are present, less well formed on the left.    Interictal Findings:  Later in recording, occasional bursts of left posterior temporal (P7) fast activity lasting 2-6 seconds, at times repetitive bursts in a fluctuating run without evolution.  Later in recording, rare left frontotemporal LRDA at 1-1.5 Hz.    Electrographic and Electroclinical seizures:  2-6 discrete seizures/hour (up to 10 seizures/hour evolving without clear offset in between)  Duration 1.5-6.5 minutes, or up to 17.5 minutes (multiple seizures evolving without clear offset in between). Later in recording, shorter duration of 30 seconds to 2 minutes.  Left posterior temporal (P7) fast activity increasing in amplitude, spreading in the left > right hemispheres, then decreasing in frequency and evolving to sharp waves and rhythmic delta activity at 2-4 Hz. There are often multiple seizures in a row, evolving with appearance of fast activity without clear offset of the previous seizure. Later in recording, some seizures are more focal, with evolution in the left hemisphere and minimal to no spread to the right hemisphere, occasionally with evolution mainly in the left posterior temporal region and less prominent evolution in the left hemisphere, fluctuating.  No clear abnormal movements seen on video. Patient is at times awake, answering questions from staff inappropriately (saying "Yeah" to a question that was not a yes/no question), and having difficulty understanding/following commands.    Other Clinical Events:  None    Activation Procedures:  Hyperventilation is not performed.    Photic stimulation is not performed.    Artifacts:  Intermittent myogenic and movement artifacts are present.    Single-lead EKG: Regular rhythm    EEG Classification / Summary:  Abnormal EEG in the awake, drowsy, and asleep states.  -Numerous left posterior temporal focal-onset seizures with bilateral spread, 2-6 seizures/hour, with some improvement later in recording (some seizures become more focal and shorter in duration). Patient appears confused and/or aphasic at times.  -Later in recording, occasional bursts/fluctuating runs of left posterior temporal fast activity  -Later in recording, rare left frontotemporal LRDA at 1-1.5 Hz  -Left hemispheric focal slowing (occasional asymmetric sleep structures)    Clinical Impression:  -Numerous left posterior temporal focal-onset seizures with bilateral spread, 2-6 seizures/hour, with some improvement later in recording (some seizures become more focal and shorter in duration). Patient appears confused and/or aphasic at times.  -Risk of left posterior temporal and left frontotemporal focal-onset seizures  -Left hemispheric focal cerebral dysfunction can be structural and/or functional (such as post-ictal) in etiology.        -------------------------------------------------------------------------------------------------------    Austen Moreno DO  Epilepsy Fellow    Nadege Squires MD  Attending Physician, [facility]    -------------------------------------------------------------------------------------------------------    To reach EEG technologist:  Please use the pager number for the appropriate hospital or contact the .  At [facility] - Pager #: [phone]    To reach EEG-reading physician:  Teams Message, or:  EEG Reading Room Phone #: [phone]  Epilepsy Answering Service after 5PM and before 8:30AM: Phone #: [phone]

## 2025-06-10 ENCOUNTER — TRANSCRIPTION ENCOUNTER (OUTPATIENT)
Age: 55
End: 2025-06-10

## 2025-06-10 LAB
ALBUMIN SERPL ELPH-MCNC: 4.1 G/DL — SIGNIFICANT CHANGE UP (ref 3.3–5.2)
ALP SERPL-CCNC: 81 U/L — SIGNIFICANT CHANGE UP (ref 40–120)
ALT FLD-CCNC: 74 U/L — HIGH
ANION GAP SERPL CALC-SCNC: 17 MMOL/L — SIGNIFICANT CHANGE UP (ref 5–17)
AST SERPL-CCNC: 28 U/L — SIGNIFICANT CHANGE UP
BILIRUB SERPL-MCNC: 0.2 MG/DL — LOW (ref 0.4–2)
BUN SERPL-MCNC: 14.1 MG/DL — SIGNIFICANT CHANGE UP (ref 8–20)
CALCIUM SERPL-MCNC: 9.9 MG/DL — SIGNIFICANT CHANGE UP (ref 8.4–10.5)
CHLORIDE SERPL-SCNC: 101 MMOL/L — SIGNIFICANT CHANGE UP (ref 96–108)
CO2 SERPL-SCNC: 21 MMOL/L — LOW (ref 22–29)
CREAT SERPL-MCNC: 0.75 MG/DL — SIGNIFICANT CHANGE UP (ref 0.5–1.3)
EGFR: 93 ML/MIN/1.73M2 — SIGNIFICANT CHANGE UP
EGFR: 93 ML/MIN/1.73M2 — SIGNIFICANT CHANGE UP
GLUCOSE SERPL-MCNC: 131 MG/DL — HIGH (ref 70–99)
HCT VFR BLD CALC: 38.6 % — SIGNIFICANT CHANGE UP (ref 34.5–45)
HGB BLD-MCNC: 13.2 G/DL — SIGNIFICANT CHANGE UP (ref 11.5–15.5)
MCHC RBC-ENTMCNC: 30.2 PG — SIGNIFICANT CHANGE UP (ref 27–34)
MCHC RBC-ENTMCNC: 34.2 G/DL — SIGNIFICANT CHANGE UP (ref 32–36)
MCV RBC AUTO: 88.3 FL — SIGNIFICANT CHANGE UP (ref 80–100)
NRBC # BLD AUTO: 0 K/UL — SIGNIFICANT CHANGE UP (ref 0–0)
NRBC # FLD: 0 K/UL — SIGNIFICANT CHANGE UP (ref 0–0)
NRBC BLD AUTO-RTO: 0 /100 WBCS — SIGNIFICANT CHANGE UP (ref 0–0)
PLATELET # BLD AUTO: 351 K/UL — SIGNIFICANT CHANGE UP (ref 150–400)
PMV BLD: 9.1 FL — SIGNIFICANT CHANGE UP (ref 7–13)
POTASSIUM SERPL-MCNC: 4.4 MMOL/L — SIGNIFICANT CHANGE UP (ref 3.5–5.3)
POTASSIUM SERPL-SCNC: 4.4 MMOL/L — SIGNIFICANT CHANGE UP (ref 3.5–5.3)
PROT SERPL-MCNC: 7.4 G/DL — SIGNIFICANT CHANGE UP (ref 6.6–8.7)
RBC # BLD: 4.37 M/UL — SIGNIFICANT CHANGE UP (ref 3.8–5.2)
RBC # FLD: 11.8 % — SIGNIFICANT CHANGE UP (ref 10.3–14.5)
SODIUM SERPL-SCNC: 139 MMOL/L — SIGNIFICANT CHANGE UP (ref 135–145)
WBC # BLD: 7.75 K/UL — SIGNIFICANT CHANGE UP (ref 3.8–10.5)
WBC # FLD AUTO: 7.75 K/UL — SIGNIFICANT CHANGE UP (ref 3.8–10.5)

## 2025-06-10 PROCEDURE — 70496 CT ANGIOGRAPHY HEAD: CPT | Mod: 26

## 2025-06-10 PROCEDURE — 70460 CT HEAD/BRAIN W/DYE: CPT | Mod: 26

## 2025-06-10 PROCEDURE — 99233 SBSQ HOSP IP/OBS HIGH 50: CPT

## 2025-06-10 PROCEDURE — 99232 SBSQ HOSP IP/OBS MODERATE 35: CPT

## 2025-06-10 PROCEDURE — 95720 EEG PHY/QHP EA INCR W/VEEG: CPT

## 2025-06-10 RX ORDER — MELATONIN 5 MG
1 TABLET ORAL AT BEDTIME
Refills: 0 | Status: DISCONTINUED | OUTPATIENT
Start: 2025-06-10 | End: 2025-06-11

## 2025-06-10 RX ORDER — ATORVASTATIN CALCIUM 80 MG/1
1 TABLET, FILM COATED ORAL
Refills: 0 | DISCHARGE

## 2025-06-10 RX ORDER — DEXAMETHASONE 0.5 MG/1
1 TABLET ORAL
Qty: 0 | Refills: 0 | DISCHARGE
Start: 2025-06-10

## 2025-06-10 RX ORDER — LAMOTRIGINE 150 MG/1
10 TABLET ORAL
Qty: 0 | Refills: 0 | DISCHARGE
Start: 2025-06-10

## 2025-06-10 RX ORDER — KETOROLAC TROMETHAMINE 30 MG/ML
15 INJECTION, SOLUTION INTRAMUSCULAR; INTRAVENOUS ONCE
Refills: 0 | Status: DISCONTINUED | OUTPATIENT
Start: 2025-06-10 | End: 2025-06-10

## 2025-06-10 RX ORDER — BRIVARACETAM 75 MG/1
100 TABLET, FILM COATED ORAL
Qty: 0 | Refills: 0 | DISCHARGE
Start: 2025-06-10

## 2025-06-10 RX ORDER — LAMOTRIGINE 150 MG/1
2 TABLET ORAL
Refills: 0 | DISCHARGE

## 2025-06-10 RX ORDER — LORAZEPAM 4 MG/ML
2 VIAL (ML) INJECTION
Qty: 0 | Refills: 0 | DISCHARGE
Start: 2025-06-10

## 2025-06-10 RX ORDER — PERAMPANEL 6 MG/1
1 TABLET ORAL
Qty: 0 | Refills: 0 | DISCHARGE
Start: 2025-06-10

## 2025-06-10 RX ORDER — ACETAMINOPHEN 500 MG/5ML
325 LIQUID (ML) ORAL ONCE
Refills: 0 | Status: COMPLETED | OUTPATIENT
Start: 2025-06-10 | End: 2025-06-10

## 2025-06-10 RX ORDER — LACOSAMIDE 150 MG/1
150 TABLET, FILM COATED ORAL
Qty: 0 | Refills: 0 | DISCHARGE
Start: 2025-06-10

## 2025-06-10 RX ADMIN — Medication 88 MICROGRAM(S): at 06:14

## 2025-06-10 RX ADMIN — PERAMPANEL 2 MILLIGRAM(S): 6 TABLET ORAL at 17:10

## 2025-06-10 RX ADMIN — LACOSAMIDE 130 MILLIGRAM(S): 150 TABLET, FILM COATED ORAL at 06:13

## 2025-06-10 RX ADMIN — Medication 650 MILLIGRAM(S): at 00:51

## 2025-06-10 RX ADMIN — KETOROLAC TROMETHAMINE 15 MILLIGRAM(S): 30 INJECTION, SOLUTION INTRAMUSCULAR; INTRAVENOUS at 15:46

## 2025-06-10 RX ADMIN — Medication 650 MILLIGRAM(S): at 18:39

## 2025-06-10 RX ADMIN — Medication 650 MILLIGRAM(S): at 18:37

## 2025-06-10 RX ADMIN — Medication 650 MILLIGRAM(S): at 02:17

## 2025-06-10 RX ADMIN — DEXAMETHASONE 4 MILLIGRAM(S): 0.5 TABLET ORAL at 13:23

## 2025-06-10 RX ADMIN — LAMOTRIGINE 250 MILLIGRAM(S): 150 TABLET ORAL at 06:19

## 2025-06-10 RX ADMIN — LAMOTRIGINE 250 MILLIGRAM(S): 150 TABLET ORAL at 17:10

## 2025-06-10 RX ADMIN — Medication 3 MILLIGRAM(S): at 21:35

## 2025-06-10 RX ADMIN — DEXAMETHASONE 4 MILLIGRAM(S): 0.5 TABLET ORAL at 06:14

## 2025-06-10 RX ADMIN — LACOSAMIDE 130 MILLIGRAM(S): 150 TABLET, FILM COATED ORAL at 23:08

## 2025-06-10 RX ADMIN — LACOSAMIDE 130 MILLIGRAM(S): 150 TABLET, FILM COATED ORAL at 13:24

## 2025-06-10 RX ADMIN — KETOROLAC TROMETHAMINE 15 MILLIGRAM(S): 30 INJECTION, SOLUTION INTRAMUSCULAR; INTRAVENOUS at 16:14

## 2025-06-10 RX ADMIN — DEXAMETHASONE 4 MILLIGRAM(S): 0.5 TABLET ORAL at 21:36

## 2025-06-10 RX ADMIN — BRIVARACETAM 240 MILLIGRAM(S): 75 TABLET, FILM COATED ORAL at 06:12

## 2025-06-10 RX ADMIN — PERAMPANEL 2 MILLIGRAM(S): 6 TABLET ORAL at 06:14

## 2025-06-10 RX ADMIN — BRIVARACETAM 240 MILLIGRAM(S): 75 TABLET, FILM COATED ORAL at 13:24

## 2025-06-10 RX ADMIN — HEPARIN SODIUM 5000 UNIT(S): 1000 INJECTION INTRAVENOUS; SUBCUTANEOUS at 06:14

## 2025-06-10 RX ADMIN — Medication 325 MILLIGRAM(S): at 02:54

## 2025-06-10 RX ADMIN — BRIVARACETAM 240 MILLIGRAM(S): 75 TABLET, FILM COATED ORAL at 22:50

## 2025-06-10 NOTE — DISCHARGE NOTE PROVIDER - NSDCCPCAREPLAN_GEN_ALL_CORE_FT
PRINCIPAL DISCHARGE DIAGNOSIS  Diagnosis: Seizure disorder, nonconvulsive, with status epilepticus  Assessment and Plan of Treatment: Seen by neurology. Transfer to [facility] for further workup.      SECONDARY DISCHARGE DIAGNOSES  Diagnosis: Mass of left temporal lobe  Assessment and Plan of Treatment: [facility] by neurosurgery. [facility]

## 2025-06-10 NOTE — DISCHARGE NOTE NURSING/CASE MANAGEMENT/SOCIAL WORK - PATIENT PORTAL LINK FT
You can access the FollowMyHealth Patient Portal offered by [facility] by registering at the following website: [email or web address]. By joining [employer]s FollowMyHealth portal, you will also be able to view your health information using other applications (apps) compatible with our system.

## 2025-06-10 NOTE — DISCHARGE NOTE PROVIDER - ATTENDING DISCHARGE PHYSICAL EXAMINATION:
GENERAL: NAD, intermittent confused.  CHEST/LUNG: Clear to ausculation bilaterally  HEART: Regular rate and rhythm; S1 S2   ABDOMEN: Soft, Nontender, Bowel sounds present  EXTREMITIES:  no edema   NERVOUS SYSTEM: awake, follows commands. expressive aphasia

## 2025-06-10 NOTE — EEG REPORT - NS EEG TEXT BOX
--------------------------------------------------------------------------------------------------     DAY 2     Study Date: 6/9/25 08:00 - 6/10/25 08:00     Duration: 23 hr 58 min     --------------------------------------------------------------------------------------------------     Daily EEG Visual Analysis     FINDINGS:         Background:     Continuity: Continuous     Symmetry: Symmetric     Posterior dominant rhythm (PDR): 13-13.5 Hz, reactive to eye closure. Symmetric low-amplitude frontal beta in wakefulness.     Voltage: Normal     Anterior-Posterior Gradient: Present     Other background findings: Diffuse low-amplitude beta activity     Breach: Absent       Background Slowing:     Generalized slowing: None     Focal slowing: Occasional asymmetric sleep structures as below        State Changes:     Drowsiness is characterized by fragmentation, attenuation, and slowing of the background activity.     Stage 2 sleep is characterized by symmetric K complexes and by sleep spindles that are occasionally less well formed on the left. POSTS and vertex waves are present, less well formed on the left.        Interictal Findings:     Occasional bursts of left posterior temporal (P7) fast activity lasting 2-6 seconds, at times repetitive bursts in a fluctuating run without evolution.     Rare left frontotemporal LRDA at 1-1.5 Hz.       Electrographic and Electroclinical seizures:     2-6 discrete seizures/hour up to around 19:00. Around 00:00, 1-4 discrete seizures/hour with last seizure at 06:56. Seizures appear similar electrographically.     Duration 30 seconds to 5 minutes (several seizures evolving without clear offset in between). Later in recording, on average the seizures have a shorter duration of 20 seconds to 2 minutes.     Left posterior temporal (P7) fast activity increasing in amplitude, spreading quickly from the left > right hemispheres, then decreasing in frequency and evolving to sharp waves and rhythmic delta activity at 2-4 Hz. At times in the recording, some seizures are more focal, with evolution in the left hemisphere and minimal to no spread to the right hemisphere, occasionally with evolution mainly in the left posterior temporal region and less prominent evolution in the left hemisphere, fluctuating.     No clear abnormal movements seen on video. Patient is at times awake, answering questions from staff inappropriately (saying "red" repeatedly, trying to get visitor to understand), and having difficulty understanding/following commands.        Other Clinical Events:     None       Activation Procedures:     Hyperventilation is not performed.       Photic stimulation is not performed.       Artifacts:     Intermittent myogenic and movement artifacts are present.       Single-lead EKG: Regular rhythm       ASMs:  mg q12h PO, LEV 1000 mg q8h IV,  mg q8h IV, Onfi 5 mg once, Briviact 100 mg TID, Fycompa 2 mg BID     --------------------------------------------------------------------------------------------------     EEG Classification / Summary:     Abnormal EEG in the awake, drowsy, and asleep states.     -Numerous left posterior temporal focal-onset seizures with bilateral spread, 2-6 seizures/hour, with some improvement later in recording (some seizures become more focal and shorter in duration). Patient appears confused and/or aphasic at times.     -Occasional bursts/fluctuating runs of left posterior temporal fast activity     -Rare left frontotemporal LRDA at 1-1.5 Hz     -Left hemispheric focal slowing (occasional asymmetric sleep structures)       --------------------------------------------------------------------------------------------------     Clinical Impression:     -Numerous left posterior temporal focal-onset seizures with bilateral spread, 2-6 seizures/hour, with some improvement later in recording (some seizures become more focal and shorter in duration). Patient appears confused and/or aphasic at times.     -Risk of left posterior temporal and left frontotemporal focal-onset seizures     -Left hemispheric focal cerebral dysfunction can be structural and/or functional (such as post-ictal) in etiology.     -------------------------------------------------------------------------------------------------------     Austen Moreno DO     Epilepsy Fellow  --------------------------------------------------------------------------------------------------     DAY 2     Study Date: 6/9/25 08:00 - 6/10/25 08:00     Duration: 23 hr 58 min     --------------------------------------------------------------------------------------------------           Daily EEG Visual Analysis     FINDINGS:             Background:     Continuity: Continuous     Symmetry: Symmetric     Posterior dominant rhythm (PDR): 12.5 Hz, reactive to eye closure. Symmetric low-amplitude frontal beta in wakefulness.     Voltage: Normal     Anterior-Posterior Gradient: Present     Other background findings: None     Breach: Absent           Background Slowing:     Generalized slowing: None     Focal slowing: Nearly continuous left hemispheric focal polymorphic delta slowing and paucity of faster frequencies           State Changes:     Drowsiness is characterized by fragmentation, attenuation, and slowing of the background activity.     Stage 2 sleep is characterized by symmetric K complexes and sleep spindles.           Interictal Findings:     Rare right frontotemporal (F8) sharp waves.           Electrographic and Electroclinical seizures:     3-6 seizures/hour. Last seizure 07:03 6/10/25.     Duration 16 seconds to 3 minutes 45 seconds.     Left posterior temporal (P7) fast activity increasing in amplitude, spreading in the left > right hemispheres, then decreasing in frequency and evolving to sharp waves and rhythmic delta activity, at times with overriding fast activity. Some seizures, especially later in recording, are more focal, with evolution in the left hemisphere and minimal to no spread to the right hemisphere.     Clinical: In the middle of one seizure, there is behavioral arrest with rightward head turn; at other points during this seizure, she reaches for objects and drinks something using her left hand. She often appears to interact and track others during seizures but is not speaking appropriately.          Other Clinical Events:     Event button press 07:38. No clinical change seen on video. Staff is examining the patient. Possible demonstration of button use. EEG shows no electrographic seizure.           Activation Procedures:     Hyperventilation is not performed.       Photic stimulation is not performed.           Artifacts:     Intermittent myogenic and movement artifacts are present.           Single-lead EKG: Regular rhythm          ASMs:  mg q12h PO, LEV 1000 mg q8h IV changed to brivaracetam 100 mg q8h IV,  mg q8h IV, clobazam 5 mg x1, perampanel 2 mg BID started 6/10 AM. Dexamethasone 10 mg x1 and then 4 mg q8h.                --------------------------------------------------------------------------------------------------          EEG Classification / Summary:     Abnormal EEG in the awake, drowsy, and asleep states.     -Numerous left posterior temporal focal-onset seizures with bilateral spread, 3-6 seizures/hour, with some improvement later in recording (some seizures become more focal). Last seizure 07:03 6/10/25.     -Occasional bursts/fluctuating runs of left posterior temporal fast activity     -Rare left frontotemporal LRDA at 1-1.5 Hz     -Rare right frontotemporal sharp waves     -Left hemispheric focal slowing          Clinical Impression:     -Numerous left posterior temporal focal-onset seizures with bilateral spread, 3-6 seizures/hour, with some improvement later in recording (some seizures become more focal). Last seizure 07:03 6/10/25.     -Risk of left posterior temporal, left frontotemporal, and right frontotemporal focal-onset seizures     -Left hemispheric focal cerebral dysfunction can be structural and/or functional (such as post-ictal) in etiology.           -------------------------------------------------------------------------------------------------------           Austen Moreno DO     Epilepsy Fellow           Nadege Squires MD     Attending Physician, [facility] [place]  --------------------------------------------------------------------------------------------------     DAY 2     Study Date: 6/9/25 08:00 - 6/10/25 08:00     Duration: 23 hr 58 min     --------------------------------------------------------------------------------------------------           Daily EEG Visual Analysis     FINDINGS:             Background:     Continuity: Continuous     Symmetry: Symmetric     Posterior dominant rhythm (PDR): 12.5 Hz, reactive to eye closure. Symmetric low-amplitude frontal beta in wakefulness.     Voltage: Normal     Anterior-Posterior Gradient: Present     Other background findings: None     Breach: Absent           Background Slowing:     Generalized slowing: None     Focal slowing: Nearly continuous left hemispheric focal polymorphic delta slowing and paucity of faster frequencies           State Changes:     Drowsiness is characterized by fragmentation, attenuation, and slowing of the background activity.     Stage 2 sleep is characterized by symmetric K complexes and sleep spindles.           Interictal Findings:     Rare right frontotemporal (F8) sharp waves.           Electrographic and Electroclinical seizures:     3-6 seizures/hour. Last seizure 07:03 6/10/25.     Duration 16 seconds to 3 minutes 45 seconds.     Left posterior temporal (P7) fast activity increasing in amplitude, spreading in the left > right hemispheres, then decreasing in frequency and evolving to sharp waves and rhythmic delta activity, at times with overriding fast activity. Some seizures, especially later in recording, are more focal, with evolution in the left hemisphere and minimal to no spread to the right hemisphere.     Clinical: In the middle of one seizure, there is behavioral arrest with rightward head turn; at other points during this seizure, she reaches for objects and drinks something using her left hand. She often appears to interact and track others during seizures but is not speaking appropriately.          Other Clinical Events:     Event button press 07:38. No clinical change seen on video. Staff is examining the patient. Possible demonstration of button use. EEG shows no electrographic seizure.           Activation Procedures:     Hyperventilation is not performed.       Photic stimulation is not performed.           Artifacts:     Intermittent myogenic and movement artifacts are present.           Single-lead EKG: Regular rhythm          ASMs:  mg q12h PO, LEV 1000 mg q8h IV changed to brivaracetam 100 mg q8h IV,  mg q8h IV, clobazam 5 mg x1, perampanel 2 mg BID started 6/10 AM. Dexamethasone 10 mg x1 and then 4 mg q8h.                --------------------------------------------------------------------------------------------------          EEG Classification / Summary:     Abnormal EEG in the awake, drowsy, and asleep states.     -Numerous left posterior temporal focal-onset seizures with bilateral spread, 3-6 seizures/hour, with some improvement later in recording (some seizures become more focal). Last seizure 07:03 6/10/25.     -Rare right frontotemporal sharp waves     -Left hemispheric focal slowing          Clinical Impression:     -Numerous left posterior temporal focal-onset seizures with bilateral spread, 3-6 seizures/hour, with some improvement later in recording (some seizures become more focal). Last seizure 07:03 6/10/25.     -Risk of right frontotemporal focal-onset seizures     -Left hemispheric focal cerebral dysfunction can be structural and/or functional (such as post-ictal) in etiology.           -------------------------------------------------------------------------------------------------------           Austen Moreno DO     Epilepsy Fellow           Nadege Squires MD     Attending Physician, [facility] [place]

## 2025-06-10 NOTE — CHART NOTE - NSCHARTNOTEFT_GEN_A_CORE
EEG preliminary read (not final) on the initial recording hour(s)  08:00-16:07    Improvement in left posterior temporal focal-onset seizures:  3-4 seizures/hour in the morning, with most seizures remaining more focal than on prior day of recording.  Seizures later decrease in frequency and end. Last seizure at 13:31 6/10/25.    Final report to follow tomorrow morning after completion of study.    EEG Reading Room Ph#: [phone]  Epilepsy Answering Service after 5PM and before 8:30AM: Ph#: [phone]
EEG preliminary read (not final) on the initial recording hour(s)  08:00-16:26    Left posterior temporal focal-onset seizures, some spreading bilaterally.  3-6 seizures/hour    Discussed with neurology in the morning of 6/9/25 and updates given in the afternoon.    Final report to follow tomorrow morning after completion of study.    EEG Reading Room Ph#: [phone]  Epilepsy Answering Service after 5PM and before 8:30AM: Ph#: [phone]
[facility]                                        Ne[facility] at [place]                                 Nando Louis, & Haris                                  [address]                                        [place], [place], [address]                                             [phone]          Addendum:    Case discussed with Epilepsy service attending from [facility].  The EEG is still showing seizure activity.   Given that they are likely coming from the area of the tumor, the recommendations were as follows.     1) Stop Keppra  2) Begin Briviact 100 mg q 8 h  3) Add Fycompa starting at 2 mg twice per day.  4) Decadron 10 mg x 1 followed by 4 mg q 8 h.    I will discuss the changes with patient's family and in addition relay that the epilepsy team is discussing the patient's case and feels that she will likely need surgery to control the seizures.    If this is the case, they may wish to transfer her to the [facility] for presurgical monitoring.   This would only occur if all are in agreement.
EEG preliminary read (not final) on the initial recording hour(s) = x 7    EEG highly abnormal with frequent focal seizures involving over the left posterior quadrant.   Seizures involving over 3 to 5 minutes and at times back-to-back up to 10 times per hour.   consistent with focal  nonconvulsive status epilepticus  Interictal left hemispheric slowing  Mild  diffuse slowing noted, nonspecific.    Neurology consult notified to escalate   antiseizure medication therapy  Final report to follow tomorrow morning after completion of study.    [facility] EEG Reading Room Ph#: [phone]  Epilepsy Answering Service after 5PM and before 8:30AM: Ph#: [phone]
[facility]                                        Neurology at [place]                                 Nando Louis, & Haris                                  [address]                                        [place], [place], [address]                                             [phone]          Addendum:     I spoke with the patient's significant other.   I explained the situation as stated in my previous note.   He is agreeable for transfer to [place].
Pt.'s family with concern pt. becomes silent every once in a while, concern for seizure. Pt. currently having EEG. Pt. is a 54y Female who presented as transfer from [facility] with seizures for continuous EEG.  She has history of aphasia, seizure and PNES per chart and has been having an increased frequency of seizures recently.  She is currently aphasic and has a left temporal lobe lesion. Pt. denies HA, photohpbia, n/v, visual disturbances, light headedness/ weakness or any other c/o @ present.  - VSS, NAD, alert, +aphasia  Neuro exam - no post ictal state noted, no 1 sided weakness, muscle strength 5/5 throughout, face symmetrical, + aphasia    ** Discussed with Nando (neuro) who contacted epilepsy obseervation who noted pt. having frequent seizue activity per EEG and asked that I place ord. for Ativan, Keppra loading and then Q 8hr. Ords placed. Dr. Platt updated as well as RN, pt. and family who are at bedside.
The patient is a 54y Female who presented as transfer from [facility] with seizures for continuous EEG.  She has history of seizure and PNES per chart and has been having an increased frequency of seizures recently.  She is currently aphasic and has a left temporal lobe lesion. Followed by neurology. Neurosurgery consult called at Dr. Sandoval's request. Neurosurgery consult also recommendation per Neurology note.

## 2025-06-10 NOTE — DISCHARGE NOTE PROVIDER - NSDCMRMEDTOKEN_GEN_ALL_CORE_FT
Albuterol (Eqv-ProAir HFA) 90 mcg/inh inhalation aerosol: 2 puff(s) inhaled every 6 hours as needed for  bronchospasm  brivaracetam 10 mg/mL intravenous solution: 100 milligram(s) intravenous 3 times a day  dexAMETHasone 4 mg oral tablet: 1 tab(s) orally every 8 hours  lacosamide 200 mg/20 mL intravenous solution: 150 milligram(s) intravenous 3 times a day  LaMICtal 25 mg oral tablet: 10 tab(s) orally every 12 hours  LORazepam 1 mg/mL-NaCl 0.9% intravenous solution: 2 milliliter(s) intravenous every hour As needed Seizure Activity  perampanel 2 mg oral tablet: 1 tab(s) orally 2 times a day  Synthroid 88 mcg (0.088 mg) oral tablet: 1 tab(s) orally once a day

## 2025-06-10 NOTE — DISCHARGE NOTE NURSING/CASE MANAGEMENT/SOCIAL WORK - FINANCIAL ASSISTANCE
[facility] provides services at a reduced cost to those who are determined to be eligible through [facility]’s financial assistance program. Information regarding [facility]’s financial assistance program can be found by going to [email or web address] or by calling [phone].

## 2025-06-10 NOTE — DISCHARGE NOTE PROVIDER - HOSPITAL COURSE
54 year old female with a PMH of Left Temporal Lobe Lesion, PNES, Bipolar, Anxiety transfered from [place] to [facility] to be evaluated for Seizure.   History as per partner (Benson) at bedside. As per partner, over the past few weeks patient has been having multiple episodes of partial seizures more often than usual. Despite being on her normal routine, patient often has multiple seizures weekly but always returned to baseline. On  patient had a seizure and afterwards patiet appeared more confused, was unable to articulate herself did not seek medical intervention. Since then symptoms have worsen, on  patient was found with a bunch of Lamictal pills around her, reported she might have taken more  pills than usual & patient was brought to [place] ED.   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   As per partner, patient ha been going under a lot of life changing events. In april she lost her job, 3 weeks ago a family member has ; with eveything happening, she has been deteriorating.   Also on  patient fell at [facility], got on the plane & went to [place], there she went to the ED and was found to have a concussion.   Patient used to be on lexapro, but was discontinued in April by her psychiatrist. Patient was transferred [facility] and had MRI brain with stable temporal lobe lesion favoring diagnosis of malignancy as well as surrounding edema likely correlated to recent seizure activity. Neurosurgery was consulted and recommended Outpatient follow up as Left lateral lobe lesion seen on MR Imaging at [facility] stable from scans dating back to  and . Neurology following as EEG still with seizure activity. Neurology indicating that continued seizure activity likely related to temporal lobe lesion and will need transfer to EMU at [facility] for further evaluation and management. Patient is stable for transfer to [place].

## 2025-06-11 ENCOUNTER — INPATIENT (INPATIENT)
Facility: HOSPITAL | Age: 55
LOS: 11 days | Discharge: NURSING, REHAB OR LONG-TERM CARE | DRG: 101 | End: 2025-06-23
Attending: NEUROLOGICAL SURGERY | Admitting: PSYCHIATRY & NEUROLOGY
Payer: MEDICAID

## 2025-06-11 VITALS
TEMPERATURE: 98 F | WEIGHT: 123.02 LBS | DIASTOLIC BLOOD PRESSURE: 67 MMHG | SYSTOLIC BLOOD PRESSURE: 104 MMHG | HEIGHT: 62 IN | HEART RATE: 78 BPM | OXYGEN SATURATION: 98 % | RESPIRATION RATE: 18 BRPM

## 2025-06-11 VITALS
OXYGEN SATURATION: 100 % | RESPIRATION RATE: 18 BRPM | DIASTOLIC BLOOD PRESSURE: 65 MMHG | TEMPERATURE: 98 F | SYSTOLIC BLOOD PRESSURE: 100 MMHG | HEART RATE: 74 BPM

## 2025-06-11 DIAGNOSIS — Z98.890 OTHER SPECIFIED POSTPROCEDURAL STATES: Chronic | ICD-10-CM

## 2025-06-11 DIAGNOSIS — R56.9 UNSPECIFIED CONVULSIONS: ICD-10-CM

## 2025-06-11 DIAGNOSIS — Z41.1 ENCOUNTER FOR COSMETIC SURGERY: Chronic | ICD-10-CM

## 2025-06-11 LAB
ALBUMIN SERPL ELPH-MCNC: 4.5 G/DL — SIGNIFICANT CHANGE UP (ref 3.3–5)
ALP SERPL-CCNC: 81 U/L — SIGNIFICANT CHANGE UP (ref 40–120)
ALT FLD-CCNC: 64 U/L — HIGH (ref 10–45)
ANION GAP SERPL CALC-SCNC: 19 MMOL/L — HIGH (ref 5–17)
AST SERPL-CCNC: 21 U/L — SIGNIFICANT CHANGE UP (ref 10–40)
BASOPHILS # BLD AUTO: 0.05 K/UL — SIGNIFICANT CHANGE UP (ref 0–0.2)
BASOPHILS NFR BLD AUTO: 0.4 % — SIGNIFICANT CHANGE UP (ref 0–2)
BILIRUB SERPL-MCNC: 0.2 MG/DL — SIGNIFICANT CHANGE UP (ref 0.2–1.2)
BUN SERPL-MCNC: 18 MG/DL — SIGNIFICANT CHANGE UP (ref 7–23)
CALCIUM SERPL-MCNC: 9.6 MG/DL — SIGNIFICANT CHANGE UP (ref 8.4–10.5)
CHLORIDE SERPL-SCNC: 102 MMOL/L — SIGNIFICANT CHANGE UP (ref 96–108)
CK SERPL-CCNC: 42 U/L — SIGNIFICANT CHANGE UP (ref 25–170)
CO2 SERPL-SCNC: 17 MMOL/L — LOW (ref 22–31)
CREAT SERPL-MCNC: 0.72 MG/DL — SIGNIFICANT CHANGE UP (ref 0.5–1.3)
EGFR: 98 ML/MIN/1.73M2 — SIGNIFICANT CHANGE UP
EGFR: 98 ML/MIN/1.73M2 — SIGNIFICANT CHANGE UP
EOSINOPHIL # BLD AUTO: 0.04 K/UL — SIGNIFICANT CHANGE UP (ref 0–0.5)
EOSINOPHIL NFR BLD AUTO: 0.4 % — SIGNIFICANT CHANGE UP (ref 0–6)
GLUCOSE SERPL-MCNC: 130 MG/DL — HIGH (ref 70–99)
HCT VFR BLD CALC: 37.9 % — SIGNIFICANT CHANGE UP (ref 34.5–45)
HGB BLD-MCNC: 13.4 G/DL — SIGNIFICANT CHANGE UP (ref 11.5–15.5)
IMM GRANULOCYTES NFR BLD AUTO: 1.4 % — HIGH (ref 0–0.9)
LYMPHOCYTES # BLD AUTO: 1.56 K/UL — SIGNIFICANT CHANGE UP (ref 1–3.3)
LYMPHOCYTES # BLD AUTO: 14 % — SIGNIFICANT CHANGE UP (ref 13–44)
MCHC RBC-ENTMCNC: 31.5 PG — SIGNIFICANT CHANGE UP (ref 27–34)
MCHC RBC-ENTMCNC: 35.4 G/DL — SIGNIFICANT CHANGE UP (ref 32–36)
MCV RBC AUTO: 89 FL — SIGNIFICANT CHANGE UP (ref 80–100)
MONOCYTES # BLD AUTO: 0.91 K/UL — HIGH (ref 0–0.9)
MONOCYTES NFR BLD AUTO: 8.2 % — SIGNIFICANT CHANGE UP (ref 2–14)
NEUTROPHILS # BLD AUTO: 8.4 K/UL — HIGH (ref 1.8–7.4)
NEUTROPHILS NFR BLD AUTO: 75.6 % — SIGNIFICANT CHANGE UP (ref 43–77)
NRBC BLD AUTO-RTO: 0 /100 WBCS — SIGNIFICANT CHANGE UP (ref 0–0)
PLATELET # BLD AUTO: 368 K/UL — SIGNIFICANT CHANGE UP (ref 150–400)
POTASSIUM SERPL-MCNC: 4.3 MMOL/L — SIGNIFICANT CHANGE UP (ref 3.5–5.3)
POTASSIUM SERPL-SCNC: 4.3 MMOL/L — SIGNIFICANT CHANGE UP (ref 3.5–5.3)
PROT SERPL-MCNC: 7.6 G/DL — SIGNIFICANT CHANGE UP (ref 6–8.3)
RBC # BLD: 4.26 M/UL — SIGNIFICANT CHANGE UP (ref 3.8–5.2)
RBC # FLD: 12.2 % — SIGNIFICANT CHANGE UP (ref 10.3–14.5)
SODIUM SERPL-SCNC: 138 MMOL/L — SIGNIFICANT CHANGE UP (ref 135–145)
WBC # BLD: 11.12 K/UL — HIGH (ref 3.8–10.5)
WBC # FLD AUTO: 11.12 K/UL — HIGH (ref 3.8–10.5)

## 2025-06-11 PROCEDURE — 80053 COMPREHEN METABOLIC PANEL: CPT

## 2025-06-11 PROCEDURE — 95711 VEEG 2-12 HR UNMONITORED: CPT

## 2025-06-11 PROCEDURE — 84100 ASSAY OF PHOSPHORUS: CPT

## 2025-06-11 PROCEDURE — 70496 CT ANGIOGRAPHY HEAD: CPT

## 2025-06-11 PROCEDURE — 82962 GLUCOSE BLOOD TEST: CPT

## 2025-06-11 PROCEDURE — 99233 SBSQ HOSP IP/OBS HIGH 50: CPT

## 2025-06-11 PROCEDURE — 84480 ASSAY TRIIODOTHYRONINE (T3): CPT

## 2025-06-11 PROCEDURE — 93005 ELECTROCARDIOGRAM TRACING: CPT

## 2025-06-11 PROCEDURE — 36415 COLL VENOUS BLD VENIPUNCTURE: CPT

## 2025-06-11 PROCEDURE — 83735 ASSAY OF MAGNESIUM: CPT

## 2025-06-11 PROCEDURE — 84443 ASSAY THYROID STIM HORMONE: CPT

## 2025-06-11 PROCEDURE — 83036 HEMOGLOBIN GLYCOSYLATED A1C: CPT

## 2025-06-11 PROCEDURE — 70460 CT HEAD/BRAIN W/DYE: CPT

## 2025-06-11 PROCEDURE — 95720 EEG PHY/QHP EA INCR W/VEEG: CPT

## 2025-06-11 PROCEDURE — 95718 EEG PHYS/QHP 2-12 HR W/VEEG: CPT

## 2025-06-11 PROCEDURE — 80061 LIPID PANEL: CPT

## 2025-06-11 PROCEDURE — 95714 VEEG EA 12-26 HR UNMNTR: CPT

## 2025-06-11 PROCEDURE — 95700 EEG CONT REC W/VID EEG TECH: CPT

## 2025-06-11 PROCEDURE — 84436 ASSAY OF TOTAL THYROXINE: CPT

## 2025-06-11 PROCEDURE — C9254: CPT

## 2025-06-11 PROCEDURE — 85027 COMPLETE CBC AUTOMATED: CPT

## 2025-06-11 RX ORDER — DEXAMETHASONE 0.5 MG/1
4 TABLET ORAL EVERY 8 HOURS
Refills: 0 | Status: DISCONTINUED | OUTPATIENT
Start: 2025-06-11 | End: 2025-06-12

## 2025-06-11 RX ORDER — BRIVARACETAM 75 MG/1
100 TABLET, FILM COATED ORAL ONCE
Refills: 0 | Status: DISCONTINUED | OUTPATIENT
Start: 2025-06-11 | End: 2025-06-16

## 2025-06-11 RX ORDER — PERAMPANEL 6 MG/1
2 TABLET ORAL ONCE
Refills: 0 | Status: DISCONTINUED | OUTPATIENT
Start: 2025-06-11 | End: 2025-06-11

## 2025-06-11 RX ORDER — BRIVARACETAM 75 MG/1
100 TABLET, FILM COATED ORAL
Refills: 0 | Status: DISCONTINUED | OUTPATIENT
Start: 2025-06-11 | End: 2025-06-16

## 2025-06-11 RX ORDER — MELATONIN 5 MG
1 TABLET ORAL AT BEDTIME
Refills: 0 | Status: DISCONTINUED | OUTPATIENT
Start: 2025-06-11 | End: 2025-06-16

## 2025-06-11 RX ORDER — LEVOTHYROXINE SODIUM 300 MCG
88 TABLET ORAL DAILY
Refills: 0 | Status: DISCONTINUED | OUTPATIENT
Start: 2025-06-11 | End: 2025-06-16

## 2025-06-11 RX ORDER — PERAMPANEL 6 MG/1
4 TABLET ORAL AT BEDTIME
Refills: 0 | Status: DISCONTINUED | OUTPATIENT
Start: 2025-06-12 | End: 2025-06-16

## 2025-06-11 RX ORDER — LORAZEPAM 4 MG/ML
1 VIAL (ML) INJECTION ONCE
Refills: 0 | Status: DISCONTINUED | OUTPATIENT
Start: 2025-06-11 | End: 2025-06-12

## 2025-06-11 RX ORDER — ENOXAPARIN SODIUM 100 MG/ML
40 INJECTION SUBCUTANEOUS EVERY 24 HOURS
Refills: 0 | Status: COMPLETED | OUTPATIENT
Start: 2025-06-11 | End: 2025-06-14

## 2025-06-11 RX ORDER — LACOSAMIDE 150 MG/1
150 TABLET, FILM COATED ORAL
Refills: 0 | Status: DISCONTINUED | OUTPATIENT
Start: 2025-06-11 | End: 2025-06-16

## 2025-06-11 RX ADMIN — PERAMPANEL 2 MILLIGRAM(S): 6 TABLET ORAL at 21:09

## 2025-06-11 RX ADMIN — BRIVARACETAM 240 MILLIGRAM(S): 75 TABLET, FILM COATED ORAL at 13:18

## 2025-06-11 RX ADMIN — PERAMPANEL 2 MILLIGRAM(S): 6 TABLET ORAL at 05:26

## 2025-06-11 RX ADMIN — ENOXAPARIN SODIUM 40 MILLIGRAM(S): 100 INJECTION SUBCUTANEOUS at 21:06

## 2025-06-11 RX ADMIN — Medication 1 MILLIGRAM(S): at 21:06

## 2025-06-11 RX ADMIN — LACOSAMIDE 130 MILLIGRAM(S): 150 TABLET, FILM COATED ORAL at 13:18

## 2025-06-11 RX ADMIN — BRIVARACETAM 240 MILLIGRAM(S): 75 TABLET, FILM COATED ORAL at 05:26

## 2025-06-11 RX ADMIN — Medication 88 MICROGRAM(S): at 05:25

## 2025-06-11 RX ADMIN — DEXAMETHASONE 4 MILLIGRAM(S): 0.5 TABLET ORAL at 13:18

## 2025-06-11 RX ADMIN — LAMOTRIGINE 250 MILLIGRAM(S): 150 TABLET ORAL at 05:25

## 2025-06-11 RX ADMIN — DEXAMETHASONE 4 MILLIGRAM(S): 0.5 TABLET ORAL at 21:06

## 2025-06-11 RX ADMIN — Medication 1 MILLIGRAM(S): at 01:05

## 2025-06-11 RX ADMIN — BRIVARACETAM 240 MILLIGRAM(S): 75 TABLET, FILM COATED ORAL at 21:34

## 2025-06-11 RX ADMIN — DEXAMETHASONE 4 MILLIGRAM(S): 0.5 TABLET ORAL at 05:27

## 2025-06-11 RX ADMIN — LACOSAMIDE 130 MILLIGRAM(S): 150 TABLET, FILM COATED ORAL at 20:25

## 2025-06-11 RX ADMIN — LACOSAMIDE 130 MILLIGRAM(S): 150 TABLET, FILM COATED ORAL at 05:26

## 2025-06-11 NOTE — PROGRESS NOTE ADULT - PROVIDER SPECIALTY LIST ADULT
Neurology
Neurology
Internal Medicine
Internal Medicine
Neurology
Internal Medicine
Internal Medicine

## 2025-06-11 NOTE — H&P ADULT - HISTORY OF PRESENT ILLNESS
no HPI    Patient NAT THOMASON is a 54y (1970) woman with a PMHx significant for Left Temporal lobe lesion, Bipolar, Anxiety transferred from [place] to [facility] to be evaluated for seizure. History limited as patient is poor historian, see hx below from chart at OSH.     History as per partner (Benson) at bedside. As per partner over the past few weeks patient has been having multiple episodes of partial seizures more often than usual. Despite being on her normal routine, patient often has multiple seizures weekly but always returned to baseline. On  patient had a seizure and afterwards patient appeared more confused, was unable to articulate herself did not seek medical intervention. Since then symptoms have worsen, on  patient was found with a bunch of Lamictal pills around her, reported she might have taken more  pills than usual & patient was brought to [place] ED.   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   As per partner, patient ha been going under a lot of life changing events. In april she lost her job, 3 weeks ago a family member has ; with everything happening, she has been deteriorating   Also on  patient fell at [facility], got on the plane & went to [place], there she went to the ED and was found to have a concussion   Patient used to be on lexapro, but was discontinued in april by her psychiatrist   Upon evaluation patient is awake, alert, speaking incoherently, pacing around the room, but able to follow commands and re-directable.     Imaging at [place]   - CT-head-Unremarkable.   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   - EEG: Revealed evidence of electrographic seizure activity  On  - Patient was found to be hyponatremic (Na:127), corrected with latest Na:138 ()    Interval Hx:  Patient was transferred [facility] and had MRI brain with stable temporal lobe lesion favoring diagnosis of malignancy as well as surrounding edema likely correlated to recent seizure activity. Neurology following as EEG still with seizure activit    seizure activity likely related to temporal lobe lesion and will need transfer to EMU at [facility] for further evaluation and management    EEG 2025  Clinical Impression:  -Numerous left posterior temporal focal-onset seizures at times with bilateral spread, 2-5 seizures/hour for most of recording, some hours with no seizures. Overall improving during recording. Last seizure captured 12:38 25.   -Risk of left frontal and right frontotemporal focal-onset seizures   -Left hemispheric focal cerebral dysfunction can be structural and/or functional (such as post-ictal) in etiology.     Vitals:  Vital Signs Last 24 Hrs  T(C): 36.9 (2025 15:44), Max: 36.9 (10 Alirio 2025 20:04)  T(F): 98.4 (2025 15:44), Max: 98.5 (10 Alirio 2025 20:04)  HR: 78 (2025 15:44) (66 - 89)  BP: 104/67 (2025 15:44) (97/65 - 104/67)   HPI    Patient NAT THOMASON is a 54y (1970) woman with a PMHx significant for Left Temporal lobe lesion, Bipolar, Anxiety transferred from [place] to [facility] to be evaluated for seizure. History limited as patient is poor historian, see hx below from chart at OSH.     History as per partner (Benson) at bedside. As per partner over the past few weeks patient has been having multiple episodes of partial seizures more often than usual. Despite being on her normal routine, patient often has multiple seizures weekly but always returned to baseline. On  patient had a seizure and afterwards patient appeared more confused, was unable to articulate herself did not seek medical intervention. Since then symptoms have worsen, on  patient was found with a bunch of Lamictal pills around her, reported she might have taken more  pills than usual & patient was brought to [place] ED.   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   As per partner, patient ha been going under a lot of life changing events. In april she lost her job, 3 weeks ago a family member has ; with everything happening, she has been deteriorating   Also on  patient fell at [facility], got on the plane & went to [place], there she went to the ED and was found to have a concussion   Patient used to be on lexapro, but was discontinued in april by her psychiatrist   Upon evaluation patient is awake, alert, speaking incoherently, pacing around the room, but able to follow commands and re-directable.     Imaging at [place]   - CT-head-Unremarkable.   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 Neurology following as EEG still with seizure activity likely related to temporal lobe lesion. Patient transferred to [facility] EMU for further management.     EEG 2025  Clinical Impression:  -Numerous left posterior temporal focal-onset seizures at times with bilateral spread, 2-5 seizures/hour for most of recording, some hours with no seizures. Overall improving during recording. Last seizure captured 12:38 25.   -Risk of left frontal and right frontotemporal focal-onset seizures   -Left hemispheric focal cerebral dysfunction can be structural and/or functional (such as post-ictal) in etiology.     Vitals:  Vital Signs Last 24 Hrs  T(C): 36.9 (2025 15:44), Max: 36.9 (10 Alirio 2025 20:04)  T(F): 98.4 (2025 15:44), Max: 98.5 (10 Alirio 2025 20:04)  HR: 78 (2025 15:44) (66 - 89)  BP: 104/67 (2025 15:44) (97/65 - 104/67)

## 2025-06-11 NOTE — H&P ADULT - NSHPLABSRESULTS_GEN_ALL_CORE
Labs:                          13.4   11.12 )-----------( 368      ( 11 Jun 2025 16:26 )             37.9     06-10    139  |  101  |  14.1  ----------------------------<  131[H]  4.4   |  21.0[L]  |  0.75    Ca    9.9      10 Alirio 2025 05:56    TPro  7.4  /  Alb  4.1  /  TBili  0.2[L]  /  DBili  x   /  AST  28  /  ALT  74[H]  /  AlkPhos  81  06-10      Urinalysis Basic - ( 10 Alirio 2025 05:56 )    Color: x / Appearance: x / SG: x / pH: x  Gluc: 131 mg/dL / Ketone: x  / Bili: x / Urobili: x   Blood: x / Protein: x / Nitrite: x   Leuk Esterase: x / RBC: x / WBC x   Sq Epi: x / Non Sq Epi: x / Bacteria: x      EEG 6/11/2025  Clinical Impression:  -Numerous left posterior temporal focal-onset seizures at times with bilateral spread, 2-5 seizures/hour for most of recording, some hours with no seizures. Overall improving during recording. Last seizure captured 12:38 6/11/25.   -Risk of left frontal and right frontotemporal focal-onset seizures   -Left hemispheric focal cerebral dysfunction can be structural and/or functional (such as post-ictal) in etiology.     RADIOLOGY, EKG & ADDITIONAL TESTS: Reviewed.   MR Head No Cont (06.06.25 @ 14:53)   IMPRESSION:  Left temporal lobe lesion as described stable compared to 4/11/2019.   Low-grade neoplasm favored.  Edema and diffusion restriction involving the cortex of the left temporal   lobe. Favor related to recent seizure activity. Correlate with   presentation and EEG.  Follow-up as clinically warranted.

## 2025-06-11 NOTE — PROGRESS NOTE ADULT - SUBJECTIVE AND OBJECTIVE BOX
[facility]                                        Neurology at [place]                                 [facility] Louis [facility] Haris                                  [address]                                        [place], [place], [address]                                             [phone]        [place] MRN: [id number]    CC: seizure     HPI:   The patient is a 54y Female who presented as transfer from [facility] with seizures for continuous EEG.  She has history of seizure and PNES per chart and has been having an increased frequency of seizures recently.  She is currently aphasic and con not provide meaningful history.  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 Per chart there is also question of whether she took too much Lamictal prior to [place] hospitalization. Dr Bhardwaj at [place] saw seizure activity on EEG from [place] and asked for continuous EEG monitoring to be done at  [facility]. ([place]).    Interim history:  On 6 [place] EMU.  Still with seizures per EEG.    ROS:   Denies headache or dizziness.  Denies chest pain.  Denies shortness of breath.    MEDICATIONS  (STANDING):  brivaracetam  IVPB 100 milliGRAM(s) IV Intermittent <User Schedule>  dexAMETHasone     Tablet 4 milliGRAM(s) Oral every 8 hours  heparin   Injectable 5000 Unit(s) SubCutaneous every 12 hours  lacosamide IVPB 150 milliGRAM(s) IV Intermittent <User Schedule>  lamoTRIgine 250 milliGRAM(s) Oral every 12 hours  levothyroxine 88 MICROGram(s) Oral daily  perampanel 2 milliGRAM(s) Oral <User Schedule>    Vital Signs Last 24 Hrs  T(C): 36.6 (10 Alirio 2025 05:28), Max: 36.9 (09 Jun 2025 14:30)  T(F): 97.8 (10 Alirio 2025 05:28), Max: 98.4 (09 Jun 2025 14:30)  HR: 81 (10 Alirio 2025 05:28) (81 - 92)  BP: 98/61 (10 Alirio 2025 05:28) (98/61 - 109/76)  RR: 18 (10 Alirio 2025 05:28) (16 - 18)  SpO2: 98% (10 Alirio 2025 05:28) (95% - 99%)    Parameters below as of 10 Alirio 2025 05:28  Patient On (Oxygen Delivery Method): room air    Detailed Neurologic Exam:    Mental status: The patient is sleepy but opens eyes to voice. She answers simple questions and follows some simple instructions but has difficulty with others. Receptive more than expressive aphasia.     Cranial nerves: Pupils equal and react symmetrically to light. There is no visual field deficit to threat. Extraocular motion is full with no nystagmus. Facial sensation is intact. Facial musculature is symmetric. Palate elevates symmetrically. Tongue is midline.    Motor: There is normal bulk and tone.  There is no tremor.  Strength grossly 5/5 bilaterally. Follows some manual motor testing at present.    Sensation: Grossly intact to light touch and pin.    Reflexes: 2+ throughout and plantar responses are flexor.    Cerebellar: Not following instructions.    Labs:     06-10    139  |  101  |  14.1  ----------------------------<  131[H]  4.4   |  21.0[L]  |  0.75    Ca    9.9      10 Alirio 2025 05:56    TPro  7.4  /  Alb  4.1  /  TBili  0.2[L]  /  DBili  x   /  AST  28  /  ALT  74[H]  /  AlkPhos  81  06-10                            13.2   7.75  )-----------( 351      ( 10 Alirio 2025 05:56 )             38.6             
  seen for seizures    aphasic, ros unable to obtain     MEDICATIONS  (STANDING):  heparin   Injectable 5000 Unit(s) SubCutaneous every 12 hours  lamoTRIgine 250 milliGRAM(s) Oral every 12 hours  levothyroxine 88 MICROGram(s) Oral daily    MEDICATIONS  (PRN):  acetaminophen     Tablet .. 650 milliGRAM(s) Oral every 6 hours PRN Temp greater or equal to 38C (100.4F), Mild Pain (1 - 3)  aluminum hydroxide/magnesium hydroxide/simethicone Suspension 30 milliLiter(s) Oral every 4 hours PRN Dyspepsia  LORazepam   Injectable 2 milliGRAM(s) IV Push every 1 hour PRN Seizure Activity  melatonin 3 milliGRAM(s) Oral at bedtime PRN Insomnia  ondansetron Injectable 4 milliGRAM(s) IV Push every 8 hours PRN Nausea and/or Vomiting      Allergies    No Known Allergies         Vital Signs Last 24 Hrs  T(C): 36.9 (08 Jun 2025 09:21), Max: 36.9 (08 Jun 2025 09:21)  T(F): 98.5 (08 Jun 2025 09:21), Max: 98.5 (08 Jun 2025 09:21)  HR: 83 (08 Jun 2025 09:21) (71 - 83)  BP: 119/80 (08 Jun 2025 09:21) (101/60 - 119/80)  BP(mean): --  RR: 18 (08 Jun 2025 09:21) (16 - 18)  SpO2: 97% (08 Jun 2025 09:21) (95% - 98%)    Parameters below as of 08 Jun 2025 09:21  Patient On (Oxygen Delivery Method): room air        PHYSICAL EXAM:    GENERAL: NAD   CHEST/LUNG: Clear to ausculation bilaterall   HEART: Regular rate and rhythm; S1 S2   ABDOMEN: Soft, Nontender, Bowel sounds present  EXTREMITIES:  no edema   NERVOUS SYSTEM: awake, follows commands. expressive aphasia    LABS:                        12.7   8.11  )-----------( 229      ( 08 Jun 2025 07:06 )             38.8     06-08    142  |  105  |  13.5  ----------------------------<  91  4.2   |  22.0  |  0.71    Ca    9.0      08 Jun 2025 07:06  Phos  3.2     06-08  Mg     2.2     06-08    TPro  7.0  /  Alb  4.1  /  TBili  0.2[L]  /  DBili  x   /  AST  68[H]  /  ALT  102[H]  /  AlkPhos  67  06-08      Urinalysis Basic - ( 08 Jun 2025 07:06 )    Color: x / Appearance: x / SG: x / pH: x  Gluc: 91 mg/dL / Ketone: x  / Bili: x / Urobili: x   Blood: x / Protein: x / Nitrite: x   Leuk Esterase: x / RBC: x / WBC x   Sq Epi: x / Non Sq Epi: x / Bacteria: x        CAPILLARY BLOOD GLUCOSE      POCT Blood Glucose.: 103 mg/dL (08 Jun 2025 09:03)        RADIOLOGY & ADDITIONAL TESTS:  
                            [facility]                                        Neurology at [place]                                 N[facility] Louis & [facility]                                  [address]                                        [place], [place], [address]                                             [phone]      [place] MRN: [id number]    CC: seizure     HPI:   The patient is a 54y Female who presented as transfer from [facility] with seizures for continuous EEG.  She has history of seizure and PNES per chart and has been having an increased frequency of seizures recently.  She is currently aphasic and con not provide meaningful history.  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 Per chart there is also question of whether she took too much Lamictal prior to [place] hospitalization. Dr Bhardwaj at [place] saw seizure activity on EEG from [place] and asked for continuous EEG monitoring to be done at  [facility]. ([place]).    Interim history:  On 6 [place] EMU.  Still with seizures per EEG.    ROS:   Denies headache or dizziness.  Denies chest pain.  Denies shortness of breath.    MEDICATIONS  (STANDING):  heparin   Injectable 5000 Unit(s) SubCutaneous every 12 hours  lacosamide IVPB 150 milliGRAM(s) IV Intermittent <User Schedule>  lamoTRIgine 250 milliGRAM(s) Oral every 12 hours  levETIRAcetam  IVPB 1000 milliGRAM(s) IV Intermittent <User Schedule>  levothyroxine 88 MICROGram(s) Oral daily      Vital Signs Last 24 Hrs  T(C): 36.7 (09 Jun 2025 05:25), Max: 36.9 (08 Jun 2025 14:41)  T(F): 98 (09 Jun 2025 05:25), Max: 98.4 (08 Jun 2025 14:41)  HR: 67 (09 Jun 2025 05:25) (64 - 86)  BP: 100/61 (09 Jun 2025 05:25) (100/61 - 102/65)  RR: 18 (09 Jun 2025 05:25) (16 - 18)  SpO2: 96% (09 Jun 2025 05:25) (96% - 98%)    Parameters below as of 09 Jun 2025 05:25  Patient On (Oxygen Delivery Method): room air    Detailed Neurologic Exam:    Mental status: The patient is sleepy but opens eyes to voice. She answers simple questions and follows some simple instructions but has difficulty with others. Mild expressive and receptive components.     Cranial nerves: Pupils equal and react symmetrically to light. There is no visual field deficit to threat. Extraocular motion is full with no nystagmus. Facial sensation is intact. Facial musculature is symmetric. Palate elevates symmetrically. Tongue is midline.    Motor: There is normal bulk and tone.  There is no tremor.  Strength grossly 5/5 bilaterally. Follows some manual motor testing at present.    Sensation: Grossly intact to light touch and pin.    Reflexes: 2+ throughout and plantar responses are flexor.    Cerebellar: Not following instructions.    Labs:     06-09    140  |  102  |  14.4  ----------------------------<  107[H]  4.2   |  22.0  |  0.78    Ca    8.9      09 Jun 2025 05:55  Phos  3.2     06-08  Mg     2.2     06-08    TPro  6.9  /  Alb  3.9  /  TBili  0.2[L]  /  DBili  x   /  AST  46[H]  /  ALT  93[H]  /  AlkPhos  69  06-09                            12.7   8.11  )-----------( 229      ( 08 Jun 2025 07:06 )             38.8       EEG:       
Patient is a 54y old  Female who presents with a chief complaint of Seizure (09 Jun 2025 10:47)      SUBJECTIVE / OVERNIGHT EVENTS: Seen and examined. Somewhat intermittently confused appearing. Feels ok overall. able to follow commands. Unable to obtain further ROS.    MEDICATIONS  (STANDING):  cloBAZam 5 milliGRAM(s) Oral two times a day  heparin   Injectable 5000 Unit(s) SubCutaneous every 12 hours  lacosamide IVPB 150 milliGRAM(s) IV Intermittent <User Schedule>  lamoTRIgine 250 milliGRAM(s) Oral every 12 hours  levETIRAcetam  IVPB 1000 milliGRAM(s) IV Intermittent <User Schedule>  levothyroxine 88 MICROGram(s) Oral daily    MEDICATIONS  (PRN):  acetaminophen     Tablet .. 650 milliGRAM(s) Oral every 6 hours PRN Temp greater or equal to 38C (100.4F), Mild Pain (1 - 3)  aluminum hydroxide/magnesium hydroxide/simethicone Suspension 30 milliLiter(s) Oral every 4 hours PRN Dyspepsia  LORazepam   Injectable 2 milliGRAM(s) IV Push every 1 hour PRN Seizure Activity  melatonin 3 milliGRAM(s) Oral at bedtime PRN Insomnia  ondansetron Injectable 4 milliGRAM(s) IV Push every 8 hours PRN Nausea and/or Vomiting        I&O's Summary    08 Jun 2025 07:01  -  09 Jun 2025 07:00  --------------------------------------------------------  IN: 970 mL / OUT: 1200 mL / NET: -230 mL        PHYSICAL EXAM:  Vital Signs Last 24 Hrs  T(C): 36.7 (09 Jun 2025 05:25), Max: 36.9 (08 Jun 2025 14:41)  T(F): 98 (09 Jun 2025 05:25), Max: 98.4 (08 Jun 2025 14:41)  HR: 67 (09 Jun 2025 05:25) (64 - 86)  BP: 100/61 (09 Jun 2025 05:25) (100/61 - 102/65)  BP(mean): --  RR: 18 (09 Jun 2025 05:25) (16 - 18)  SpO2: 96% (09 Jun 2025 05:25) (96% - 98%)    Parameters below as of 09 Jun 2025 05:25  Patient On (Oxygen Delivery Method): room air          PHYSICAL EXAM:    GENERAL: NAD   CHEST/LUNG: Clear to ausculation bilaterally  HEART: Regular rate and rhythm; S1 S2   ABDOMEN: Soft, Nontender, Bowel sounds present  EXTREMITIES:  no edema   NERVOUS SYSTEM: awake, follows commands. expressive aphasia    LABS:                        12.7   8.11  )-----------( 229      ( 08 Jun 2025 07:06 )             38.8     06-09    140  |  102  |  14.4  ----------------------------<  107[H]  4.2   |  22.0  |  0.78    Ca    8.9      09 Jun 2025 05:55  Phos  3.2     06-08  Mg     2.2     06-08    TPro  6.9  /  Alb  3.9  /  TBili  0.2[L]  /  DBili  x   /  AST  46[H]  /  ALT  93[H]  /  AlkPhos  69  06-09          Urinalysis Basic - ( 09 Jun 2025 05:55 )    Color: x / Appearance: x / SG: x / pH: x  Gluc: 107 mg/dL / Ketone: x  / Bili: x / Urobili: x   Blood: x / Protein: x / Nitrite: x   Leuk Esterase: x / RBC: x / WBC x   Sq Epi: x / Non Sq Epi: x / Bacteria: x        CAPILLARY BLOOD GLUCOSE            RADIOLOGY & ADDITIONAL TESTS:  Results Reviewed:   Imaging Personally Reviewed:  Electrocardiogram Personally Reviewed:      
                            [facility]                                        Neurology at [place]                                 Nando Louis & Haris                                  [address]                                        [place], [place], [address]                                             [phone]        [place] MRN: [id number]    CC: seizure     HPI:   The patient is a 54y Female who presented as transfer from [facility] with seizures for continuous EEG.  She has history of seizure and PNES per chart and has been having an increased frequency of seizures recently.  She is currently aphasic and con not provide meaningful history.  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 Per chart there is also question of whether she took too much Lamictal prior to [place] hospitalization. Dr Bhardwaj at [place] saw seizure activity on EEG from [place] and asked for continuous EEG monitoring to be done at  [facility]. ([place]).    Interim history:  On 6 [place] EMU.  Still with seizures per EEG.    ROS:   Denies headache or dizziness.  Denies chest pain.  Denies shortness of breath.    MEDICATIONS  (STANDING):  brivaracetam  IVPB 100 milliGRAM(s) IV Intermittent <User Schedule>  dexAMETHasone     Tablet 4 milliGRAM(s) Oral every 8 hours  heparin   Injectable 5000 Unit(s) SubCutaneous every 12 hours  lacosamide IVPB 150 milliGRAM(s) IV Intermittent <User Schedule>  lamoTRIgine 250 milliGRAM(s) Oral every 12 hours  levothyroxine 88 MICROGram(s) Oral daily  melatonin 1 milliGRAM(s) Oral at bedtime  perampanel 2 milliGRAM(s) Oral <User Schedule>    Vital Signs Last 24 Hrs  T(C): 36.5 (11 Jun 2025 09:23), Max: 36.9 (10 Alirio 2025 20:04)  T(F): 97.7 (11 Jun 2025 09:23), Max: 98.5 (10 Alirio 2025 20:04)  HR: 66 (11 Jun 2025 09:23) (66 - 89)  BP: 98/65 (11 Jun 2025 09:23) (97/65 - 121/82)  BP(mean): 78 (10 Alirio 2025 17:20) (78 - 93)  RR: 18 (11 Jun 2025 09:23) (17 - 18)  SpO2: 96% (11 Jun 2025 09:23) (92% - 98%)    Parameters below as of 11 Jun 2025 09:23  Patient On (Oxygen Delivery Method): room air    Detailed Neurologic Exam:    Mental status: The patient is sleepy but opens eyes to voice. She answers simple questions and follows some simple instructions but has difficulty with others. Receptive more than expressive aphasia.   Seems to be some improvement in comprehension since yesterday.    Cranial nerves: Pupils equal and react symmetrically to light. There is no visual field deficit to threat. Extraocular motion is full with no nystagmus. Facial sensation is intact. Facial musculature is symmetric. Palate elevates symmetrically. Tongue is midline.    Motor: There is normal bulk and tone.  There is no tremor.  Strength grossly 5/5 bilaterally. Follows some manual motor testing at present.    Sensation: Grossly intact to light touch and pin.    Reflexes: 2+ throughout and plantar responses are flexor.    Cerebellar: Not following instructions.  Labs:     06-10    139  |  101  |  14.1  ----------------------------<  131[H]  4.4   |  21.0[L]  |  0.75    Ca    9.9      10 Alirio 2025 05:56    TPro  7.4  /  Alb  4.1  /  TBili  0.2[L]  /  DBili  x   /  AST  28  /  ALT  74[H]  /  AlkPhos  81  06-10                            13.2   7.75  )-----------( 351      ( 10 Alirio 2025 05:56 )             38.6       EEG:       Rad:   CT head images reviewed (and concur with report): There is no acute pathology.   Region of low density in the left mid temporal lobe along the lateral aspect of the left temporal horn corresponds to previously seen nonspecific T2 and FLAIR hyperintense lesion.  CT-A: negative.
Patient is a 54y old  Female who presents with a chief complaint of Seizure (11 Jun 2025 11:10)      SUBJECTIVE / OVERNIGHT EVENTS: Seen and examined. Feels ok. AAOx2-3 today. Awaiting transfer to [facility].  Patient denies chest pain, SOB, abd pain, N/V, fever, chills, dysuria or any other complaints. All remainder ROS negative.     MEDICATIONS  (STANDING):  brivaracetam  IVPB 100 milliGRAM(s) IV Intermittent <User Schedule>  dexAMETHasone     Tablet 4 milliGRAM(s) Oral every 8 hours  heparin   Injectable 5000 Unit(s) SubCutaneous every 12 hours  lacosamide IVPB 150 milliGRAM(s) IV Intermittent <User Schedule>  lamoTRIgine 250 milliGRAM(s) Oral every 12 hours  levothyroxine 88 MICROGram(s) Oral daily  melatonin 1 milliGRAM(s) Oral at bedtime  perampanel 2 milliGRAM(s) Oral <User Schedule>    MEDICATIONS  (PRN):  acetaminophen     Tablet .. 650 milliGRAM(s) Oral every 6 hours PRN Temp greater or equal to 38C (100.4F), Mild Pain (1 - 3)  aluminum hydroxide/magnesium hydroxide/simethicone Suspension 30 milliLiter(s) Oral every 4 hours PRN Dyspepsia  LORazepam   Injectable 2 milliGRAM(s) IV Push every 1 hour PRN Seizure Activity  melatonin 3 milliGRAM(s) Oral at bedtime PRN Insomnia  ondansetron Injectable 4 milliGRAM(s) IV Push every 8 hours PRN Nausea and/or Vomiting        I&O's Summary    10 Alirio 2025 07:01  -  11 Jun 2025 07:00  --------------------------------------------------------  IN: 1250 mL / OUT: 0 mL / NET: 1250 mL        PHYSICAL EXAM:  Vital Signs Last 24 Hrs  T(C): 36.5 (11 Jun 2025 09:23), Max: 36.9 (10 Alirio 2025 20:04)  T(F): 97.7 (11 Jun 2025 09:23), Max: 98.5 (10 Alirio 2025 20:04)  HR: 66 (11 Jun 2025 09:23) (66 - 89)  BP: 98/65 (11 Jun 2025 09:23) (97/65 - 121/82)  BP(mean): 78 (10 Alirio 2025 17:20) (78 - 93)  RR: 18 (11 Jun 2025 09:23) (17 - 18)  SpO2: 96% (11 Jun 2025 09:23) (92% - 98%)    Parameters below as of 11 Jun 2025 09:23  Patient On (Oxygen Delivery Method): room air        GENERAL: NAD, intermittent confused.  CHEST/LUNG: Clear to ausculation bilaterally  HEART: Regular rate and rhythm; S1 S2   ABDOMEN: Soft, Nontender, Bowel sounds present  EXTREMITIES:  no edema   NERVOUS SYSTEM: awake, follows commands. expressive aphasia    LABS:                        13.2   7.75  )-----------( 351      ( 10 Alirio 2025 05:56 )             38.6     06-10    139  |  101  |  14.1  ----------------------------<  131[H]  4.4   |  21.0[L]  |  0.75    Ca    9.9      10 Alirio 2025 05:56    TPro  7.4  /  Alb  4.1  /  TBili  0.2[L]  /  DBili  x   /  AST  28  /  ALT  74[H]  /  AlkPhos  81  06-10          Urinalysis Basic - ( 10 Alirio 2025 05:56 )    Color: x / Appearance: x / SG: x / pH: x  Gluc: 131 mg/dL / Ketone: x  / Bili: x / Urobili: x   Blood: x / Protein: x / Nitrite: x   Leuk Esterase: x / RBC: x / WBC x   Sq Epi: x / Non Sq Epi: x / Bacteria: x        CAPILLARY BLOOD GLUCOSE            RADIOLOGY & ADDITIONAL TESTS:  Results Reviewed:   Imaging Personally Reviewed:  Electrocardiogram Personally Reviewed:      
Patient is a 54y old  Female who presents with a chief complaint of Seizure (10 Alirio 2025 10:13)      SUBJECTIVE / OVERNIGHT EVENTS: Seen and examined. Feels ok. Confused overall. Very poor historian. D/w neurology- will transfer to [facility] for presurgical testing as still with seizures likely due to temporal lobe lesion. Denies chest pain, SOB, abd pain.     MEDICATIONS  (STANDING):  brivaracetam  IVPB 100 milliGRAM(s) IV Intermittent <User Schedule>  dexAMETHasone     Tablet 4 milliGRAM(s) Oral every 8 hours  heparin   Injectable 5000 Unit(s) SubCutaneous every 12 hours  lacosamide IVPB 150 milliGRAM(s) IV Intermittent <User Schedule>  lamoTRIgine 250 milliGRAM(s) Oral every 12 hours  levothyroxine 88 MICROGram(s) Oral daily  perampanel 2 milliGRAM(s) Oral <User Schedule>    MEDICATIONS  (PRN):  acetaminophen     Tablet .. 650 milliGRAM(s) Oral every 6 hours PRN Temp greater or equal to 38C (100.4F), Mild Pain (1 - 3)  aluminum hydroxide/magnesium hydroxide/simethicone Suspension 30 milliLiter(s) Oral every 4 hours PRN Dyspepsia  LORazepam   Injectable 2 milliGRAM(s) IV Push every 1 hour PRN Seizure Activity  melatonin 3 milliGRAM(s) Oral at bedtime PRN Insomnia  ondansetron Injectable 4 milliGRAM(s) IV Push every 8 hours PRN Nausea and/or Vomiting        I&O's Summary    09 Jun 2025 07:01  -  10 Alirio 2025 07:00  --------------------------------------------------------  IN: 250 mL / OUT: 0 mL / NET: 250 mL        PHYSICAL EXAM:  Vital Signs Last 24 Hrs  T(C): 36.6 (10 Alirio 2025 05:28), Max: 36.9 (09 Jun 2025 14:30)  T(F): 97.8 (10 Alirio 2025 05:28), Max: 98.4 (09 Jun 2025 14:30)  HR: 81 (10 Alirio 2025 05:28) (81 - 92)  BP: 98/61 (10 Alirio 2025 05:28) (98/61 - 109/76)  BP(mean): --  RR: 18 (10 Alirio 2025 05:28) (16 - 18)  SpO2: 98% (10 Alirio 2025 05:28) (95% - 99%)    Parameters below as of 10 Alirio 2025 05:28  Patient On (Oxygen Delivery Method): room air          GENERAL: NAD, intermittent confused.  CHEST/LUNG: Clear to ausculation bilaterally  HEART: Regular rate and rhythm; S1 S2   ABDOMEN: Soft, Nontender, Bowel sounds present  EXTREMITIES:  no edema   NERVOUS SYSTEM: awake, follows commands. expressive aphasia    LABS:                        13.2   7.75  )-----------( 351      ( 10 Alirio 2025 05:56 )             38.6     06-10    139  |  101  |  14.1  ----------------------------<  131[H]  4.4   |  21.0[L]  |  0.75    Ca    9.9      10 Alirio 2025 05:56    TPro  7.4  /  Alb  4.1  /  TBili  0.2[L]  /  DBili  x   /  AST  28  /  ALT  74[H]  /  AlkPhos  81  06-10          Urinalysis Basic - ( 10 Alirio 2025 05:56 )    Color: x / Appearance: x / SG: x / pH: x  Gluc: 131 mg/dL / Ketone: x  / Bili: x / Urobili: x   Blood: x / Protein: x / Nitrite: x   Leuk Esterase: x / RBC: x / WBC x   Sq Epi: x / Non Sq Epi: x / Bacteria: x        CAPILLARY BLOOD GLUCOSE            RADIOLOGY & ADDITIONAL TESTS:  Results Reviewed:   Imaging Personally Reviewed:  Electrocardiogram Personally Reviewed:

## 2025-06-11 NOTE — H&P ADULT - NSHPPHYSICALEXAM_GEN_ALL_CORE
Physical Examination:   General - NAD  Eyes - Normal sclera, conjugate gaze   Mood: Anxious, unsure what happened in the hospital but doesn't feel like herself     Neurologic Exam:  Mental status - Awake, Alert, Oriented to person, place, and time (2025). Initially did not know  or age (48 something) but was able to say after a few minutes. Speech fluent, repetition and naming intact however with some perseveration. Follows 1 and 2 step commands. Attention/concentration mildly impaired. Able to recall name of president after a few minutes    Cranial nerves - PERRL, VFF, EOMI, face sensation (V1-V3) intact b/l, facial strength intact without asymmetry b/l, tongue midline on protrusion with full lateral movement    Motor - Normal bulk and tone throughout. No pronator drift.  Strength testing grossly 5/5 throughout    Sensation - Light touch intact throughout    DTR's -             Biceps      Triceps     Brachioradialis      Patellar    Ankle    Toes/plantar response  R             2+             2+                  2+                       2+            2+                 Down  L              2+             2+                 2+                        2+           2+                 Down    Coordination - Finger to Nose intact b/l    Gait and station - Deferred

## 2025-06-11 NOTE — H&P ADULT - ASSESSMENT
NAT THOMASON is a 53yo RIGHT handed woman with a PMHx significant for Left temporal lobe lesion, bipolar, anxiety transferred from [facility] for EMU admission and concern for focal status in setting of left temporal mass.     EEG with numerous left posterior temporal focal-onset seizures at times with bilateral spread, 2-5 seizures/hour for most of recording,   Last seizure captured 12:38 6/11/25.   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 Cullen. To be seen on AM rounds, note finalized upon attending attestation.  NAT THOMASON is a 53yo RIGHT handed woman with a PMHx significant for Left temporal lobe lesion, bipolar, anxiety transferred from [facility] for EMU admission and concern for focal status in setting of left temporal mass.     EEG with numerous left posterior temporal focal-onset seizures at times with bilateral spread, 2-5 seizures/hour for most of recording,   Last seizure captured 12:38 6/11/25.   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      Plan:    - Video EEG  - MRI Brain w/wo contrast  - Will need Neurosurgery evaluation of left temporal mass   - Will increase to Fycompa 4mg daily starting 6/12  - Continue Briviact 100mg TID  - Continue Vimpat 150mg TID   - Stop lamictal on admission  - Rescues for GTC: 1st line Ativan 1 mg IVP; 2nd line Briviact 100mg IVP  - Dexamethasone  4mg TID (to be tapered)  - Neurochecks and VS q4   - Maintain seizure fall and aspiration precautions  - Monitor on telemetry    Hypothyroid   - Continue synthroid 88mcg daily     Diet: Regular   DVT ppx: Lovenox SC  Dispo: Pending clinical course    Patient discussed with Epilepsy attending Dr. Cullen. To be seen on AM rounds, note finalized upon attending attestation.

## 2025-06-11 NOTE — H&P ADULT - ATTENDING COMMENTS
symptomatic focal status epilepticus LT origin  plan: hold lamictal, continue briviact/vimpat/fycompa and dexamethasone  MDC discussion  epilepsy surgery very soon  I doubt PNES diag in the past  MRI w/wo contrast w epilepsy protocol

## 2025-06-11 NOTE — PROGRESS NOTE ADULT - ASSESSMENT
54y Female who is followed by neurology because of seizure    Seizure  Was having seizures seen on EEG at [place] and transferred for continuous EEG  She was taking lamictal 250 mg po bid at [place].  She had further seizures overnight and Keppra was increased to 1000 mg q 8 h.   Vimpat was added at 150 mg q 8 h.  This morning She continues to have seizures but improved from prior.   Epilepsy team suggesting adding Onfi.  Will start at 5 mg twice per day.     Brain lesion  Left temporal lobe lesion.  This is likely the source of the seizures.   Await CT/CT-A head  Suggest non-urgent neurosurgical evaluation  
54y Female who is followed by neurology because of seizure    Seizure  Was having seizures seen on EEG at [place] and transferred for continuous EEG  She had further seizures.   Currently on:   Lamotrigine 250 mg q 12 h.  Begin Briviact 100 mg q 8 h  Fycompa starting at 2 mg twice per day.  Decadron 4 mg q 8 h.    Brain lesion  Left temporal lobe lesion.  This is likely the source of the seizures.       I discussed with the epilepsy team from [place].   The feeling is that the seizures will not be fully controlled without surgery.   They are suggesting we transfer to their unit for presurgical monitoring.   Awaiting bed.     Case discussed with Dr Hazel.
54 year old female with a PMH of Left Temporal Lobe Lesion, PNES, Bipolar, Anxiety transfered from [place] to [facility] to be evaluated for Seizure.   History as per partner (Benson) at bedside. As per partner, over the past few weeks patient has been having multiple episodes of partial seizures more often than usual. Despite being on her normal routine, patient often has multiple seizures weekly but always returned to baseline. On  patient had a seizure and afterwards patiet appeared more confused, was unable to articulate herself did not seek medical intervention. Since then symptoms have worsen, on  patient was found with a bunch of Lamictal pills around her, reported she might have taken more  pills than usual & patient was brought to [place] ED.   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   As per partner, patient ha been going under a lot of life changing events. In april she lost her job, 3 weeks ago a family member has ; with eveything happening, she has been deterioating.   Also on  patient fell at [facility], got on the plane & went to [place], there she went to the ED and was found to have a concussion.   Pateint used to be on lexapro, but was discontinued in april by her psychiatrist.    Imaging at [place]:   - CT-head-Unremarkable.   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   - EEG: Revealed evidence of electrographic seizure activity      seizures:  eeg ongoing  - neurology following  - Still with seizures on EEG- neurology adding further antiepileptics  - D/w Neurology- Will transfer to [facility] for further management.    left temporal lobe lesion:   - patient with different MR [id number] that has MRI report  - neurosurgery consulted- Outpatient follow up as Left lateral lobe lesion seen on MR Imaging at [place] stable from scans dating back to  and .    hypothyroid:   - tsh elevated but t3/4 wnl  - C/w Synthroid    transaminitis trend, if elevated u/s abdomen    bipolar:    unclear why on 1:1    lexapro was discontinued recently     Dispo: Pending EEG/Neurology clearance. [facility] and [facility]. 
54 year old female with a PMH of Left Temporal Lobe Lesion, PNES, Bipolar, Anxiety transfered from [place] to [facility] to be evaluated for Seizure.   History as per partner (Benson) at bedside. As per partner, over the past few weeks patient has been having multiple episodes of partial seizures more often than usual. Despite being on her normal routine, patient often has multiple seizures weekly but always returned to baseline. On  patient had a seizure and afterwards patiet appeared more confused, was unable to articulate herself did not seek medical intervention. Since then symptoms have worsen, on  patient was found with a bunch of Lamictal pills around her, reported she might have taken more  pills than usual & patient was brought to [place] ED.   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   As per partner, patient ha been going under a lot of life changing events. In april she lost her job, 3 weeks ago a family member has ; with eveything happening, she has been deterioating.   Also on  patient fell at [facility], got on the plane & went to [place], there she went to the ED and was found to have a concussion.   Pateint used to be on lexapro, but was discontinued in april by her psychiatrist     Imaging at [place]:   - CT-head-Unremarkable.   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   - EEG: Revealed evidence of electrographic seizure activity      seizures:  eeg ongoing    neurology following    lamictal restarted     left temporal lobe lesion:     patient with different MR [id number] that has MRI report    neurosurgery consulted    hypothyroid: synthroid    tsh elevated but t3/4 wnl    transaminitis trend, if elevated u/s abdomen    bipolar:    unclear why on 1:1    lexapro was discontinued recently 
54 year old female with a PMH of Left Temporal Lobe Lesion, PNES, Bipolar, Anxiety transfered from [place] to [facility] to be evaluated for Seizure.   History as per partner (Benson) at bedside. As per partner, over the past few weeks patient has been having multiple episodes of partial seizures more often than usual. Despite being on her normal routine, patient often has multiple seizures weekly but always returned to baseline. On  patient had a seizure and afterwards patiet appeared more confused, was unable to articulate herself did not seek medical intervention. Since then symptoms have worsen, on  patient was found with a bunch of Lamictal pills around her, reported she might have taken more  pills than usual & patient was brought to [place] ED.   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   As per partner, patient ha been going under a lot of life changing events. In april she lost her job, 3 weeks ago a family member has ; with eveything happening, she has been deterioating.   Also on  patient fell at [facility], got on the plane & went to [place], there she went to the ED and was found to have a concussion.   Pateint used to be on lexapro, but was discontinued in april by her psychiatrist.    Imaging at [place]:   - CT-head-Unremarkable.   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   - EEG: Revealed evidence of electrographic seizure activity      seizures:  eeg ongoing  - neurology following  - lamictal restarted  - keppra increased yesterday and vimpat was added  - Still with seizures on EEG- added Onfi    left temporal lobe lesion:   - patient with different MR [id number] that has MRI report  - neurosurgery consulted- Outpatient follow up as Left lateral lobe lesion seen on MR Imaging at [place] stable from scans dating back to  and .    hypothyroid:   - tsh elevated but t3/4 wnl  - C/w Synthroid    transaminitis trend, if elevated u/s abdomen    bipolar:    unclear why on 1:1    lexapro was discontinued recently     Dispo: Pending EEG/Neurology clearance
54y Female who is followed by neurology because of seizure    Seizure  Was having seizures seen on EEG at [place] and transferred for continuous EEG  She had further seizures.   Remains on Lamotrigine.   Further changes from last night:  1) Stop Keppra  2) Begin Briviact 100 mg q 8 h  3) Add Fycompa starting at 2 mg twice per day.  4) Decadron 10 mg x 1 followed by 4 mg q 8 h.    Brain lesion  Left temporal lobe lesion.  This is likely the source of the seizures.   Await CT/CT-A head  Suggest non-urgent neurosurgical evaluation    I discussed with the epilepsy team from [place].   The feeling is that the seizures will not be fully controlled without surgery.   They are suggesting we transfer to their unit for presurgical monitoring.     Will discuss with patient's family and with Dr Hazel.  
54 year old female with a PMH of Left Temporal Lobe Lesion, PNES, Bipolar, Anxiety transfered from [place] to [facility] to be evaluated for Seizure.   History as per partner (Benson) at bedside. As per partner, over the past few weeks patient has been having multiple episodes of partial seizures more often than usual. Despite being on her normal routine, patient often has multiple seizures weekly but always returned to baseline. On  patient had a seizure and afterwards patiet appeared more confused, was unable to articulate herself did not seek medical intervention. Since then symptoms have worsen, on  patient was found with a bunch of Lamictal pills around her, reported she might have taken more  pills than usual & patient was brought to [place] ED.   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   As per partner, patient ha been going under a lot of life changing events. In april she lost her job, 3 weeks ago a family member has ; with eveything happening, she has been deterioating.   Also on  patient fell at [facility], got on the plane & went to [place], there she went to the ED and was found to have a concussion.   Pateint used to be on lexapro, but was discontinued in april by her psychiatrist.    Imaging at [place]:   - CT-head-Unremarkable.   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   - EEG: Revealed evidence of electrographic seizure activity      seizures:  eeg ongoing  - neurology following  - Still with seizures on EEG- neurology adding further antiepileptics  - D/w Neurology- Will transfer to [facility] for further management.    left temporal lobe lesion:   - patient with different MR [id number] that has MRI report  - neurosurgery consulted- Outpatient follow up as Left lateral lobe lesion seen on MR Imaging at [place] stable from scans dating back to  and .    hypothyroid:   - tsh elevated but t3/4 wnl  - C/w Synthroid    transaminitis trend, if elevated u/s abdomen    bipolar:    unclear why on 1:1    lexapro was discontinued recently     Dispo: Pending EEG/Neurology clearance. Transfer to [facility] for further evaluation and management.     D/w Neurology

## 2025-06-11 NOTE — EEG REPORT - NS EEG TEXT BOX
--------------------------------------------------------------------------------------------------     DAY 3     Study Date: 6/10/25 08:00-6/11/25 08:00     Duration: 14 hr 35 min (EEG disconnected from 16:24 to 01:04)     --------------------------------------------------------------------------------------------------     Daily EEG Visual Analysis     FINDINGS:       Background:     Continuity: Continuous     Symmetry: Asymmetric     Posterior dominant rhythm (PDR): 12 Hz, reactive to eye closure. Symmetric low-amplitude frontal beta in wakefulness.     Voltage: Normal     Anterior-Posterior Gradient: Present     Other background findings: None     Breach: Absent       Background Slowing:     Generalized slowing: None     Focal slowing: Continuous left hemispheric focal polymorphic delta slowing and paucity of faster frequencies       State Changes:     Drowsiness is characterized by fragmentation, attenuation, and slowing of the background activity.     Stage 2 sleep is characterized by symmetric K complexes and sleep spindles.        Interictal Findings:     Rare right frontotemporal (F8) sharp waves.     Rare run of left frontal LRDA at 2 Hz.        Electrographic and Electroclinical seizures:     3-4 seizures/hour in the morning, later decreasing in frequency and ending before patient was disconnected at 16:24. Last seizure 13:31 6/10/25.     Duration 45 seconds to 2.5 minutes.       When patient was reconnected at 01:04, 3-6 seizures/hour in the evening, later decreasing in frequency and duration. Last seizure 03:16 6/11/25.     Duration 30 seconds to 1 minute.       Left posterior temporal (P7) fast activity increasing in amplitude, rarely spreading in the left > right hemispheres, then decreasing in frequency and evolving to sharp waves and rhythmic delta or theta activity. Most seizures are more focal, with evolution in the left hemisphere and minimal to no spread to the right hemisphere.     Clinical: During two seizures, patient appears to grab at head and have a headache (touches head and says “ouch” in one seizure). She is interacting with someone and saying some phrases during this seizure. During other events, there is no clinical change.       Other Clinical Events:     None       Activation Procedures:     Hyperventilation is not performed.       Photic stimulation is not performed.        Artifacts:     Intermittent myogenic and movement artifacts are present. Multiple disconnected electrodes later in recording.       Single-lead EKG: Regular rhythm       ASMs:  mg q12h PO, Brivaracetam 100 mg q8h IV,  mg q8h IV, Perampanel 2 mg BID. Dexamethasone 4 mg q8h.     --------------------------------------------------------------------------------------------------     EEG Classification / Summary:     Abnormal EEG in the awake, drowsy, and asleep states.     -Numerous left posterior temporal focal-onset seizures with bilateral spread, 2-6 seizures/hour, with some improvement later in recording (some seizures become more focal). Patient appears confused and/or aphasic at times, at other times appears to grab at head and have a headache. Last seizure 03:16 6/11/25.     -Occasional bursts/fluctuating runs of left posterior temporal fast activity     -Rare left frontotemporal LRDA at 1-1.5 Hz and frontal LRDA at 2 Hz     -Rare right frontotemporal sharp waves     -Left hemispheric focal slowing     --------------------------------------------------------------------------------------------------     Clinical Impression:     -Numerous left posterior temporal focal-onset seizures with bilateral spread, 2-6 seizures/hour, with some improvement later in recording (seizures become more focal). Patient appears confused and/or aphasic at times, at other times appears to grab at head and have a headache. Last seizure 03:16 6/11/25.     -Risk of left posterior temporal, left frontotemporal, left frontal and right frontotemporal focal-onset seizures     -Left hemispheric focal cerebral dysfunction can be structural and/or functional (such as post-ictal) in etiology.     -------------------------------------------------------------------------------------------------------     Austen Moreno DO     Epilepsy Fellow  ---------------------------------------------------------------------------------------------------     DAY 3     Study Date: 6/10/25 08:00-16:23 and 6/11/25 01:04-08:00 (EEG recording not available between those times)     Duration: 14 hr 35 min     --------------------------------------------------------------------------------------------------           Daily EEG Visual Analysis     FINDINGS:             Background:     Continuity: Continuous     Symmetry: Asymmetric     Posterior dominant rhythm (PDR): 13 Hz, reactive to eye closure. Symmetric low-amplitude frontal beta in wakefulness.     Voltage: Normal     Anterior-Posterior Gradient: Present     Other background findings: None     Breach: Absent           Background Slowing:     Generalized slowing: None     Focal slowing: Continuous left hemispheric focal polymorphic delta slowing and paucity of faster frequencies           State Changes:     Drowsiness is characterized by fragmentation, attenuation, and slowing of the background activity.     Stage 2 sleep is characterized by symmetric K complexes and sleep spindles.           Interictal Findings:     Rare right frontotemporal (F8) sharp waves.     Rare run of left frontal LRDA at 2 Hz.           Electrographic and Electroclinical seizures:     3-4 seizures/hour in the morning, later decreasing in frequency. Last seizure 13:31 6/10/25, before disconnection of EEG at 16:23. After EEG restarts at 01:04 6/11/25, seizures recur at 4-5 seizures/hour, later decreasing in frequency and ending, with last seizure at 05:46 6/11/25.     Duration 45 seconds to 2.5 minutes.        Electrographically, there is left posterior temporal (P7) fast activity increasing in amplitude, at times spreading in the left > right hemispheres, then decreasing in frequency and evolving to sharp waves and rhythmic delta or theta activity. Seizures are at times more focal, with evolution in the left hemisphere and minimal to no spread to the right hemisphere. Later seizures are more subtle, with left temporal or posterior temporal theta activity spreading in the left hemisphere, increasing in amplitude and decreasing in frequency, then fluctuating; or left posterior temporal (T7/P7) beta/alpha activity increasing in amplitude without spread.     Clinical: During one seizure, patient appears to have a headache (touches head and says “ouch” ). She is interacting with someone and saying some phrases during this seizure. During another seizure, patient sits up, then possibly feels dizzy, with someone in the room saying something is happening at that time (unclear if coincidental or related to seizure).          Other Clinical Events:     None           Activation Procedures:     Hyperventilation is not performed.       Photic stimulation is not performed.           Artifacts:     Intermittent myogenic and movement artifacts are present. Multiple disconnected electrodes later in recording.           Single-lead EKG: Regular rhythm          ASMs:  mg q12h PO, Brivaracetam 100 mg q8h IV,  mg q8h IV, Perampanel 2 mg BID. Dexamethasone 4 mg q8h.          --------------------------------------------------------------------------------------------------          EEG Classification / Summary:     Abnormal EEG in the awake, drowsy, and asleep states.     -Numerous left posterior temporal focal-onset seizures with bilateral spread, 3-5 seizures/hour for most of recording, with improvement later. Last seizure captured at 05:46 6/11/25.     -Occasional bursts/fluctuating runs of left posterior temporal fast activity     -Rare left frontal LRDA at 2 Hz     -Rare right frontotemporal sharp waves     -Left hemispheric focal slowing       Clinical Impression:     -Numerous left posterior temporal focal-onset seizures with bilateral spread, 3-5 seizures/hour for most of recording, with improvement later. Last seizure captured at 05:46 6/11/25.     -Risk of left frontal and right frontotemporal focal-onset seizures     -Left hemispheric focal cerebral dysfunction can be structural and/or functional (such as post-ictal) in etiology.           -------------------------------------------------------------------------------------------------------           Austen Moreno DO     Epilepsy Fellow           Nadege Squires MD     Attending Physician, [facility]           -------------------------------------------------------------------------------------------------------            To reach EEG-reading physician:     EEG Reading Room Phone #: [phone]     Epilepsy Answering Service after 5PM and before 8:30AM: Phone #: [phone]  ---------------------------------------------------------------------------------------------------     DAY 3     Study Date: 6/10/25 08:00-16:23 and 6/11/25 01:04-08:00 (EEG recording not available between those times)     Duration: 14 hr 35 min     --------------------------------------------------------------------------------------------------           Daily EEG Visual Analysis     FINDINGS:             Background:     Continuity: Continuous     Symmetry: Asymmetric     Posterior dominant rhythm (PDR): 13 Hz, reactive to eye closure. Symmetric low-amplitude frontal beta in wakefulness.     Voltage: Normal     Anterior-Posterior Gradient: Present     Other background findings: None     Breach: Absent           Background Slowing:     Generalized slowing: None     Focal slowing: Continuous left hemispheric focal polymorphic delta slowing and paucity of faster frequencies           State Changes:     Drowsiness is characterized by fragmentation, attenuation, and slowing of the background activity.     Stage 2 sleep is characterized by symmetric K complexes and sleep spindles.           Interictal Findings:     Rare right frontotemporal (F8) sharp waves.     Rare run of left frontal LRDA at 2 Hz.           Electrographic and Electroclinical seizures:     3-4 seizures/hour in the morning, later decreasing in frequency. Last seizure 13:31 6/10/25, before disconnection of EEG at 16:23. After EEG restarts at 01:04 6/11/25, seizures recur at 4-5 seizures/hour, later decreasing in frequency and ending, with last seizure at 05:46 6/11/25.     Duration 45 seconds to 2.5 minutes.        Electrographically, there is left posterior temporal (P7) fast activity increasing in amplitude, at times spreading in the left > right hemispheres, then decreasing in frequency and evolving to sharp waves and rhythmic delta or theta activity. Seizures are at times more focal, with evolution in the left hemisphere and minimal to no spread to the right hemisphere. Later seizures are more subtle, with left temporal or posterior temporal theta activity spreading in the left hemisphere, increasing in amplitude and decreasing in frequency, then fluctuating; or left posterior temporal (T7/P7) beta/alpha activity increasing in amplitude without spread.     Clinical: During one seizure, patient appears to have a headache (touches head and says “ouch” ). She is interacting with someone and saying some phrases during this seizure. During another seizure, patient sits up, then possibly feels dizzy, with someone in the room saying something is happening at that time (unclear if coincidental or related to seizure).          Other Clinical Events:     None           Activation Procedures:     Hyperventilation is not performed.       Photic stimulation is not performed.           Artifacts:     Intermittent myogenic and movement artifacts are present. Multiple disconnected electrodes at times.          Single-lead EKG: Regular rhythm          ASMs:  mg q12h PO, Brivaracetam 100 mg q8h IV,  mg q8h IV, Perampanel 2 mg BID. Dexamethasone 4 mg q8h.          --------------------------------------------------------------------------------------------------          EEG Classification / Summary:     Abnormal EEG in the awake, drowsy, and asleep states.     -Numerous left posterior temporal focal-onset seizures with bilateral spread, 3-5 seizures/hour for most of recording, with improvement later. Last seizure captured at 05:46 6/11/25.     -Occasional bursts/fluctuating runs of left posterior temporal fast activity     -Rare left frontal LRDA at 2 Hz     -Rare right frontotemporal sharp waves     -Left hemispheric focal slowing       Clinical Impression:     -Numerous left posterior temporal focal-onset seizures with bilateral spread, 3-5 seizures/hour for most of recording, with improvement later. Last seizure captured at 05:46 6/11/25.     -Risk of left frontal and right frontotemporal focal-onset seizures     -Left hemispheric focal cerebral dysfunction can be structural and/or functional (such as post-ictal) in etiology.           -------------------------------------------------------------------------------------------------------           Austen Moreno DO     Epilepsy Fellow           Nadege Squires MD     Attending Physician, [facility]           -------------------------------------------------------------------------------------------------------            To reach EEG-reading physician:     EEG Reading Room Phone #: [phone]     Epilepsy Answering Service after 5PM and before 8:30AM: Phone #: [phone]  --------------------------------------------------------------------------------------------------     DAY 3     Study Date: 6/10/25 08:00 - 6/11/25 01:04-12:56     Duration: 27 hr 39 min     --------------------------------------------------------------------------------------------------           Daily EEG Visual Analysis     FINDINGS:             Background:     Continuity: Continuous     Symmetry: Asymmetric     Posterior dominant rhythm (PDR): 12.5-13 Hz, reactive to eye closure. Symmetric low-amplitude frontal beta in wakefulness.     Voltage: Normal     Anterior-Posterior Gradient: Present     Other background findings: None     Breach: Absent           Background Slowing:     Generalized slowing: None     Focal slowing: Continuous left hemispheric focal polymorphic delta slowing and paucity of faster frequencies           State Changes:     Drowsiness is characterized by fragmentation, attenuation, and slowing of the background activity.     Stage 2 sleep is characterized by symmetric K complexes and sleep spindles.           Interictal Findings:     Rare right frontotemporal (F8) sharp waves.     Rare runs of left frontal LRDA at 2 Hz.           Electrographic and Electroclinical seizures:     3-4 seizures/hour in the morning 6/10/25, later decreasing in frequency. No seizures between 13:31 and 17:03 6/10/25. Afterward, 2-5 seizures/hour (interpretation limited by multiple disconnected electrodes at times), later decreasing in frequency. No seizures between 05:46 and 10:30 6/11/25.     Additional 3 seizures at 10:30, 12:15, 12:38 6/11/25.     Duration 14 seconds to 2.5 minutes.        Electrographically, there is left posterior temporal (P7) fast activity increasing in amplitude, at times spreading in the left > right hemispheres, then decreasing in frequency and evolving to sharp waves and rhythmic delta or theta activity. Seizures are at times more focal, with evolution in the left hemisphere and minimal to no spread to the right hemisphere. Later seizures are more subtle, with left temporal or posterior temporal theta activity spreading in the left hemisphere, increasing in amplitude and decreasing in frequency, then fluctuating; or left posterior temporal (T7/P7) beta/alpha activity increasing in amplitude without spread.     Clinical: During one seizure, patient appears to have a headache (touches head and says “ouch”). She is interacting with someone and saying some phrases during this seizure. During another seizure, patient sits up, then possibly feels dizzy, with someone in the room saying something is happening at that time (unclear if coincidental or related to seizure).          Other Clinical Events:     None           Activation Procedures:     Hyperventilation is not performed.       Photic stimulation is not performed.           Artifacts:     Intermittent myogenic and movement artifacts are present. Multiple disconnected electrodes at times limit interpretation.           Single-lead EKG: Regular rhythm          ASMs:  mg q12h PO, Brivaracetam 100 mg q8h IV,  mg q8h IV, Perampanel 2 mg BID. Dexamethasone 4 mg q8h.        --------------------------------------------------------------------------------------------------          EEG Classification / Summary:     Abnormal EEG in the awake, drowsy, and asleep states.     -Numerous left posterior temporal focal-onset seizures with bilateral spread, 2-5 seizures/hour for most of recording, some hours with no seizures. Overall improving during recording. Last seizure captured 12:38 6/11/25.     -Rare left frontal LRDA at 2 Hz     -Rare right frontotemporal sharp waves     -Left hemispheric focal slowing       Clinical Impression:     -Numerous left posterior temporal focal-onset seizures with bilateral spread, 2-5 seizures/hour for most of recording, some hours with no seizures. Overall improving during recording. Last seizure captured 12:38 6/11/25.     -Risk of left frontal and right frontotemporal focal-onset seizures     -Left hemispheric focal cerebral dysfunction can be structural and/or functional (such as post-ictal) in etiology.           -------------------------------------------------------------------------------------------------------           Austen Moreno DO     Epilepsy Fellow           Nadege Squires MD     Attending Physician, [facility]           -------------------------------------------------------------------------------------------------------            To reach EEG-reading physician:     EEG Reading Room Phone #: [phone]     Epilepsy Answering Service after 5PM and before 8:30AM: Phone #: [phone]  --------------------------------------------------------------------------------------------------     DAY 3     Study Date: 6/10/25 08:00 - 6/11/25 01:04-12:56     Duration: 27 hr 39 min     --------------------------------------------------------------------------------------------------           Daily EEG Visual Analysis     FINDINGS:             Background:     Continuity: Continuous     Symmetry: Asymmetric     Posterior dominant rhythm (PDR): 12.5-13 Hz, reactive to eye closure. Symmetric low-amplitude frontal beta in wakefulness.     Voltage: Normal     Anterior-Posterior Gradient: Present     Other background findings: None     Breach: Absent           Background Slowing:     Generalized slowing: None     Focal slowing: Continuous left hemispheric focal polymorphic delta slowing and paucity of faster frequencies           State Changes:     Drowsiness is characterized by fragmentation, attenuation, and slowing of the background activity.     Stage 2 sleep is characterized by symmetric K complexes and sleep spindles.           Interictal Findings:     Rare right frontotemporal (F8) sharp waves.     Rare runs of left frontal LRDA at 2 Hz.           Electrographic and Electroclinical seizures:     3-4 seizures/hour in the morning 6/10/25, later decreasing in frequency. No seizures between 13:31 and 17:03 6/10/25. Afterward, 2-5 seizures/hour (interpretation limited by multiple disconnected electrodes at times), later decreasing in frequency. No seizures between 05:46 and 10:30 6/11/25.     Additional 3 seizures at 10:30, 12:15, 12:38 6/11/25.     Duration 14 seconds to 2.5 minutes.        Electrographically, there is left posterior temporal (P7) fast activity increasing in amplitude, at times spreading in the left > right hemispheres, then decreasing in frequency and evolving to sharp waves and rhythmic delta or theta activity. Seizures are at times more focal, with evolution in the left hemisphere and minimal to no spread to the right hemisphere. Later seizures are more subtle, with left temporal or posterior temporal theta activity spreading in the left hemisphere, increasing in amplitude and decreasing in frequency, then fluctuating; or left posterior temporal (T7/P7) beta/alpha activity increasing in amplitude without spread.     Clinical: During one seizure, patient appears to have a headache (touches head and says “ouch”). She is interacting with someone and saying some phrases during this seizure. During another seizure, patient sits up, then possibly feels dizzy, with someone in the room saying something is happening at that time (unclear if coincidental or related to seizure).          Other Clinical Events:     None           Activation Procedures:     Hyperventilation is not performed.       Photic stimulation is not performed.           Artifacts:     Intermittent myogenic and movement artifacts are present. Multiple disconnected electrodes at times limit interpretation.           Single-lead EKG: Regular rhythm          ASMs:  mg q12h PO, Brivaracetam 100 mg q8h IV,  mg q8h IV, Perampanel 2 mg BID. Dexamethasone 4 mg q8h.        --------------------------------------------------------------------------------------------------          EEG Classification / Summary:     Abnormal EEG in the awake, drowsy, and asleep states.     -Numerous left posterior temporal focal-onset seizures at times with bilateral spread, 2-5 seizures/hour for most of recording, some hours with no seizures. Overall improving during recording. Last seizure captured 12:38 6/11/25.     -Rare left frontal LRDA at 2 Hz     -Rare right frontotemporal sharp waves     -Left hemispheric focal slowing       Clinical Impression:     -Numerous left posterior temporal focal-onset seizures at times with bilateral spread, 2-5 seizures/hour for most of recording, some hours with no seizures. Overall improving during recording. Last seizure captured 12:38 6/11/25.     -Risk of left frontal and right frontotemporal focal-onset seizures     -Left hemispheric focal cerebral dysfunction can be structural and/or functional (such as post-ictal) in etiology.           -------------------------------------------------------------------------------------------------------           Austen Moreno DO     Epilepsy Fellow           Nadege Squires MD     Attending Physician, [facility]           -------------------------------------------------------------------------------------------------------            To reach EEG-reading physician:     EEG Reading Room Phone #: [phone]     Epilepsy Answering Service after 5PM and before 8:30AM: Phone #: [phone]  --------------------------------------------------------------------------------------------------     DAY 3     Study Date: 6/10/25 08:00 - 6/11/25 12:56     Duration: 27 hr 39 min     --------------------------------------------------------------------------------------------------           Daily EEG Visual Analysis     FINDINGS:             Background:     Continuity: Continuous     Symmetry: Asymmetric     Posterior dominant rhythm (PDR): 12.5-13 Hz, reactive to eye closure. Symmetric low-amplitude frontal beta in wakefulness.     Voltage: Normal     Anterior-Posterior Gradient: Present     Other background findings: None     Breach: Absent           Background Slowing:     Generalized slowing: None     Focal slowing: Continuous left hemispheric focal polymorphic delta slowing and paucity of faster frequencies           State Changes:     Drowsiness is characterized by fragmentation, attenuation, and slowing of the background activity.     Stage 2 sleep is characterized by symmetric K complexes and sleep spindles.           Interictal Findings:     Rare right frontotemporal (F8) sharp waves.     Rare runs of left frontal LRDA at 2 Hz.           Electrographic and Electroclinical seizures:     3-4 seizures/hour in the morning 6/10/25, later decreasing in frequency. No seizures between 13:31 and 17:03 6/10/25. Afterward, 2-5 seizures/hour (interpretation limited by multiple disconnected electrodes at times), later decreasing in frequency. No seizures between 05:46 and 10:30 6/11/25.     Additional 3 seizures at 10:30, 12:15, 12:38 6/11/25.     Duration 14 seconds to 2.5 minutes.        Electrographically, there is left posterior temporal (P7) fast activity increasing in amplitude, at times spreading in the left > right hemispheres, then decreasing in frequency and evolving to sharp waves and rhythmic delta or theta activity. Seizures are at times more focal, with evolution in the left hemisphere and minimal to no spread to the right hemisphere. Later seizures are more subtle, with left temporal or posterior temporal theta activity spreading in the left hemisphere, increasing in amplitude and decreasing in frequency, then fluctuating; or left posterior temporal (T7/P7) beta/alpha activity increasing in amplitude without spread.     Clinical: During one seizure, patient appears to have a headache (touches head and says “ouch”). She is interacting with someone and saying some phrases during this seizure. During another seizure, patient sits up, then possibly feels dizzy, with someone in the room saying something is happening at that time (unclear if coincidental or related to seizure).          Other Clinical Events:     None           Activation Procedures:     Hyperventilation is not performed.       Photic stimulation is not performed.           Artifacts:     Intermittent myogenic and movement artifacts are present. Multiple disconnected electrodes at times limit interpretation.           Single-lead EKG: Regular rhythm          ASMs:  mg q12h PO, Brivaracetam 100 mg q8h IV,  mg q8h IV, Perampanel 2 mg BID. Dexamethasone 4 mg q8h.        --------------------------------------------------------------------------------------------------          EEG Classification / Summary:     Abnormal EEG in the awake, drowsy, and asleep states.     -Numerous left posterior temporal focal-onset seizures at times with bilateral spread, 2-5 seizures/hour for most of recording, some hours with no seizures. Overall improving during recording. Last seizure captured 12:38 6/11/25.     -Rare left frontal LRDA at 2 Hz     -Rare right frontotemporal sharp waves     -Left hemispheric focal slowing       Clinical Impression:     -Numerous left posterior temporal focal-onset seizures at times with bilateral spread, 2-5 seizures/hour for most of recording, some hours with no seizures. Overall improving during recording. Last seizure captured 12:38 6/11/25.     -Risk of left frontal and right frontotemporal focal-onset seizures     -Left hemispheric focal cerebral dysfunction can be structural and/or functional (such as post-ictal) in etiology.           -------------------------------------------------------------------------------------------------------           Austen Moreno DO     Epilepsy Fellow           Nadege Squires MD     Attending Physician, [facility]           -------------------------------------------------------------------------------------------------------            To reach EEG-reading physician:     EEG Reading Room Phone #: [phone]     Epilepsy Answering Service after 5PM and before 8:30AM: Phone #: [phone]

## 2025-06-12 LAB
ANION GAP SERPL CALC-SCNC: 16 MMOL/L — SIGNIFICANT CHANGE UP (ref 5–17)
BASOPHILS # BLD AUTO: 0.05 K/UL — SIGNIFICANT CHANGE UP (ref 0–0.2)
BASOPHILS NFR BLD AUTO: 0.5 % — SIGNIFICANT CHANGE UP (ref 0–2)
BUN SERPL-MCNC: 19 MG/DL — SIGNIFICANT CHANGE UP (ref 7–23)
CALCIUM SERPL-MCNC: 10.6 MG/DL — HIGH (ref 8.4–10.5)
CHLORIDE SERPL-SCNC: 102 MMOL/L — SIGNIFICANT CHANGE UP (ref 96–108)
CO2 SERPL-SCNC: 23 MMOL/L — SIGNIFICANT CHANGE UP (ref 22–31)
CREAT SERPL-MCNC: 0.76 MG/DL — SIGNIFICANT CHANGE UP (ref 0.5–1.3)
CULTURE RESULTS: SIGNIFICANT CHANGE UP
CULTURE RESULTS: SIGNIFICANT CHANGE UP
EGFR: 92 ML/MIN/1.73M2 — SIGNIFICANT CHANGE UP
EGFR: 92 ML/MIN/1.73M2 — SIGNIFICANT CHANGE UP
EOSINOPHIL # BLD AUTO: 0 K/UL — SIGNIFICANT CHANGE UP (ref 0–0.5)
EOSINOPHIL NFR BLD AUTO: 0 % — SIGNIFICANT CHANGE UP (ref 0–6)
GLUCOSE BLDC GLUCOMTR-MCNC: 156 MG/DL — HIGH (ref 70–99)
GLUCOSE SERPL-MCNC: 120 MG/DL — HIGH (ref 70–99)
HCT VFR BLD CALC: 39.2 % — SIGNIFICANT CHANGE UP (ref 34.5–45)
HGB BLD-MCNC: 13.2 G/DL — SIGNIFICANT CHANGE UP (ref 11.5–15.5)
IMM GRANULOCYTES NFR BLD AUTO: 1.5 % — HIGH (ref 0–0.9)
LAMOTRIGINE SERPL-MCNC: 10.4 UG/ML — SIGNIFICANT CHANGE UP (ref 2–20)
LYMPHOCYTES # BLD AUTO: 1.33 K/UL — SIGNIFICANT CHANGE UP (ref 1–3.3)
LYMPHOCYTES # BLD AUTO: 12.3 % — LOW (ref 13–44)
MAGNESIUM SERPL-MCNC: 2.3 MG/DL — SIGNIFICANT CHANGE UP (ref 1.6–2.6)
MCHC RBC-ENTMCNC: 30.6 PG — SIGNIFICANT CHANGE UP (ref 27–34)
MCHC RBC-ENTMCNC: 33.7 G/DL — SIGNIFICANT CHANGE UP (ref 32–36)
MCV RBC AUTO: 90.7 FL — SIGNIFICANT CHANGE UP (ref 80–100)
MONOCYTES # BLD AUTO: 0.79 K/UL — SIGNIFICANT CHANGE UP (ref 0–0.9)
MONOCYTES NFR BLD AUTO: 7.3 % — SIGNIFICANT CHANGE UP (ref 2–14)
NEUTROPHILS # BLD AUTO: 8.47 K/UL — HIGH (ref 1.8–7.4)
NEUTROPHILS NFR BLD AUTO: 78.4 % — HIGH (ref 43–77)
NRBC BLD AUTO-RTO: 0 /100 WBCS — SIGNIFICANT CHANGE UP (ref 0–0)
PHOSPHATE SERPL-MCNC: 3.4 MG/DL — SIGNIFICANT CHANGE UP (ref 2.5–4.5)
PLATELET # BLD AUTO: 337 K/UL — SIGNIFICANT CHANGE UP (ref 150–400)
POTASSIUM SERPL-MCNC: 5.6 MMOL/L — HIGH (ref 3.5–5.3)
POTASSIUM SERPL-SCNC: 5.6 MMOL/L — HIGH (ref 3.5–5.3)
RBC # BLD: 4.32 M/UL — SIGNIFICANT CHANGE UP (ref 3.8–5.2)
RBC # FLD: 12.3 % — SIGNIFICANT CHANGE UP (ref 10.3–14.5)
SODIUM SERPL-SCNC: 141 MMOL/L — SIGNIFICANT CHANGE UP (ref 135–145)
SPECIMEN SOURCE: SIGNIFICANT CHANGE UP
SPECIMEN SOURCE: SIGNIFICANT CHANGE UP
WBC # BLD: 10.8 K/UL — HIGH (ref 3.8–10.5)
WBC # FLD AUTO: 10.8 K/UL — HIGH (ref 3.8–10.5)

## 2025-06-12 PROCEDURE — 99222 1ST HOSP IP/OBS MODERATE 55: CPT

## 2025-06-12 PROCEDURE — 95720 EEG PHY/QHP EA INCR W/VEEG: CPT

## 2025-06-12 PROCEDURE — 70553 MRI BRAIN STEM W/O & W/DYE: CPT | Mod: 26

## 2025-06-12 RX ORDER — MIDAZOLAM IN 0.9 % SOD.CHLORID 1 MG/ML
2 PLASTIC BAG, INJECTION (ML) INTRAVENOUS ONCE
Refills: 0 | Status: DISCONTINUED | OUTPATIENT
Start: 2025-06-12 | End: 2025-06-16

## 2025-06-12 RX ORDER — DEXAMETHASONE 0.5 MG/1
4 TABLET ORAL EVERY 12 HOURS
Refills: 0 | Status: DISCONTINUED | OUTPATIENT
Start: 2025-06-12 | End: 2025-06-13

## 2025-06-12 RX ADMIN — BRIVARACETAM 240 MILLIGRAM(S): 75 TABLET, FILM COATED ORAL at 05:01

## 2025-06-12 RX ADMIN — Medication 88 MICROGRAM(S): at 05:01

## 2025-06-12 RX ADMIN — DEXAMETHASONE 4 MILLIGRAM(S): 0.5 TABLET ORAL at 17:01

## 2025-06-12 RX ADMIN — BRIVARACETAM 240 MILLIGRAM(S): 75 TABLET, FILM COATED ORAL at 13:42

## 2025-06-12 RX ADMIN — PERAMPANEL 4 MILLIGRAM(S): 6 TABLET ORAL at 22:05

## 2025-06-12 RX ADMIN — DEXAMETHASONE 4 MILLIGRAM(S): 0.5 TABLET ORAL at 05:00

## 2025-06-12 RX ADMIN — LACOSAMIDE 130 MILLIGRAM(S): 150 TABLET, FILM COATED ORAL at 22:05

## 2025-06-12 RX ADMIN — Medication 1 MILLIGRAM(S): at 22:05

## 2025-06-12 RX ADMIN — LACOSAMIDE 130 MILLIGRAM(S): 150 TABLET, FILM COATED ORAL at 04:54

## 2025-06-12 RX ADMIN — LACOSAMIDE 130 MILLIGRAM(S): 150 TABLET, FILM COATED ORAL at 13:42

## 2025-06-12 RX ADMIN — BRIVARACETAM 240 MILLIGRAM(S): 75 TABLET, FILM COATED ORAL at 23:01

## 2025-06-12 NOTE — BH CONSULTATION LIAISON ASSESSMENT NOTE - NSBHMSESPEECH_PSY_A_CORE
Disjointed speech/Abnormal as indicated, otherwise normal... Disjointed speech/Normal volume, rate, productivity, spontaneity and articulation

## 2025-06-12 NOTE — EEG REPORT - NS EEG TEXT BOX
-- DAY 1 	     -- START: 06/11/26 16:26     -- END: 06/12/26  08:00     -- DURATION (HRS): 15 hrs 6 min ?          DAILY EEG VISUAL ANALYSIS     The background was continuous, symmetric, spontaneously variable and reactive. During wakefulness, the posterior dominant rhythm consisted of symmetric fragments of 9-10 Hz activity, with amplitude to 30 uV, that attenuated to eye opening.  Low amplitude frontal beta was noted in wakefulness.      The anterior to posterior gradient was present.           GENERALIZED SLOWING:     No generalized background slowing was present.          FOCAL SLOWING:      None was present.          SLEEP BACKGROUND:     Drowsiness was characterized by fragmentation, attenuation, and slowing of the background activity.       Sleep was characterized by the presence of vertex waves, symmetric sleep spindles and K-complexes.          OTHER NON-EPILEPTIFORM FINDINGS:     None were present.          INTERICTAL EPILEPTIFORM ACTIVITY:      None were present.          EVENTS:     2 focal electrographic seizures with rhythmic theta activity at F3 maximal with spread to F7.           ACTIVATION PROCEDURES:      Hyperventilation was not performed.       Photic stimulation was not performed.          ARTIFACTS:     Intermittent myogenic and movement artifacts were noted.          ECG:     The heart rate on single channel ECG was predominantly between XX BPM.          ASMs:           -----------------------------------------------------------------------------------------------------------     EEG CLASSIFICATION:     Abnormal EEG in the awake, drowsy and asleep states.          1. 2 focal electrographic seizures, left frontal onset with frontotemporal spread     -----------------------------------------------------------------------------------------------------------     IMPRESSION/CLINICAL CORRELATE:     This is an abnormal EEG record.           1. 2 left frontal/frontotemporal electrographic seizures as described above.        THIS IS A PRELIMINARY INTERPRETATION ONLY PENDING ATTENDING REVIEW/ATTESTATION    Mary Griggs DO  Epilepsy Fellow, PGY-5    -------------------------------------------------------------------------------------------------------  [facility] EEG Reading Room Ph#: [phone]  Epilepsy Answering Service after 5PM and before 8:30AM: Ph#: [phone]

## 2025-06-12 NOTE — BH CONSULTATION LIAISON ASSESSMENT NOTE - NSBHREFERDETAILS_PSY_A_CORE_FT
consult for safety evaluation due to report she may have expressed SI and may have been found with Lamictal pills prior to presentation

## 2025-06-12 NOTE — BH CONSULTATION LIAISON ASSESSMENT NOTE - RISK ASSESSMENT
Given the unreliability of the patient's history, risk factors cannot be fully appreciated at this time. She reports that she does not have any firearms at home. She has a partner (Benson). She has family around her and has received visits from family recently.  Risk factors:  h/o SA/SIB, h/o psych admissions, recent loss, acute illness    Protective factors:  no access to weapons, no active substance abuse, domiciled, social supports    Due to current altered mental status in the setting of seizures and temporal lobe lesion we are unable to fully assess risk or need for psychiatric admission at this time.

## 2025-06-12 NOTE — BH CONSULTATION LIAISON ASSESSMENT NOTE - CURRENT MEDICATION
MEDICATIONS  (STANDING):  brivaracetam  IVPB 100 milliGRAM(s) IV Intermittent <User Schedule>  dexAMETHasone  Injectable 4 milliGRAM(s) IV Push every 12 hours  enoxaparin Injectable 40 milliGRAM(s) SubCutaneous every 24 hours  lacosamide IVPB 150 milliGRAM(s) IV Intermittent <User Schedule>  levothyroxine 88 MICROGram(s) Oral daily  melatonin 1 milliGRAM(s) Oral at bedtime  pantoprazole    Tablet 40 milliGRAM(s) Oral before breakfast  perampanel 4 milliGRAM(s) Oral at bedtime    MEDICATIONS  (PRN):  brivaracetam  Injectable 100 milliGRAM(s) IV Push once PRN 2nd line seizure rescue  LORazepam   Injectable 1 milliGRAM(s) IV Push once PRN Seizure Activity

## 2025-06-12 NOTE — BH CONSULTATION LIAISON ASSESSMENT NOTE - NSBHATTESTCOMMENTATTENDFT_PSY_A_CORE
Patient is a 54 year old single, domiciled, recently unemployed woman with an unclear past psychiatric history but possible including bipolar disorder, anxiety, and PTSD, and a medical history significant for seizure disorder and osteoarthritis who is currently admitted for management of seizures with L temporal lobe lesion. Psychiatry is consulted for a safety evaluation a patient reportedly expressed SI at some point, with concern raised that she may have taken an overdose of Lamictal. She reportedly has had multiple stressors lately including loss of her job and the death of a family member a few weeks ago. Per records it appears that patient's significant other has reported she's had an increased frequency of seizures lately, but would typically return to her cognitive baseline afterwards. However after her seizure 5/30 she has not returned to her baseline. On exam today patient is alert, but confused, distractible, oddly related, with labile mood and apparent word finding difficulties. She is oriented to self, initially stated she is in a hotel before correcting to a hospital, and did not know the date and looked at her phone despite being asked not to. She was able to complete DOWB after delay but unable to perform simple calculation task. Patient acknowledges not feeling like herself, but also having increased stress and feeling burnt out lately. However she denies having suicidal thoughts or making a suicide attempt. Denies having any symptoms of psychosis. She is unable to provide history regarding whether she is seeing a psychiatrist or therapist, or details of why the Lexapro was stopped a few months ago, but per records has a history of a suicide attempt and psychiatric hospitalization 11 years ago. No known substance abuse. Given her current mental state, we are unable to complete the safety evaluation at this time. Due to her unpredictable behavior would recommend continuing constant observation and we will continue to evaluate.

## 2025-06-12 NOTE — BH CONSULTATION LIAISON ASSESSMENT NOTE - NSBHMSEATTEN_PSY_A_CORE
In one instance patient could list the days of the week backwards but she could not say how many quarters are in $1.75/Impaired In one instance patient could list the days of the week backwards but was highly distractible throughout interview/Impaired

## 2025-06-12 NOTE — BH CONSULTATION LIAISON ASSESSMENT NOTE - NSBHCONSULTRECOMMENDOTHER_PSY_A_CORE FT
- Unable to complete safety evaluation at this time due to patient's altered mental status. Will continue to follow and assess as patient's mental status clears.  - Would use Ativan 0.5-1mg Q6hrs PRN for any agitation.

## 2025-06-12 NOTE — BH CONSULTATION LIAISON ASSESSMENT NOTE - DESCRIPTION
Patient recently had a death in her family. She also was recently let go from her job working as a  in April.  Patient is single in a relationship and lives alone. Patient recently had a death in her family. She also was recently let go from her job working as a  in April. She is a college graduate. She does not have any children or pets.

## 2025-06-12 NOTE — BH CONSULTATION LIAISON ASSESSMENT NOTE - NSBHCHARTREVIEWVS_PSY_A_CORE FT
Vital Signs Last 24 Hrs  T(C): 36.7 (12 Jun 2025 08:10), Max: 37.2 (11 Jun 2025 20:15)  T(F): 98.1 (12 Jun 2025 08:10), Max: 98.9 (11 Jun 2025 20:15)  HR: 78 (12 Jun 2025 08:10) (65 - 78)  BP: 106/69 (12 Jun 2025 08:10) (96/61 - 106/69)  BP(mean): --  RR: 18 (12 Jun 2025 08:10) (18 - 18)  SpO2: 96% (12 Jun 2025 08:10) (96% - 100%)    Parameters below as of 12 Jun 2025 08:10  Patient On (Oxygen Delivery Method): room air

## 2025-06-12 NOTE — BH CONSULTATION LIAISON ASSESSMENT NOTE - HPI (INCLUDE ILLNESS QUALITY, SEVERITY, DURATION, TIMING, CONTEXT, MODIFYING FACTORS, ASSOCIATED SIGNS AND SYMPTOMS)
Layla Lainez is a 54y (1970) woman with a PMHx significant for Left Temporal lobe lesion, PNES, Anxiety transferred from [place] to [facility] to be evaluated for seizure. History limited as patient is poor historian at the moment. She reports feeling anxious surrounding multiple transfers between different hospitals. She says that she does not want to remain in the hospital and is concerned she may be becoming even sicker here. She used to work as a  but was let go from her job in April. According to previous notes, she was taken off Lexapro by her psychiatrist in April.     According to  note from evaluation on June 6th at [facility]:    Partner states that pt had a partial-seizure episode last  Friday 5/30/25, described as "black out" episode, where she becomes confused  and difficulty with attention. Since then, the confusion has persisted and  worsened with associated nausea/vomiting, bringing her to the [facility] ED 6/4/25,  sent home with close F/U with neurologist and PCP.  Per partner, she has seizure episodes multiple times a week, however symptoms  are brief and she returns to her baseline mentation, AOx4. He states that the  pt takes an additional dose of her Lamictal when she feels the seizure episode  beginning. He states most recent longer-duration confusion episode was around 2  years ago, where she was evaluated in the ED. She had a hospitalization for  confusion 7 years ago as well.  Of note, partner states that she did have recent loss of family member (Uncle)  3 weeks ago, job loss, and has been in significant emotional distress. Her  hospitalization 7 years ago was also around the time when she was in emotional  distress. At the time, she had neurologic workup, suspected diagnosis with  PNES. Patient at this time is minimally engageable, stating she is not able to  speak due to excessive hunger. As per significant other she has been  increasingly depressed, especially after she lost her job recently with poor  sleep, decreased interest, feeling helpless, poor energy, poor concentration,  worsened appetite, no known suicidal ideation.    On evaluation patient is A+Ox2, disoriented, distractible and confused. Her affect is labile and she often laughs inappropriately. She had difficulty directly answering questions and often spoke incoherently. She was able to follow orders. Denies SI/HI/AVH. Denies perceptual disturbances. Denies smoking, alcohol or recreational drug use.

## 2025-06-12 NOTE — BH CONSULTATION LIAISON ASSESSMENT NOTE - NSBHCHARTREVIEWINVESTIGATE_PSY_A_CORE FT
< from: 12 Lead ECG (06.08.25 @ 20:54) >    Ventricular Rate 83 BPM    Atrial Rate 83 BPM    P-R Interval 136 ms    QRS Duration 72 ms    Q-T Interval 340 ms    QTC Calculation(Bazett) 399 ms    P Axis 45 degrees    R Axis 58 degrees    T Axis 13 degrees    Diagnosis Line sinus rhythm with inappropriate pacing spikes.  Suggest repeat ECG    Confirmed by Speedy Saunders MD ([id number]) on 6/9/2025 12:03:30 AM    < end of copied text >    < from: CT Head w/ IV Cont (06.10.25 @ 12:29) >    ACC: [id number] EXAM:  CT ANGIO BRAIN (W)AW IC   ORDERED BY: DANIEL FLORES     ACC: [id number] EXAM:  CT BRAIN IC   ORDERED BY: NESTOR ORANTES     PROCEDURE DATE:  06/10/2025          INTERPRETATION:  CT angiography of the brain    CLINICAL INDICATION:  Left temporal lobe lesion with adjacent cortical   thickening/swelling    TECHNIQUE: Direct axial CT scanning of the brain was obtained after the   dynamic intravenous administration of 90 cc of Omnipaque 350. 10 cc   discarded. Sagittal and coronal maximum intensity projection reformats   were provided.  Three-dimensional reconstructions were performed the   radiologist using the [employer] workstation.    Additionally, noncontrast axial CT scanning of the brain was obtained   from the skull base to the vertex. Sagittal and coronal reformats were   provided.    COMPARISON: MRI and CT brain 6/6/2025.    FINDINGS:    CT brain:    Limited by patient's positioning within the scanner and streak artifact.    Region of low density in the left midtemporal lobe along the lateral   aspect of the left temporal horn corresponds to previously seen   nonspecific T2 and FLAIR hyperintense lesion.    No hydrocephalus, midline shift, or effacement of the basal cisterns.    No acute intracranial hemorrhage.    The visualized paranasal sinuses and mastoid air cells are clear.    CTA brain:    Normal flow-related enhancement of the skull base/intracranial internal   carotid arteries.    Normal flow-related enhancement of the bilateral anterior, middle, and   posterior cerebral arteries.    Anterior communicating artery is visualized.    Normal flow-related enhancement of the bilateral intradural vertebral   arteries and the basilar artery.    No flow-limiting stenosis or vascular aneurysm. No AVM.    IMPRESSION:    CT brain:  Region of low density in the left mid temporal lobe along the lateral   aspect of the left temporal horn corresponds to previously seen   nonspecific T2 and FLAIR hyperintense lesion.    No acute intracranial hemorrhage.    CTA brain:  No flow-limiting stenosis or vascular aneurysm. No AVM.                      --- End of Report ---          < end of copied text >    < from: MR Head No Cont (06.06.25 @ 14:53) >    ACC: [id number] EXAM:  MR BRAIN   ORDERED BY: KEITH BECKER     MARIA DEL CARMEN DATE:  06/06/2025          INTERPRETATION:  MR BRAIN    Clinical information: ams, confusion, vomiting    A noncontrast MRI of the brain was performed.    TECHNIQUE:  In the sagittal plane a T1 weighted sequence was performed.  In the axial   plane T1, T2, FLAIR, SWAN, and diffusion weighted imaging was obtained.   In the coronal plane T2 weighted imaging was performed.    COMPARISON:  Compared to head CT of 6/6/2025 and brainMRI of 4/11/2019    FINDINGS:  BRAIN PARENCHYMA:  There is a heterogeneous region of signal abnormality within the left   temporal lobe lateral to the temporal horn of the left lateral ventricle.   This measures 3.7 x 1.6 x 1.6 cm and appears grosslystable from MRI of   4/11/2019. Low-grade neoplasm is again favored. Follow-up exam with   contrast can be obtained if warranted.    There appears to be edema and diffusion restriction associated with the   cortex of the left temporal lobe (diffusionimaging 3-10 through 3-13,   coronal T2-weighted imaging 8-18 through 8-29). Favor related to recent   seizure activity. Ischemia and infection are in the differential   diagnosis as well. Correlate with presentation and EEG.    There are no intracranial blood products. Corpus callosum is well-formed.   There is no cerebellar tonsillar ectopia    VENTRICLES:  There is no hydrocephalus.   There is no midline shift.    EXTRA-AXIAL:  No extra-axial mass.  No evidence of a subdural or epidural collection.    Patent basal cisterns.    OTHER:  No air-fluid levels within the paranasal sinuses.    IMPRESSION:  Left temporal lobe lesion as described stable compared to 4/11/2019.   Low-grade neoplasm favored.  Edema and diffusion restriction involving the cortex of the left temporal   lobe. Favor related to recent seizure activity. Correlate with   presentation and EEG.  Follow-up as clinically warranted.    --- End of Report ---            GERTRUDE MOHAN MD; Attending Radiologist  This document hasbeen electronically signed. Jun 6 2025  3:17PM    < end of copied text >

## 2025-06-12 NOTE — BH CONSULTATION LIAISON ASSESSMENT NOTE - OTHER PAST PSYCHIATRIC HISTORY (INCLUDE DETAILS REGARDING ONSET, COURSE OF ILLNESS, INPATIENT/OUTPATIENT TREATMENT)
Per records patient has had diagnoses including bipolar disorder, anxiety, PTSD, and PNES but unclear which are accurate.  Patient had a psychiatric admission 11 years ago after a suicide attempt by overdose. No other known hospitalizations or suicide attempts.  Currently unclear if patient is seeing a psychiatrist or therapist.  Patient has been taking Lamictal for seizures and recently Lexapro but not currently.

## 2025-06-12 NOTE — BH CONSULTATION LIAISON ASSESSMENT NOTE - NSBHCHARTREVIEWLAB_PSY_A_CORE FT
Phosphorus in AM (06.12.25 @ 11:20)   Phosphorus: 3.4 mg/dL  Magnesium in AM (06.12.25 @ 11:20)   Magnesium: 2.3  Complete Blood Count + Automated Diff (06.12.25 @ 11:20)   Auto NRBC: 0 /100 WBCs  WBC Count: 10.80 K/uL  RBC Count: 4.32 M/uL  Hemoglobin: 13.2 g/dL  Hematocrit: 39.2 %  Mean Cell Volume: 90.7 fl  Mean Cell Hemoglobin: 30.6 pg  Mean Cell Hemoglobin Conc: 33.7 g/dL  Red Cell Distrib Width: 12.3 %  Platelet Count - Automated: 337 K/uL  Neutrophil #: 8.47 K/uL    Basic Metabolic Panel (06.12.25 @ 11:20)   Sodium: 141 mmol/L  Potassium: 5.6 mmol/L  Chloride: 102 mmol/L  Carbon Dioxide: 23 mmol/L  Anion Gap: 16 mmol/L  Blood Urea Nitrogen: 19 mg/dL  Creatinine: 0.76 mg/dL  Glucose: 120 mg/dL  Calcium: 10.6 mg/dL  eGFR: 92  Creatine Kinase (06.11.25 @ 16:26)   Creatine Kinase: 42 U/L  Protein Total: 7.6 g/dL  Albumin: 4.5 g/dL  Bilirubin Total: 0.2 mg/dL  Alkaline Phosphatase: 81 U/L  Aspartate Aminotransferase (AST/SGOT): 21 U/L  Alanine Aminotransferase (ALT/SGPT): 64 U/L  Thyroid Stimulating Hormone, Serum in AM (06.09.25 @ 05:55)   Thyroid Stimulating Hormone, Serum: 10.17 uIU/mL

## 2025-06-12 NOTE — BH CONSULTATION LIAISON ASSESSMENT NOTE - SUMMARY
Layla Lainez is a 54y (1970) woman with a PMHx significant for Left Temporal lobe lesion, Bipolar, Anxiety transferred from [place] to [facility] to be evaluated for seizure. On evaluation patient is A+Ox2, disoriented, distractible and confused. She was labile and laughing inappropriately. She had difficulty answering questions directly and could not remember the names of her doctors as well as close family members. Although she denies suicidal ideation at this time, the her labile mood and reports of a possible suicide attempt (per H&P not on June 11), I recommend that she remain under constant observation.

## 2025-06-12 NOTE — BH CONSULTATION LIAISON ASSESSMENT NOTE - OTHER
Patient states that she lives alone but other records report that she lives with her parents moves extremities spontaneously more stressed word-finding difficulties

## 2025-06-13 LAB
ALBUMIN SERPL ELPH-MCNC: 4.2 G/DL — SIGNIFICANT CHANGE UP (ref 3.3–5)
ALP SERPL-CCNC: 71 U/L — SIGNIFICANT CHANGE UP (ref 40–120)
ALT FLD-CCNC: 51 U/L — HIGH (ref 10–45)
ANION GAP SERPL CALC-SCNC: 18 MMOL/L — HIGH (ref 5–17)
AST SERPL-CCNC: 20 U/L — SIGNIFICANT CHANGE UP (ref 10–40)
BASOPHILS # BLD AUTO: 0.07 K/UL — SIGNIFICANT CHANGE UP (ref 0–0.2)
BASOPHILS NFR BLD AUTO: 0.7 % — SIGNIFICANT CHANGE UP (ref 0–2)
BILIRUB SERPL-MCNC: 0.2 MG/DL — SIGNIFICANT CHANGE UP (ref 0.2–1.2)
BUN SERPL-MCNC: 20 MG/DL — SIGNIFICANT CHANGE UP (ref 7–23)
CALCIUM SERPL-MCNC: 9.5 MG/DL — SIGNIFICANT CHANGE UP (ref 8.4–10.5)
CHLORIDE SERPL-SCNC: 103 MMOL/L — SIGNIFICANT CHANGE UP (ref 96–108)
CO2 SERPL-SCNC: 19 MMOL/L — LOW (ref 22–31)
CREAT SERPL-MCNC: 0.75 MG/DL — SIGNIFICANT CHANGE UP (ref 0.5–1.3)
EGFR: 93 ML/MIN/1.73M2 — SIGNIFICANT CHANGE UP
EGFR: 93 ML/MIN/1.73M2 — SIGNIFICANT CHANGE UP
EOSINOPHIL # BLD AUTO: 0.05 K/UL — SIGNIFICANT CHANGE UP (ref 0–0.5)
EOSINOPHIL NFR BLD AUTO: 0.5 % — SIGNIFICANT CHANGE UP (ref 0–6)
GLUCOSE BLDC GLUCOMTR-MCNC: 101 MG/DL — HIGH (ref 70–99)
GLUCOSE BLDC GLUCOMTR-MCNC: 121 MG/DL — HIGH (ref 70–99)
GLUCOSE BLDC GLUCOMTR-MCNC: 127 MG/DL — HIGH (ref 70–99)
GLUCOSE SERPL-MCNC: 97 MG/DL — SIGNIFICANT CHANGE UP (ref 70–99)
HCT VFR BLD CALC: 37.4 % — SIGNIFICANT CHANGE UP (ref 34.5–45)
HGB BLD-MCNC: 12.9 G/DL — SIGNIFICANT CHANGE UP (ref 11.5–15.5)
IMM GRANULOCYTES NFR BLD AUTO: 2 % — HIGH (ref 0–0.9)
LYMPHOCYTES # BLD AUTO: 2.33 K/UL — SIGNIFICANT CHANGE UP (ref 1–3.3)
LYMPHOCYTES # BLD AUTO: 21.7 % — SIGNIFICANT CHANGE UP (ref 13–44)
MAGNESIUM SERPL-MCNC: 2.1 MG/DL — SIGNIFICANT CHANGE UP (ref 1.6–2.6)
MCHC RBC-ENTMCNC: 31.2 PG — SIGNIFICANT CHANGE UP (ref 27–34)
MCHC RBC-ENTMCNC: 34.5 G/DL — SIGNIFICANT CHANGE UP (ref 32–36)
MCV RBC AUTO: 90.3 FL — SIGNIFICANT CHANGE UP (ref 80–100)
MONOCYTES # BLD AUTO: 1.03 K/UL — HIGH (ref 0–0.9)
MONOCYTES NFR BLD AUTO: 9.6 % — SIGNIFICANT CHANGE UP (ref 2–14)
NEUTROPHILS # BLD AUTO: 7.05 K/UL — SIGNIFICANT CHANGE UP (ref 1.8–7.4)
NEUTROPHILS NFR BLD AUTO: 65.5 % — SIGNIFICANT CHANGE UP (ref 43–77)
NRBC BLD AUTO-RTO: 0 /100 WBCS — SIGNIFICANT CHANGE UP (ref 0–0)
PHOSPHATE SERPL-MCNC: 3.3 MG/DL — SIGNIFICANT CHANGE UP (ref 2.5–4.5)
PLATELET # BLD AUTO: 339 K/UL — SIGNIFICANT CHANGE UP (ref 150–400)
POTASSIUM SERPL-MCNC: 3.9 MMOL/L — SIGNIFICANT CHANGE UP (ref 3.5–5.3)
POTASSIUM SERPL-SCNC: 3.9 MMOL/L — SIGNIFICANT CHANGE UP (ref 3.5–5.3)
PROT SERPL-MCNC: 7.1 G/DL — SIGNIFICANT CHANGE UP (ref 6–8.3)
RBC # BLD: 4.14 M/UL — SIGNIFICANT CHANGE UP (ref 3.8–5.2)
RBC # FLD: 12.2 % — SIGNIFICANT CHANGE UP (ref 10.3–14.5)
SODIUM SERPL-SCNC: 140 MMOL/L — SIGNIFICANT CHANGE UP (ref 135–145)
WBC # BLD: 10.74 K/UL — HIGH (ref 3.8–10.5)
WBC # FLD AUTO: 10.74 K/UL — HIGH (ref 3.8–10.5)

## 2025-06-13 PROCEDURE — 99231 SBSQ HOSP IP/OBS SF/LOW 25: CPT

## 2025-06-13 PROCEDURE — 95720 EEG PHY/QHP EA INCR W/VEEG: CPT

## 2025-06-13 PROCEDURE — 99223 1ST HOSP IP/OBS HIGH 75: CPT | Mod: 57

## 2025-06-13 PROCEDURE — 93970 EXTREMITY STUDY: CPT | Mod: 26

## 2025-06-13 RX ORDER — DEXAMETHASONE 0.5 MG/1
2 TABLET ORAL
Refills: 0 | Status: DISCONTINUED | OUTPATIENT
Start: 2025-06-13 | End: 2025-06-16

## 2025-06-13 RX ORDER — CLOBAZAM 20 MG/1
10 TABLET ORAL AT BEDTIME
Refills: 0 | Status: DISCONTINUED | OUTPATIENT
Start: 2025-06-13 | End: 2025-06-16

## 2025-06-13 RX ADMIN — DEXAMETHASONE 2 MILLIGRAM(S): 0.5 TABLET ORAL at 17:29

## 2025-06-13 RX ADMIN — BRIVARACETAM 240 MILLIGRAM(S): 75 TABLET, FILM COATED ORAL at 21:12

## 2025-06-13 RX ADMIN — CLOBAZAM 10 MILLIGRAM(S): 20 TABLET ORAL at 21:11

## 2025-06-13 RX ADMIN — LACOSAMIDE 130 MILLIGRAM(S): 150 TABLET, FILM COATED ORAL at 14:17

## 2025-06-13 RX ADMIN — ENOXAPARIN SODIUM 40 MILLIGRAM(S): 100 INJECTION SUBCUTANEOUS at 21:12

## 2025-06-13 RX ADMIN — LACOSAMIDE 130 MILLIGRAM(S): 150 TABLET, FILM COATED ORAL at 20:21

## 2025-06-13 RX ADMIN — BRIVARACETAM 240 MILLIGRAM(S): 75 TABLET, FILM COATED ORAL at 15:00

## 2025-06-13 RX ADMIN — Medication 88 MICROGRAM(S): at 05:13

## 2025-06-13 RX ADMIN — DEXAMETHASONE 4 MILLIGRAM(S): 0.5 TABLET ORAL at 05:13

## 2025-06-13 RX ADMIN — Medication 500 MILLILITER(S): at 02:45

## 2025-06-13 RX ADMIN — PERAMPANEL 4 MILLIGRAM(S): 6 TABLET ORAL at 21:11

## 2025-06-13 RX ADMIN — LACOSAMIDE 130 MILLIGRAM(S): 150 TABLET, FILM COATED ORAL at 04:35

## 2025-06-13 RX ADMIN — Medication 40 MILLIGRAM(S): at 05:13

## 2025-06-13 RX ADMIN — Medication 75 MILLILITER(S): at 00:46

## 2025-06-13 RX ADMIN — BRIVARACETAM 240 MILLIGRAM(S): 75 TABLET, FILM COATED ORAL at 05:14

## 2025-06-13 RX ADMIN — Medication 1 MILLIGRAM(S): at 21:11

## 2025-06-13 RX ADMIN — Medication 1000 MILLILITER(S): at 17:31

## 2025-06-13 NOTE — PROGRESS NOTE ADULT - SUBJECTIVE AND OBJECTIVE BOX
*************************************  NEUROLOGY PROGRESS NOTE  **************************************    NAT THOMASON  Female  MRN-[id number]    Subjective: Patient seen and examined at bedside with Neurology team and attending     Interval History:          VITAL SIGNS:  Vital Signs Last 24 Hrs  T(C): 36.8 (2025 08:28), Max: 37.2 (2025 04:42)  T(F): 98.3 (2025 08:28), Max: 98.9 (2025 04:42)  HR: 68 (2025 08:28) (57 - 80)  BP: 102/67 (2025 08:28) (85/46 - 102/67)  BP(mean): --  RR: 19 (2025 08:28) (18 - 20)  SpO2: 98% (2025 08:28) (95% - 98%)    Parameters below as of 2025 08:28  Patient On (Oxygen Delivery Method): room air      PHYSICAL EXAMINATION: INCOMPLETE    Eyes - Normal sclera, conjugate gaze   Mood:     Neurologic Exam:  Mental status - Awake, Alert, Oriented to person, place, and time (2025). Initially did not know  or age (48 something) but was able to say after a few minutes. Speech fluent, repetition and naming intact however with some perseveration. Follows 1 and 2 step commands. Attention/concentration mildly impaired. Able to recall name of president after a few minutes    Cranial nerves - PERRL, VFF, EOMI, face sensation (V1-V3) intact b/l, facial strength intact without asymmetry b/l, tongue midline on protrusion with full lateral movement    Motor - Normal bulk and tone throughout. No pronator drift.  Strength testing grossly 5/5 throughout    Sensation - Light touch intact throughout    DTR's -             Biceps      Triceps     Brachioradialis      Patellar    Ankle    Toes/plantar response  R             2+             2+                  2+                       2+            2+                 Down  L              2+             2+                 2+                        2+           2+                 Down    Coordination - Finger to Nose intact b/l    Gait and station - Deferred      LABS:    CBC                       12.9   10.74 )-----------( 339      ( 2025 06:42 )             37.4     Chem     140  |  103  |  20  ----------------------------<  97  3.9   |  19[L]  |  0.75    Ca    9.5      2025 06:42  Phos  3.3     06-  Mg     2.1     -    TPro  7.1  /  Alb  4.2  /  TBili  0.2  /  DBili  x   /  AST  20  /  ALT  51[H]  /  AlkPhos  71  -13    LFTs LIVER FUNCTIONS - ( 2025 06:42 )  Alb: 4.2 g/dL / Pro: 7.1 g/dL / ALK PHOS: 71 U/L / ALT: 51 U/L / AST: 20 U/L / GGT: x           Coagulopathy   Lipid Panel  Chol 178 LDL -- HDL 69 Trig 128  A1c   Cardiac enzymes     U/A Urinalysis Basic - ( 2025 06:42 )    Color: x / Appearance: x / SG: x / pH: x  Gluc: 97 mg/dL / Ketone: x  / Bili: x / Urobili: x   Blood: x / Protein: x / Nitrite: x   Leuk Esterase: x / RBC: x / WBC x   Sq Epi: x / Non Sq Epi: x / Bacteria: x      RADIOLOGY & ADDITIONAL STUDIES:          EEG 25    EEG CLASSIFICATION:   Abnormal EEG in the awake, drowsy and asleep states.   1. 2 focal electrographic seizures, left frontal onset with frontotemporal spread   -----------------------------------------------------------------------------------------------------------   IMPRESSION/CLINICAL CORRELATE:   This is an abnormal EEG record.    1. 2 left frontal/frontotemporal electrographic seizures as described above.     *************************************  NEUROLOGY PROGRESS NOTE  **************************************    NAT THOMASON  Female  MRN-[id number]    Subjective: Patient doing okay, but anxious mood. She is disorganized and easily distractible during conversation     Interval History:    - MR brain completed       VITAL SIGNS:  Vital Signs Last 24 Hrs  T(C): 36.8 (2025 08:28), Max: 37.2 (2025 04:42)  T(F): 98.3 (2025 08:28), Max: 98.9 (2025 04:42)  HR: 68 (2025 08:28) (57 - 80)  BP: 102/67 (2025 08:28) (85/46 - 102/67)  BP(mean): --  RR: 19 (2025 08:28) (18 - 20)  SpO2: 98% (2025 08:28) (95% - 98%)    Parameters below as of 2025 08:28  Patient On (Oxygen Delivery Method): room air      PHYSICAL EXAMINATION: INCOMPLETE    Eyes - Normal sclera, conjugate gaze   Mood:     Neurologic Exam:  Mental status - Awake, Alert, Oriented to person, place, and time (2025). Initially did not know  or age (48 something) but was able to say after a few minutes. Speech fluent, repetition and naming intact however with some perseveration. Follows 1 and 2 step commands. Attention/concentration mildly impaired. Able to recall name of president after a few minutes    Cranial nerves - PERRL, VFF, EOMI, face sensation (V1-V3) intact b/l, facial strength intact without asymmetry b/l, tongue midline on protrusion with full lateral movement    Motor - Normal bulk and tone throughout. No pronator drift.  Strength testing grossly 5/5 throughout    Sensation - Light touch intact throughout    DTR's -             Biceps      Triceps     Brachioradialis      Patellar    Ankle    Toes/plantar response  R             2+             2+                  2+                       2+            2+                 Down  L              2+             2+                 2+                        2+           2+                 Down    Coordination - Finger to Nose intact b/l    Gait and station - Deferred      LABS:    CBC                       12.9   10.74 )-----------( 339      ( 2025 06:42 )             37.4     Chem     140  |  103  |  20  ----------------------------<  97  3.9   |  19[L]  |  0.75    Ca    9.5      2025 06:42  Phos  3.3       Mg     2.1         TPro  7.1  /  Alb  4.2  /  TBili  0.2  /  DBili  x   /  AST  20  /  ALT  51[H]  /  AlkPhos  71      LFTs LIVER FUNCTIONS - ( 2025 06:42 )  Alb: 4.2 g/dL / Pro: 7.1 g/dL / ALK PHOS: 71 U/L / ALT: 51 U/L / AST: 20 U/L / GGT: x           Coagulopathy   Lipid Panel  Chol 178 LDL -- HDL 69 Trig 128  A1c   Cardiac enzymes     U/A Urinalysis Basic - ( 2025 06:42 )    Color: x / Appearance: x / SG: x / pH: x  Gluc: 97 mg/dL / Ketone: x  / Bili: x / Urobili: x   Blood: x / Protein: x / Nitrite: x   Leuk Esterase: x / RBC: x / WBC x   Sq Epi: x / Non Sq Epi: x / Bacteria: x      RADIOLOGY & ADDITIONAL STUDIES:          EEG 25    EEG CLASSIFICATION:   Abnormal EEG in the awake, drowsy and asleep states.   1. 2 focal electrographic seizures, left frontal onset with frontotemporal spread   -----------------------------------------------------------------------------------------------------------   IMPRESSION/CLINICAL CORRELATE:   This is an abnormal EEG record.    1. 2 left frontal/frontotemporal electrographic seizures as described above.

## 2025-06-13 NOTE — CONSULT NOTE ADULT - SUBJECTIVE AND OBJECTIVE BOX
p ([id number])     Layla Lainez   HPI: 54F right handed h/o bipolar, anxiety, epilepsy adm for worsening partial sz. EEG w/ left frontotemporal focal sz. 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     Exam: AOx3, intermit expressive aphasia/WFD, PERRL, EOMI, CONWAY 5/5    =====================  PAST MEDICAL HISTORY   PTSD (post-traumatic stress disorder)    Anxiety    Seizure    Osteoarthritis    Shingles      PAST SURGICAL HISTORY   No significant past surgical history    S/P hernia surgery    H/O rhinoplasty    No significant past surgical history    Abdominal hernia    H/O umbilical hernia repair      No Known Allergies      MEDICATIONS:  Antibiotics:    Neuro:  brivaracetam  Injectable 100 milliGRAM(s) IV Push once PRN  brivaracetam  IVPB 100 milliGRAM(s) IV Intermittent <User Schedule>  cloBAZam 10 milliGRAM(s) Oral at bedtime  lacosamide IVPB 150 milliGRAM(s) IV Intermittent <User Schedule>  melatonin 1 milliGRAM(s) Oral at bedtime  midazolam Injectable 2 milliGRAM(s) IV Push once PRN  perampanel 4 milliGRAM(s) Oral at bedtime    Other:  dexAMETHasone     Tablet 2 milliGRAM(s) Oral <User Schedule>  levothyroxine 88 MICROGram(s) Oral daily  pantoprazole    Tablet 40 milliGRAM(s) Oral before breakfast  sodium chloride 0.9%. 1000 milliLiter(s) IV Continuous <Continuous>      SOCIAL HISTORY:   Occupation:   Marital Status:     FAMILY HISTORY:  No pertinent family history in first degree relatives    Family history of cancer (Father)    Family history of arthritis (Mother)        ROS: Negative except per HPI    LABS:                          12.9   10.74 )-----------( 339      ( 13 Jun 2025 06:42 )             37.4     06-13    140  |  103  |  20  ----------------------------<  97  3.9   |  19[L]  |  0.75    Ca    9.5      13 Jun 2025 06:42  Phos  3.3     06-13  Mg     2.1     06-13    TPro  7.1  /  Alb  4.2  /  TBili  0.2  /  DBili  x   /  AST  20  /  ALT  51[H]  /  AlkPhos  71  06-13

## 2025-06-13 NOTE — EEG REPORT - NS EEG TEXT BOX
-- DAY 2	     -- START: 06/12/26 08:00    -- END: 06/13/26  08:00     -- DURATION (HRS): 24         DAILY EEG VISUAL ANALYSIS     The background was continuous, symmetric, spontaneously variable and reactive. During wakefulness, the posterior dominant rhythm consisted of symmetric fragments of 9-10 Hz activity, with amplitude to 30 uV, that attenuated to eye opening.  Low amplitude frontal beta was noted in wakefulness.      The anterior to posterior gradient was present.           GENERALIZED SLOWING:     No generalized background slowing was present.          FOCAL SLOWING:      None was present.          SLEEP BACKGROUND:     Drowsiness was characterized by fragmentation, attenuation, and slowing of the background activity.       Sleep was characterized by the presence of vertex waves, symmetric sleep spindles and K-complexes.          OTHER NON-EPILEPTIFORM FINDINGS:     None were present.          INTERICTAL EPILEPTIFORM ACTIVITY:      None were present.          EVENTS:     5 focal electrographic seizures with rhythmic theta activity at F3 maximal with spread to F7.           ACTIVATION PROCEDURES:      Hyperventilation was not performed.       Photic stimulation was not performed.          ARTIFACTS:     Intermittent myogenic and movement artifacts were noted.          ECG:     The heart rate on single channel ECG was predominantly between XX BPM.          ASMs:           -----------------------------------------------------------------------------------------------------------     EEG CLASSIFICATION:     Abnormal EEG in the awake, drowsy and asleep states.          1. 5 focal electrographic seizures, left frontal onset with frontotemporal spread     -----------------------------------------------------------------------------------------------------------     IMPRESSION/CLINICAL CORRELATE:     This is an abnormal EEG record.           1. 5 left frontal/frontotemporal electrographic seizures as described above.            Mary Griggs DO  Epilepsy Fellow, PGY-5    -------------------------------------------------------------------------------------------------------  [facility] EEG Reading Room Ph#: [phone]  Epilepsy Answering Service after 5PM and before 8:30AM: Ph#: [phone]        -- DAY 2	     -- START: 06/12/26 08:00    -- END: 06/13/26  08:00     -- DURATION (HRS): 24         DAILY EEG VISUAL ANALYSIS     The background was continuous, symmetric, spontaneously variable and reactive. During wakefulness, the posterior dominant rhythm consisted of symmetric fragments of 9-10 Hz activity, with amplitude to 30 uV, that attenuated to eye opening.  Low amplitude frontal beta was noted in wakefulness.      The anterior to posterior gradient was present.           GENERALIZED SLOWING:     No generalized background slowing was present.          FOCAL SLOWING:      None was present.          SLEEP BACKGROUND:     Drowsiness was characterized by fragmentation, attenuation, and slowing of the background activity.       Sleep was characterized by the presence of vertex waves, symmetric sleep spindles and K-complexes.          OTHER NON-EPILEPTIFORM FINDINGS:     None were present.          INTERICTAL EPILEPTIFORM ACTIVITY:      None were present.          EVENTS:     5 focal electrographic seizures with rhythmic theta activity at F3 maximal with spread to F7.           ACTIVATION PROCEDURES:      Hyperventilation was not performed.       Photic stimulation was not performed.          ARTIFACTS:     Intermittent myogenic and movement artifacts were noted.          ECG:     The heart rate on single channel ECG was predominantly between XX BPM.          ASMs:           -----------------------------------------------------------------------------------------------------------     EEG CLASSIFICATION:     Abnormal EEG in the awake, drowsy and asleep states.          1. 9 focal electrographic seizures, left frontal onset with frontotemporal spread     -----------------------------------------------------------------------------------------------------------     IMPRESSION/CLINICAL CORRELATE:     This is an abnormal EEG record.           1. 9 left frontal/frontotemporal electrographic seizures as described above.            Mary Griggs DO  Epilepsy Fellow, PGY-5    -------------------------------------------------------------------------------------------------------  [facility] EEG Reading Room Ph#: [phone]  Epilepsy Answering Service after 5PM and before 8:30AM: Ph#: [phone]

## 2025-06-13 NOTE — CONSULT NOTE ADULT - ASSESSMENT
-booked for OR Monday for rxn tumor, ECOG w/ Dr. Hernández  -preop labs (ordered), medical evaluation ([facility] texted)  -MR Spect, poss fMRI if tolerates  -c/w AEDs per neurology

## 2025-06-13 NOTE — ADVANCED PRACTICE NURSE CONSULT - REASON FOR CONSULT
Vascular Access Team    Evaluation for: Bedside Midline placement  Requested by name: Chayo Caceres (13-Jun-2025 10:09)    Indication: Access  Allergy to CHG or Heparin or Lidocaine: no    Platelets(>20): 339  INR(<3): 0.98  eGFR(>40): 93  Blood cultures sent: na   Blood culture negative in 48hrs: na  Anticoagulants/ antipletelets: none  Arms DVT/ Mastectomy/ Fistula/ PPM/ Defib: no  IR or Nephrology or ID clearance needed: no    Consent obtained: na     Pending: none    Plan: Bedside midline order evaluated. Will place midline within 24-48 hours.

## 2025-06-13 NOTE — PROGRESS NOTE ADULT - ASSESSMENT
NAT THOMASON is a 53yo RIGHT handed woman with a PMHx significant for Left temporal lobe lesion, bipolar, anxiety transferred from [facility] for EMU admission and concern for focal status in setting of left temporal mass.     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 Likely OR for resection Monday 6/16   - Continue Fycompa 4mg daily   - Continue Briviact 100mg TID  - Continue Vimpat 150mg TID   - Stopped lamictal on admission  - Rescues for GTC: 1st line Ativan 1 mg IVP; 2nd line Briviact 100mg IVP  - Continue Dexamethasone 4mg BID  - Neurochecks and VS q4   - Maintain seizure fall and aspiration precautions  - Monitor on telemetry    Hypothyroid   - Continue synthroid 88mcg daily     Diet: Regular   DVT ppx: Lovenox SC  Dispo: Pending clinical course    Patient seen and discussed with Epilepsy attending Dr. Cullen.    NAT THOMASON is a 55yo RIGHT handed woman with a PMHx significant for Left temporal lobe lesion, bipolar, anxiety transferred from [facility] for EMU admission and concern for focal status in setting of left temporal mass.     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 Likely OR for resection Monday 6/16   - Continue Fycompa 4mg daily   - Continue Briviact 100mg TID  - Continue Vimpat 150mg TID   - Stopped lamictal on admission  - Rescues for GTC: 1st line Ativan 1 mg IVP; 2nd line Briviact 100mg IVP  - Continue Dexamethasone 4mg BID  - Behavioral health eval appreciated, maintain 1:1 for now   - Neurochecks and VS q4   - Maintain seizure fall and aspiration precautions  - Monitor on telemetry    #Hypothyroidism  - Continue synthroid 88mcg daily     #Hypotension  -Monitor VS q4   -Giving IVF bolus to augment BP    Diet: Regular   DVT ppx: Lovenox SC  Dispo: Pending clinical course    Patient seen and discussed with Epilepsy attending Dr. Cullen.    NAT THOMASON is a 55yo RIGHT handed woman with a PMHx significant for Left temporal lobe lesion, bipolar, anxiety transferred from [facility] for EMU admission and concern for focal status in setting of left temporal mass.     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 Likely OR for resection Monday 6/16   - Continue Fycompa 4mg daily   - Continue Briviact 100mg TID  - Continue Vimpat 150mg TID   - Start Onfi 10mg qhs  - Stopped lamictal on admission  - Rescues for GTC: 1st line Ativan 1 mg IVP; 2nd line Briviact 100mg IVP  - Continue Dexamethasone 4mg BID  - Behavioral health eval appreciated, maintain 1:1 for now   - Neurochecks and VS q4   - Maintain seizure fall and aspiration precautions  - Monitor on telemetry    #Hypothyroidism  - Continue synthroid 88mcg daily     #Hypotension  -Monitor VS q4   -Giving IVF bolus to augment BP    Diet: Regular   DVT ppx: Lovenox SC  Dispo: Pending clinical course    Patient seen and discussed with Epilepsy attending Dr. Cullen.

## 2025-06-14 LAB
ALBUMIN SERPL ELPH-MCNC: 4.3 G/DL — SIGNIFICANT CHANGE UP (ref 3.3–5)
ALP SERPL-CCNC: 71 U/L — SIGNIFICANT CHANGE UP (ref 40–120)
ALT FLD-CCNC: 54 U/L — HIGH (ref 10–45)
ANION GAP SERPL CALC-SCNC: 16 MMOL/L — SIGNIFICANT CHANGE UP (ref 5–17)
APTT BLD: 28 SEC — SIGNIFICANT CHANGE UP (ref 26.1–36.8)
AST SERPL-CCNC: 19 U/L — SIGNIFICANT CHANGE UP (ref 10–40)
BILIRUB SERPL-MCNC: 0.2 MG/DL — SIGNIFICANT CHANGE UP (ref 0.2–1.2)
BLD GP AB SCN SERPL QL: NEGATIVE — SIGNIFICANT CHANGE UP
BUN SERPL-MCNC: 17 MG/DL — SIGNIFICANT CHANGE UP (ref 7–23)
CALCIUM SERPL-MCNC: 9.6 MG/DL — SIGNIFICANT CHANGE UP (ref 8.4–10.5)
CHLORIDE SERPL-SCNC: 107 MMOL/L — SIGNIFICANT CHANGE UP (ref 96–108)
CO2 SERPL-SCNC: 19 MMOL/L — LOW (ref 22–31)
CREAT SERPL-MCNC: 0.64 MG/DL — SIGNIFICANT CHANGE UP (ref 0.5–1.3)
EGFR: 104 ML/MIN/1.73M2 — SIGNIFICANT CHANGE UP
EGFR: 104 ML/MIN/1.73M2 — SIGNIFICANT CHANGE UP
GLUCOSE SERPL-MCNC: 90 MG/DL — SIGNIFICANT CHANGE UP (ref 70–99)
HCG SERPL-ACNC: 7.2 MIU/ML — SIGNIFICANT CHANGE UP
HCT VFR BLD CALC: 39.8 % — SIGNIFICANT CHANGE UP (ref 34.5–45)
HGB BLD-MCNC: 13.3 G/DL — SIGNIFICANT CHANGE UP (ref 11.5–15.5)
INR BLD: 0.95 RATIO — SIGNIFICANT CHANGE UP (ref 0.85–1.16)
MCHC RBC-ENTMCNC: 30.7 PG — SIGNIFICANT CHANGE UP (ref 27–34)
MCHC RBC-ENTMCNC: 33.4 G/DL — SIGNIFICANT CHANGE UP (ref 32–36)
MCV RBC AUTO: 91.9 FL — SIGNIFICANT CHANGE UP (ref 80–100)
NRBC BLD AUTO-RTO: 0 /100 WBCS — SIGNIFICANT CHANGE UP (ref 0–0)
PLATELET # BLD AUTO: 350 K/UL — SIGNIFICANT CHANGE UP (ref 150–400)
POTASSIUM SERPL-MCNC: 4.1 MMOL/L — SIGNIFICANT CHANGE UP (ref 3.5–5.3)
POTASSIUM SERPL-SCNC: 4.1 MMOL/L — SIGNIFICANT CHANGE UP (ref 3.5–5.3)
PROT SERPL-MCNC: 7.1 G/DL — SIGNIFICANT CHANGE UP (ref 6–8.3)
PROTHROM AB SERPL-ACNC: 10.9 SEC — SIGNIFICANT CHANGE UP (ref 9.9–13.4)
RBC # BLD: 4.33 M/UL — SIGNIFICANT CHANGE UP (ref 3.8–5.2)
RBC # FLD: 12.3 % — SIGNIFICANT CHANGE UP (ref 10.3–14.5)
RH IG SCN BLD-IMP: POSITIVE — SIGNIFICANT CHANGE UP
SODIUM SERPL-SCNC: 142 MMOL/L — SIGNIFICANT CHANGE UP (ref 135–145)
WBC # BLD: 10.84 K/UL — HIGH (ref 3.8–10.5)
WBC # FLD AUTO: 10.84 K/UL — HIGH (ref 3.8–10.5)

## 2025-06-14 PROCEDURE — 99231 SBSQ HOSP IP/OBS SF/LOW 25: CPT

## 2025-06-14 RX ADMIN — BRIVARACETAM 240 MILLIGRAM(S): 75 TABLET, FILM COATED ORAL at 21:14

## 2025-06-14 RX ADMIN — Medication 40 MILLIGRAM(S): at 08:55

## 2025-06-14 RX ADMIN — DEXAMETHASONE 2 MILLIGRAM(S): 0.5 TABLET ORAL at 17:16

## 2025-06-14 RX ADMIN — Medication 88 MICROGRAM(S): at 05:38

## 2025-06-14 RX ADMIN — Medication 1 MILLIGRAM(S): at 21:15

## 2025-06-14 RX ADMIN — DEXAMETHASONE 2 MILLIGRAM(S): 0.5 TABLET ORAL at 05:38

## 2025-06-14 RX ADMIN — BRIVARACETAM 240 MILLIGRAM(S): 75 TABLET, FILM COATED ORAL at 05:43

## 2025-06-14 RX ADMIN — CLOBAZAM 10 MILLIGRAM(S): 20 TABLET ORAL at 21:15

## 2025-06-14 RX ADMIN — PERAMPANEL 4 MILLIGRAM(S): 6 TABLET ORAL at 21:15

## 2025-06-14 RX ADMIN — LACOSAMIDE 130 MILLIGRAM(S): 150 TABLET, FILM COATED ORAL at 05:38

## 2025-06-14 RX ADMIN — LACOSAMIDE 130 MILLIGRAM(S): 150 TABLET, FILM COATED ORAL at 21:14

## 2025-06-14 RX ADMIN — LACOSAMIDE 130 MILLIGRAM(S): 150 TABLET, FILM COATED ORAL at 13:48

## 2025-06-14 RX ADMIN — BRIVARACETAM 240 MILLIGRAM(S): 75 TABLET, FILM COATED ORAL at 13:48

## 2025-06-14 NOTE — PROGRESS NOTE ADULT - SUBJECTIVE AND OBJECTIVE BOX
Patient seen and examined at bedside.    --Anticoagulation--  enoxaparin Injectable 40 milliGRAM(s) SubCutaneous every 24 hours    T(C): 36.3 (06-13-25 @ 23:50), Max: 36.8 (06-13-25 @ 08:28)  HR: 67 (06-13-25 @ 23:50) (64 - 84)  BP: 96/63 (06-13-25 @ 23:50) (88/60 - 112/71)  RR: 18 (06-13-25 @ 23:50) (18 - 19)  SpO2: 98% (06-13-25 @ 23:50) (96% - 99%)  Wt(kg): --    Exam: aphasia/WFD, PERRL, EOMI, CONWAY 5/5

## 2025-06-14 NOTE — PROGRESS NOTE ADULT - ASSESSMENT
NAT THOMASON is a 53yo RIGHT handed woman with a PMHx significant for Left temporal lobe lesion, bipolar, anxiety transferred from [facility] for EMU admission and concern for focal status in setting of left temporal mass.     Impression:  Focal status, resolved but still with electrographic seizures - left frontal onset with frontotemporal spread.      Plan:    #Seizure  #Left temporal mass   - Video EEG - can d/c today  - MRI Brain w/wo contrast completed. Awaiting MR brain spectroscopy and functional  - Neurosurgery evaluation of left temporal mass, plan discussed with patient and family. OR for resection Monday 6/16   - Continue Fycompa 4mg daily   - Continue Briviact 100mg TID  - Continue Vimpat 150mg TID   - Started Onfi 10mg qHS - to help anxiety (in addition to seizures)  - Stopped lamictal on admission  - Rescues for GTC: 1st line Ativan 1 mg IVP; 2nd line Briviact 100mg IVP  - Continue Dexamethasone 2mg BID  - Behavioral health eval appreciated, maintain 1:1 for now   - Neurochecks and VS q4   - Maintain seizure fall and aspiration precautions  - Monitor on telemetry    #Hypothyroidism  - Continue synthroid 88mcg daily     #Hypotension  -Monitor VS q4   -Giving IVF bolus to augment BP    Diet: Regular, NPO midnight 6/16   DVT ppx: Lovenox SC  Dispo: Pending clinical course    Patient seen and discussed with Epilepsy attending Dr. Cullen. Spoke with family at bedside.

## 2025-06-14 NOTE — PROGRESS NOTE ADULT - SUBJECTIVE AND OBJECTIVE BOX
Neurology Progress Note    SUBJECTIVE/OBJECTIVE/INTERVAL EVENTS: Patient seen and examined at bedside w/ neuro attending and team.     INTERVAL HISTORY:      REVIEW OF SYSTEMS: Few questions of a 10-system ROS was performed and is negative except for those items noted above and/or in the HPI.    VITALS & EXAMINATION:  Vital Signs Last 24 Hrs  T(C): 36.6 (14 Jun 2025 14:13), Max: 36.7 (14 Jun 2025 09:56)  T(F): 97.8 (14 Jun 2025 14:13), Max: 98 (14 Jun 2025 09:56)  HR: 71 (14 Jun 2025 14:13) (54 - 96)  BP: 94/68 (14 Jun 2025 14:13) (88/60 - 102/67)  BP(mean): --  RR: 18 (14 Jun 2025 14:13) (18 - 18)  SpO2: 98% (14 Jun 2025 14:13) (96% - 100%)    Parameters below as of 14 Jun 2025 14:13  Patient On (Oxygen Delivery Method): room air        General:  Constitutional: Female, appears stated age, nontoxic, not in distress,    Head: Normocephalic;   Eyes: clear sclera;   Extremities: No cyanosis;   Resp: breathing comfortably  Neck: no nuchal rigidity  Fundoscopic exam:      Neurological (>12):  MS: Awake, alert.  Oriented person place situation year month. Follows simple complex cross commands. Able to ID 3/3 objects. Attends to examiner  Language: Speech is hypophonic, clear, fluent, good repetition,  comprehension, registration of words.  CNs: PERRL (R 3mm, L 3mm). VFF. EOMI. No disconjugate gaze, nystagmus. V1-3 intact LT, No facial asymmetry b/l. Hearing grossly normal b/l. Tongue midline and can move side to side.     Motor - Normal bulk and tone throughout. No pronator drift.    L/R (out of 5 each)       Deltoid  5/5    Biceps   5/5      Triceps  5/5         Wrist Extension 5/5   Wrist Flexion  5/5   Interossei 5/5     5/5   L/R (out of 5 each)       Hip Flexion  5/5    Hip Extension  5/5  Knee Extension  5/5  Dorsiflexion  5/5      Plantar Flexion 5/5     Sensation: Intact to LT b/l x4 extremities. Cortical: Extinction on DSS (neglect): none  Reflexes L/R:  Biceps(C5) 2/2  BR(C6) 2/2   Triceps(C7)  2/2 Patellar(L4)   2/2   Ankles 2/2   Toes: mute b/l  no ankle clonus  Coordination: No dysmetria to FTN b/l UE  Gait: Normal Romberg. No postural instability. Normal stance. Gait baseline.    LABORATORY:  CBC                       13.3   10.84 )-----------( 350      ( 14 Jun 2025 07:03 )             39.8     Chem 06-14    142  |  107  |  17  ----------------------------<  90  4.1   |  19[L]  |  0.64    Ca    9.6      14 Jun 2025 07:04  Phos  3.3     06-13  Mg     2.1     06-13    TPro  7.1  /  Alb  4.3  /  TBili  0.2  /  DBili  x   /  AST  19  /  ALT  54[H]  /  AlkPhos  71  06-14    LFTs LIVER FUNCTIONS - ( 14 Jun 2025 07:04 )  Alb: 4.3 g/dL / Pro: 7.1 g/dL / ALK PHOS: 71 U/L / ALT: 54 U/L / AST: 19 U/L / GGT: x           Coagulopathy PT/INR - ( 14 Jun 2025 07:04 )   PT: 10.9 sec;   INR: 0.95 ratio         PTT - ( 14 Jun 2025 07:04 )  PTT:28.0 sec  Lipid Panel 06-08 Chol 178 LDL -- HDL 69 Trig 128  A1c   Cardiac enzymes     U/A Urinalysis Basic - ( 14 Jun 2025 07:04 )    Color: x / Appearance: x / SG: x / pH: x  Gluc: 90 mg/dL / Ketone: x  / Bili: x / Urobili: x   Blood: x / Protein: x / Nitrite: x   Leuk Esterase: x / RBC: x / WBC x   Sq Epi: x / Non Sq Epi: x / Bacteria: x      CSF  Immunological  Other    STUDIES & IMAGING: (EEG, CT, MR, U/S, TTE/COBY): Neurology Progress Note    SUBJECTIVE/OBJECTIVE/INTERVAL EVENTS: Patient seen and examined at bedside w/ neuro attending and team.     INTERVAL HISTORY:  Smiling, doing well. Remembers meeting with neurosurgeon, Dr. Hernández. Can't remember when the surgery is scheduled. We discussed plan for OR on Monday.    REVIEW OF SYSTEMS: Few questions of a 10-system ROS was performed and is negative except for those items noted above and/or in the HPI.    VITALS & EXAMINATION:  Vital Signs Last 24 Hrs  T(C): 36.6 (14 Jun 2025 14:13), Max: 36.7 (14 Jun 2025 09:56)  T(F): 97.8 (14 Jun 2025 14:13), Max: 98 (14 Jun 2025 09:56)  HR: 71 (14 Jun 2025 14:13) (54 - 96)  BP: 94/68 (14 Jun 2025 14:13) (88/60 - 102/67)  BP(mean): --  RR: 18 (14 Jun 2025 14:13) (18 - 18)  SpO2: 98% (14 Jun 2025 14:13) (96% - 100%)    Parameters below as of 14 Jun 2025 14:13  Patient On (Oxygen Delivery Method): room air    Neurologic Exam:  Mental status - Awake, Alert, Follows commands. Attention/concentration mildly impaired.    Cranial nerves - PERRL, VFF, EOMI, face sensation (V1-V3) intact b/l, facial strength intact without asymmetry b/l, tongue midline on protrusion with full lateral movement    Motor - Normal bulk and tone throughout. No pronator drift.  Strength testing grossly 5/5 throughout    Sensation - Light touch intact throughout    DTR's -             Biceps      Triceps     Brachioradialis      Patellar    Ankle    Toes/plantar response  R             2+             2+                  2+                       2+            2+                 Down  L              2+             2+                 2+                        2+           2+                 Down    Coordination - Finger to Nose intact b/l    Gait and station - Walking unassisted    LABORATORY:  CBC                       13.3   10.84 )-----------( 350      ( 14 Jun 2025 07:03 )             39.8     Chem 06-14    142  |  107  |  17  ----------------------------<  90  4.1   |  19[L]  |  0.64    Ca    9.6      14 Jun 2025 07:04  Phos  3.3     06-13  Mg     2.1     06-13    TPro  7.1  /  Alb  4.3  /  TBili  0.2  /  DBili  x   /  AST  19  /  ALT  54[H]  /  AlkPhos  71  06-14    LFTs LIVER FUNCTIONS - ( 14 Jun 2025 07:04 )  Alb: 4.3 g/dL / Pro: 7.1 g/dL / ALK PHOS: 71 U/L / ALT: 54 U/L / AST: 19 U/L / GGT: x           Coagulopathy PT/INR - ( 14 Jun 2025 07:04 )   PT: 10.9 sec;   INR: 0.95 ratio         PTT - ( 14 Jun 2025 07:04 )  PTT:28.0 sec  Lipid Panel 06-08 Chol 178 LDL -- HDL 69 Trig 128  A1c   Cardiac enzymes     U/A Urinalysis Basic - ( 14 Jun 2025 07:04 )    Color: x / Appearance: x / SG: x / pH: x  Gluc: 90 mg/dL / Ketone: x  / Bili: x / Urobili: x   Blood: x / Protein: x / Nitrite: x   Leuk Esterase: x / RBC: x / WBC x   Sq Epi: x / Non Sq Epi: x / Bacteria: x      CSF  Immunological  Other    STUDIES & IMAGING: (EEG, CT, MR, U/S, TTE/COBY):  EEG CLASSIFICATION (6/14/2025):   Abnormal EEG in the awake, drowsy and asleep states.   1. 5 focal electrographic seizures, left frontal onset with frontotemporal spread   -----------------------------------------------------------------------------------------------------------   IMPRESSION/CLINICAL CORRELATE:   This is an abnormal EEG record.    1. 5 left frontal/frontotemporal electrographic seizures as described above.      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 No significant mass effect.    Interval resolution of left temporal cortical restricted diffusion.   Persistent left temporal moderate cortical swelling and sulcal effacement   likely compatible with expected post seizure changes.

## 2025-06-14 NOTE — EEG REPORT - NS EEG TEXT BOX
-- DAY 3	     -- START: 06/13/26 08:00    -- END: 06/14/26  08:00     -- DURATION (HRS): 24         DAILY EEG VISUAL ANALYSIS     The background was continuous, symmetric, spontaneously variable and reactive. During wakefulness, the posterior dominant rhythm consisted of symmetric fragments of 9-10 Hz activity, with amplitude to 30 uV, that attenuated to eye opening.  Low amplitude frontal beta was noted in wakefulness.      The anterior to posterior gradient was present.           GENERALIZED SLOWING:     No generalized background slowing was present.          FOCAL SLOWING:      None was present.          SLEEP BACKGROUND:     Drowsiness was characterized by fragmentation, attenuation, and slowing of the background activity.       Sleep was characterized by the presence of vertex waves, symmetric sleep spindles and K-complexes.          OTHER NON-EPILEPTIFORM FINDINGS:     None were present.          INTERICTAL EPILEPTIFORM ACTIVITY:      None were present.          EVENTS:     5 focal electrographic seizures with rhythmic theta activity at F3 maximal with spread to F7.           ACTIVATION PROCEDURES:      Hyperventilation was not performed.       Photic stimulation was not performed.          ARTIFACTS:     Intermittent myogenic and movement artifacts were noted.          ECG:     The heart rate on single channel ECG was predominantly between XX BPM.          ASMs:           -----------------------------------------------------------------------------------------------------------     EEG CLASSIFICATION:     Abnormal EEG in the awake, drowsy and asleep states.          1. 5 focal electrographic seizures, left frontal onset with frontotemporal spread     -----------------------------------------------------------------------------------------------------------     IMPRESSION/CLINICAL CORRELATE:     This is an abnormal EEG record.           1. 5 left frontal/frontotemporal electrographic seizures as described above.            Suze Cullen MD  Epilepsy Attending  -------------------------------------------------------------------------------------------------------  [facility] EEG Reading Room Ph#: [phone]  Epilepsy Answering Service after 5PM and before 8:30AM: Ph#: [phone]        -- DAY 3	     -- START: 06/13/26 08:00    -- END: 06/14/26  08:00     -- DURATION (HRS): 24         DAILY EEG VISUAL ANALYSIS     The background was continuous, symmetric, spontaneously variable and reactive. During wakefulness, the posterior dominant rhythm consisted of symmetric fragments of 9-10 Hz activity, with amplitude to 30 uV, that attenuated to eye opening.  Low amplitude frontal beta was noted in wakefulness.      The anterior to posterior gradient was present.           GENERALIZED SLOWING:     No generalized background slowing was present.          FOCAL SLOWING:      None was present.          SLEEP BACKGROUND:     Drowsiness was characterized by fragmentation, attenuation, and slowing of the background activity.       Sleep was characterized by the presence of vertex waves, symmetric sleep spindles and K-complexes.          OTHER NON-EPILEPTIFORM FINDINGS:     None were present.          INTERICTAL EPILEPTIFORM ACTIVITY:      None were present.          EVENTS:     None          ACTIVATION PROCEDURES:      Hyperventilation was not performed.       Photic stimulation was not performed.          ARTIFACTS:     Intermittent myogenic and movement artifacts were noted.          ECG:     The heart rate on single channel ECG was predominantly between XX BPM.          ASMs:           -----------------------------------------------------------------------------------------------------------     EEG CLASSIFICATION:     Normal EEG in the awake, drowsy and asleep states.       -----------------------------------------------------------------------------------------------------------     IMPRESSION/CLINICAL CORRELATE:     This is anormal EEG record.      No seizures recorded on this day.           Suze Cullen MD  Epilepsy Attending  -------------------------------------------------------------------------------------------------------  [facility] EEG Reading Room Ph#: [phone]  Epilepsy Answering Service after 5PM and before 8:30AM: Ph#: [phone]

## 2025-06-14 NOTE — CONSULT NOTE ADULT - SUBJECTIVE AND OBJECTIVE BOX
Name of Patient : NAT THOMASON  MRN: [id number]  Date of visit: 06-14-25 @ 12:02      Subjective: Patient seen and examined. No new events except as noted.     REVIEW OF SYSTEMS:    CONSTITUTIONAL: No weakness, fevers or chills  EYES/ENT: No visual changes;  No vertigo or throat pain   NECK: No pain or stiffness  RESPIRATORY: No cough, wheezing, hemoptysis; No shortness of breath  CARDIOVASCULAR: No chest pain or palpitations  GASTROINTESTINAL: No abdominal or epigastric pain. No nausea, vomiting, or hematemesis; No diarrhea or constipation. No melena or hematochezia.  GENITOURINARY: No dysuria, frequency or hematuria  NEUROLOGICAL: No numbness or weakness  SKIN: No itching, burning, rashes, or lesions   All other review of systems is negative unless indicated above.    MEDICATIONS:  MEDICATIONS  (STANDING):  brivaracetam  IVPB 100 milliGRAM(s) IV Intermittent <User Schedule>  cloBAZam 10 milliGRAM(s) Oral at bedtime  dexAMETHasone     Tablet 2 milliGRAM(s) Oral <User Schedule>  enoxaparin Injectable 40 milliGRAM(s) SubCutaneous every 24 hours  lacosamide IVPB 150 milliGRAM(s) IV Intermittent <User Schedule>  levothyroxine 88 MICROGram(s) Oral daily  melatonin 1 milliGRAM(s) Oral at bedtime  pantoprazole    Tablet 40 milliGRAM(s) Oral before breakfast  perampanel 4 milliGRAM(s) Oral at bedtime      PHYSICAL EXAM:  T(C): 36.7 (06-14-25 @ 09:56), Max: 36.7 (06-13-25 @ 13:12)  HR: 96 (06-14-25 @ 09:56) (54 - 96)  BP: 100/68 (06-14-25 @ 09:56) (88/60 - 112/71)  RR: 18 (06-14-25 @ 09:56) (18 - 18)  SpO2: 96% (06-14-25 @ 09:56) (96% - 100%)  Wt(kg): --  I&O's Summary    13 Jun 2025 07:01  -  14 Jun 2025 07:00  --------------------------------------------------------  IN: 860 mL / OUT: 0 mL / NET: 860 mL          Appearance: Normal	  HEENT:  PERRLA   Lymphatic: No lymphadenopathy   Cardiovascular: Normal S1 S2, no JVD  Respiratory: normal effort , clear  Gastrointestinal:  Soft, Non-tender  Skin: No rashes,  warm to touch  Psychiatry:  Mood & affect appropriate  Musculuskeletal: No edema    recent labs, Imaging and EKGs personally reviewed   CODE status discussed with the patient in detail            06-13-25 @ 07:01  -  06-14-25 @ 07:00  --------------------------------------------------------  IN: 860 mL / OUT: 0 mL / NET: 860 mL

## 2025-06-14 NOTE — PROGRESS NOTE ADULT - ASSESSMENT
-MR spectroscopy and fMRI would be ideal preoperatively  -pend medical clearance, Dr. Mcgregor to see  -preop labs ordered for this AM  -Please keep NPO after midnight 6/16/2025

## 2025-06-15 LAB
ALBUMIN SERPL ELPH-MCNC: 4.1 G/DL — SIGNIFICANT CHANGE UP (ref 3.3–5)
ALP SERPL-CCNC: 70 U/L — SIGNIFICANT CHANGE UP (ref 40–120)
ALT FLD-CCNC: 50 U/L — HIGH (ref 10–45)
ANION GAP SERPL CALC-SCNC: 15 MMOL/L — SIGNIFICANT CHANGE UP (ref 5–17)
AST SERPL-CCNC: 19 U/L — SIGNIFICANT CHANGE UP (ref 10–40)
BILIRUB SERPL-MCNC: 0.2 MG/DL — SIGNIFICANT CHANGE UP (ref 0.2–1.2)
BLD GP AB SCN SERPL QL: NEGATIVE — SIGNIFICANT CHANGE UP
BUN SERPL-MCNC: 20 MG/DL — SIGNIFICANT CHANGE UP (ref 7–23)
CALCIUM SERPL-MCNC: 9.8 MG/DL — SIGNIFICANT CHANGE UP (ref 8.4–10.5)
CHLORIDE SERPL-SCNC: 105 MMOL/L — SIGNIFICANT CHANGE UP (ref 96–108)
CO2 SERPL-SCNC: 20 MMOL/L — LOW (ref 22–31)
CREAT SERPL-MCNC: 0.78 MG/DL — SIGNIFICANT CHANGE UP (ref 0.5–1.3)
EGFR: 89 ML/MIN/1.73M2 — SIGNIFICANT CHANGE UP
EGFR: 89 ML/MIN/1.73M2 — SIGNIFICANT CHANGE UP
GLUCOSE BLDC GLUCOMTR-MCNC: 150 MG/DL — HIGH (ref 70–99)
GLUCOSE BLDC GLUCOMTR-MCNC: 99 MG/DL — SIGNIFICANT CHANGE UP (ref 70–99)
GLUCOSE SERPL-MCNC: 114 MG/DL — HIGH (ref 70–99)
HCT VFR BLD CALC: 37.9 % — SIGNIFICANT CHANGE UP (ref 34.5–45)
HGB BLD-MCNC: 12.7 G/DL — SIGNIFICANT CHANGE UP (ref 11.5–15.5)
MCHC RBC-ENTMCNC: 30.5 PG — SIGNIFICANT CHANGE UP (ref 27–34)
MCHC RBC-ENTMCNC: 33.5 G/DL — SIGNIFICANT CHANGE UP (ref 32–36)
MCV RBC AUTO: 91.1 FL — SIGNIFICANT CHANGE UP (ref 80–100)
MRSA PCR RESULT.: SIGNIFICANT CHANGE UP
NRBC BLD AUTO-RTO: 0 /100 WBCS — SIGNIFICANT CHANGE UP (ref 0–0)
PLATELET # BLD AUTO: 346 K/UL — SIGNIFICANT CHANGE UP (ref 150–400)
POTASSIUM SERPL-MCNC: 4.4 MMOL/L — SIGNIFICANT CHANGE UP (ref 3.5–5.3)
POTASSIUM SERPL-SCNC: 4.4 MMOL/L — SIGNIFICANT CHANGE UP (ref 3.5–5.3)
PROT SERPL-MCNC: 7 G/DL — SIGNIFICANT CHANGE UP (ref 6–8.3)
RBC # BLD: 4.16 M/UL — SIGNIFICANT CHANGE UP (ref 3.8–5.2)
RBC # FLD: 12.3 % — SIGNIFICANT CHANGE UP (ref 10.3–14.5)
RH IG SCN BLD-IMP: POSITIVE — SIGNIFICANT CHANGE UP
S AUREUS DNA NOSE QL NAA+PROBE: SIGNIFICANT CHANGE UP
SODIUM SERPL-SCNC: 140 MMOL/L — SIGNIFICANT CHANGE UP (ref 135–145)
WBC # BLD: 10.67 K/UL — HIGH (ref 3.8–10.5)
WBC # FLD AUTO: 10.67 K/UL — HIGH (ref 3.8–10.5)

## 2025-06-15 PROCEDURE — 99232 SBSQ HOSP IP/OBS MODERATE 35: CPT

## 2025-06-15 PROCEDURE — 99232 SBSQ HOSP IP/OBS MODERATE 35: CPT | Mod: GC

## 2025-06-15 RX ADMIN — LACOSAMIDE 130 MILLIGRAM(S): 150 TABLET, FILM COATED ORAL at 05:26

## 2025-06-15 RX ADMIN — Medication 88 MICROGRAM(S): at 05:27

## 2025-06-15 RX ADMIN — Medication 40 MILLIGRAM(S): at 05:28

## 2025-06-15 RX ADMIN — PERAMPANEL 4 MILLIGRAM(S): 6 TABLET ORAL at 21:21

## 2025-06-15 RX ADMIN — Medication 75 MILLILITER(S): at 17:50

## 2025-06-15 RX ADMIN — LACOSAMIDE 130 MILLIGRAM(S): 150 TABLET, FILM COATED ORAL at 21:21

## 2025-06-15 RX ADMIN — DEXAMETHASONE 2 MILLIGRAM(S): 0.5 TABLET ORAL at 17:50

## 2025-06-15 RX ADMIN — BRIVARACETAM 240 MILLIGRAM(S): 75 TABLET, FILM COATED ORAL at 05:26

## 2025-06-15 RX ADMIN — Medication 1 MILLIGRAM(S): at 21:20

## 2025-06-15 RX ADMIN — LACOSAMIDE 130 MILLIGRAM(S): 150 TABLET, FILM COATED ORAL at 12:31

## 2025-06-15 RX ADMIN — BRIVARACETAM 240 MILLIGRAM(S): 75 TABLET, FILM COATED ORAL at 12:32

## 2025-06-15 RX ADMIN — Medication 500 MILLILITER(S): at 14:31

## 2025-06-15 RX ADMIN — CLOBAZAM 10 MILLIGRAM(S): 20 TABLET ORAL at 21:21

## 2025-06-15 RX ADMIN — Medication 75 MILLILITER(S): at 09:31

## 2025-06-15 RX ADMIN — DEXAMETHASONE 2 MILLIGRAM(S): 0.5 TABLET ORAL at 05:27

## 2025-06-15 RX ADMIN — BRIVARACETAM 240 MILLIGRAM(S): 75 TABLET, FILM COATED ORAL at 21:22

## 2025-06-15 NOTE — DISCHARGE NOTE PROVIDER - HOSPITAL COURSE
HPI  Patient NAT THOMASON is a 55yo woman with a PMH of Left temporal lobe lesion, Bipolar, Anxiety transferred from [place] to [facility] to be evaluated for seizure. History limited as patient is poor historian.  History as per partner (Benson) at bedside. As per partner over the past few weeks patient has been having multiple episodes of partial seizures more often than usual. Despite being on her normal routine, patient often has multiple seizures weekly but always returned to baseline. On  patient had a seizure and afterwards patient appeared more confused, was unable to articulate herself did not seek medical intervention. Since then symptoms have worsen, on  patient was found with a bunch of Lamictal pills around her, reported she might have taken more  pills than usual & patient was brought to [place] ED.   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   As per partner, patient ha been going under a lot of life changing events. In april she lost her job, 3 weeks ago a family member has ; with everything happening, she has been deteriorating  Also on  patient fell at [facility], got on the plane & went to [place], there she went to the ED and was found to have a concussion   Patient used to be on lexapro, but was discontinued in April by her psychiatrist   Upon evaluation patient is awake, alert, speaking incoherently, pacing around the room, but able to follow commands and re-directable.   Patient was transferred [facility] and had MRI brain with stable temporal lobe lesion favoring diagnosis of malignancy as well as surrounding edema likely correlated to recent seizure activity. Neurology suspected seizures related to temporal lobe lesion and patient was transferred no [facility] EMU for further evaluation and management    Hospital Course:  Patient admitted to EMU for management of seizures. On admission lamotrigine was stopped and fycompa was increased to 4mg daily. She was continued on Briviact 100mg TID and Vimpat 150mg TID. Dexamethasone was tapered down to 2mg BID. She had focal seizures with onset in left frontal region, likely 2/2 left temporal lesion with suspicion for neoplasm. Onfi 10mg qhs was added to improve seizure control. Patient was seen by Neurosugery team with plan for resection of left temporal lesion   MR brain obtained....          EEG    25 EEG  Abnormal EEG in the awake, drowsy and asleep states.   1. 2 focal electrographic seizures, left frontal onset with frontotemporal spread     25 EEG  Abnormal EEG in the awake, drowsy and asleep states.   1. 5 focal electrographic seizures, left frontal onset with frontotemporal spread     25 EEG  EEG CLASSIFICATION:   Abnormal EEG in the awake, drowsy and asleep states.   1. 5 focal electrographic seizures, left frontal onset with frontotemporal spread       Imaging:   MR Head w/wo IV Cont (25 @ 22:02)   IMPRESSION:  Stable nonenhancing left temporal lesion with imaging characteristics   favoring a low-grade neoplasm. No significant mass effect.  Interval resolution of left temporal cortical restricted diffusion.   Persistent left temporal moderate cortical swelling and sulcal effacement   likely compatible with expected post seizure changes.       HPI  Patient NAT THOMASON is a 55yo woman with a PMH of Left temporal lobe lesion, Bipolar, Anxiety transferred from [place] to [facility] to be evaluated for seizure. History limited as patient is poor historian.  History as per partner (Benson) at bedside. As per partner over the past few weeks patient has been having multiple episodes of partial seizures more often than usual. Despite being on her normal routine, patient often has multiple seizures weekly but always returned to baseline. On  patient had a seizure and afterwards patient appeared more confused, was unable to articulate herself did not seek medical intervention. Since then symptoms have worsen, on  patient was found with a bunch of Lamictal pills around her, reported she might have taken more  pills than usual & patient was brought to [place] ED.   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   As per partner, patient ha been going under a lot of life changing events. In april she lost her job, 3 weeks ago a family member has ; with everything happening, she has been deteriorating  Also on  patient fell at [facility], got on the plane & went to [place], there she went to the ED and was found to have a concussion   Patient used to be on lexapro, but was discontinued in April by her psychiatrist   Upon evaluation patient is awake, alert, speaking incoherently, pacing around the room, but able to follow commands and re-directable.   Patient was transferred [facility] and had MRI brain with stable temporal lobe lesion favoring diagnosis of malignancy as well as surrounding edema likely correlated to recent seizure activity. Neurology suspected seizures related to temporal lobe lesion and patient was transferred no [facility] EMU for further evaluation and management    Hospital Course:  Patient admitted to EMU for management of seizures. On admission lamotrigine was stopped and fycompa was increased to 4mg daily. She was continued on Briviact 100mg TID and Vimpat 150mg TID. Dexamethasone was tapered down to 2mg BID. She had focal seizures with onset in left frontal region, likely 2/2 left temporal lesion with suspicion for neoplasm. Onfi 10mg qhs was added to improve seizure control. Patient was seen by Neurosurgery team with plan for resection of left temporal lesion   MR brain obtained....          EEG    25 EEG  Abnormal EEG in the awake, drowsy and asleep states.   1. 2 focal electrographic seizures, left frontal onset with frontotemporal spread     25 EEG  Abnormal EEG in the awake, drowsy and asleep states.   1. 5 focal electrographic seizures, left frontal onset with frontotemporal spread     25 EEG  EEG CLASSIFICATION:   Abnormal EEG in the awake, drowsy and asleep states.   1. 5 focal electrographic seizures, left frontal onset with frontotemporal spread       Imaging:   MR Head w/wo IV Cont (25 @ 22:02)   IMPRESSION:  Stable nonenhancing left temporal lesion with imaging characteristics   favoring a low-grade neoplasm. No significant mass effect.  Interval resolution of left temporal cortical restricted diffusion.   Persistent left temporal moderate cortical swelling and sulcal effacement   likely compatible with expected post seizure changes.         Patient NAT THOMASON is a 55yo woman with a PMH of Left temporal lobe lesion, Bipolar, Anxiety transferred from [place] to [facility] to be evaluated for seizure. History limited as patient is poor historian.  History as per partner (Benson) at bedside. As per partner over the past few weeks patient has been having multiple episodes of partial seizures more often than usual. Despite being on her normal routine, patient often has multiple seizures weekly but always returned to baseline. On  patient had a seizure and afterwards patient appeared more confused, was unable to articulate herself did not seek medical intervention. Since then symptoms have worsen, on  patient was found with a bunch of Lamictal pills around her, reported she might have taken more  pills than usual & patient was brought to [place] ED.   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   As per partner, patient ha been going under a lot of life changing events. In april she lost her job, 3 weeks ago a family member has ; with everything happening, she has been deteriorating  Also on  patient fell at [facility], got on the plane & went to [place], there she went to the ED and was found to have a concussion   Patient used to be on lexapro, but was discontinued in April by her psychiatrist   Upon evaluation patient is awake, alert, speaking incoherently, pacing around the room, but able to follow commands and re-directable.   Patient was transferred [facility] and had MRI brain with stable temporal lobe lesion favoring diagnosis of malignancy as well as surrounding edema likely correlated to recent seizure activity. Neurology suspected seizures related to temporal lobe lesion and patient was transferred no [facility] EMU for further evaluation and management    Hospital Course:  Patient admitted to EMU for management of seizures. On admission lamotrigine was stopped and fycompa was increased to 4mg daily. She was continued on Briviact 100mg TID and Vimpat 150mg TID. Dexamethasone was tapered down to 2mg BID. She had focal seizures with onset in left frontal region, likely 2/2 left temporal lesion with suspicion for neoplasm. Onfi 10mg qhs was added to improve seizure control. Patient was seen by Neurosurgery team with plan for resection of left temporal lesion   MR brain obtained....          EEG    25 EEG  Abnormal EEG in the awake, drowsy and asleep states.   1. 2 focal electrographic seizures, left frontal onset with frontotemporal spread     25 EEG  Abnormal EEG in the awake, drowsy and asleep states.   1. 5 focal electrographic seizures, left frontal onset with frontotemporal spread     25 EEG  EEG CLASSIFICATION:   Abnormal EEG in the awake, drowsy and asleep states.   1. 5 focal electrographic seizures, left frontal onset with frontotemporal spread       Imaging:   MR Head w/wo IV Cont (25 @ 22:02)   IMPRESSION:  Stable nonenhancing left temporal lesion with imaging characteristics   favoring a low-grade neoplasm. No significant mass effect.  Interval resolution of left temporal cortical restricted diffusion.   Persistent left temporal moderate cortical swelling and sulcal effacement   likely compatible with expected post seizure changes.         Patient NAT THOMASON is a 53yo woman with a PMH of Left temporal lobe lesion, Bipolar, Anxiety transferred from [place] to [facility] to be evaluated for seizure. History limited as patient is poor historian.  History as per partner (Benson) at bedside. As per partner over the past few weeks patient has been having multiple episodes of partial seizures more often than usual. Despite being on her normal routine, patient often has multiple seizures weekly but always returned to baseline. On  patient had a seizure and afterwards patient appeared more confused, was unable to articulate herself did not seek medical intervention. Since then symptoms have worsen, on  patient was found with a bunch of Lamictal pills around her, reported she might have taken more  pills than usual & patient was brought to [place] ED.   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   As per partner, patient ha been going under a lot of life changing events. In april she lost her job, 3 weeks ago a family member has ; with everything happening, she has been deteriorating  Also on  patient fell at [facility], got on the plane & went to [place], there she went to the ED and was found to have a concussion   Patient used to be on lexapro, but was discontinued in April by her psychiatrist   Upon evaluation patient is awake, alert, speaking incoherently, pacing around the room, but able to follow commands and re-directable.   Patient was transferred [facility] and had MRI brain with stable temporal lobe lesion favoring diagnosis of malignancy as well as surrounding edema likely correlated to recent seizure activity. Neurology suspected seizures related to temporal lobe lesion and patient was transferred no [facility] EMU for further evaluation and management    Hospital Course:  Patient admitted to EMU for management of seizures. On admission lamotrigine was stopped and fycompa was increased to 4mg daily. She was continued on Briviact 100mg TID and Vimpat 150mg TID. Dexamethasone was tapered down to 2mg BID. She had focal seizures with onset in left frontal region, likely 2/2 left temporal lesion with suspicion for neoplasm. Onfi 10mg qhs was added to improve seizure control. Patient was seen by Neurosurgery team with plan for resection of left temporal lesion   MR brain obtained....          EEG    25 EEG  Abnormal EEG in the awake, drowsy and asleep states.   1. 2 focal electrographic seizures, left frontal onset with frontotemporal spread     25 EEG  Abnormal EEG in the awake, drowsy and asleep states.   1. 5 focal electrographic seizures, left frontal onset with frontotemporal spread     25 EEG  EEG CLASSIFICATION:   Abnormal EEG in the awake, drowsy and asleep states.   1. 5 focal electrographic seizures, left frontal onset with frontotemporal spread       Imaging:   MR Head w/wo IV Cont (25 @ 22:02)   IMPRESSION:  Stable nonenhancing left temporal lesion with imaging characteristics   favoring a low-grade neoplasm. No significant mass effect.  Interval resolution of left temporal cortical restricted diffusion.   Persistent left temporal moderate cortical swelling and sulcal effacement   likely compatible with expected post seizure changes.  6/15  Psych was called to evaluate patient for agitation, but evaluation could not be completed 2/2 depressed mental status         Patient NAT THOMASON is a 55yo woman with a PMH of Left temporal lobe lesion, Bipolar, Anxiety transferred from [place] to [facility] to be evaluated for seizure. History limited as patient is poor historian.  History as per partner (Benson) at bedside. As per partner over the past few weeks patient has been having multiple episodes of partial seizures more often than usual. Despite being on her normal routine, patient often has multiple seizures weekly but always returned to baseline. On  patient had a seizure and afterwards patient appeared more confused, was unable to articulate herself did not seek medical intervention. Since then symptoms have worsen, on  patient was found with a bunch of Lamictal pills around her, reported she might have taken more  pills than usual & patient was brought to [place] ED.   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   As per partner, patient ha been going under a lot of life changing events. In april she lost her job, 3 weeks ago a family member has ; with everything happening, she has been deteriorating  Also on  patient fell at [facility], got on the plane & went to [place], there she went to the ED and was found to have a concussion   Patient used to be on lexapro, but was discontinued in April by her psychiatrist   Upon evaluation patient is awake, alert, speaking incoherently, pacing around the room, but able to follow commands and re-directable.   Patient was transferred [facility] and had MRI brain with stable temporal lobe lesion favoring diagnosis of malignancy as well as surrounding edema likely correlated to recent seizure activity. Neurology suspected seizures related to temporal lobe lesion and patient was transferred no [facility] EMU for further evaluation and management    Hospital Course:  Patient admitted to EMU for management of seizures. On admission lamotrigine was stopped and fycompa was increased to 4mg daily. She was continued on Briviact 100mg TID and Vimpat 150mg TID. Dexamethasone was tapered down to 2mg BID. She had focal seizures with onset in left frontal region, likely 2/2 left temporal lesion with suspicion for neoplasm. Onfi 10mg qhs was added to improve seizure control. Patient was seen by Neurosurgery team with plan for resection of left temporal lesion   MR brain obtained....          EEG    25 EEG  Abnormal EEG in the awake, drowsy and asleep states.   1. 2 focal electrographic seizures, left frontal onset with frontotemporal spread     25 EEG  Abnormal EEG in the awake, drowsy and asleep states.   1. 5 focal electrographic seizures, left frontal onset with frontotemporal spread     25 EEG  EEG CLASSIFICATION:   Abnormal EEG in the awake, drowsy and asleep states.   1. 5 focal electrographic seizures, left frontal onset with frontotemporal spread       Imaging:   MR Head w/wo IV Cont (25 @ 22:02)   IMPRESSION:  Stable nonenhancing left temporal lesion with imaging characteristics   favoring a low-grade neoplasm. No significant mass effect.  Interval resolution of left temporal cortical restricted diffusion.   Persistent left temporal moderate cortical swelling and sulcal effacement   likely compatible with expected post seizure changes.  Psych evaluation was requested because as per records has a history of a suicide attempt and psychiatric hospitalization 11 years ago. No known substance abuse.  On exam  patient appeared more confused and less able to answer questions, though still oriented to being in a hospital and the date. Family at the bedside were able to provide more history, including patient's psychiatrist Dr. Joseluis Horner, and that she had been taking extra Lamictal because she could not remember if she had taken it already, because of the seizures. On 6/15 patient recalled making statements at OSH that she would rather not be here than have brain surgery, but no longer feels this way, wants to have the surgery, and wants to live. 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 It does not appear that she is actively suicidal, and she has not done or said anything during this admission to suggest that, and is accepting of treatment. However given her current mental state, we are unable to fully complete the safety evaluation at this time. Due to her unpredictable behavior would recommend continuing constant observation and we will continue to evaluate.  Neurologically patient has been stable. She was continue to be seen by psych and was weaned off constaant observation on . She was evaluated by PT/OT PM&R and acuterehab recommended  On the day of discharge, patient is neurologically stable

## 2025-06-15 NOTE — PROGRESS NOTE ADULT - SUBJECTIVE AND OBJECTIVE BOX
Patient seen and examined at bedside.    --Anticoagulation--    T(C): 36 (06-15-25 @ 05:30), Max: 36.7 (06-14-25 @ 09:56)  HR: 70 (06-15-25 @ 05:30) (70 - 96)  BP: 112/82 (06-15-25 @ 05:30) (88/55 - 112/82)  RR: 20 (06-15-25 @ 05:30) (18 - 20)  SpO2: 99% (06-15-25 @ 05:30) (96% - 99%)  Wt(kg): --    Exam: aphasia/WFD, PERRL, EOMI, CONWAY 5/5

## 2025-06-15 NOTE — PROGRESS NOTE ADULT - SUBJECTIVE AND OBJECTIVE BOX
Name of Patient : NAT THOMASON  MRN: [id number]  Date of visit: 06-15-25       Subjective: Patient seen and examined. No new events except as noted.   calm    REVIEW OF SYSTEMS:    CONSTITUTIONAL: No weakness, fevers or chills  EYES/ENT: No visual changes;  No vertigo or throat pain   NECK: No pain or stiffness  RESPIRATORY: No cough, wheezing, hemoptysis; No shortness of breath  CARDIOVASCULAR: No chest pain or palpitations  GASTROINTESTINAL: No abdominal or epigastric pain. No nausea, vomiting, or hematemesis; No diarrhea or constipation. No melena or hematochezia.  GENITOURINARY: No dysuria, frequency or hematuria  NEUROLOGICAL: No numbness or weakness  SKIN: No itching, burning, rashes, or lesions   All other review of systems is negative unless indicated above.    MEDICATIONS:  MEDICATIONS  (STANDING):  brivaracetam  IVPB 100 milliGRAM(s) IV Intermittent <User Schedule>  chlorhexidine 4% Liquid 1 Application(s) Topical once  cloBAZam 10 milliGRAM(s) Oral at bedtime  dexAMETHasone     Tablet 2 milliGRAM(s) Oral <User Schedule>  lacosamide IVPB 150 milliGRAM(s) IV Intermittent <User Schedule>  levothyroxine 88 MICROGram(s) Oral daily  melatonin 1 milliGRAM(s) Oral at bedtime  pantoprazole    Tablet 40 milliGRAM(s) Oral before breakfast  perampanel 4 milliGRAM(s) Oral at bedtime  sodium chloride 0.9%. 1000 milliLiter(s) (75 mL/Hr) IV Continuous <Continuous>      PHYSICAL EXAM:  T(C): 36.8 (06-15-25 @ 21:04), Max: 36.8 (06-15-25 @ 21:04)  HR: 75 (06-15-25 @ 21:04) (70 - 87)  BP: 97/67 (06-15-25 @ 21:19) (88/55 - 122/70)  RR: 18 (06-15-25 @ 21:04) (18 - 20)  SpO2: 93% (06-15-25 @ 21:04) (93% - 99%)  Wt(kg): --  I&O's Summary    14 Jun 2025 07:01  -  15 Alirio 2025 07:00  --------------------------------------------------------  IN: 100 mL / OUT: 0 mL / NET: 100 mL    15 Alirio 2025 07:01  -  15 Alirio 2025 22:00  --------------------------------------------------------  IN: 1580 mL / OUT: 0 mL / NET: 1580 mL          Appearance: Normal	  HEENT:  PERRLA   Lymphatic: No lymphadenopathy   Cardiovascular: Normal S1 S2, no JVD  Respiratory: normal effort , clear  Gastrointestinal:  Soft, Non-tender  Skin: No rashes,  warm to touch  Psychiatry:  Mood & affect appropriate  Musculuskeletal: No edema    recent labs, Imaging and EKGs personally reviewed       06-14-25 @ 07:01  -  06-15-25 @ 07:00  --------------------------------------------------------  IN: 100 mL / OUT: 0 mL / NET: 100 mL    06-15-25 @ 07:01  -  06-15-25 @ 22:00  --------------------------------------------------------  IN: 1580 mL / OUT: 0 mL / NET: 1580 mL                          12.7   10.67 )-----------( 346      ( 15 Alirio 2025 06:19 )             37.9               06-15    140  |  105  |  20  ----------------------------<  114[H]  4.4   |  20[L]  |  0.78    Ca    9.8      15 Alirio 2025 06:19    TPro  7.0  /  Alb  4.1  /  TBili  0.2  /  DBili  x   /  AST  19  /  ALT  50[H]  /  AlkPhos  70  06-15    PT/INR - ( 14 Jun 2025 07:04 )   PT: 10.9 sec;   INR: 0.95 ratio         PTT - ( 14 Jun 2025 07:04 )  PTT:28.0 sec                   Urinalysis Basic - ( 15 Alirio 2025 06:19 )    Color: x / Appearance: x / SG: x / pH: x  Gluc: 114 mg/dL / Ketone: x  / Bili: x / Urobili: x   Blood: x / Protein: x / Nitrite: x   Leuk Esterase: x / RBC: x / WBC x   Sq Epi: x / Non Sq Epi: x / Bacteria: x

## 2025-06-15 NOTE — PROGRESS NOTE ADULT - ASSESSMENT
Patient is a 55 Y/O RIGHT handed woman with a PMHx significant for Left temporal lobe lesion, bipolar, anxiety transferred from [facility] for EMU admission and concern for focal status in setting of left temporal mass.     # Brain Temp lobe lesion   Neuro and neurosurg eval appreciated   MRI noted   Echo ordered  EKG noted   patient is medically optimized for OR   antiseizure meds per neurology     # Elevated TSH   repeat TSH in AM   COnt Synthroid for now

## 2025-06-15 NOTE — PROGRESS NOTE ADULT - ASSESSMENT
NAT THOMASON is a 55yo RIGHT handed woman with a PMHx significant for Left temporal lobe lesion, bipolar, anxiety transferred from [facility] for EMU admission and concern for focal status in setting of left temporal mass.     Impression:  Focal status, resolved but still with electrographic seizures - left frontal onset with frontotemporal spread.      Plan:    #Seizure  #Left temporal mass   - Video EEG - can d/c today  - MRI Brain w/wo contrast completed. Awaiting MR brain spectroscopy and functional  - Neurosurgery evaluation of left temporal mass, plan discussed with patient and family. OR for resection Monday 6/16   - Continue Fycompa 4mg daily   - Continue Briviact 100mg TID  - Continue Vimpat 150mg TID   - Started Onfi 10mg qHS - to help anxiety (in addition to seizures)  - Stopped lamictal on admission  - Rescues for GTC: 1st line Ativan 1 mg IVP; 2nd line Briviact 100mg IVP  - Continue Dexamethasone 2mg BID  - Behavioral health eval appreciated, maintain 1:1 for now   - Neurochecks and VS q4   - Maintain seizure fall and aspiration precautions  - Monitor on telemetry    #Hypothyroidism  - Continue synthroid 88mcg daily     #Hypotension  -Monitor VS q4   -Giving IVF bolus to augment BP    Diet: Regular, NPO midnight 6/16   DVT ppx: Lovenox SC  Dispo: Pending clinical course    Patient seen and discussed with Epilepsy attending Dr. Cullen. Spoke with family at bedside. NAT THOMASON is a 53yo RIGHT handed woman with a PMHx significant for Left temporal lobe lesion, bipolar, anxiety transferred from [facility] for EMU admission and concern for focal status in setting of left temporal mass.     Impression:  Focal status, resolved but still with electrographic seizures - left frontal onset with frontotemporal spread.      Plan:    #Seizure  #Left temporal mass   - Video EEG - can d/c today  - MRI Brain w/wo contrast completed. Awaiting MR brain spectroscopy and functional  - Neurosurgery evaluation of left temporal mass, plan discussed with patient and family. OR for resection Monday 6/16   - Continue Fycompa 4mg daily   - Continue Briviact 100mg TID  - Continue Vimpat 150mg TID   - Started Onfi 10mg qHS - to help anxiety (in addition to seizures)  - Stopped lamictal on admission  - Rescues for GTC: 1st line Ativan 1 mg IVP; 2nd line Briviact 100mg IVP  - Continue Dexamethasone 2mg BID  - Behavioral health eval appreciated, maintain 1:1 for now   - Neurochecks and VS q4   - Maintain seizure fall and aspiration precautions  - Monitor on telemetry    #Hypothyroidism  - Continue synthroid 88mcg daily     #Hypotension  -Monitor VS q4   -Giving IVF bolus to augment BP    Diet: Regular, NPO midnight 6/15  DVT ppx: Lovenox SC  Dispo: Pending clinical course    Patient seen and discussed with Epilepsy attending Dr. Cullen. Spoke with family at bedside.

## 2025-06-15 NOTE — DISCHARGE NOTE PROVIDER - NSDCFUSCHEDAPPT_GEN_ALL_CORE_FT
Scooter Hernández  [facility]  NEUROSURG [address]  Scheduled Appointment: 06/16/2025

## 2025-06-15 NOTE — DISCHARGE NOTE PROVIDER - NSDCFUADDINST_GEN_ALL_CORE_FT
Please keep incision clean and dry, you can shower, do not submerge wound in water for prolonged periods of time, pat dry after showering, and do not use any creams or ointments to incision.  Please do not engage in strenuous activity, heavy lifting, drive, or return to work or school until cleared by surgeon.  Please notify physician if fevers, bleeding, swelling, pain not relieved by medication, increased sluggishness or irritability, increased nausea or vomiting, inability to tolerate foods or liquids.  No heavy lifting/straining, Showering allowed, Stairs allowed, Walking - Indoors allowed, Walking - Outdoors allowed, Follow Instructions Provided by your Surgical Team  Do not start any Motrin /Aspirin NSAIDS( Motrin, Advil.... until cleared by your neurosurgeon

## 2025-06-15 NOTE — DISCHARGE NOTE PROVIDER - NSDCCPCAREPLAN_GEN_ALL_CORE_FT
PRINCIPAL DISCHARGE DIAGNOSIS  Diagnosis: Brain tumor  Assessment and Plan of Treatment: You had Left craniotomy for brain tumor, your condition is stable for discharge to acute rehab today. Please continue Decadron for brain edema x 1 dose. Please see Dr Hernández for follow up once discharge from rehab      SECONDARY DISCHARGE DIAGNOSES  Diagnosis: Anxiety disorder  Assessment and Plan of Treatment: Continue Ativan as needed    Diagnosis: Seizure disorder  Assessment and Plan of Treatment: Continue your anti seizure medications    Diagnosis: Hypothyroidism  Assessment and Plan of Treatment: Continue your home medication and follow up with your primary care physician    Diagnosis: Asthma  Assessment and Plan of Treatment: Continue your home medication and follow up with your primary care physician

## 2025-06-15 NOTE — PROGRESS NOTE ADULT - SUBJECTIVE AND OBJECTIVE BOX
Neurology Progress Note    SUBJECTIVE/OBJECTIVE/INTERVAL EVENTS: Patient seen and examined at bedside w/ neuro attending and team.     INTERVAL HISTORY:  Smiling, doing well. Remembers meeting with neurosurgeon, Dr. Hernández. Can't remember when the surgery is scheduled. We discussed plan for OR on Monday.    REVIEW OF SYSTEMS: Few questions of a 10-system ROS was performed and is negative except for those items noted above and/or in the HPI.    VITALS & EXAMINATION:  Vital Signs Last 24 Hrs  T(C): 36.6 (14 Jun 2025 14:13), Max: 36.7 (14 Jun 2025 09:56)  T(F): 97.8 (14 Jun 2025 14:13), Max: 98 (14 Jun 2025 09:56)  HR: 71 (14 Jun 2025 14:13) (54 - 96)  BP: 94/68 (14 Jun 2025 14:13) (88/60 - 102/67)  BP(mean): --  RR: 18 (14 Jun 2025 14:13) (18 - 18)  SpO2: 98% (14 Jun 2025 14:13) (96% - 100%)    Parameters below as of 14 Jun 2025 14:13  Patient On (Oxygen Delivery Method): room air    Neurologic Exam:  Mental status - Awake, Alert, Follows commands. Attention/concentration mildly impaired.    Cranial nerves - PERRL, VFF, EOMI, face sensation (V1-V3) intact b/l, facial strength intact without asymmetry b/l, tongue midline on protrusion with full lateral movement    Motor - Normal bulk and tone throughout. No pronator drift.  Strength testing grossly 5/5 throughout    Sensation - Light touch intact throughout    DTR's -             Biceps      Triceps     Brachioradialis      Patellar    Ankle    Toes/plantar response  R             2+             2+                  2+                       2+            2+                 Down  L              2+             2+                 2+                        2+           2+                 Down    Coordination - Finger to Nose intact b/l    Gait and station - Walking unassisted    LABORATORY:  CBC                       13.3   10.84 )-----------( 350      ( 14 Jun 2025 07:03 )             39.8     Chem 06-14    142  |  107  |  17  ----------------------------<  90  4.1   |  19[L]  |  0.64    Ca    9.6      14 Jun 2025 07:04  Phos  3.3     06-13  Mg     2.1     06-13    TPro  7.1  /  Alb  4.3  /  TBili  0.2  /  DBili  x   /  AST  19  /  ALT  54[H]  /  AlkPhos  71  06-14    LFTs LIVER FUNCTIONS - ( 14 Jun 2025 07:04 )  Alb: 4.3 g/dL / Pro: 7.1 g/dL / ALK PHOS: 71 U/L / ALT: 54 U/L / AST: 19 U/L / GGT: x           Coagulopathy PT/INR - ( 14 Jun 2025 07:04 )   PT: 10.9 sec;   INR: 0.95 ratio         PTT - ( 14 Jun 2025 07:04 )  PTT:28.0 sec  Lipid Panel 06-08 Chol 178 LDL -- HDL 69 Trig 128  A1c   Cardiac enzymes     U/A Urinalysis Basic - ( 14 Jun 2025 07:04 )    Color: x / Appearance: x / SG: x / pH: x  Gluc: 90 mg/dL / Ketone: x  / Bili: x / Urobili: x   Blood: x / Protein: x / Nitrite: x   Leuk Esterase: x / RBC: x / WBC x   Sq Epi: x / Non Sq Epi: x / Bacteria: x      CSF  Immunological  Other    STUDIES & IMAGING: (EEG, CT, MR, U/S, TTE/COBY):  EEG CLASSIFICATION (6/14/2025):   Abnormal EEG in the awake, drowsy and asleep states.   1. 5 focal electrographic seizures, left frontal onset with frontotemporal spread   -----------------------------------------------------------------------------------------------------------   IMPRESSION/CLINICAL CORRELATE:   This is an abnormal EEG record.    1. 5 left frontal/frontotemporal electrographic seizures as described above.      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 No significant mass effect.    Interval resolution of left temporal cortical restricted diffusion.   Persistent left temporal moderate cortical swelling and sulcal effacement   likely compatible with expected post seizure changes. Writer spoke with patient by phone 4/16/24 at 1300 to discuss pharmacological options to treat insomnia given insurance coverage with temazepam.  We discussed risks, benefits, alternative treatments.  Patient was given opportunity to ask questions and no barriers to learning were identified.  All questions were answered and patient was agreeable to reduce temazepam dose to 15 mg by mouth nightly as needed for insomnia.  Per pharmacist at [facility], patient's insurance will only cover 30 capsules per prescription, thus 30 dose supply of temazepam with 2 refills was E scribed to pharmacy.    Thanh Salguero, PMERAP-BC

## 2025-06-15 NOTE — DISCHARGE NOTE PROVIDER - CARE PROVIDER_API CALL
Scooter Hernández  Neurological Surgery  [address], [address]  [place] [address]  Phone: [phone]  Fax: [phone]  Follow Up Time:

## 2025-06-15 NOTE — BH CONSULTATION LIAISON PROGRESS NOTE - OTHER
able to complete DOWB attention tasks but distractible during conversation brief periods of less organized thought moves extremities spontaneously some difficulty with word-finding or unusual choice of words ok, overwhelmed at times

## 2025-06-15 NOTE — PROGRESS NOTE ADULT - ASSESSMENT
-MR spectroscopy and fMRI would be ideal preoperatively  -pend medical clearance, Dr. Mcgregor to see  -Please keep NPO after midnight 6/16/2025

## 2025-06-15 NOTE — DISCHARGE NOTE PROVIDER - NSDCMRMEDTOKEN_GEN_ALL_CORE_FT
Albuterol (Eqv-ProAir HFA) 90 mcg/inh inhalation aerosol: 2 puff(s) inhaled every 6 hours as needed for  bronchospasm  brivaracetam 10 mg/mL intravenous solution: 100 milligram(s) intravenous 3 times a day  dexAMETHasone 4 mg oral tablet: 1 tab(s) orally every 8 hours  lacosamide 200 mg/20 mL intravenous solution: 150 milligram(s) intravenous 3 times a day  LaMICtal 25 mg oral tablet: 10 tab(s) orally every 12 hours  LORazepam 1 mg/mL-NaCl 0.9% intravenous solution: 2 milliliter(s) intravenous every hour As needed Seizure Activity  perampanel 2 mg oral tablet: 1 tab(s) orally 2 times a day  Synthroid 88 mcg (0.088 mg) oral tablet: 1 tab(s) orally once a day   acetaminophen 325 mg oral tablet: 2 tab(s) orally every 6 hours As needed Temp greater or equal to 38C (100.4F), Mild Pain (1 - 3)  Albuterol (Eqv-ProAir HFA) 90 mcg/inh inhalation aerosol: 2 puff(s) inhaled every 6 hours as needed for  bronchospasm  brivaracetam 10 mg/mL intravenous solution: 100 milligram(s) intravenous 3 times a day  dexAMETHasone 2 mg oral tablet: 1 tab(s) orally every 12 hours x 1 more dose  dexAMETHasone 4 mg oral tablet: 1 tab(s) orally every 8 hours  lacosamide 200 mg/20 mL intravenous solution: 150 milligram(s) intravenous 3 times a day  LaMICtal 25 mg oral tablet: 10 tab(s) orally every 12 hours  LORazepam 1 mg/mL-NaCl 0.9% intravenous solution: 2 milliliter(s) intravenous every hour As needed Seizure Activity  perampanel 2 mg oral tablet: 1 tab(s) orally 2 times a day  polyethylene glycol 3350 oral powder for reconstitution: 17 gram(s) orally once a day  senna leaf extract oral tablet: 2 tab(s) orally once a day (at bedtime)  simethicone 80 mg oral tablet, chewable: 1 tab(s) orally once a day As needed Gas  Synthroid 88 mcg (0.088 mg) oral tablet: 1 tab(s) orally once a day   acetaminophen 325 mg oral tablet: 2 tab(s) orally every 6 hours As needed Temp greater or equal to 38C (100.4F), Mild Pain (1 - 3)  Albuterol (Eqv-ProAir HFA) 90 mcg/inh inhalation aerosol: 2 puff(s) inhaled every 6 hours as needed for  bronchospasm  brivaracetam 100 mg oral tablet: 1 tab(s) orally 3 times a day 6AM, 14PM AND 22PM  cloBAZam 10 mg oral tablet: 1 tab(s) orally once a day (at bedtime)  dexAMETHasone 2 mg oral tablet: 1 tab(s) orally every 12 hours x 1 more dose  enoxaparin: 40 milligram(s) subcutaneous once a day (at bedtime)  hydrocortisone 2.5% topical lotion: 1 Apply topically to affected area every 8 hours As needed Itching  lacosamide 150 mg oral tablet: 1 tab(s) orally 3 times a day 5AM,13 PM AND 21PM  LORazepam 0.5 mg oral tablet: 1 tab(s) orally every 6 hours As needed Agitation  melatonin 1 mg oral tablet: 1 tab(s) orally once a day (at bedtime)  perampanel 4 mg oral tablet: 1 tab(s) orally once a day (at bedtime)  polyethylene glycol 3350 oral powder for reconstitution: 17 gram(s) orally once a day  senna leaf extract oral tablet: 2 tab(s) orally once a day (at bedtime)  simethicone 80 mg oral tablet, chewable: 1 tab(s) orally once a day As needed Gas  Synthroid 88 mcg (0.088 mg) oral tablet: 1 tab(s) orally once a day

## 2025-06-16 ENCOUNTER — RESULT REVIEW (OUTPATIENT)
Age: 55
End: 2025-06-16

## 2025-06-16 ENCOUNTER — APPOINTMENT (OUTPATIENT)
Dept: NEUROSURGERY | Facility: HOSPITAL | Age: 55
End: 2025-06-16

## 2025-06-16 LAB
ALBUMIN SERPL ELPH-MCNC: 4.2 G/DL — SIGNIFICANT CHANGE UP (ref 3.3–5)
ALP SERPL-CCNC: 71 U/L — SIGNIFICANT CHANGE UP (ref 40–120)
ALT FLD-CCNC: 49 U/L — HIGH (ref 10–45)
ANION GAP SERPL CALC-SCNC: 14 MMOL/L — SIGNIFICANT CHANGE UP (ref 5–17)
ANION GAP SERPL CALC-SCNC: 15 MMOL/L — SIGNIFICANT CHANGE UP (ref 5–17)
APTT BLD: 25.1 SEC — LOW (ref 26.1–36.8)
AST SERPL-CCNC: 20 U/L — SIGNIFICANT CHANGE UP (ref 10–40)
BILIRUB SERPL-MCNC: 0.1 MG/DL — LOW (ref 0.2–1.2)
BLD GP AB SCN SERPL QL: NEGATIVE — SIGNIFICANT CHANGE UP
BUN SERPL-MCNC: 13 MG/DL — SIGNIFICANT CHANGE UP (ref 7–23)
BUN SERPL-MCNC: 26 MG/DL — HIGH (ref 7–23)
CALCIUM SERPL-MCNC: 8.3 MG/DL — LOW (ref 8.4–10.5)
CALCIUM SERPL-MCNC: 9.4 MG/DL — SIGNIFICANT CHANGE UP (ref 8.4–10.5)
CHLORIDE SERPL-SCNC: 105 MMOL/L — SIGNIFICANT CHANGE UP (ref 96–108)
CHLORIDE SERPL-SCNC: 108 MMOL/L — SIGNIFICANT CHANGE UP (ref 96–108)
CO2 SERPL-SCNC: 19 MMOL/L — LOW (ref 22–31)
CO2 SERPL-SCNC: 23 MMOL/L — SIGNIFICANT CHANGE UP (ref 22–31)
CREAT SERPL-MCNC: 0.55 MG/DL — SIGNIFICANT CHANGE UP (ref 0.5–1.3)
CREAT SERPL-MCNC: 0.82 MG/DL — SIGNIFICANT CHANGE UP (ref 0.5–1.3)
EGFR: 108 ML/MIN/1.73M2 — SIGNIFICANT CHANGE UP
EGFR: 108 ML/MIN/1.73M2 — SIGNIFICANT CHANGE UP
EGFR: 84 ML/MIN/1.73M2 — SIGNIFICANT CHANGE UP
EGFR: 84 ML/MIN/1.73M2 — SIGNIFICANT CHANGE UP
GLUCOSE BLDC GLUCOMTR-MCNC: 124 MG/DL — HIGH (ref 70–99)
GLUCOSE SERPL-MCNC: 152 MG/DL — HIGH (ref 70–99)
GLUCOSE SERPL-MCNC: 99 MG/DL — SIGNIFICANT CHANGE UP (ref 70–99)
HCT VFR BLD CALC: 35.4 % — SIGNIFICANT CHANGE UP (ref 34.5–45)
HCT VFR BLD CALC: 37.2 % — SIGNIFICANT CHANGE UP (ref 34.5–45)
HGB BLD-MCNC: 12 G/DL — SIGNIFICANT CHANGE UP (ref 11.5–15.5)
HGB BLD-MCNC: 12.7 G/DL — SIGNIFICANT CHANGE UP (ref 11.5–15.5)
INR BLD: 0.91 RATIO — SIGNIFICANT CHANGE UP (ref 0.85–1.16)
MAGNESIUM SERPL-MCNC: 2.1 MG/DL — SIGNIFICANT CHANGE UP (ref 1.6–2.6)
MCHC RBC-ENTMCNC: 30.4 PG — SIGNIFICANT CHANGE UP (ref 27–34)
MCHC RBC-ENTMCNC: 31.3 PG — SIGNIFICANT CHANGE UP (ref 27–34)
MCHC RBC-ENTMCNC: 33.9 G/DL — SIGNIFICANT CHANGE UP (ref 32–36)
MCHC RBC-ENTMCNC: 34.1 G/DL — SIGNIFICANT CHANGE UP (ref 32–36)
MCV RBC AUTO: 89.6 FL — SIGNIFICANT CHANGE UP (ref 80–100)
MCV RBC AUTO: 91.6 FL — SIGNIFICANT CHANGE UP (ref 80–100)
NRBC BLD AUTO-RTO: 0 /100 WBCS — SIGNIFICANT CHANGE UP (ref 0–0)
NRBC BLD AUTO-RTO: 0 /100 WBCS — SIGNIFICANT CHANGE UP (ref 0–0)
PHOSPHATE SERPL-MCNC: 3.5 MG/DL — SIGNIFICANT CHANGE UP (ref 2.5–4.5)
PLATELET # BLD AUTO: 342 K/UL — SIGNIFICANT CHANGE UP (ref 150–400)
PLATELET # BLD AUTO: 343 K/UL — SIGNIFICANT CHANGE UP (ref 150–400)
POTASSIUM SERPL-MCNC: 4 MMOL/L — SIGNIFICANT CHANGE UP (ref 3.5–5.3)
POTASSIUM SERPL-MCNC: 4.1 MMOL/L — SIGNIFICANT CHANGE UP (ref 3.5–5.3)
POTASSIUM SERPL-SCNC: 4 MMOL/L — SIGNIFICANT CHANGE UP (ref 3.5–5.3)
POTASSIUM SERPL-SCNC: 4.1 MMOL/L — SIGNIFICANT CHANGE UP (ref 3.5–5.3)
PROT SERPL-MCNC: 7 G/DL — SIGNIFICANT CHANGE UP (ref 6–8.3)
PROTHROM AB SERPL-ACNC: 10.5 SEC — SIGNIFICANT CHANGE UP (ref 9.9–13.4)
RBC # BLD: 3.95 M/UL — SIGNIFICANT CHANGE UP (ref 3.8–5.2)
RBC # BLD: 4.06 M/UL — SIGNIFICANT CHANGE UP (ref 3.8–5.2)
RBC # FLD: 12.3 % — SIGNIFICANT CHANGE UP (ref 10.3–14.5)
RBC # FLD: 12.3 % — SIGNIFICANT CHANGE UP (ref 10.3–14.5)
RH IG SCN BLD-IMP: POSITIVE — SIGNIFICANT CHANGE UP
SODIUM SERPL-SCNC: 142 MMOL/L — SIGNIFICANT CHANGE UP (ref 135–145)
SODIUM SERPL-SCNC: 142 MMOL/L — SIGNIFICANT CHANGE UP (ref 135–145)
WBC # BLD: 11.22 K/UL — HIGH (ref 3.8–10.5)
WBC # BLD: 11.38 K/UL — HIGH (ref 3.8–10.5)
WBC # FLD AUTO: 11.22 K/UL — HIGH (ref 3.8–10.5)
WBC # FLD AUTO: 11.38 K/UL — HIGH (ref 3.8–10.5)

## 2025-06-16 PROCEDURE — 61536 REMOVAL OF BRAIN LESION: CPT

## 2025-06-16 PROCEDURE — 93306 TTE W/DOPPLER COMPLETE: CPT | Mod: 26

## 2025-06-16 PROCEDURE — 88360 TUMOR IMMUNOHISTOCHEM/MANUAL: CPT | Mod: 26

## 2025-06-16 PROCEDURE — 99291 CRITICAL CARE FIRST HOUR: CPT | Mod: 24

## 2025-06-16 PROCEDURE — 61781 SCAN PROC CRANIAL INTRA: CPT

## 2025-06-16 PROCEDURE — 69990 MICROSURGERY ADD-ON: CPT | Mod: 59

## 2025-06-16 PROCEDURE — 99232 SBSQ HOSP IP/OBS MODERATE 35: CPT

## 2025-06-16 PROCEDURE — 88307 TISSUE EXAM BY PATHOLOGIST: CPT | Mod: 26

## 2025-06-16 PROCEDURE — 88341 IMHCHEM/IMCYTCHM EA ADD ANTB: CPT | Mod: 26,59

## 2025-06-16 PROCEDURE — 88331 PATH CONSLTJ SURG 1 BLK 1SPC: CPT | Mod: 26

## 2025-06-16 PROCEDURE — 88334 PATH CONSLTJ SURG CYTO XM EA: CPT | Mod: 26,59

## 2025-06-16 PROCEDURE — 88342 IMHCHEM/IMCYTCHM 1ST ANTB: CPT | Mod: 26,59

## 2025-06-16 DEVICE — PLATE COVER BURRHOLE UN3 W/TAB 14MM: Type: IMPLANTABLE DEVICE | Site: LEFT | Status: FUNCTIONAL

## 2025-06-16 DEVICE — SET VNTRC DRN 35CM 10FR 3.3MM: Type: IMPLANTABLE DEVICE | Site: LEFT | Status: FUNCTIONAL

## 2025-06-16 DEVICE — SURGIFOAM 8 X 12.5CM X 10MM (100): Type: IMPLANTABLE DEVICE | Site: LEFT | Status: FUNCTIONAL

## 2025-06-16 DEVICE — SCREW UN3 AXS SELF DRILL 1.5X4MM: Type: IMPLANTABLE DEVICE | Site: LEFT | Status: FUNCTIONAL

## 2025-06-16 DEVICE — SURGIFLO MATRIX WITH THROMBIN KIT: Type: IMPLANTABLE DEVICE | Site: LEFT | Status: FUNCTIONAL

## 2025-06-16 DEVICE — TACHOSIL 9.5 X 4.8CM: Type: IMPLANTABLE DEVICE | Site: LEFT | Status: FUNCTIONAL

## 2025-06-16 DEVICE — CLIP APPLIER COVIDIEN SURGICLIP II 9.75" MEDIUM: Type: IMPLANTABLE DEVICE | Site: LEFT | Status: FUNCTIONAL

## 2025-06-16 DEVICE — PLATE UN3 STRAIGHT 16 HOLE: Type: IMPLANTABLE DEVICE | Site: LEFT | Status: FUNCTIONAL

## 2025-06-16 DEVICE — ELCTR SPINAL KIT (6 CONTACTS): Type: IMPLANTABLE DEVICE | Site: LEFT | Status: FUNCTIONAL

## 2025-06-16 DEVICE — ELECT 8 CONTACT 0.76X4.5MM: Type: IMPLANTABLE DEVICE | Site: LEFT | Status: FUNCTIONAL

## 2025-06-16 DEVICE — SURGICEL 2 X 14": Type: IMPLANTABLE DEVICE | Site: LEFT | Status: FUNCTIONAL

## 2025-06-16 RX ORDER — DEXAMETHASONE 0.5 MG/1
4 TABLET ORAL EVERY 6 HOURS
Refills: 0 | Status: COMPLETED | OUTPATIENT
Start: 2025-06-16 | End: 2025-06-18

## 2025-06-16 RX ORDER — HYDROMORPHONE/SOD CHLOR,ISO/PF 2 MG/10 ML
0.5 SYRINGE (ML) INJECTION
Refills: 0 | Status: DISCONTINUED | OUTPATIENT
Start: 2025-06-16 | End: 2025-06-16

## 2025-06-16 RX ORDER — LACOSAMIDE 150 MG/1
150 TABLET, FILM COATED ORAL
Refills: 0 | Status: DISCONTINUED | OUTPATIENT
Start: 2025-06-16 | End: 2025-06-20

## 2025-06-16 RX ORDER — BRIVARACETAM 75 MG/1
100 TABLET, FILM COATED ORAL ONCE
Refills: 0 | Status: DISCONTINUED | OUTPATIENT
Start: 2025-06-16 | End: 2025-06-23

## 2025-06-16 RX ORDER — POLYETHYLENE GLYCOL 3350 17 G/17G
17 POWDER, FOR SOLUTION ORAL DAILY
Refills: 0 | Status: DISCONTINUED | OUTPATIENT
Start: 2025-06-16 | End: 2025-06-23

## 2025-06-16 RX ORDER — DEXAMETHASONE 0.5 MG/1
4 TABLET ORAL EVERY 12 HOURS
Refills: 0 | Status: COMPLETED | OUTPATIENT
Start: 2025-06-21 | End: 2025-06-21

## 2025-06-16 RX ORDER — DEXAMETHASONE 0.5 MG/1
3 TABLET ORAL EVERY 12 HOURS
Refills: 0 | Status: COMPLETED | OUTPATIENT
Start: 2025-06-22 | End: 2025-06-22

## 2025-06-16 RX ORDER — DEXAMETHASONE 0.5 MG/1
4 TABLET ORAL EVERY 8 HOURS
Refills: 0 | Status: COMPLETED | OUTPATIENT
Start: 2025-06-18 | End: 2025-06-20

## 2025-06-16 RX ORDER — ONDANSETRON HCL/PF 4 MG/2 ML
4 VIAL (ML) INJECTION EVERY 6 HOURS
Refills: 0 | Status: DISCONTINUED | OUTPATIENT
Start: 2025-06-16 | End: 2025-06-23

## 2025-06-16 RX ORDER — OXYCODONE HYDROCHLORIDE 30 MG/1
5 TABLET ORAL EVERY 4 HOURS
Refills: 0 | Status: DISCONTINUED | OUTPATIENT
Start: 2025-06-16 | End: 2025-06-17

## 2025-06-16 RX ORDER — DEXAMETHASONE 0.5 MG/1
TABLET ORAL
Refills: 0 | Status: DISCONTINUED | OUTPATIENT
Start: 2025-06-16 | End: 2025-06-23

## 2025-06-16 RX ORDER — ACETAMINOPHEN 500 MG/5ML
650 LIQUID (ML) ORAL EVERY 6 HOURS
Refills: 0 | Status: DISCONTINUED | OUTPATIENT
Start: 2025-06-17 | End: 2025-06-23

## 2025-06-16 RX ORDER — MELATONIN 5 MG
1 TABLET ORAL AT BEDTIME
Refills: 0 | Status: DISCONTINUED | OUTPATIENT
Start: 2025-06-16 | End: 2025-06-23

## 2025-06-16 RX ORDER — ONDANSETRON HCL/PF 4 MG/2 ML
4 VIAL (ML) INJECTION ONCE
Refills: 0 | Status: DISCONTINUED | OUTPATIENT
Start: 2025-06-16 | End: 2025-06-16

## 2025-06-16 RX ORDER — SENNA 187 MG
2 TABLET ORAL AT BEDTIME
Refills: 0 | Status: DISCONTINUED | OUTPATIENT
Start: 2025-06-16 | End: 2025-06-23

## 2025-06-16 RX ORDER — LEVOTHYROXINE SODIUM 300 MCG
88 TABLET ORAL DAILY
Refills: 0 | Status: DISCONTINUED | OUTPATIENT
Start: 2025-06-16 | End: 2025-06-23

## 2025-06-16 RX ORDER — PERAMPANEL 6 MG/1
4 TABLET ORAL AT BEDTIME
Refills: 0 | Status: DISCONTINUED | OUTPATIENT
Start: 2025-06-16 | End: 2025-06-23

## 2025-06-16 RX ORDER — BRIVARACETAM 75 MG/1
100 TABLET, FILM COATED ORAL
Refills: 0 | Status: DISCONTINUED | OUTPATIENT
Start: 2025-06-16 | End: 2025-06-20

## 2025-06-16 RX ORDER — CLOBAZAM 20 MG/1
10 TABLET ORAL AT BEDTIME
Refills: 0 | Status: DISCONTINUED | OUTPATIENT
Start: 2025-06-16 | End: 2025-06-23

## 2025-06-16 RX ORDER — ACETAMINOPHEN 500 MG/5ML
1000 LIQUID (ML) ORAL EVERY 6 HOURS
Refills: 0 | Status: COMPLETED | OUTPATIENT
Start: 2025-06-16 | End: 2025-06-17

## 2025-06-16 RX ORDER — DEXAMETHASONE 0.5 MG/1
2 TABLET ORAL EVERY 12 HOURS
Refills: 0 | Status: DISCONTINUED | OUTPATIENT
Start: 2025-06-23 | End: 2025-06-23

## 2025-06-16 RX ORDER — CEFAZOLIN SODIUM IN 0.9 % NACL 3 G/100 ML
2000 INTRAVENOUS SOLUTION, PIGGYBACK (ML) INTRAVENOUS EVERY 8 HOURS
Refills: 0 | Status: COMPLETED | OUTPATIENT
Start: 2025-06-16 | End: 2025-06-17

## 2025-06-16 RX ORDER — MIDAZOLAM IN 0.9 % SOD.CHLORID 1 MG/ML
2 PLASTIC BAG, INJECTION (ML) INTRAVENOUS ONCE
Refills: 0 | Status: DISCONTINUED | OUTPATIENT
Start: 2025-06-16 | End: 2025-06-16

## 2025-06-16 RX ORDER — ALBUTEROL SULFATE 2.5 MG/3ML
2 VIAL, NEBULIZER (ML) INHALATION EVERY 6 HOURS
Refills: 0 | Status: DISCONTINUED | OUTPATIENT
Start: 2025-06-16 | End: 2025-06-23

## 2025-06-16 RX ADMIN — BRIVARACETAM 240 MILLIGRAM(S): 75 TABLET, FILM COATED ORAL at 23:08

## 2025-06-16 RX ADMIN — DEXAMETHASONE 4 MILLIGRAM(S): 0.5 TABLET ORAL at 17:10

## 2025-06-16 RX ADMIN — LACOSAMIDE 130 MILLIGRAM(S): 150 TABLET, FILM COATED ORAL at 23:08

## 2025-06-16 RX ADMIN — Medication 100 MILLIGRAM(S): at 21:05

## 2025-06-16 RX ADMIN — Medication 400 MILLIGRAM(S): at 21:04

## 2025-06-16 RX ADMIN — Medication 0.5 MILLIGRAM(S): at 17:11

## 2025-06-16 RX ADMIN — Medication 40 MILLIGRAM(S): at 05:09

## 2025-06-16 RX ADMIN — LACOSAMIDE 130 MILLIGRAM(S): 150 TABLET, FILM COATED ORAL at 05:11

## 2025-06-16 RX ADMIN — Medication 0.5 MILLIGRAM(S): at 18:00

## 2025-06-16 RX ADMIN — DEXAMETHASONE 4 MILLIGRAM(S): 0.5 TABLET ORAL at 23:20

## 2025-06-16 RX ADMIN — Medication 2 TABLET(S): at 21:05

## 2025-06-16 RX ADMIN — Medication 1000 MILLILITER(S): at 20:00

## 2025-06-16 RX ADMIN — PERAMPANEL 4 MILLIGRAM(S): 6 TABLET ORAL at 21:05

## 2025-06-16 RX ADMIN — Medication 75 MILLILITER(S): at 19:12

## 2025-06-16 RX ADMIN — Medication 88 MICROGRAM(S): at 05:09

## 2025-06-16 RX ADMIN — DEXAMETHASONE 2 MILLIGRAM(S): 0.5 TABLET ORAL at 05:10

## 2025-06-16 RX ADMIN — Medication 1 MILLIGRAM(S): at 23:08

## 2025-06-16 RX ADMIN — Medication 1000 MILLIGRAM(S): at 22:00

## 2025-06-16 RX ADMIN — CLOBAZAM 10 MILLIGRAM(S): 20 TABLET ORAL at 21:05

## 2025-06-16 RX ADMIN — BRIVARACETAM 240 MILLIGRAM(S): 75 TABLET, FILM COATED ORAL at 05:12

## 2025-06-16 RX ADMIN — Medication 1 APPLICATION(S): at 07:08

## 2025-06-16 NOTE — PROGRESS NOTE ADULT - SUBJECTIVE AND OBJECTIVE BOX
Name of Patient : NAT THOMASON  MRN: [id number]  Date of visit: 06-16-25     Subjective: Patient seen and examined. No new events except as noted.     REVIEW OF SYSTEMS:  limited     MEDICATIONS:  MEDICATIONS  (STANDING):  brivaracetam  IVPB 100 milliGRAM(s) IV Intermittent <User Schedule>  ceFAZolin   IVPB 2000 milliGRAM(s) IV Intermittent every 8 hours  cloBAZam 10 milliGRAM(s) Oral at bedtime  dexAMETHasone  Injectable   IV Push   dexAMETHasone  Injectable 4 milliGRAM(s) IV Push every 6 hours  lacosamide IVPB 150 milliGRAM(s) IV Intermittent <User Schedule>  levothyroxine 88 MICROGram(s) Oral daily  melatonin 1 milliGRAM(s) Oral at bedtime  pantoprazole    Tablet 40 milliGRAM(s) Oral before breakfast  perampanel 4 milliGRAM(s) Oral at bedtime  polyethylene glycol 3350 17 Gram(s) Oral daily  senna 2 Tablet(s) Oral at bedtime  sodium chloride 0.9%. 1000 milliLiter(s) (75 mL/Hr) IV Continuous <Continuous>      PHYSICAL EXAM:  T(C): 37.1 (06-16-25 @ 19:00), Max: 37.1 (06-16-25 @ 19:00)  HR: 86 (06-16-25 @ 21:00) (59 - 106)  BP: 100/69 (06-16-25 @ 11:22) (90/55 - 108/53)  RR: 19 (06-16-25 @ 21:00) (12 - 25)  SpO2: 96% (06-16-25 @ 21:00) (93% - 100%)  Wt(kg): --  I&O's Summary    15 Alirio 2025 07:01  -  16 Jun 2025 07:00  --------------------------------------------------------  IN: 1580 mL / OUT: 0 mL / NET: 1580 mL    16 Jun 2025 07:01  -  16 Jun 2025 22:36  --------------------------------------------------------  IN: 1550 mL / OUT: 425 mL / NET: 1125 mL      Height (cm): 157.5 (06-16 @ 11:22)  Weight (kg): 55.8 (06-16 @ 11:22)  BMI (kg/m2): 22.5 (06-16 @ 11:22)  BSA (m2): 1.55 (06-16 @ 11:22)    Appearance: Normal	  HEENT:  PERRLA   Lymphatic: No lymphadenopathy   Cardiovascular: Normal S1 S2, no JVD  Respiratory: normal effort , clear  Gastrointestinal:  Soft, Non-tender  Skin: No rashes,  warm to touch  Psychiatry:  Mood & affect appropriate  Musculuskeletal: No edema    recent labs, Imaging and EKGs personally reviewed       06-15-25 @ 07:01  -  06-16-25 @ 07:00  --------------------------------------------------------  IN: 1580 mL / OUT: 0 mL / NET: 1580 mL    06-16-25 @ 07:01  -  06-16-25 @ 22:36  --------------------------------------------------------  IN: 1550 mL / OUT: 425 mL / NET: 1125 mL                          12.0   11.38 )-----------( 343      ( 16 Jun 2025 18:30 )             35.4               06-16    142  |  105  |  26[H]  ----------------------------<  99  4.0   |  23  |  0.82    Ca    9.4      16 Jun 2025 05:33  Phos  3.5     06-16  Mg     2.1     06-16    TPro  7.0  /  Alb  4.2  /  TBili  0.1[L]  /  DBili  x   /  AST  20  /  ALT  49[H]  /  AlkPhos  71  06-16    PT/INR - ( 16 Jun 2025 05:34 )   PT: 10.5 sec;   INR: 0.91 ratio         PTT - ( 16 Jun 2025 05:34 )  PTT:25.1 sec                   Urinalysis Basic - ( 16 Jun 2025 05:33 )    Color: x / Appearance: x / SG: x / pH: x  Gluc: 99 mg/dL / Ketone: x  / Bili: x / Urobili: x   Blood: x / Protein: x / Nitrite: x   Leuk Esterase: x / RBC: x / WBC x   Sq Epi: x / Non Sq Epi: x / Bacteria: x

## 2025-06-16 NOTE — BRIEF OPERATIVE NOTE - NSICDXBRIEFPROCEDURE_GEN_ALL_CORE_FT
PROCEDURES:  Craniotomy for resection of tumor of left side of brain 16-Jun-2025 11:12:21  Austyn Gibbs

## 2025-06-16 NOTE — PROGRESS NOTE ADULT - SUBJECTIVE AND OBJECTIVE BOX
Patient seen and examined at bedside.    --Anticoagulation--    T(C): 36.6 (06-16-25 @ 01:13), Max: 36.8 (06-15-25 @ 21:04)  HR: 61 (06-16-25 @ 01:13) (61 - 87)  BP: 90/55 (06-16-25 @ 01:13) (88/62 - 122/70)  RR: 16 (06-16-25 @ 01:13) (16 - 20)  SpO2: 98% (06-16-25 @ 01:13) (93% - 99%)  Wt(kg): --    Exam:  AOx3, intermit WFD, PERRL, EOMI, CONWAY 5/5

## 2025-06-16 NOTE — PROGRESS NOTE ADULT - SUBJECTIVE AND OBJECTIVE BOX
SUMMARY:    OVERNIGHT EVENTS:     ADMISSION SCORES:   GCS: HH: MF: NIHSS: ICH Score:    SEDATION SCORES:  RASS: CAM-ICU:     REVIEW OF SYSTEMS:    VITALS: [] Reviewed    IMAGING/DATA: [] Reviewed    IVF FLUIDS/MEDICATIONS: [] Reviewed    ALLERGIES: Allergies    No Known Allergies    Intolerances        DEVICES:   [] Restraints [] PIVs [] ET tube [] central line [] PICC [] arterial line [] rodriguez [] NGT/OGT [] EVD [] LD [] HALEY/HMV [] LiCOX [] ICP monitor [] Trach [] PEG [] Chest Tube [] other:    EXAMINATION:  General: No acute distress  HEENT: Anicteric sclerae  Cardiac: [id number]  Lungs: Clear  Abdomen: Soft, non-tender, +BS  Extremities: No c/c/e  Skin/Incision Site: Clean, dry and intact  Neurologic: Awake, alert, fully oriented, follows commands, PERRL, VFFtc, EOMI, face symmetric, tongue midline, no drift, full strength SUMMARY:   54F right handed h/o bipolar, anxiety, epilepsy adm for worsening partial sz. EEG w/ left frontotemporal focal sz. 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           REVIEW OF SYSTEMS:    VITALS: [x] Reviewed    IMAGING/DATA: [x] Reviewed    IVF FLUIDS/MEDICATIONS: [x] Reviewed    ALLERGIES: Allergies    No Known Allergies    Intolerances        DEVICES:   [] Restraints [] PIVs [] ET tube [] central line [] PICC [] arterial line [] rodriguez [] NGT/OGT [] EVD [] LD [] HALEY/HMV [] LiCOX [] ICP monitor [] Trach [] PEG [] Chest Tube [] other:    EXAMINATION:  General: No acute distress  HEENT: Anicteric sclerae  Cardiac: [id number]  Lungs: Clear  Abdomen: Soft, non-tender, +BS  Extremities: No c/c/e  Skin/Incision Site: Clean, dry and intact  Neurologic:   AAOx3 to verbal stimulation. speech is clear. follows commands.   PERRL EOMI. no facial palsy noted.   Strength: 5/5 in the UE and LE b/l.   Sensation intact x4

## 2025-06-16 NOTE — PRE-OP CHECKLIST - 4.
LMP at age 48 according to pt and pt family LMP at age 48 according to pt and pt family as per ROSCOE Miller no need to send any further testing since LMP was greater then 5 years ago

## 2025-06-16 NOTE — PATIENT PROFILE ADULT - FALL HARM RISK - HARM RISK INTERVENTIONS

## 2025-06-16 NOTE — PROGRESS NOTE ADULT - ASSESSMENT
ASSESSMENT/PLAN:    NEURO:  Activity: [] mobilize as tolerated [] Bedrest [] PT [] OT [] PMNR    PULM:    CV:  SBP goal    RENAL:  Fluids:    GI:  Diet:  GI prophylaxis [] not indicated [] PPI [] other:  Bowel regimen [] colace [] senna [] other:    ENDO:   Goal euglycemia (-180)    HEME/ONC:  VTE prophylaxis: [] SCDs [] chemoprophylaxis [] hold chemoprophylaxis due to: [] high risk of DVT/PE on admission due to:    ID:    MISC:    SOCIAL/FAMILY:  [] awaiting [] updated at bedside [] family meeting    CODE STATUS:  [] Full Code [] DNR [] DNI [] Palliative/Comfort Care    DISPOSITION:  [] ICU [] Stroke Unit [] Floor [] EMU [] RCU [] PCU    [] Patient is at high risk of neurologic deterioration/death due to:     Time seen:  Time spent: ___ [] critical care minutes    Contact: [phone] ASSESSMENT/PLAN:    NEURO: POD 0 of left crani resection   Neuro checks q1hrs   Dex taper as per protocol   Continue on AEDS: Briviact 100mg q12hr, clobazam 10mg QHS, vimpat 150mg BID, fycompa 4mg qhs   pain regiment oxy5mg/ Dilaudid/ Tylenol   Epilepsy following   Activity: [x mobilize as tolerated [] Bedrest [] PT [] OT [] PMNR    PULM:  on 2L NC   IS   Keep Sp02     CV:  SBP goal normotensive   telemetry     RENAL:  Fluids: NS 75 cc/hr   can dc fluids   BMP     GI:  Diet: adv diet as tolerated   GI prophylaxis [] not indicated [x] PPI [] other:  Bowel regimen [x] colace [x] senna [] other:    ENDO:   Goal euglycemia (-180)  ISS     HEME/ONC:  postop CBC   VTE prophylaxis: [x] SCDs [] chemoprophylaxis [] hold chemoprophylaxis due to: [] high risk of DVT/PE on admission due to:    ID:  post op abx     MISC:    SOCIAL/FAMILY:  [x] awaiting [] updated at bedside [] family meeting    CODE STATUS:  [x] Full Code [] DNR [] DNI [] Palliative/Comfort Care    DISPOSITION:  [x] ICU [] Stroke Unit [] Floor [] EMU [] RCU [] PCU    [x] Patient is at high risk of neurologic deterioration/death due to:     Time seen:  Time spent: ___ [] critical care minutes    Contact: [phone]

## 2025-06-16 NOTE — PROGRESS NOTE ADULT - SUBJECTIVE AND OBJECTIVE BOX
Neurology Progress Note    SUBJECTIVE/OBJECTIVE/INTERVAL EVENTS: Patient seen and examined at bedside w/ neuro attending and team.     REVIEW OF SYSTEMS: Few questions of a 10-system ROS was performed and is negative except for those items noted above and/or in the HPI.    brivaracetam  Injectable 100 milliGRAM(s) IV Push once PRN  brivaracetam  IVPB 100 milliGRAM(s) IV Intermittent <User Schedule>  cloBAZam 10 milliGRAM(s) Oral at bedtime  dexAMETHasone     Tablet 2 milliGRAM(s) Oral <User Schedule>  lacosamide IVPB 150 milliGRAM(s) IV Intermittent <User Schedule>  levothyroxine 88 MICROGram(s) Oral daily  melatonin 1 milliGRAM(s) Oral at bedtime  midazolam Injectable 2 milliGRAM(s) IV Push once PRN  pantoprazole    Tablet 40 milliGRAM(s) Oral before breakfast  perampanel 4 milliGRAM(s) Oral at bedtime  sodium chloride 0.9%. 1000 milliLiter(s) IV Continuous <Continuous>      Neurologic Exam:  Mental status - Awake, Alert, Follows commands. Attention/concentration mildly impaired.    Cranial nerves - PERRL, VFF, EOMI, face sensation (V1-V3) intact b/l, facial strength intact without asymmetry b/l, tongue midline on protrusion with full lateral movement    Motor - Normal bulk and tone throughout. No pronator drift.  Strength testing grossly 5/5 throughout    Sensation - Light touch intact throughout    DTR's -             Biceps      Triceps     Brachioradialis      Patellar    Ankle    Toes/plantar response  R             2+             2+                  2+                       2+            2+                 Down  L              2+             2+                 2+                        2+           2+                 Down    Coordination - Finger to Nose intact b/l    Gait and station - Walking unassisted    LABS:                        12.7   11.22 )-----------( 342      ( 16 Jun 2025 05:34 )             37.2    06-16    142  |  105  |  26[H]  ----------------------------<  99  4.0   |  23  |  0.82    Ca    9.4      16 Jun 2025 05:33    TPro  7.0  /  Alb  4.2  /  TBili  0.1[L]  /  DBili  x   /  AST  20  /  ALT  49[H]  /  AlkPhos  71  06-16  PT/INR - ( 16 Jun 2025 05:34 )   PT: 10.5 sec;   INR: 0.91 ratio         PTT - ( 16 Jun 2025 05:34 )  PTT:25.1 sec    IMAGING:     STUDIES & IMAGING: (EEG, CT, MR, U/S, TTE/COBY):  EEG CLASSIFICATION (6/14/2025):   Abnormal EEG in the awake, drowsy and asleep states.   1. 5 focal electrographic seizures, left frontal onset with frontotemporal spread   -----------------------------------------------------------------------------------------------------------   IMPRESSION/CLINICAL CORRELATE:   This is an abnormal EEG record.    1. 5 left frontal/frontotemporal electrographic seizures as described above.      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 No significant mass effect.    Interval resolution of left temporal cortical restricted diffusion.   Persistent left temporal moderate cortical swelling and sulcal effacement   likely compatible with expected post seizure changes.

## 2025-06-16 NOTE — PROGRESS NOTE ADULT - ASSESSMENT
NAT THOMASON is a 55yo RIGHT handed woman with a PMHx significant for Left temporal lobe lesion, bipolar, anxiety transferred from [facility] for EMU admission and concern for focal status in setting of left temporal mass.     Impression:  Focal status, resolved but still with electrographic seizures - left frontal onset with frontotemporal spread.    Plan:    #Seizure  #Left temporal mass   - Video EEG - discontinued on 6/14  - MRI Brain w/wo contrast completed. Awaiting MR brain spectroscopy and functional  - Neurosurgery evaluation of left temporal mass, plan discussed with patient and family. OR for resection 6/16   - Continue Fycompa 4mg daily   - Continue Briviact 100mg TID  - Continue Vimpat 150mg TID   - Continue Onfi 10mg qHS - to help anxiety (in addition to seizures)  - Stopped lamictal on admission  - Rescues for GTC: 1st line Ativan 1 mg IVP; 2nd line Briviact 100mg IVP  - Continue Dexamethasone 2mg BID  - Behavioral health eval appreciated, maintain 1:1 for now   - Neurochecks and VS q4   - Maintain seizure fall and aspiration precautions  - Monitor on telemetry    #Hypothyroidism  - Continue synthroid 88mcg daily     #Hypotension  -Monitor VS q4   -Giving IVF bolus to augment BP    Diet: Regular, NPO midnight 6/15  DVT ppx: Lovenox SC on hold due to surgery  Dispo: Pending clinical course, transfer to NSCU post surgery, spoke to NSCU PA.    Patient seen and discussed with Epilepsy attending Dr. Ward.

## 2025-06-16 NOTE — PROGRESS NOTE ADULT - ASSESSMENT
Patient is a 55 Y/O RIGHT handed woman with a PMHx significant for Left temporal lobe lesion, bipolar, anxiety transferred from [facility] for EMU admission and concern for focal status in setting of left temporal mass.     # Brain Temp lobe lesion   Neuro and neurosurg eval appreciated   MRI noted   Echo ordered  EKG noted   patient is medically optimized for OR   antiseizure meds per neurology     # Elevated TSH   repeat TSH in AM   COnt Synthroid for now      DVT and GI ppx

## 2025-06-16 NOTE — PROGRESS NOTE ADULT - SUBJECTIVE AND OBJECTIVE BOX
NSCU ATTENDING -- ADDITIONAL PROGRESS NOTE    Nighttime rounds were performed -- please refer to earlier Progress Note for HPI details.    T(C): 37.1 (06-16-25 @ 19:00), Max: 37.1 (06-16-25 @ 19:00)  HR: 86 (06-16-25 @ 21:00) (59 - 106)  BP: 100/69 (06-16-25 @ 11:22) (90/55 - 108/53)  RR: 19 (06-16-25 @ 21:00) (12 - 25)e  SpO2: 96% (06-16-25 @ 21:00) (93% - 100%)  Wt(kg): --    Relevant labwork and imaging reviewed.    s/p crani for tumor resection.  CT brain in AM.  get post-op BMP.  SBP <140.

## 2025-06-16 NOTE — BRIEF OPERATIVE NOTE - ESTIMATED BLOOD LOSS
75 Quality 110: Preventive Care And Screening: Influenza Immunization: Influenza Immunization previously received during influenza season Quality 111:Pneumonia Vaccination Status For Older Adults: Pneumococcal Vaccination not Administered or Previously Received, Reason not Otherwise Specified Detail Level: Detailed Quality 226: Preventive Care And Screening: Tobacco Use: Screening And Cessation Intervention: Patient screened for tobacco use and is an ex/non-smoker

## 2025-06-17 PROCEDURE — 99232 SBSQ HOSP IP/OBS MODERATE 35: CPT

## 2025-06-17 PROCEDURE — 70553 MRI BRAIN STEM W/O & W/DYE: CPT | Mod: 26

## 2025-06-17 PROCEDURE — 99232 SBSQ HOSP IP/OBS MODERATE 35: CPT | Mod: 25

## 2025-06-17 PROCEDURE — 70450 CT HEAD/BRAIN W/O DYE: CPT | Mod: 26

## 2025-06-17 RX ORDER — SIMETHICONE 80 MG
80 TABLET,CHEWABLE ORAL DAILY
Refills: 0 | Status: DISCONTINUED | OUTPATIENT
Start: 2025-06-17 | End: 2025-06-23

## 2025-06-17 RX ORDER — ENOXAPARIN SODIUM 100 MG/ML
40 INJECTION SUBCUTANEOUS
Refills: 0 | Status: DISCONTINUED | OUTPATIENT
Start: 2025-06-17 | End: 2025-06-23

## 2025-06-17 RX ADMIN — Medication 80 MILLIGRAM(S): at 22:20

## 2025-06-17 RX ADMIN — Medication 2 TABLET(S): at 22:20

## 2025-06-17 RX ADMIN — DEXAMETHASONE 4 MILLIGRAM(S): 0.5 TABLET ORAL at 05:13

## 2025-06-17 RX ADMIN — DEXAMETHASONE 4 MILLIGRAM(S): 0.5 TABLET ORAL at 17:49

## 2025-06-17 RX ADMIN — LACOSAMIDE 130 MILLIGRAM(S): 150 TABLET, FILM COATED ORAL at 22:20

## 2025-06-17 RX ADMIN — Medication 400 MILLIGRAM(S): at 12:23

## 2025-06-17 RX ADMIN — PERAMPANEL 4 MILLIGRAM(S): 6 TABLET ORAL at 22:20

## 2025-06-17 RX ADMIN — Medication 1000 MILLIGRAM(S): at 06:00

## 2025-06-17 RX ADMIN — BRIVARACETAM 240 MILLIGRAM(S): 75 TABLET, FILM COATED ORAL at 22:30

## 2025-06-17 RX ADMIN — Medication 650 MILLIGRAM(S): at 22:59

## 2025-06-17 RX ADMIN — POLYETHYLENE GLYCOL 3350 17 GRAM(S): 17 POWDER, FOR SOLUTION ORAL at 11:04

## 2025-06-17 RX ADMIN — LACOSAMIDE 130 MILLIGRAM(S): 150 TABLET, FILM COATED ORAL at 13:25

## 2025-06-17 RX ADMIN — Medication 40 MILLIGRAM(S): at 06:10

## 2025-06-17 RX ADMIN — Medication 100 MILLIGRAM(S): at 05:13

## 2025-06-17 RX ADMIN — ENOXAPARIN SODIUM 40 MILLIGRAM(S): 100 INJECTION SUBCUTANEOUS at 17:49

## 2025-06-17 RX ADMIN — Medication 400 MILLIGRAM(S): at 19:00

## 2025-06-17 RX ADMIN — DEXAMETHASONE 4 MILLIGRAM(S): 0.5 TABLET ORAL at 11:04

## 2025-06-17 RX ADMIN — Medication 88 MICROGRAM(S): at 05:13

## 2025-06-17 RX ADMIN — Medication 1000 MILLIGRAM(S): at 20:00

## 2025-06-17 RX ADMIN — CLOBAZAM 10 MILLIGRAM(S): 20 TABLET ORAL at 22:20

## 2025-06-17 RX ADMIN — Medication 1000 MILLIGRAM(S): at 13:00

## 2025-06-17 RX ADMIN — Medication 400 MILLIGRAM(S): at 05:30

## 2025-06-17 RX ADMIN — Medication 650 MILLIGRAM(S): at 22:29

## 2025-06-17 RX ADMIN — LACOSAMIDE 130 MILLIGRAM(S): 150 TABLET, FILM COATED ORAL at 06:10

## 2025-06-17 RX ADMIN — BRIVARACETAM 240 MILLIGRAM(S): 75 TABLET, FILM COATED ORAL at 06:10

## 2025-06-17 RX ADMIN — Medication 1 APPLICATION(S): at 11:04

## 2025-06-17 RX ADMIN — Medication 1 MILLIGRAM(S): at 22:19

## 2025-06-17 RX ADMIN — BRIVARACETAM 240 MILLIGRAM(S): 75 TABLET, FILM COATED ORAL at 13:25

## 2025-06-17 NOTE — PROGRESS NOTE ADULT - ASSESSMENT
ASSESSMENT/PLAN:    NEURO: POD 0 of left crani resection   Neuro checks q1hrs   Dex taper as per protocol   Continue on AEDS: Briviact 100mg q12hr, clobazam 10mg QHS, vimpat 150mg BID, fycompa 4mg qhs   pain regiment oxy5mg/ Dilaudid/ Tylenol   Epilepsy following   Activity: [x mobilize as tolerated [] Bedrest [] PT [] OT [] PMNR    PULM:  on 2L NC   IS   Keep Sp02     CV:  SBP goal normotensive   telemetry     RENAL:  Fluids: NS 75 cc/hr   can dc fluids   BMP     GI:  Diet: adv diet as tolerated   GI prophylaxis [] not indicated [x] PPI [] other:  Bowel regimen [x] colace [x] senna [] other:    ENDO:   Goal euglycemia (-180)  ISS     HEME/ONC:  postop CBC   VTE prophylaxis: [x] SCDs [] chemoprophylaxis [] hold chemoprophylaxis due to: [] high risk of DVT/PE on admission due to:    ID:  post op abx     MISC:    SOCIAL/FAMILY:  [x] awaiting [] updated at bedside [] family meeting    CODE STATUS:  [x] Full Code [] DNR [] DNI [] Palliative/Comfort Care    DISPOSITION:  [x] ICU [] Stroke Unit [] Floor [] EMU [] RCU [] PCU    [x] Patient is at high risk of neurologic deterioration/death due to:     Time seen:  Time spent: ___ [] critical care minutes    Contact: [phone] ASSESSMENT/PLAN:    NEURO: POD 1 of left crani resection   Neuro checks q4hrs   Dex taper as per protocol   Continue on AEDS: Briviact 100mg q12hr, clobazam 10mg QHS, vimpat 150mg BID, fycompa 4mg qhs   pain regiment oxy5mg/ Dilaudid/ Tylenol   Epilepsy following   Activity: [x mobilize as tolerated [] Bedrest [] PT [] OT [] PMNR    PULM:  on 2L NC   IS   Keep Sp02     CV:  SBP goal normotensive   telemetry     RENAL:  monitor I/Os   keep normonatremia     GI:  Diet: adv diet as tolerated   GI prophylaxis [] not indicated [x] PPI [] other:  Bowel regimen [x] colace [x] senna [] other:    ENDO:   Goal euglycemia (-180)  ISS     HEME/ONC:  postop CBC   VTE prophylaxis: [x] SCDs [] chemoprophylaxis [] hold chemoprophylaxis due to: [] high risk of DVT/PE on admission due to:    ID:  post op abx     MISC:    SOCIAL/FAMILY:  [x] awaiting [] updated at bedside [] family meeting    CODE STATUS:  [x] Full Code [] DNR [] DNI [] Palliative/Comfort Care    DISPOSITION:  [x] ICU [] Stroke Unit [] Floor [] EMU [] RCU [] PCU    [x] Patient is at high risk of neurologic deterioration/death due to:     Time seen:  Time spent: ___ [] critical care minutes    Contact: [phone] ASSESSMENT/PLAN:    NEURO: POD 1 of left crani resection   Neuro checks q4hrs   Dex taper as per protocol   Continue on AEDS: Briviact 100mg q12hr, clobazam 10mg QHS, vimpat 150mg BID, fycompa 4mg qhs   pain regiment Tylenol   Epilepsy following   Activity: [x mobilize as tolerated [] Bedrest [] PT [] OT [] PMNR    PULM:  on RA   nonrebreather   IS   Keep Sp02     CV:  SBP goal normotensive   telemetry     RENAL:  monitor I/Os   keep normonatremia     GI:  Diet: regular diet   GI prophylaxis [] not indicated [x] PPI [] other:  Bowel regimen [x] colace [x] senna [] other:    ENDO:   Goal euglycemia (-180)  ISS     HEME/ONC:  VTE prophylaxis: [x] SCDs [] chemoprophylaxis [] hold chemoprophylaxis due to: [] high risk of DVT/PE on admission due to:  DVT ppx this evening     ID:  afebrile     MISC:    SOCIAL/FAMILY:  [x] awaiting [] updated at bedside [] family meeting    CODE STATUS:  [x] Full Code [] DNR [] DNI [] Palliative/Comfort Care    DISPOSITION:  [] ICU [] Stroke Unit [x] Floor this evening  [] EMU [] RCU [] PCU    [x] Patient is at high risk of neurologic deterioration/death due to:

## 2025-06-17 NOTE — PROGRESS NOTE ADULT - SUBJECTIVE AND OBJECTIVE BOX
Name of Patient : NAT THOMASON  MRN: [id number]  Date of visit: 06-17-25 @ 23:27      Subjective: Patient seen and examined. No new events except as noted.     REVIEW OF SYSTEMS:    CONSTITUTIONAL: No weakness, fevers or chills  EYES/ENT: No visual changes;  No vertigo or throat pain   NECK: No pain or stiffness  RESPIRATORY: No cough, wheezing, hemoptysis; No shortness of breath  CARDIOVASCULAR: No chest pain or palpitations  GASTROINTESTINAL: No abdominal or epigastric pain. No nausea, vomiting, or hematemesis; No diarrhea or constipation. No melena or hematochezia.  GENITOURINARY: No dysuria, frequency or hematuria  NEUROLOGICAL: No numbness or weakness  SKIN: No itching, burning, rashes, or lesions   All other review of systems is negative unless indicated above.    MEDICATIONS:  MEDICATIONS  (STANDING):  brivaracetam  IVPB 100 milliGRAM(s) IV Intermittent <User Schedule>  cloBAZam 10 milliGRAM(s) Oral at bedtime  dexAMETHasone  Injectable   IV Push   dexAMETHasone  Injectable 4 milliGRAM(s) IV Push every 6 hours  enoxaparin Injectable 40 milliGRAM(s) SubCutaneous <User Schedule>  lacosamide IVPB 150 milliGRAM(s) IV Intermittent <User Schedule>  levothyroxine 88 MICROGram(s) Oral daily  melatonin 1 milliGRAM(s) Oral at bedtime  pantoprazole    Tablet 40 milliGRAM(s) Oral before breakfast  perampanel 4 milliGRAM(s) Oral at bedtime  polyethylene glycol 3350 17 Gram(s) Oral daily  senna 2 Tablet(s) Oral at bedtime      PHYSICAL EXAM:  T(C): 36.6 (06-17-25 @ 21:05), Max: 37.1 (06-17-25 @ 11:00)  HR: 63 (06-17-25 @ 21:05) (55 - 82)  BP: 115/72 (06-17-25 @ 21:05) (99/59 - 115/72)  RR: 18 (06-17-25 @ 21:05) (15 - 36)  SpO2: 98% (06-17-25 @ 21:05) (95% - 100%)  Wt(kg): --  I&O's Summary    16 Jun 2025 07:01  -  17 Jun 2025 07:00  --------------------------------------------------------  IN: 2325 mL / OUT: 2075 mL / NET: 250 mL    17 Jun 2025 07:01  -  17 Jun 2025 23:27  --------------------------------------------------------  IN: 500 mL / OUT: 3500 mL / NET: -3000 mL          Appearance: Normal	  HEENT:  PERRLA   Lymphatic: No lymphadenopathy   Cardiovascular: Normal S1 S2, no JVD  Respiratory: normal effort , clear  Gastrointestinal:  Soft, Non-tender  Skin: No rashes,  warm to touch  Psychiatry:  Mood & affect appropriate  Musculuskeletal: No edema    recent labs, Imaging and EKGs personally reviewed   CODE status discussed with the patient in detail    06-16-25 @ 07:01  -  06-17-25 @ 07:00  --------------------------------------------------------  IN: 2325 mL / OUT: 2075 mL / NET: 250 mL    06-17-25 @ 07:01  -  06-17-25 @ 23:27  --------------------------------------------------------  IN: 500 mL / OUT: 3500 mL / NET: -3000 mL                          12.0   11.38 )-----------( 343      ( 16 Jun 2025 18:30 )             35.4               06-16    142  |  108  |  13  ----------------------------<  152[H]  4.1   |  19[L]  |  0.55    Ca    8.3[L]      16 Jun 2025 23:35  Phos  3.5     06-16  Mg     2.1     06-16    TPro  7.0  /  Alb  4.2  /  TBili  0.1[L]  /  DBili  x   /  AST  20  /  ALT  49[H]  /  AlkPhos  71  06-16    PT/INR - ( 16 Jun 2025 05:34 )   PT: 10.5 sec;   INR: 0.91 ratio         PTT - ( 16 Jun 2025 05:34 )  PTT:25.1 sec                   Urinalysis Basic - ( 16 Jun 2025 23:35 )    Color: x / Appearance: x / SG: x / pH: x  Gluc: 152 mg/dL / Ketone: x  / Bili: x / Urobili: x   Blood: x / Protein: x / Nitrite: x   Leuk Esterase: x / RBC: x / WBC x   Sq Epi: x / Non Sq Epi: x / Bacteria: x

## 2025-06-17 NOTE — PHYSICAL THERAPY INITIAL EVALUATION ADULT - LEVEL OF INDEPENDENCE: SUPINE/SIT, REHAB EVAL
contact guard Rhomboid Transposition Flap Text: The defect edges were debeveled with a #15 scalpel blade.  Given the location of the defect and the proximity to free margins a rhomboid transposition flap was deemed most appropriate.  Using a sterile surgical marker, an appropriate rhomboid flap was drawn incorporating the defect.    The area thus outlined was incised deep to adipose tissue with a #15 scalpel blade.  The skin margins were undermined to an appropriate distance in all directions utilizing iris scissors.

## 2025-06-17 NOTE — PROGRESS NOTE ADULT - SUBJECTIVE AND OBJECTIVE BOX
Neurology Progress Note    SUBJECTIVE/OBJECTIVE/INTERVAL EVENTS: Patient seen and examined at bedside w/ neuro attending and team. 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    REVIEW OF SYSTEMS: Few questions of a 10-system ROS was performed and is negative except for those items noted above and/or in the HPI.    MEDICATIONS  (STANDING):  brivaracetam  IVPB 100 milliGRAM(s) IV Intermittent <User Schedule>  chlorhexidine 4% Liquid 1 Application(s) Topical daily  cloBAZam 10 milliGRAM(s) Oral at bedtime  dexAMETHasone  Injectable   IV Push   dexAMETHasone  Injectable 4 milliGRAM(s) IV Push every 6 hours  enoxaparin Injectable 40 milliGRAM(s) SubCutaneous <User Schedule>  lacosamide IVPB 150 milliGRAM(s) IV Intermittent <User Schedule>  levothyroxine 88 MICROGram(s) Oral daily  melatonin 1 milliGRAM(s) Oral at bedtime  pantoprazole    Tablet 40 milliGRAM(s) Oral before breakfast  perampanel 4 milliGRAM(s) Oral at bedtime  polyethylene glycol 3350 17 Gram(s) Oral daily  senna 2 Tablet(s) Oral at bedtime      Neurologic Exam:  Mental status - Awake, Alert, Follows commands. Attention/concentration mildly impaired.    Cranial nerves - PERRL, VFF, EOMI, face sensation (V1-V3) intact b/l, facial strength intact without asymmetry b/l, tongue midline on protrusion with full lateral movement    Motor - Normal bulk and tone throughout. No pronator drift.  Strength testing grossly 5/5 throughout    Sensation - Light touch intact throughout    DTR's -             Biceps      Triceps     Brachioradialis      Patellar    Ankle    Toes/plantar response  R             2+             2+                  2+                       2+            2+                 Down  L              2+             2+                 2+                        2+           2+                 Down    Coordination - Finger to Nose intact b/l    Gait and station - Walking unassisted    LABS:                        12.7   11.22 )-----------( 342      ( 16 Jun 2025 05:34 )             37.2    06-16    142  |  105  |  26[H]  ----------------------------<  99  4.0   |  23  |  0.82    Ca    9.4      16 Jun 2025 05:33    TPro  7.0  /  Alb  4.2  /  TBili  0.1[L]  /  DBili  x   /  AST  20  /  ALT  49[H]  /  AlkPhos  71  06-16  PT/INR - ( 16 Jun 2025 05:34 )   PT: 10.5 sec;   INR: 0.91 ratio         PTT - ( 16 Jun 2025 05:34 )  PTT:25.1 sec    IMAGING:

## 2025-06-17 NOTE — PROGRESS NOTE ADULT - ASSESSMENT
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

## 2025-06-17 NOTE — BH CONSULTATION LIAISON PROGRESS NOTE - OTHER
some difficulty with word-finding or unusual choice of words able to complete DOWB attention tasks but distractible during conversation some tangentiality but able to answer questions in a linear way moves extremities spontaneously

## 2025-06-17 NOTE — OCCUPATIONAL THERAPY INITIAL EVALUATION ADULT - ADL RETRAINING, OT EVAL
GOAL: Pt will perform LB dressing independently in 2 weeks GOAL: Pt will be independent with toileting in 2 weeks

## 2025-06-17 NOTE — PROGRESS NOTE ADULT - ASSESSMENT
Patient is a 53 Y/O RIGHT handed woman with a PMHx significant for Left temporal lobe lesion, bipolar, anxiety transferred from [facility] for EMU admission and concern for focal status in setting of left temporal mass.     # Brain Temp lobe lesion   Neuro and neurosurg eval appreciated   MRI noted   Echo ordered  EKG noted   S/P OR     # Elevated TSH   repeat TSH   COnt Synthroid for now      DVT and GI ppx

## 2025-06-17 NOTE — PHYSICAL THERAPY INITIAL EVALUATION ADULT - PERTINENT HX OF CURRENT PROBLEM, REHAB EVAL
54y (1970) woman with a PMHx significant for Left Temporal lobe lesion, Bipolar, Anxiety transferred from [place] to [facility] to be evaluated for seizure. History limited as patient is poor historian, see hx below from chart at OSH. History as per partner (Benson) at bedside. As per partner over the past few weeks patient has been having multiple episodes of partial seizures more often than usual. Despite being on her normal routine, patient often has multiple seizures weekly but always returned to baseline. On  patient had a seizure and afterwards patient appeared more confused, was unable to articulate herself did not seek medical intervention. Since then symptoms have worsen, on  patient was found with a bunch of Lamictal pills around her, reported she might have taken more  pills than usual & patient was brought to [place] ED. 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 As per partner, patient ha been going under a lot of life changing events. In april she lost her job, 3 weeks ago a family member has ; with everything happening, she has been deteriorating Also on  patient fell at [facility], got on the plane & went to [place], there she went to the ED and was found to have a concussion Patient used to be on lexapro, but was discontinued in april by her psychiatrist Upon evaluation patient is awake, alert, speaking incoherently, pacing around the room, but able to follow commands and re-directable. Patient was transferred [facility] and had MRI brain with stable temporal lobe lesion favoring diagnosis of malignancy as well as surrounding edema likely correlated to recent seizure activity. Neurology following as EEG still with seizure activity likely related to temporal lobe lesion. Patient transferred to [facility] EMU for further management. sz. EEG w/ left frontotemporal focal sz. Pt underwent Craniotomy for resection of tumor of left side of brain .MRI Head : Stable nonenhancing left temporal lesion with imaging characteristics favoring a low-grade neoplasm. No significant mass effect. Interval resolution of left temporal cortical restricted diffusion. Persistent left temporal moderate cortical swelling and sulcal effacement likely compatible with expected post seizure changes. CT Head : Status post left medial temporal region lesion. Bifrontal pneumocephalus. No hydrocephalus. Minimal postop hemorrhage

## 2025-06-17 NOTE — PROGRESS NOTE ADULT - ATTENDING COMMENTS
history as above  s/p glioma resection  improved mood and no clinical seizures    plan: continue briviact 100 tid IV  vimpat 150 TID iv  fycompa 4 daily  dexamethasone according to NSX.    please obtain prior auth for anti seizure medications prir dc the patient.    no EEG needed and she is clear for discharge when NSX considers  can f/u in my office if desire and I will consider tapering her of some medications in 3 month or transitioning her to xcopri.  She may need long term antiseizure medication  will do neuropsych as outpatient
plan as above
stable,  OR tomorrow
Patient is stable POD0 from left crani for tumor resection. AOx3. Language intact. 5/5 throughout.    - q1h neuro checks  - Continue Briviact 100mg q12hr, clobazam 10mg QHS, vimpat 150mg BID, fycompa 4mg qhs   - wean oxygen as tolerated  - dysphagia screen, and start PO diet if passes    Patient at high risk of clinical deterioration in setting of possible seizures or hemorrhage at operative site. Will remain in ICU overnight.
No events overnight. No episodes of aphasia reported.  Vitals and serum studies stable. CT scan with minimal frontal pneumocephalus.    - q4h neuro checks, q4h vitals  - Decadron 4mg q6h  - Continue seizure medications  - NSGY requests EEG monitoring  - Pain control with Tylenol  - Start nonrebreather for mild pneumocephalus- will likely resolve with time on its own  - Bowel movement PTA to unit; bowel regimen  - DVT prophylaxis tonight  - Afebrile      Patient appropriate for floor level of care

## 2025-06-17 NOTE — PROGRESS NOTE ADULT - SUBJECTIVE AND OBJECTIVE BOX
Patient seen and examined at bedside.    --Anticoagulation--    T(C): 37 (06-17-25 @ 03:00), Max: 37.1 (06-16-25 @ 19:00)  HR: 59 (06-17-25 @ 05:00) (56 - 106)  BP: 100/69 (06-16-25 @ 11:22) (100/69 - 108/53)  RR: 15 (06-17-25 @ 05:00) (12 - 25)  SpO2: 100% (06-17-25 @ 05:00) (93% - 100%)  Wt(kg): --    Exam:  Ox3, PERRL, FC, CONWAY 5/5

## 2025-06-17 NOTE — PROGRESS NOTE ADULT - TIME BILLING
Reviewing the patient's electronic medical record, examining the patient, discussing the management plan.

## 2025-06-17 NOTE — OCCUPATIONAL THERAPY INITIAL EVALUATION ADULT - PERTINENT HX OF CURRENT PROBLEM, REHAB EVAL
53 Y/O RIGHT handed woman with a PMHx significant for Left temporal lobe lesion, bipolar, anxiety transferred from [facility] for EMU admission and concern for focal status in setting of left temporal mass.       6/16 s/p Craniotomy for resection of tumor of left side of brain

## 2025-06-17 NOTE — PHYSICAL THERAPY INITIAL EVALUATION ADULT - ADDITIONAL COMMENTS
Pt lives in apt with mother. No steps to enter. Prior to admission, pt was I with all functional mobility and ADLs without AD. Pt states working as a . R handed.

## 2025-06-17 NOTE — PROGRESS NOTE ADULT - SUBJECTIVE AND OBJECTIVE BOX
SUMMARY:   54F right handed h/o bipolar, anxiety, epilepsy adm for worsening partial sz. EEG w/ left frontotemporal focal sz. 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           REVIEW OF SYSTEMS:    VITALS: [x] Reviewed    IMAGING/DATA: [x] Reviewed    IVF FLUIDS/MEDICATIONS: [x] Reviewed    ALLERGIES: Allergies    No Known Allergies    Intolerances        DEVICES:   [] Restraints [] PIVs [] ET tube [] central line [] PICC [] arterial line [] rodriguez [] NGT/OGT [] EVD [] LD [] HALEY/HMV [] LiCOX [] ICP monitor [] Trach [] PEG [] Chest Tube [] other:    EXAMINATION:  General: No acute distress  HEENT: Anicteric sclerae  Cardiac: [id number]  Lungs: Clear  Abdomen: Soft, non-tender, +BS  Extremities: No c/c/e  Skin/Incision Site: Clean, dry and intact  Neurologic:   AAOx3 to verbal stimulation. speech is clear. follows commands.   PERRL EOMI. no facial palsy noted.   Strength: 5/5 in the UE and LE b/l.   Sensation intact x4  SUMMARY:   54F right handed h/o bipolar, anxiety, epilepsy adm for worsening partial sz. EEG w/ left frontotemporal focal sz. 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       ON: there were no events noted.     REVIEW OF SYSTEMS:    VITALS: [x] Reviewed    IMAGING/DATA: [x] Reviewed    IVF FLUIDS/MEDICATIONS: [x] Reviewed    ALLERGIES: Allergies    No Known Allergies    Intolerances        DEVICES:   [] Restraints [] PIVs [] ET tube [] central line [] PICC [] arterial line [] rodriguez [] NGT/OGT [] EVD [] LD [] HALEY/HMV [] LiCOX [] ICP monitor [] Trach [] PEG [] Chest Tube [] other:    EXAMINATION:  General: No acute distress  HEENT: Anicteric sclerae  Cardiac: [id number]  Lungs: Clear  Abdomen: Soft, non-tender, +BS  Extremities: No c/c/e  Skin/Incision Site: Clean, dry and intact  Neurologic:   AAOx3 to verbal stimulation. speech is clear. follows commands.   PERRL EOMI. no facial palsy noted.   Strength: 5/5 in the UE and LE b/l.   Sensation intact x4  SUMMARY:   54F right handed h/o bipolar, anxiety, epilepsy adm for worsening partial sz. EEG w/ left frontotemporal focal sz. 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       ON: there were no events noted.     REVIEW OF SYSTEMS:    VITALS: [x] Reviewed    IMAGING/DATA: [x] Reviewed    IVF FLUIDS/MEDICATIONS: [x] Reviewed    ALLERGIES: Allergies    No Known Allergies    Intolerances        DEVICES:   [] Restraints [] PIVs [] ET tube [] central line [] PICC [] arterial line [] rodriguez [] NGT/OGT [] EVD [] LD [] HALEY/HMV [] LiCOX [] ICP monitor [] Trach [] PEG [] Chest Tube [] other:    EXAMINATION:  General: No acute distress  HEENT: Anicteric sclerae  Cardiac: [id number]  Lungs: Clear  Abdomen: Soft, non-tender, +BS  Extremities: No c/c/e  Skin/Incision Site: Clean, dry and intact    Neurologic:   AAOx3 to verbal stimulation. speech is clear. follows commands.   PERRL EOMI. no facial palsy noted.   Strength: 5/5 in the UE and LE b/l.   Sensation intact x4

## 2025-06-17 NOTE — OCCUPATIONAL THERAPY INITIAL EVALUATION ADULT - DIAGNOSIS, OT EVAL
Patient presents with decreased balance, cognition, strength, endurance impacting ability to perform ADLs and functional mobility

## 2025-06-18 LAB
ANION GAP SERPL CALC-SCNC: 17 MMOL/L — SIGNIFICANT CHANGE UP (ref 5–17)
BUN SERPL-MCNC: 14 MG/DL — SIGNIFICANT CHANGE UP (ref 7–23)
CALCIUM SERPL-MCNC: 9.3 MG/DL — SIGNIFICANT CHANGE UP (ref 8.4–10.5)
CHLORIDE SERPL-SCNC: 105 MMOL/L — SIGNIFICANT CHANGE UP (ref 96–108)
CO2 SERPL-SCNC: 20 MMOL/L — LOW (ref 22–31)
CREAT SERPL-MCNC: 0.58 MG/DL — SIGNIFICANT CHANGE UP (ref 0.5–1.3)
EGFR: 106 ML/MIN/1.73M2 — SIGNIFICANT CHANGE UP
EGFR: 106 ML/MIN/1.73M2 — SIGNIFICANT CHANGE UP
GLUCOSE SERPL-MCNC: 126 MG/DL — HIGH (ref 70–99)
HCT VFR BLD CALC: 35.6 % — SIGNIFICANT CHANGE UP (ref 34.5–45)
HGB BLD-MCNC: 11.8 G/DL — SIGNIFICANT CHANGE UP (ref 11.5–15.5)
MAGNESIUM SERPL-MCNC: 2.1 MG/DL — SIGNIFICANT CHANGE UP (ref 1.6–2.6)
MCHC RBC-ENTMCNC: 30.4 PG — SIGNIFICANT CHANGE UP (ref 27–34)
MCHC RBC-ENTMCNC: 33.1 G/DL — SIGNIFICANT CHANGE UP (ref 32–36)
MCV RBC AUTO: 91.8 FL — SIGNIFICANT CHANGE UP (ref 80–100)
NRBC # BLD AUTO: 0 K/UL — SIGNIFICANT CHANGE UP (ref 0–0)
NRBC # FLD: 0 K/UL — SIGNIFICANT CHANGE UP (ref 0–0)
NRBC BLD AUTO-RTO: 0 /100 WBCS — SIGNIFICANT CHANGE UP (ref 0–0)
PHOSPHATE SERPL-MCNC: 2.9 MG/DL — SIGNIFICANT CHANGE UP (ref 2.5–4.5)
PLATELET # BLD AUTO: 326 K/UL — SIGNIFICANT CHANGE UP (ref 150–400)
PMV BLD: 9.8 FL — SIGNIFICANT CHANGE UP (ref 7–13)
POTASSIUM SERPL-MCNC: 4 MMOL/L — SIGNIFICANT CHANGE UP (ref 3.5–5.3)
POTASSIUM SERPL-SCNC: 4 MMOL/L — SIGNIFICANT CHANGE UP (ref 3.5–5.3)
RBC # BLD: 3.88 M/UL — SIGNIFICANT CHANGE UP (ref 3.8–5.2)
RBC # FLD: 13 % — SIGNIFICANT CHANGE UP (ref 10.3–14.5)
SODIUM SERPL-SCNC: 142 MMOL/L — SIGNIFICANT CHANGE UP (ref 135–145)
WBC # BLD: 16.34 K/UL — HIGH (ref 3.8–10.5)
WBC # FLD AUTO: 16.34 K/UL — HIGH (ref 3.8–10.5)

## 2025-06-18 PROCEDURE — 99231 SBSQ HOSP IP/OBS SF/LOW 25: CPT

## 2025-06-18 PROCEDURE — 99222 1ST HOSP IP/OBS MODERATE 55: CPT

## 2025-06-18 RX ADMIN — DEXAMETHASONE 4 MILLIGRAM(S): 0.5 TABLET ORAL at 21:16

## 2025-06-18 RX ADMIN — Medication 2 TABLET(S): at 21:13

## 2025-06-18 RX ADMIN — Medication 40 MILLIGRAM(S): at 05:11

## 2025-06-18 RX ADMIN — ENOXAPARIN SODIUM 40 MILLIGRAM(S): 100 INJECTION SUBCUTANEOUS at 17:45

## 2025-06-18 RX ADMIN — Medication 650 MILLIGRAM(S): at 11:17

## 2025-06-18 RX ADMIN — BRIVARACETAM 240 MILLIGRAM(S): 75 TABLET, FILM COATED ORAL at 21:16

## 2025-06-18 RX ADMIN — DEXAMETHASONE 4 MILLIGRAM(S): 0.5 TABLET ORAL at 05:11

## 2025-06-18 RX ADMIN — LACOSAMIDE 130 MILLIGRAM(S): 150 TABLET, FILM COATED ORAL at 05:11

## 2025-06-18 RX ADMIN — Medication 650 MILLIGRAM(S): at 17:45

## 2025-06-18 RX ADMIN — CLOBAZAM 10 MILLIGRAM(S): 20 TABLET ORAL at 21:14

## 2025-06-18 RX ADMIN — LACOSAMIDE 130 MILLIGRAM(S): 150 TABLET, FILM COATED ORAL at 21:16

## 2025-06-18 RX ADMIN — Medication 88 MICROGRAM(S): at 05:11

## 2025-06-18 RX ADMIN — LACOSAMIDE 130 MILLIGRAM(S): 150 TABLET, FILM COATED ORAL at 14:19

## 2025-06-18 RX ADMIN — PERAMPANEL 4 MILLIGRAM(S): 6 TABLET ORAL at 21:16

## 2025-06-18 RX ADMIN — BRIVARACETAM 240 MILLIGRAM(S): 75 TABLET, FILM COATED ORAL at 06:15

## 2025-06-18 RX ADMIN — POLYETHYLENE GLYCOL 3350 17 GRAM(S): 17 POWDER, FOR SOLUTION ORAL at 11:18

## 2025-06-18 RX ADMIN — Medication 1 MILLIGRAM(S): at 21:14

## 2025-06-18 RX ADMIN — DEXAMETHASONE 4 MILLIGRAM(S): 0.5 TABLET ORAL at 11:17

## 2025-06-18 RX ADMIN — BRIVARACETAM 240 MILLIGRAM(S): 75 TABLET, FILM COATED ORAL at 14:19

## 2025-06-18 RX ADMIN — DEXAMETHASONE 4 MILLIGRAM(S): 0.5 TABLET ORAL at 01:38

## 2025-06-18 NOTE — PROGRESS NOTE ADULT - ASSESSMENT
Patient is a 53 Y/O RIGHT handed woman with a PMHx significant for Left temporal lobe lesion, bipolar, anxiety transferred from [facility] for EMU admission and concern for focal status in setting of left temporal mass.     # Brain Temp lobe lesion   - Post-OR CT w/ S/P medial temporal region lesion. Bifrontal pneumocephalus. No hydrocephalus. Minimal postop hemorrhage  - Post OR MR w/ Lt temporal postoperative changes after excision of a suspicious nonenhancing lesion. No acute infarcts.  - TTE w/ EF of 66%, no WMA, trace pericardial effusion --> Repeat TTE in 1 month    - Dexamethsone per NSGY. PPI for GI PPX   - S/P OR    - F/u pathology   - Pain control  - Neuro and NSGY evals appreciated; F/u recs    # Leukocytosis   - Likely 2/2 reactivity from OR and Dexamethasone   - Monitor and trend CBC, temp curve, VS and adjust as tolerated  - If febrile, check pan cultures    # Elevated TSH   - Repeat TFTs in AM   - Cont Synthroid for now    - Can be due to reactivity. Discussed with patient      DVT and GI PPX      Discussed with Attending.

## 2025-06-18 NOTE — PROGRESS NOTE ADULT - SUBJECTIVE AND OBJECTIVE BOX
Name of Patient : NAT THOMASON  MRN: [id number]  Date of visit: 06-18-25 @ 16:08      Subjective: Patient seen and examined. No new events except as noted.   Patient seen earlier this AM. Down-graded from NSICU.   Reports headache is better controlled.    REVIEW OF SYSTEMS:    CONSTITUTIONAL: Generalized weakness; Intermittent headache   EYES/ENT: No visual changes;  No vertigo or throat pain   NECK: No pain or stiffness  RESPIRATORY: No cough, wheezing, hemoptysis; No shortness of breath  CARDIOVASCULAR: No chest pain or palpitations  GASTROINTESTINAL: No abdominal or epigastric pain. No nausea, vomiting, or hematemesis; No diarrhea or constipation. No melena or hematochezia.  GENITOURINARY: No dysuria, frequency or hematuria  NEUROLOGICAL: + Intermittent headache; S/P OR   SKIN: No itching, burning, rashes, or lesions   All other review of systems is negative unless indicated above.    MEDICATIONS:  MEDICATIONS  (STANDING):  brivaracetam  IVPB 100 milliGRAM(s) IV Intermittent <User Schedule>  cloBAZam 10 milliGRAM(s) Oral at bedtime  dexAMETHasone  Injectable   IV Push   dexAMETHasone  Injectable 4 milliGRAM(s) IV Push every 8 hours  enoxaparin Injectable 40 milliGRAM(s) SubCutaneous <User Schedule>  lacosamide IVPB 150 milliGRAM(s) IV Intermittent <User Schedule>  levothyroxine 88 MICROGram(s) Oral daily  melatonin 1 milliGRAM(s) Oral at bedtime  pantoprazole    Tablet 40 milliGRAM(s) Oral before breakfast  perampanel 4 milliGRAM(s) Oral at bedtime  polyethylene glycol 3350 17 Gram(s) Oral daily  senna 2 Tablet(s) Oral at bedtime      PHYSICAL EXAM:  T(C): 36.8 (06-18-25 @ 12:00), Max: 37.3 (06-18-25 @ 04:42)  HR: 80 (06-18-25 @ 12:00) (58 - 83)  BP: 92/59 (06-18-25 @ 12:00) (92/59 - 115/72)  RR: 18 (06-18-25 @ 12:00) (17 - 20)  SpO2: 97% (06-18-25 @ 12:00) (95% - 99%)  Wt(kg): --  I&O's Summary    17 Jun 2025 07:01  -  18 Jun 2025 07:00  --------------------------------------------------------  IN: 740 mL / OUT: 3500 mL / NET: -2760 mL    18 Jun 2025 07:01  -  18 Jun 2025 16:08  --------------------------------------------------------  IN: 960 mL / OUT: 0 mL / NET: 960 mL          Appearance: Awake, generalized weakness, sitting up in bed 	  HEENT:  + Lt scalp incision; Eyes are open; LT eye ecchymosis   Lymphatic: No lymphadenopathy grossly   Cardiovascular: Normal    Respiratory: normal effort on RA, clear  Gastrointestinal:  Soft, Non-tender  Skin: No rashes,  warm to touch  Psychiatry:  Mood & affect appropriate  Musculoskeletal: No edema         06-17-25 @ 07:01  -  06-18-25 @ 07:00  --------------------------------------------------------  IN: 740 mL / OUT: 3500 mL / NET: -2760 mL    06-18-25 @ 07:01  - 06-18-25 @ 16:08  --------------------------------------------------------  IN: 960 mL / OUT: 0 mL / NET: 960 mL                              11.8   16.34 )-----------( 326      ( 18 Jun 2025 07:35 )             35.6               06-18    142  |  105  |  14  ----------------------------<  126[H]  4.0   |  20[L]  |  0.58    Ca    9.3      18 Jun 2025 07:32  Phos  2.9     06-18  Mg     2.1     06-18                         Urinalysis Basic - ( 18 Jun 2025 07:32 )    Color: x / Appearance: x / SG: x / pH: x  Gluc: 126 mg/dL / Ketone: x  / Bili: x / Urobili: x   Blood: x / Protein: x / Nitrite: x   Leuk Esterase: x / RBC: x / WBC x   Sq Epi: x / Non Sq Epi: x / Bacteria: x          < from: CT Head No Cont (06.17.25 @ 10:02) >  IMPRESSION:  Status post left medial temporal region lesion. Bifrontal pneumocephalus.   No hydrocephalus. Minimal postop hemorrhage    < end of copied text >      < from: MR Head w/wo IV Cont (06.17.25 @ 15:58) >  IMPRESSION: Left temporal postoperative changes after excision of a   suspicious nonenhancing lesion since 6/12/2025. No acute infarcts.    < end of copied text >      < from: TTE W or WO Ultrasound Enhancing Agent (06.16.25 @ 08:25) >      1. Left ventricular cavity is normal in size. Left ventricular systolic function is normal with an ejection fraction of 66 % by Mueller's method of disks. There are no regional wall motion abnormalities seen.   2. Normal right ventricular cavity size and normal right ventricular systolic function.   3. No significant valvular disease.   4. Trace pericardial effusion with no echocardiographic evidence of tamponade physiology.   5. No prior echocardiogram is available for comparison.    < end of copied text >

## 2025-06-18 NOTE — CONSULT NOTE ADULT - SUBJECTIVE AND OBJECTIVE BOX
Patient is a 54y old  Female who presents with a chief complaint of Seizure (2025 14:29)    Admission HPI  Patient NAT THOMASON is a 54y (1970) woman with a PMHx significant for Left Temporal lobe lesion, Bipolar, Anxiety transferred from [place] to [facility] to be evaluated for seizure. History limited as patient is poor historian, see hx below from chart at OSH.     History as per partner (Benson) at bedside. As per partner over the past few weeks patient has been having multiple episodes of partial seizures more often than usual. Despite being on her normal routine, patient often has multiple seizures weekly but always returned to baseline. On  patient had a seizure and afterwards patient appeared more confused, was unable to articulate herself did not seek medical intervention. Since then symptoms have worsen, on  patient was found with a bunch of Lamictal pills around her, reported she might have taken more  pills than usual & patient was brought to [place] ED.   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   As per partner, patient ha been going under a lot of life changing events. In april she lost her job, 3 weeks ago a family member has ; with everything happening, she has been deteriorating   Also on  patient fell at [facility] got on the plane & went to [place], there she went to the ED and was found to have a concussion   Patient used to be on lexapro, but was discontinued in april by her psychiatrist   Upon evaluation patient is awake, alert, speaking incoherently, pacing around the room, but able to follow commands and re-directable.     Imaging at [place]   - CT-head-Unremarkable.   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   - EEG: Revealed evidence of electrographic seizure activity  On  - Patient was found to be hyponatremic (Na:127), corrected with latest Na:138 ()    Interval Hx:  Patient was transferred [facility] and had MRI brain with stable temporal lobe lesion favoring diagnosis of malignancy as well as surrounding edema likely correlated to recent seizure activity. Neurology following as EEG still with seizure activity likely related to temporal lobe lesion. Patient transferred to [facility] EMU for further management.     EEG 2025  Clinical Impression:  -Numerous left posterior temporal focal-onset seizures at times with bilateral spread, 2-5 seizures/hour for most of recording, some hours with no seizures. Overall improving during recording. Last seizure captured 12:38 25.   -Risk of left frontal and right frontotemporal focal-onset seizures   -Left hemispheric focal cerebral dysfunction can be structural and/or functional (such as post-ictal) in etiology.     Interval History:  Patient s/p L craniotomy and tumor resection on .  Pathology pending  Course c/b delirium.    REVIEW OF SYSTEMS: No chest pain, shortness of breath, nausea, vomiting or diarhea; other ROS neg     PAST MEDICAL & SURGICAL HISTORY  Seizure    PTSD (post-traumatic stress disorder)    Anxiety    Seizure    Osteoarthritis    Shingles    S/P hernia surgery    H/O rhinoplasty    H/O umbilical hernia repair    FUNCTIONAL HISTORY:   Lives w mother in home w no steps.  PTA Independent    CURRENT FUNCTIONAL STATUS:  CG Transfers, Min A gait    FAMILY HISTORY   Family history of cancer (Father)  Family history of arthritis (Mother)    MEDICATIONS   acetaminophen     Tablet .. 650 milliGRAM(s) Oral every 6 hours PRN  albuterol    90 MICROgram(s) HFA Inhaler 2 Puff(s) Inhalation every 6 hours PRN  brivaracetam  Injectable 100 milliGRAM(s) IV Push once PRN  brivaracetam  IVPB 100 milliGRAM(s) IV Intermittent <User Schedule>  cloBAZam 10 milliGRAM(s) Oral at bedtime  dexAMETHasone  Injectable   IV Push   dexAMETHasone  Injectable 4 milliGRAM(s) IV Push every 6 hours  dexAMETHasone  Injectable 4 milliGRAM(s) IV Push every 8 hours  enoxaparin Injectable 40 milliGRAM(s) SubCutaneous <User Schedule>  lacosamide IVPB 150 milliGRAM(s) IV Intermittent <User Schedule>  levothyroxine 88 MICROGram(s) Oral daily  melatonin 1 milliGRAM(s) Oral at bedtime  midazolam Injectable 2 milliGRAM(s) IV Push once PRN  ondansetron Injectable 4 milliGRAM(s) IV Push every 6 hours PRN  pantoprazole    Tablet 40 milliGRAM(s) Oral before breakfast  perampanel 4 milliGRAM(s) Oral at bedtime  polyethylene glycol 3350 17 Gram(s) Oral daily  senna 2 Tablet(s) Oral at bedtime  simethicone 80 milliGRAM(s) Chew daily PRN    ALLERGIES  No Known Allergies    VITALS  T(C): 36.9 (25 @ 08:00), Max: 37.3 (25 @ 04:42)  HR: 83 (25 @ 08:00) (55 - 83)  BP: 97/64 (25 @ 08:00) (97/64 - 115/72)  RR: 17 (25 @ 08:00) (17 - 36)  SpO2: 95% (25 @ 08:00) (95% - 100%)  Wt(kg): --    PHYSICAL EXAM  Constitutional - NAD, Comfortable  HEENT - NCAT, EOMI  Neck - Supple  Chest - No distress, no use of accessory muscles for respiration  Cardiovascular -Well perfused  Abdomen - BS+, Soft, NTND  Extremities - No C/C/E, No calf tenderness   Neurologic Exam -                    Cognitive - Awake, Alert, AAO to self, place, date, year, situation     Communication - Fluent, No dysarthria, no aphasia     Cranial Nerves - CN 2-12 intact     Motor - No focal deficits      Sensory - Intact to LT     Reflexes - DTR Intact, No primitive reflexive  Psychiatric - Mood stable, Affect WNL    RECENT LABS/IMAGING  CBC Full  -  ( 2025 07:35 )  WBC Count : 16.34 K/uL  RBC Count : 3.88 M/uL  Hemoglobin : 11.8 g/dL  Hematocrit : 35.6 %  Platelet Count - Automated : 326 K/uL  Mean Cell Volume : 91.8 fl  Mean Cell Hemoglobin : 30.4 pg  Mean Cell Hemoglobin Concentration : 33.1 g/dL  Auto Neutrophil # : x  Auto Lymphocyte # : x  Auto Monocyte # : x  Auto Eosinophil # : x  Auto Basophil # : x  Auto Neutrophil % : x  Auto Lymphocyte % : x  Auto Monocyte % : x  Auto Eosinophil % : x  Auto Basophil % : x        142  |  105  |  14  ----------------------------<  126[H]  4.0   |  20[L]  |  0.58    Ca    9.3      2025 07:32  Phos  2.9     06-18  Mg     2.1     06-18      Urinalysis Basic - ( 2025 07:32 )    Color: x / Appearance: x / SG: x / pH: x  Gluc: 126 mg/dL / Ketone: x  / Bili: x / Urobili: x   Blood: x / Protein: x / Nitrite: x   Leuk Esterase: x / RBC: x / WBC x   Sq Epi: x / Non Sq Epi: x / Bacteria: x    Impression:  55 yo with functional deficits secondary to diagnosis of brain mass    Plan:  PT- ROM, Bed Mob, Transfers, Amb w AD and bracing as needed  OT- ADLs, bracing  SLP- Dysphagia eval and treat  Prec- Falls, Cardiac  DVT Prophylaxis - Lovenox  Skin- Turn q2 h  Dispo-     Total time taken to review relevant records and imaging results, examine patient, write note, and, when applicable, discuss case with patient, family, , resident, medical student and other medical providers:     [  ] 40 minutes (10597)  [  ] 55 minutes (54059)  [  ] 75 minutes (81921)    [  ] 25 minutes (95236)  [  ] 35 minutes (63143)  [  ] 50 minutes (10705)           Patient is a 54y old  Female who presents with a chief complaint of Seizure (2025 14:29)    Admission HPI  Patient NAT THOMASON is a 54y (1970) woman with a PMHx significant for Left Temporal lobe lesion, Bipolar, Anxiety transferred from [place] to [facility] to be evaluated for seizure. History limited as patient is poor historian, see hx below from chart at OSH.     History as per partner (Benson) at bedside. As per partner over the past few weeks patient has been having multiple episodes of partial seizures more often than usual. Despite being on her normal routine, patient often has multiple seizures weekly but always returned to baseline. On  patient had a seizure and afterwards patient appeared more confused, was unable to articulate herself did not seek medical intervention. Since then symptoms have worsen, on  patient was found with a bunch of Lamictal pills around her, reported she might have taken more  pills than usual & patient was brought to [place] ED.   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   As per partner, patient ha been going under a lot of life changing events. In april she lost her job, 3 weeks ago a family member has ; with everything happening, she has been deteriorating   Also on  patient fell at [facility], got on the plane & went to [place], there she went to the ED and was found to have a concussion   Patient used to be on lexapro, but was discontinued in april by her psychiatrist   Upon evaluation patient is awake, alert, speaking incoherently, pacing around the room, but able to follow commands and re-directable.     Imaging at [facility]   - CT-head-Unremarkable.   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   - EEG: Revealed evidence of electrographic seizure activity  On  - Patient was found to be hyponatremic (Na:127), corrected with latest Na:138 ()    Interval Hx:  Patient was transferred [facility] and had MRI brain with stable temporal lobe lesion favoring diagnosis of malignancy as well as surrounding edema likely correlated to recent seizure activity. Neurology following as EEG still with seizure activity likely related to temporal lobe lesion. Patient transferred to [facility] EMU for further management.     EEG 2025  Clinical Impression:  -Numerous left posterior temporal focal-onset seizures at times with bilateral spread, 2-5 seizures/hour for most of recording, some hours with no seizures. Overall improving during recording. Last seizure captured 12:38 25.   -Risk of left frontal and right frontotemporal focal-onset seizures   -Left hemispheric focal cerebral dysfunction can be structural and/or functional (such as post-ictal) in etiology.     Interval History:  Patient s/p L craniotomy and tumor resection on .  Pathology pending  Course c/b delirium.    REVIEW OF SYSTEMS: + weakness, + difficulty walking, + poor balance, No chest pain, shortness of breath, nausea, vomiting or diarhea; other ROS neg     PAST MEDICAL & SURGICAL HISTORY  Seizure    PTSD (post-traumatic stress disorder)    Anxiety    Seizure    Osteoarthritis    Shingles    S/P hernia surgery    H/O rhinoplasty    H/O umbilical hernia repair    FUNCTIONAL HISTORY:   Lives w mother in home w no steps.  PTA Independent    CURRENT FUNCTIONAL STATUS:  CG Transfers, Min A gait    FAMILY HISTORY   Family history of cancer (Father)  Family history of arthritis (Mother)    MEDICATIONS   acetaminophen     Tablet .. 650 milliGRAM(s) Oral every 6 hours PRN  albuterol    90 MICROgram(s) HFA Inhaler 2 Puff(s) Inhalation every 6 hours PRN  brivaracetam  Injectable 100 milliGRAM(s) IV Push once PRN  brivaracetam  IVPB 100 milliGRAM(s) IV Intermittent <User Schedule>  cloBAZam 10 milliGRAM(s) Oral at bedtime  dexAMETHasone  Injectable   IV Push   dexAMETHasone  Injectable 4 milliGRAM(s) IV Push every 6 hours  dexAMETHasone  Injectable 4 milliGRAM(s) IV Push every 8 hours  enoxaparin Injectable 40 milliGRAM(s) SubCutaneous <User Schedule>  lacosamide IVPB 150 milliGRAM(s) IV Intermittent <User Schedule>  levothyroxine 88 MICROGram(s) Oral daily  melatonin 1 milliGRAM(s) Oral at bedtime  midazolam Injectable 2 milliGRAM(s) IV Push once PRN  ondansetron Injectable 4 milliGRAM(s) IV Push every 6 hours PRN  pantoprazole    Tablet 40 milliGRAM(s) Oral before breakfast  perampanel 4 milliGRAM(s) Oral at bedtime  polyethylene glycol 3350 17 Gram(s) Oral daily  senna 2 Tablet(s) Oral at bedtime  simethicone 80 milliGRAM(s) Chew daily PRN    ALLERGIES  No Known Allergies    VITALS  T(C): 36.9 (25 @ 08:00), Max: 37.3 (25 @ 04:42)  HR: 83 (25 @ 08:00) (55 - 83)  BP: 97/64 (25 @ 08:00) (97/64 - 115/72)  RR: 17 (25 @ 08:00) (17 - 36)  SpO2: 95% (25 @ 08:00) (95% - 100%)  Wt(kg): --    PHYSICAL EXAM  Constitutional - NAD, Comfortable  HEENT - L eye w eccymoses, EOMI  Neck - Supple  Chest - No distress, no use of accessory muscles for respiration  Cardiovascular -Well perfused  Abdomen - BS+, Soft, NTND  Extremities - No C/C/E, No calf tenderness   Neurologic Exam -                 AAO x 3  Speech a little tangential.  Motor 4+/5 bl UE and LEs  No clonus  Sensation intact  Psychiatric - Mood stable, Affect WNL    RECENT LABS/IMAGING  CBC Full  -  ( 2025 07:35 )  WBC Count : 16.34 K/uL  RBC Count : 3.88 M/uL  Hemoglobin : 11.8 g/dL  Hematocrit : 35.6 %  Platelet Count - Automated : 326 K/uL  Mean Cell Volume : 91.8 fl  Mean Cell Hemoglobin : 30.4 pg  Mean Cell Hemoglobin Concentration : 33.1 g/dL  Auto Neutrophil # : x  Auto Lymphocyte # : x  Auto Monocyte # : x  Auto Eosinophil # : x  Auto Basophil # : x  Auto Neutrophil % : x  Auto Lymphocyte % : x  Auto Monocyte % : x  Auto Eosinophil % : x  Auto Basophil % : x        142  |  105  |  14  ----------------------------<  126[H]  4.0   |  20[L]  |  0.58    Ca    9.3      2025 07:32  Phos  2.9     -18  Mg     2.1     -18      Urinalysis Basic - ( 2025 07:32 )    Color: x / Appearance: x / SG: x / pH: x  Gluc: 126 mg/dL / Ketone: x  / Bili: x / Urobili: x   Blood: x / Protein: x / Nitrite: x   Leuk Esterase: x / RBC: x / WBC x   Sq Epi: x / Non Sq Epi: x / Bacteria: x    Impression:  53 yo with functional deficits secondary to diagnosis of brain mass    Plan:  PT- ROM, Bed Mob, Transfers, Amb w AD and bracing as needed  OT- ADLs, cognition  SLP- Dysphagia eval and treat  Prec- Falls, Cardiac  DVT Prophylaxis - Lovenox  Skin- Turn q2 h  Dispo- Acute Rehab- patient requires active and ongoing therapeutic interventions of multiple disciplines and can tolerate and benefit from 3 hours of intensive therapies x 2-4wks depending on progress at rehabilitation facility. Can actively participate and benefit from  an intensive rehabilitation program. Requires supervision by a rehabilitation physician and a coordinated interdisciplinary approach to providing rehabilitation.     Total time taken to review relevant records and imaging results, examine patient, write note, and, when applicable, discuss case with patient, family, , resident, medical student and other medical providers:     [  ] 40 minutes (50238)  [X] 55 minutes (76031)  [  ] 75 minutes (95808)    [  ] 25 minutes (91964)  [  ] 35 minutes (57287)  [  ] 50 minutes (33547)

## 2025-06-18 NOTE — PROGRESS NOTE ADULT - SUBJECTIVE AND OBJECTIVE BOX
SUBJECTIVE: Patient evaluated this AM cousins at bedside. elevated mood, denies SI/HI. Well appearing, vitals and labs reviewed, pending rehab when cleared by .     Vital Signs Last 24 Hrs  T(C): 36.8 (06-18-25 @ 16:16), Max: 37.3 (06-18-25 @ 04:42)  T(F): 98.2 (06-18-25 @ 16:16), Max: 99.1 (06-18-25 @ 04:42)  HR: 67 (06-18-25 @ 16:16) (58 - 83)  BP: 91/57 (06-18-25 @ 16:16) (91/57 - 115/72)  BP(mean): 74 (06-17-25 @ 19:00) (74 - 74)  RR: 16 (06-18-25 @ 16:16) (16 - 20)  SpO2: 95% (06-18-25 @ 16:16) (95% - 99%)    PHYSICAL EXAM:  Constitutional: No Acute Distress, elevated mood   Neurological: Awake/alert oriented x3 (person, place and time), EOMI, PERRL 3mm briskly reactive, intermittent word finding difficulty, no facial asymmetry  moving all extremities with symmetric 5/5 strength no sensory deficits   Incision: L cranial incision clean dry intact   Pulmonary: Clear lungs   Cardiovascular: Regular rate and rhythm   Gastrointestinal: Soft, Non-tender, Non-distended, +bowel sounds present   Extremities: No calf tenderness bilaterally, no edema     LABS:                      11.8   16.34 )-----------( 326      ( 18 Jun 2025 07:35 )             35.6    06-18  142  |  105  |  14  ----------------------------<  126[H]  4.0   |  20[L]  |  0.58  Ca    9.3      18 Jun 2025 07:32  Phos  2.9     06-18  Mg     2.1     06-18 06-17 @ 07:01  -  06-18 @ 07:00  --------------------------------------------------------  IN: 740 mL / OUT: 3500 mL / NET: -2760 mL    06-18 @ 07:01  -  06-18 @ 17:13  --------------------------------------------------------  IN: 960 mL / OUT: 0 mL / NET: 960 mL    IMAGING:   < from: MR Head w/wo IV Cont (06.17.25 @ 15:58) >  ACC: [id number] EXAM:  MR BRAIN WAW IC   ORDERED BY:  ISABELLE CURTIS   PROCEDURE DATE:  06/17/2025    INTERPRETATION:  CLINICAL INDICATION: Post resection left temporal lesion  There has been a left frontal temporal craniotomy. The heterogeneous mass   in theleft inferolateral temporal lobe seen on the prior examination has   been removed. Postoperative changes are identified with a small amount of   postsurgical material, hemorrhage, edema and air. No acute infarcts are   seen. A small amount of air is identified within the temporal horn of the   lateral ventricle. There is no midline shift. Ventricles and sulci are   otherwise normal in size and position. The sellar and parasellar   structures are unremarkable.    IMPRESSION: Left temporal postoperative changes after excision of a   suspicious nonenhancing lesion since 6/12/2025. No acute infarcts.  --- End of Report ---  BUSHRA STAPLETON MD; Attending Radiologist  This document has been electronically signed. Jun 17 2025  5:17PM  < end of copied text >    MEDICATIONS  (STANDING):  brivaracetam  IVPB 100 milliGRAM(s) IV Intermittent <User Schedule>  cloBAZam 10 milliGRAM(s) Oral at bedtime  dexAMETHasone  Injectable   IV Push   dexAMETHasone  Injectable 4 milliGRAM(s) IV Push every 8 hours  enoxaparin Injectable 40 milliGRAM(s) SubCutaneous <User Schedule>  lacosamide IVPB 150 milliGRAM(s) IV Intermittent <User Schedule>  levothyroxine 88 MICROGram(s) Oral daily  melatonin 1 milliGRAM(s) Oral at bedtime  pantoprazole    Tablet 40 milliGRAM(s) Oral before breakfast  perampanel 4 milliGRAM(s) Oral at bedtime  polyethylene glycol 3350 17 Gram(s) Oral daily  senna 2 Tablet(s) Oral at bedtime    MEDICATIONS  (PRN):  acetaminophen     Tablet .. 650 milliGRAM(s) Oral every 6 hours PRN Temp greater or equal to 38C (100.4F), Mild Pain (1 - 3)  albuterol    90 MICROgram(s) HFA Inhaler 2 Puff(s) Inhalation every 6 hours PRN Bronchospasm  brivaracetam  Injectable 100 milliGRAM(s) IV Push once PRN 2nd line seizure rescue  midazolam Injectable 2 milliGRAM(s) IV Push once PRN seizures  ondansetron Injectable 4 milliGRAM(s) IV Push every 6 hours PRN Nausea and/or Vomiting  simethicone 80 milliGRAM(s) Chew daily PRN Gas    DIET:  SUBJECTIVE: Patient evaluated this AM cousins at bedside. elevated mood, denies SI/HI. Well appearing, vitals and labs reviewed, pending rehab when cleared by .     Vital Signs Last 24 Hrs  T(C): 36.8 (06-18-25 @ 16:16), Max: 37.3 (06-18-25 @ 04:42)  T(F): 98.2 (06-18-25 @ 16:16), Max: 99.1 (06-18-25 @ 04:42)  HR: 67 (06-18-25 @ 16:16) (58 - 83)  BP: 91/57 (06-18-25 @ 16:16) (91/57 - 115/72)  BP(mean): 74 (06-17-25 @ 19:00) (74 - 74)  RR: 16 (06-18-25 @ 16:16) (16 - 20)  SpO2: 95% (06-18-25 @ 16:16) (95% - 99%)    PHYSICAL EXAM:  Constitutional: No Acute Distress, elevated mood   Neurological: Awake/alert oriented x3 (person, place and time), EOMI, PERRL 3mm briskly reactive, intermittent word finding difficulty, no facial asymmetry  moving all extremities with symmetric 5/5 strength no sensory deficits   Incision: L cranial incision clean dry intact   Pulmonary: Clear lungs   Cardiovascular: Regular rate and rhythm   Gastrointestinal: Soft, Non-tender, Non-distended, +bowel sounds present   Extremities: No calf tenderness bilaterally, no edema     LABS:                      11.8   16.34 )-----------( 326      ( 18 Jun 2025 07:35 )             35.6    06-18  142  |  105  |  14  ----------------------------<  126[H]  4.0   |  20[L]  |  0.58  Ca    9.3      18 Jun 2025 07:32  Phos  2.9     06-18  Mg     2.1     06-18 06-17 @ 07:01  -  06-18 @ 07:00  --------------------------------------------------------  IN: 740 mL / OUT: 3500 mL / NET: -2760 mL    06-18 @ 07:01  -  06-18 @ 17:13  --------------------------------------------------------  IN: 960 mL / OUT: 0 mL / NET: 960 mL    IMAGING:   < from: MR Head w/wo IV Cont (06.17.25 @ 15:58) >  ACC: [id number] EXAM:  MR BRAIN WAW IC   ORDERED BY:  ISABELLE CURTIS   PROCEDURE DATE:  06/17/2025    INTERPRETATION:  CLINICAL INDICATION: Post resection left temporal lesion  There has been a left frontal temporal craniotomy. The heterogeneous mass   in theleft inferolateral temporal lobe seen on the prior examination has   been removed. Postoperative changes are identified with a small amount of   postsurgical material, hemorrhage, edema and air. No acute infarcts are   seen. A small amount of air is identified within the temporal horn of the   lateral ventricle. There is no midline shift. Ventricles and sulci are   otherwise normal in size and position. The sellar and parasellar   structures are unremarkable.    IMPRESSION: Left temporal postoperative changes after excision of a   suspicious nonenhancing lesion since 6/12/2025. No acute infarcts.  --- End of Report ---  BUSHRA STAPLETON MD; Attending Radiologist  This document has been electronically signed. Jun 17 2025  5:17PM  < end of copied text >    MEDICATIONS  (STANDING):  brivaracetam  IVPB 100 milliGRAM(s) IV Intermittent <User Schedule>  cloBAZam 10 milliGRAM(s) Oral at bedtime  dexAMETHasone  Injectable   IV Push   dexAMETHasone  Injectable 4 milliGRAM(s) IV Push every 8 hours  enoxaparin Injectable 40 milliGRAM(s) SubCutaneous <User Schedule>  lacosamide IVPB 150 milliGRAM(s) IV Intermittent <User Schedule>  levothyroxine 88 MICROGram(s) Oral daily  melatonin 1 milliGRAM(s) Oral at bedtime  pantoprazole    Tablet 40 milliGRAM(s) Oral before breakfast  perampanel 4 milliGRAM(s) Oral at bedtime  polyethylene glycol 3350 17 Gram(s) Oral daily  senna 2 Tablet(s) Oral at bedtime    MEDICATIONS  (PRN):  acetaminophen     Tablet .. 650 milliGRAM(s) Oral every 6 hours PRN Temp greater or equal to 38C (100.4F), Mild Pain (1 - 3)  albuterol    90 MICROgram(s) HFA Inhaler 2 Puff(s) Inhalation every 6 hours PRN Bronchospasm  brivaracetam  Injectable 100 milliGRAM(s) IV Push once PRN 2nd line seizure rescue  midazolam Injectable 2 milliGRAM(s) IV Push once PRN seizures  ondansetron Injectable 4 milliGRAM(s) IV Push every 6 hours PRN Nausea and/or Vomiting  simethicone 80 milliGRAM(s) Chew daily PRN Gas    DIET: reg

## 2025-06-18 NOTE — PROGRESS NOTE ADULT - ASSESSMENT
ASSESSMENT/PLAN: 55yo RIGHT handed woman with a PMHx significant for Left temporal lobe lesion, bipolar, anxiety transferred from [facility] for EMU admission and concern for focal status in setting of left temporal mass.   s/p left craniotomy for resection of tumor 6/16/2025     NEURO: Neuro checks q4hrs   post op MRI as above   Dex taper written   Continue on AEDS: Briviact 100mg q12hr, onfi 10qhs , vimpat 150mg BID, fycompa 4mg qhs --> Epilepsy following   pain regiment Tylenol   BH following for prior Suicidal ideation currently denies SI/HI  placed on constant obs per psych de-escalated today to enhanced supervision     Activity: [x] mobilize as tolerated, PT/OT/PMR rec AR     PULM: on RA   encourage incentive spirometer     CV: -160 mmHg   telemetry     RENAL: IVL, voiding independently  replete electrolytes PRN     GI: regular diet   PPI while on decadrom   contineu bowel regimen with miralax and senna     ENDO: Goal euglycemia (-180), \ISS     HEME/ONC:  VTE prophylaxis: [x] SCDs, LED 6/13 neg for DVT     ID: afebrile     will discuss plan with Dr. Hernández   contact via teams preferred  ASSESSMENT/PLAN: 55yo RIGHT handed woman with a PMHx significant for Left temporal lobe lesion, bipolar, anxiety transferred from [facility] for EMU admission and concern for focal status in setting of left temporal mass.   s/p left craniotomy for resection of tumor 6/16/2025     NEURO: Neuro checks q4hrs   post op MRI as above   Dex taper written   Continue on AEDS: Briviact 100mg q12hr, onfi 10qhs , vimpat 150mg BID, fycompa 4mg qhs --> Epilepsy following   pain regiment Tylenol   BH following for prior Suicidal ideation currently denies SI/HI  placed on constant obs per psych de-escalated today to enhanced supervision     Activity: [x] mobilize as tolerated, PT/OT/PMR rec AR     PULM: on RA  encourage incentive spirometer     CV: -160 mmHg   telemetry     RENAL: IVL, voiding independently  replete electrolytes PRN     GI: regular diet   PPI while on decadrom   contineu bowel regimen with miralax and senna     ENDO: Goal euglycemia (-180), \ISS     HEME/ONC:  VTE prophylaxis: [x] SCDs, LED 6/13 neg for DVT     ID: afebrile     will discuss plan with Dr. Hernández   contact via teams preferred

## 2025-06-18 NOTE — BH CONSULTATION LIAISON PROGRESS NOTE - OTHER
also a little scared about next steps some difficulty with word-finding or unusual choice of words moves extremities spontaneously

## 2025-06-19 PROCEDURE — 99232 SBSQ HOSP IP/OBS MODERATE 35: CPT

## 2025-06-19 PROCEDURE — 99231 SBSQ HOSP IP/OBS SF/LOW 25: CPT

## 2025-06-19 RX ORDER — LORAZEPAM 4 MG/ML
0.5 VIAL (ML) INJECTION EVERY 6 HOURS
Refills: 0 | Status: DISCONTINUED | OUTPATIENT
Start: 2025-06-19 | End: 2025-06-23

## 2025-06-19 RX ORDER — LORAZEPAM 4 MG/ML
1 VIAL (ML) INJECTION EVERY 6 HOURS
Refills: 0 | Status: DISCONTINUED | OUTPATIENT
Start: 2025-06-19 | End: 2025-06-23

## 2025-06-19 RX ADMIN — Medication 40 MILLIGRAM(S): at 06:01

## 2025-06-19 RX ADMIN — CLOBAZAM 10 MILLIGRAM(S): 20 TABLET ORAL at 21:11

## 2025-06-19 RX ADMIN — LACOSAMIDE 130 MILLIGRAM(S): 150 TABLET, FILM COATED ORAL at 21:10

## 2025-06-19 RX ADMIN — BRIVARACETAM 240 MILLIGRAM(S): 75 TABLET, FILM COATED ORAL at 06:02

## 2025-06-19 RX ADMIN — LACOSAMIDE 130 MILLIGRAM(S): 150 TABLET, FILM COATED ORAL at 06:02

## 2025-06-19 RX ADMIN — BRIVARACETAM 240 MILLIGRAM(S): 75 TABLET, FILM COATED ORAL at 21:21

## 2025-06-19 RX ADMIN — Medication 1 MILLIGRAM(S): at 21:11

## 2025-06-19 RX ADMIN — BRIVARACETAM 240 MILLIGRAM(S): 75 TABLET, FILM COATED ORAL at 13:28

## 2025-06-19 RX ADMIN — Medication 2 TABLET(S): at 21:11

## 2025-06-19 RX ADMIN — Medication 88 MICROGRAM(S): at 06:01

## 2025-06-19 RX ADMIN — Medication 650 MILLIGRAM(S): at 21:12

## 2025-06-19 RX ADMIN — ENOXAPARIN SODIUM 40 MILLIGRAM(S): 100 INJECTION SUBCUTANEOUS at 17:57

## 2025-06-19 RX ADMIN — DEXAMETHASONE 4 MILLIGRAM(S): 0.5 TABLET ORAL at 13:28

## 2025-06-19 RX ADMIN — Medication 1 APPLICATION(S): at 21:22

## 2025-06-19 RX ADMIN — DEXAMETHASONE 4 MILLIGRAM(S): 0.5 TABLET ORAL at 06:01

## 2025-06-19 RX ADMIN — DEXAMETHASONE 4 MILLIGRAM(S): 0.5 TABLET ORAL at 21:11

## 2025-06-19 RX ADMIN — Medication 650 MILLIGRAM(S): at 06:30

## 2025-06-19 RX ADMIN — Medication 650 MILLIGRAM(S): at 06:00

## 2025-06-19 RX ADMIN — PERAMPANEL 4 MILLIGRAM(S): 6 TABLET ORAL at 21:11

## 2025-06-19 RX ADMIN — LACOSAMIDE 130 MILLIGRAM(S): 150 TABLET, FILM COATED ORAL at 13:53

## 2025-06-19 RX ADMIN — POLYETHYLENE GLYCOL 3350 17 GRAM(S): 17 POWDER, FOR SOLUTION ORAL at 13:28

## 2025-06-19 NOTE — PROGRESS NOTE ADULT - ASSESSMENT
55yo RIGHT handed woman with a PMHx significant for Left temporal lobe lesion, bipolar, anxiety transferred from [facility] for EMU admission and concern for focal status in setting of left temporal mass.     Procedure: s/p left craniotomy for resection of tumor 6/16/2025     PLAN    NEURO: Neuro checks q4hrs   post op MRI as above   Dex taper written   Continue on AEDS: Briviact 100mg q12hr, onfi 10qhs , vimpat 150mg BID, fycompa 4mg qhs --> Epilepsy following   pain regiment Tylenol    following for prior Suicidal ideation currently denies SI/HI  placed on constant obs per psych de-escalated today to routine supervision     Activity: [x] mobilize as tolerated, PT/OT/PMR rec AR     PULM:   on RA  satting > 93               %  encourage incentive spirometer     CV: -160 mmHg   telemetry     RENAL: IVL, voiding independently  replete electrolytes PRN     GI: regular diet   PPI while on decadrom   contineu bowel regimen with miralax and senna     ENDO: Goal euglycemia (-180), \ISS     HEME/ONC:  VTE prophylaxis: [x] SCDs, LED 6/13 neg for DVT     ID: afebrile     will discuss plan with Dr. Hernández   contact via teams preferred  55yo RIGHT handed woman with a PMHx significant for Left temporal lobe lesion, bipolar, anxiety transferred from [facility] for EMU admission and concern for focal status in setting of left temporal mass.     Procedure: s/p left craniotomy for resection of tumor 6/16/2025     PLAN    NEURO: Neuro checks q4hrs   post op MRI as above   Dex taper written   Continue on AEDS: Briviact 100mg q12hr, onfi 10qhs , vimpat 150mg BID, fycompa 4mg qhs --> Epilepsy following   pain regiment Tylenol    following for prior Suicidal ideation currently denies SI/HI  placed on constant obs per psych de-escalated today to routine supervision     Activity: [x] mobilize as tolerated, PT/OT/PMR rec AR     PULM:   on RA  satting > 93%  encourage incentive spirometer   C/w Albuterol    CV: -160 mmHg   telemetry     RENAL:   IVL,   voiding independently    GI:   regular diet   PPI while on decadrom   continue bowel regimen with miralax and senna     ENDO:  Goal euglycemia (-180), \ISS   continue Synthroid    HEME/ONC:  VTE prophylaxis: [x] SCDs, SQL  LED 6/13 neg for DVT     ID: afebrile     will discuss plan with Dr. Hernández   Available on Teams  55yo RIGHT handed woman with a PMHx significant for Left temporal lobe lesion, bipolar, anxiety transferred from [facility] for EMU admission and concern for focal status in setting of left temporal mass.     Procedure: s/p left craniotomy for resection of tumor 6/16/2025     PLAN    NEURO: Neuro checks q4hrs   post op MRI as above   Dex taper written   Continue on AEDS: Briviact 100mg q12hr, onfi 10qhs , vimpat 150mg BID, fycompa 4mg qhs --> Epilepsy following   pain regiment Tylenol    following for prior Suicidal ideation currently denies SI/HI  placed on constant obs per psych de-escalated today to routine supervision     Activity: [x] mobilize as tolerated, PT/OT/PMR rec AR     PULM:   on RA  satting > 93%  encourage incentive spirometer   C/w Albuterol    CV: -160 mmHg   telemetry     RENAL:   IVL,   voiding independently    GI:   regular diet   PPI while on decadrom   continue bowel regimen with miralax and senna   Last BM 6/18    ENDO:  Goal euglycemia (-180), \ISS   continue Synthroid    HEME/ONC:  VTE prophylaxis: [x] SCDs, SQL  LED 6/13 neg for DVT     ID: afebrile     will discuss plan with Dr. Hernández   Available on Teams  Patient NAT THOMASON is a 54y (1970) woman with a PMHx significant for Left Temporal lobe lesion, Bipolar, Anxiety transferred from [place] to [facility] to be evaluated for seizure. History limited as patient is poor historian, see hx below from chart at OSH.     History as per partner (Benson) at bedside. As per partner over the past few weeks patient has been having multiple episodes of partial seizures more often than usual. Despite being on her normal routine, patient often has multiple seizures weekly but always returned to baseline. On  patient had a seizure and afterwards patient appeared more confused, was unable to articulate herself did not seek medical intervention. Since then symptoms have worsen, on  patient was found with a bunch of Lamictal pills around her, reported she might have taken more  pills than usual & patient was brought to [place] ED.   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   As per partner, patient ha been going under a lot of life changing events. In april she lost her job, 3 weeks ago a family member has ; with everything happening, she has been deteriorating   Also on  patient fell at [facility], got on the plane & went to [place], there she went to the ED and was found to have a concussion   Patient used to be on lexapro, but was discontinued in april by her psychiatrist   Upon evaluation patient is awake, alert, speaking incoherently, pacing around the room, but able to follow commands and re-directable.     Imaging at [place]   - CT-head-Unremarkable.   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   - EEG: Revealed evidence of electrographic seizure activity  On  - Patient was found to be hyponatremic (Na:127), corrected with latest Na:138 ()    Interval Hx:  Patient was transferred [facility] and had MRI brain with stable temporal lobe lesion favoring diagnosis of malignancy as well as surrounding edema likely correlated to recent seizure activity. Neurology following as EEG still with seizure activity likely related to temporal lobe lesion. Patient transferred to [facility] EMU for further management.     EEG 2025  Clinical Impression:  -Numerous left posterior temporal focal-onset seizures at times with bilateral spread, 2-5 seizures/hour for most of recording, some hours with no seizures. Overall improving during recording. Last seizure captured 12:38 25.   -Risk of left frontal and right frontotemporal focal-onset seizures   -Left hemispheric focal cerebral dysfunction can be structural and/or functional (such as post-ictal) in etiology.     Procedure: s/p left craniotomy for resection of tumor 2025     PLAN    NEURO: Neuro checks q4hrs   post op MRI as above   Dex taper written   Continue on AEDS: Briviact 100mg q12hr, onfi 10qhs , vimpat 150mg BID, fycompa 4mg qhs --> Epilepsy following   pain regiment Tylenol   BH following for prior Suicidal ideation currently denies SI/HI  placed on constant obs per psych de-escalated today to routine supervision     Activity: [x] mobilize as tolerated, PT/OT/PMR rec AR     PULM:   on RA  satting > 93%  encourage incentive spirometer   C/w Albuterol    CV: -160 mmHg   telemetry     RENAL:   IVL,   voiding independently    GI:   regular diet   PPI while on decadrom   continue bowel regimen with miralax and senna   Last BM     ENDO:  Goal euglycemia (-180), \ISS   continue Synthroid    HEME/ONC:  VTE prophylaxis: [x] SCDs, SQL  LED  neg for DVT     ID: afebrile     will discuss plan with Dr. Hernández   Available on Teams  Patient NAT THOMASON is a 54y (1970) woman with a PMHx significant for Left Temporal lobe lesion, Bipolar, Anxiety transferred from [place] to [facility] to be evaluated for seizure. History limited as patient is poor historian, see hx below from chart at OSH.     History as per partner (Benson) at bedside. As per partner over the past few weeks patient has been having multiple episodes of partial seizures more often than usual. Despite being on her normal routine, patient often has multiple seizures weekly but always returned to baseline. On  patient had a seizure and afterwards patient appeared more confused, was unable to articulate herself did not seek medical intervention. Since then symptoms have worsen, on  patient was found with a bunch of Lamictal pills around her, reported she might have taken more  pills than usual & patient was brought to [place] ED.   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   As per partner, patient ha been going under a lot of life changing events. In april she lost her job, 3 weeks ago a family member has ; with everything happening, she has been deteriorating   Also on  patient fell at [facility], got on the plane & went to [place], there she went to the ED and was found to have a concussion   Patient used to be on lexapro, but was discontinued in april by her psychiatrist   Upon evaluation patient is awake, alert, speaking incoherently, pacing around the room, but able to follow commands and re-directable.     Imaging at [place]   - CT-head-Unremarkable.   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   - EEG: Revealed evidence of electrographic seizure activity  On  - Patient was found to be hyponatremic (Na:127), corrected with latest Na:138 ()    Interval Hx:  Patient was transferred [facility] and had MRI brain with stable temporal lobe lesion favoring diagnosis of malignancy as well as surrounding edema likely correlated to recent seizure activity. Neurology following as EEG still with seizure activity likely related to temporal lobe lesion. Patient transferred to [facility] EMU for further management.     EEG 2025  Clinical Impression:  -Numerous left posterior temporal focal-onset seizures at times with bilateral spread, 2-5 seizures/hour for most of recording, some hours with no seizures. Overall improving during recording. Last seizure captured 12:38 25.   -Risk of left frontal and right frontotemporal focal-onset seizures   -Left hemispheric focal cerebral dysfunction can be structural and/or functional (such as post-ictal) in etiology.     Procedure: s/p left craniotomy for resection of tumor 2025     PLAN    NEURO: Neuro checks q4hrs   post op MRI as above   Dex taper written   Continue on AEDS: Briviact 100mg q12hr, onfi 10qhs , vimpat 150mg BID, fycompa 4mg qhs --> Epilepsy following   pain regiment Tylenol   BH following for prior Suicidal ideation currently denies SI/HI  placed on constant obs per psych de-escalated today to routine supervision     Activity: [x] mobilize as tolerated, PT/OT/PMR rec AR     PULM:   on RA  satting > 93%  encourage incentive spirometer   C/w Albuterol    CV: -160 mmHg   telemetry     RENAL:   IVL,   voiding independently    GI:   regular diet   PPI while on decadrom   continue bowel regimen with miralax and senna   Last BM     ENDO:  Goal euglycemia (-180), \ISS   continue Synthroid    HEME/ONC:  VTE prophylaxis: [x] SCDs, SQL  LED  neg for DVT     ID: afebrile     Dispo: Acute rehab    will discuss plan with Dr. Hernández   Available on Teams

## 2025-06-19 NOTE — PROGRESS NOTE ADULT - SUBJECTIVE AND OBJECTIVE BOX
INTERNAL MEDICINE PROGRESS NOTE     NAME OF PATIENT: NAT THOMASON  MRN: [id number]  DATE OF VISIT: 06-19-25 @ 15:03    SUBJECTIVE/ ROS:  - Patient seen and examined by bedside     OBJECTIVE:  ICU Vital Signs Last 24 Hrs  T(C): 37.4 (19 Jun 2025 12:30), Max: 37.4 (19 Jun 2025 12:30)  T(F): 99.3 (19 Jun 2025 12:30), Max: 99.3 (19 Jun 2025 12:30)  HR: 81 (19 Jun 2025 12:30) (61 - 81)  BP: 101/72 (19 Jun 2025 12:30) (90/55 - 101/72)  BP(mean): --  ABP: --  ABP(mean): --  RR: 18 (19 Jun 2025 12:30) (16 - 18)  SpO2: 95% (19 Jun 2025 12:30) (93% - 98%)    O2 Parameters below as of 19 Jun 2025 12:30  Patient On (Oxygen Delivery Method): room air          06-19-25 @ 15:03  T(C): 37.4 (06-19-25 @ 12:30), Max: 37.4 (06-19-25 @ 12:30)  HR: 81 (06-19-25 @ 12:30) (61 - 81)  BP: 101/72 (06-19-25 @ 12:30) (90/55 - 101/72)  RR: 18 (06-19-25 @ 12:30) (16 - 18)  SpO2: 95% (06-19-25 @ 12:30) (93% - 98%)  Wt(kg): --  CAPILLARY BLOOD GLUCOSE          HOSPITAL MEDICATIONS:  MEDICATIONS  (STANDING):  brivaracetam  IVPB 100 milliGRAM(s) IV Intermittent <User Schedule>  chlorhexidine 2% Cloths 1 Application(s) Topical daily  cloBAZam 10 milliGRAM(s) Oral at bedtime  dexAMETHasone  Injectable   IV Push   dexAMETHasone  Injectable 4 milliGRAM(s) IV Push every 8 hours  enoxaparin Injectable 40 milliGRAM(s) SubCutaneous <User Schedule>  lacosamide IVPB 150 milliGRAM(s) IV Intermittent <User Schedule>  levothyroxine 88 MICROGram(s) Oral daily  melatonin 1 milliGRAM(s) Oral at bedtime  pantoprazole    Tablet 40 milliGRAM(s) Oral before breakfast  perampanel 4 milliGRAM(s) Oral at bedtime  polyethylene glycol 3350 17 Gram(s) Oral daily  senna 2 Tablet(s) Oral at bedtime    MEDICATIONS  (PRN):  acetaminophen     Tablet .. 650 milliGRAM(s) Oral every 6 hours PRN Temp greater or equal to 38C (100.4F), Mild Pain (1 - 3)  albuterol    90 MICROgram(s) HFA Inhaler 2 Puff(s) Inhalation every 6 hours PRN Bronchospasm  brivaracetam  Injectable 100 milliGRAM(s) IV Push once PRN 2nd line seizure rescue  LORazepam     Tablet 0.5 milliGRAM(s) Oral every 6 hours PRN Agitation  LORazepam     Tablet 1 milliGRAM(s) Oral every 6 hours PRN Combative behavior  midazolam Injectable 2 milliGRAM(s) IV Push once PRN seizures  ondansetron Injectable 4 milliGRAM(s) IV Push every 6 hours PRN Nausea and/or Vomiting  simethicone 80 milliGRAM(s) Chew daily PRN Gas      PHYSICAL EXAMINATION:  General: NAD   Cardiology: S1/S2 with no murmur   Respiratory: CTA BL with no wheeze   GI: Soft and NTND      LABS:                        11.8   16.34 )-----------( 326      ( 18 Jun 2025 07:35 )             35.6     06-18    142  |  105  |  14  ----------------------------<  126[H]  4.0   |  20[L]  |  0.58    Ca    9.3      18 Jun 2025 07:32  Phos  2.9     06-18  Mg     2.1     06-18            MICROBIOLOGY:     RADIOLOGY:    CARDIOLOGY:

## 2025-06-19 NOTE — PROGRESS NOTE ADULT - SUBJECTIVE AND OBJECTIVE BOX
Patient NAT THOMASON is a 54y (1970) woman with a PMHx significant for Left Temporal lobe lesion, Bipolar, Anxiety transferred from [facility] to [facility] to be evaluated for seizure. History limited as patient is poor historian, see hx below from chart at OSH.     History as per partner (Benson) at bedside. As per partner over the past few weeks patient has been having multiple episodes of partial seizures more often than usual. Despite being on her normal routine, patient often has multiple seizures weekly but always returned to baseline. On  patient had a seizure and afterwards patient appeared more confused, was unable to articulate herself did not seek medical intervention. Since then symptoms have worsen, on  patient was found with a bunch of Lamictal pills around her, reported she might have taken more  pills than usual & patient was brought to [facility] ED.   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   As per partner, patient ha been going under a lot of life changing events. In april she lost her job, 3 weeks ago a family member has ; with everything happening, she has been deteriorating   Also on  patient fell at [facility], got on the plane & went to [place], there she went to the ED and was found to have a concussion   Patient used to be on lexapro, but was discontinued in april by her psychiatrist   Upon evaluation patient is awake, alert, speaking incoherently, pacing around the room, but able to follow commands and re-directable.     Imaging at [facility]   - CT-head-Unremarkable.   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   - EEG: Revealed evidence of electrographic seizure activity  On  - Patient was found to be hyponatremic (Na:127), corrected with latest Na:138 ()    Interval Hx:  Patient was transferred [facility] and had MRI brain with stable temporal lobe lesion favoring diagnosis of malignancy as well as surrounding edema likely correlated to recent seizure activity. Neurology following as EEG still with seizure activity likely related to temporal lobe lesion. Patient transferred to [facility] EMU for further management.     EEG 2025  Clinical Impression:  -Numerous left posterior temporal focal-onset seizures at times with bilateral spread, 2-5 seizures/hour for most of recording, some hours with no seizures. Overall improving during recording. Last seizure captured 12:38 25.   -Risk of left frontal and right frontotemporal focal-onset seizures   -Left hemispheric focal cerebral dysfunction can be structural and/or functional (such as post-ictal) in etiology.     Post-op # 3 s/p left craniotomy for resection of tumor    Overnight event: no acute event    Vital Signs Last 24 Hrs  T(C): 37.4 (2025 12:30), Max: 37.4 (2025 12:30)  T(F): 99.3 (2025 12:30), Max: 99.3 (2025 12:30)  HR: 81 (2025 12:30) (61 - 81)  BP: 101/72 (2025 12:30) (90/55 - 101/72)  BP(mean): --  RR: 18 (2025 12:30) (16 - 18)  SpO2: 95% (2025 12:30) (93% - 98%)    Parameters below as of 2025 12:30  Patient On (Oxygen Delivery Method): room air                              11.8   16.34 )-----------( 326      ( 2025 07:35 )             35.6    06-18    142  |  105  |  14  ----------------------------<  126[H]  4.0   |  20[L]  |  0.58    Ca    9.3      2025 07:32  Phos  2.9     06-18  Mg     2.1     06-18       Stroke Core Measures      DRAIN OUTPUT:     NEUROIMAGING:     PHYSICAL EXAM:    General: No Acute Distress     Neurological: Awake, alert oriented to person, place and time, Following Commands, PERRL, EOMI, Face Symmetrical, Speech Fluent, Moving all extremities, Muscle Strength normal in all four extremities, No Drift, Sensation to Light Touch Intact    Pulmonary: Clear to Auscultation, No Rales, No Rhonchi, No Wheezes     Cardiovascular: S1, S2, Regular Rate and Rhythm     Gastrointestinal: Soft, Nontender, Nondistended     Incision: intact    MEDICATIONS:   Antibiotics:    Neuro:  acetaminophen     Tablet .. 650 milliGRAM(s) Oral every 6 hours PRN Temp greater or equal to 38C (100.4F), Mild Pain (1 - 3)  brivaracetam  Injectable 100 milliGRAM(s) IV Push once PRN 2nd line seizure rescue  brivaracetam  IVPB 100 milliGRAM(s) IV Intermittent <User Schedule>  cloBAZam 10 milliGRAM(s) Oral at bedtime  lacosamide IVPB 150 milliGRAM(s) IV Intermittent <User Schedule>  LORazepam     Tablet 0.5 milliGRAM(s) Oral every 6 hours PRN Agitation  LORazepam     Tablet 1 milliGRAM(s) Oral every 6 hours PRN Combative behavior  melatonin 1 milliGRAM(s) Oral at bedtime  midazolam Injectable 2 milliGRAM(s) IV Push once PRN seizures  ondansetron Injectable 4 milliGRAM(s) IV Push every 6 hours PRN Nausea and/or Vomiting  perampanel 4 milliGRAM(s) Oral at bedtime    Anticoagulation:  enoxaparin Injectable 40 milliGRAM(s) SubCutaneous <User Schedule>    Cardiology:    Endo:   dexAMETHasone  Injectable   IV Push   dexAMETHasone  Injectable 4 milliGRAM(s) IV Push every 8 hours  levothyroxine 88 MICROGram(s) Oral daily    Pulm:  albuterol    90 MICROgram(s) HFA Inhaler 2 Puff(s) Inhalation every 6 hours PRN    GI/:  pantoprazole    Tablet 40 milliGRAM(s) Oral before breakfast  polyethylene glycol 3350 17 Gram(s) Oral daily  senna 2 Tablet(s) Oral at bedtime  simethicone 80 milliGRAM(s) Chew daily PRN    Other:  chlorhexidine 2% Cloths 1 Application(s) Topical daily  albuterol    90 MICROgram(s) HFA Inhaler 2 Puff(s) Inhalation every 6 hours PRN   pantoprazole    Tablet 40 milliGRAM(s) Oral before breakfast  polyethylene glycol 3350 17 Gram(s) Oral daily  senna 2 Tablet(s) Oral at bedtime  simethicone 80 milliGRAM(s) Chew daily PRN    55yo RIGHT handed woman with a PMHx significant for Left temporal lobe lesion, bipolar, anxiety transferred from [facility] for EMU admission and concern for focal status in setting of left temporal mass.   Post-op # 3 s/p left craniotomy for resection of tumor    Overnight event: no acute event    Vital Signs Last 24 Hrs  T(C): 37.4 (19 Jun 2025 12:30), Max: 37.4 (19 Jun 2025 12:30)  T(F): 99.3 (19 Jun 2025 12:30), Max: 99.3 (19 Jun 2025 12:30)  HR: 81 (19 Jun 2025 12:30) (61 - 81)  BP: 101/72 (19 Jun 2025 12:30) (90/55 - 101/72)  BP(mean): --  RR: 18 (19 Jun 2025 12:30) (16 - 18)  SpO2: 95% (19 Jun 2025 12:30) (93% - 98%)    Parameters below as of 19 Jun 2025 12:30  Patient On (Oxygen Delivery Method): room air                              11.8   16.34 )-----------( 326      ( 18 Jun 2025 07:35 )             35.6    06-18    142  |  105  |  14  ----------------------------<  126[H]  4.0   |  20[L]  |  0.58    Ca    9.3      18 Jun 2025 07:32  Phos  2.9     06-18  Mg     2.1     06-18       Stroke Core Measures      DRAIN OUTPUT:     NEUROIMAGING:     PHYSICAL EXAM:    General: No Acute Distress     Neurological: Awake, alert oriented to person, place and time, Following Commands, PERRL, EOMI, Face Symmetrical, Speech Fluent, Moving all extremities, Muscle Strength normal in all four extremities, No Drift, Sensation to Light Touch Intact    Pulmonary: Clear to Auscultation, No Rales, No Rhonchi, No Wheezes     Cardiovascular: S1, S2, Regular Rate and Rhythm     Gastrointestinal: Soft, Nontender, Nondistended     Incision: intact    MEDICATIONS:   Antibiotics:    Neuro:  acetaminophen     Tablet .. 650 milliGRAM(s) Oral every 6 hours PRN Temp greater or equal to 38C (100.4F), Mild Pain (1 - 3)  brivaracetam  Injectable 100 milliGRAM(s) IV Push once PRN 2nd line seizure rescue  brivaracetam  IVPB 100 milliGRAM(s) IV Intermittent <User Schedule>  cloBAZam 10 milliGRAM(s) Oral at bedtime  lacosamide IVPB 150 milliGRAM(s) IV Intermittent <User Schedule>  LORazepam     Tablet 0.5 milliGRAM(s) Oral every 6 hours PRN Agitation  LORazepam     Tablet 1 milliGRAM(s) Oral every 6 hours PRN Combative behavior  melatonin 1 milliGRAM(s) Oral at bedtime  midazolam Injectable 2 milliGRAM(s) IV Push once PRN seizures  ondansetron Injectable 4 milliGRAM(s) IV Push every 6 hours PRN Nausea and/or Vomiting  perampanel 4 milliGRAM(s) Oral at bedtime    Anticoagulation:  enoxaparin Injectable 40 milliGRAM(s) SubCutaneous <User Schedule>    Cardiology:    Endo:   dexAMETHasone  Injectable   IV Push   dexAMETHasone  Injectable 4 milliGRAM(s) IV Push every 8 hours  levothyroxine 88 MICROGram(s) Oral daily    Pulm:  albuterol    90 MICROgram(s) HFA Inhaler 2 Puff(s) Inhalation every 6 hours PRN    GI/:  pantoprazole    Tablet 40 milliGRAM(s) Oral before breakfast  polyethylene glycol 3350 17 Gram(s) Oral daily  senna 2 Tablet(s) Oral at bedtime  simethicone 80 milliGRAM(s) Chew daily PRN    Other:  chlorhexidine 2% Cloths 1 Application(s) Topical daily  albuterol    90 MICROgram(s) HFA Inhaler 2 Puff(s) Inhalation every 6 hours PRN   pantoprazole    Tablet 40 milliGRAM(s) Oral before breakfast  polyethylene glycol 3350 17 Gram(s) Oral daily  senna 2 Tablet(s) Oral at bedtime  simethicone 80 milliGRAM(s) Chew daily PRN

## 2025-06-19 NOTE — PROGRESS NOTE ADULT - ASSESSMENT
53 YO RIGHT handed woman with PMHx significant for Left temporal lobe lesion, hypothyroidism, PTSD, bipolar, and anxiety presented initially to [facility] for seizures and ultimately transferred for EMU admission, Concern for focal status in setting of left temporal mass. Admitted to neurology and internal medicine consulted for medical management     # Seizures with left temporal brain lesion   - CTH on 6/10 with left mid temporal lobe lesion   - s/p left temporal lobe resection on 6/16  - Post OP CTH 6/17 with lucency in the left medial temporal lobe status post neoplastic resection. Bifrontal pneumocephalus. No hydrocephalus. Minimal postop hemorrhage  - Post OR MR with left temporal postoperative changes after excision of a suspicious nonenhancing lesion. No acute infarcts.  - Continue with dex taper through 6/23 per NSG  - Continue with PPI for GI PPX with steroids  - Continue on AED per neuro  - Pending pathology (can be followed outpatient)  - Pain control per NSG  - Care per Neuro and NSGY    # Leukocytosis   - Likely reactivity from OR and steroids  - If febrile check pan cultures  - Monitor and trend CBC, temp curve, VS and adjust as tolerated    # Pericardial effusion   - TTE with EF 66 with normal LVSF, no WMA, and trace pericardial effusion   - RPT TTE outpatient in 1 month    # Hypothyroidism   - TSH elevated but FT4 normal likely reactive   - RPT TSH coming down   - Continue on home synthroid 88   - Repeat TFTs outpatient    # Anxiety and depression  - Patient reportedly expressed SI at some point at OSH, with concern raised that she may have taken an overdose of Lamictal.  - On  evaluation patient denied having suicidal thoughts or making a suicide attempt, but per records has a history of a suicide attempt and psychiatric hospitalization 11 years ago.   - Continue on ativan PRN per    -  appreciated     # DVT PPX with LVX     # DISPO - PMR recc AR    Discussed with Attending.

## 2025-06-20 RX ORDER — LACOSAMIDE 150 MG/1
150 TABLET, FILM COATED ORAL
Refills: 0 | Status: DISCONTINUED | OUTPATIENT
Start: 2025-06-20 | End: 2025-06-23

## 2025-06-20 RX ORDER — BRIVARACETAM 75 MG/1
100 TABLET, FILM COATED ORAL
Refills: 0 | Status: DISCONTINUED | OUTPATIENT
Start: 2025-06-20 | End: 2025-06-23

## 2025-06-20 RX ADMIN — Medication 1 MILLIGRAM(S): at 21:34

## 2025-06-20 RX ADMIN — LACOSAMIDE 150 MILLIGRAM(S): 150 TABLET, FILM COATED ORAL at 13:51

## 2025-06-20 RX ADMIN — BRIVARACETAM 100 MILLIGRAM(S): 75 TABLET, FILM COATED ORAL at 21:34

## 2025-06-20 RX ADMIN — Medication 650 MILLIGRAM(S): at 06:38

## 2025-06-20 RX ADMIN — Medication 1 APPLICATION(S): at 13:45

## 2025-06-20 RX ADMIN — LACOSAMIDE 130 MILLIGRAM(S): 150 TABLET, FILM COATED ORAL at 05:31

## 2025-06-20 RX ADMIN — Medication 2 TABLET(S): at 21:34

## 2025-06-20 RX ADMIN — DEXAMETHASONE 4 MILLIGRAM(S): 0.5 TABLET ORAL at 05:31

## 2025-06-20 RX ADMIN — Medication 650 MILLIGRAM(S): at 05:30

## 2025-06-20 RX ADMIN — ENOXAPARIN SODIUM 40 MILLIGRAM(S): 100 INJECTION SUBCUTANEOUS at 16:48

## 2025-06-20 RX ADMIN — Medication 40 MILLIGRAM(S): at 05:31

## 2025-06-20 RX ADMIN — BRIVARACETAM 240 MILLIGRAM(S): 75 TABLET, FILM COATED ORAL at 05:31

## 2025-06-20 RX ADMIN — Medication 650 MILLIGRAM(S): at 00:54

## 2025-06-20 RX ADMIN — Medication 88 MICROGRAM(S): at 05:31

## 2025-06-20 RX ADMIN — DEXAMETHASONE 4 MILLIGRAM(S): 0.5 TABLET ORAL at 13:51

## 2025-06-20 RX ADMIN — BRIVARACETAM 100 MILLIGRAM(S): 75 TABLET, FILM COATED ORAL at 13:51

## 2025-06-20 RX ADMIN — LACOSAMIDE 150 MILLIGRAM(S): 150 TABLET, FILM COATED ORAL at 21:34

## 2025-06-20 RX ADMIN — Medication 650 MILLIGRAM(S): at 21:33

## 2025-06-20 RX ADMIN — CLOBAZAM 10 MILLIGRAM(S): 20 TABLET ORAL at 21:34

## 2025-06-20 RX ADMIN — PERAMPANEL 4 MILLIGRAM(S): 6 TABLET ORAL at 21:34

## 2025-06-20 NOTE — BH CONSULTATION LIAISON PROGRESS NOTE - NSBHPSYCHOLCOGABN_PSY_A_CORE
disoriented to place
disoriented to time
disoriented to place

## 2025-06-20 NOTE — BH CONSULTATION LIAISON PROGRESS NOTE - NSBHADMITIPOBS_PSY_A_CORE
Constant observation
Routine observation
Constant observation
Enhanced supervision
Routine observation
Constant observation
Constant observation

## 2025-06-20 NOTE — PROGRESS NOTE ADULT - SUBJECTIVE AND OBJECTIVE BOX
Name of Patient : NAT THOMASON  MRN: [id number]  Date of visit: 06-20-25      Subjective: Patient seen and examined. No new events except as noted.       REVIEW OF SYSTEMS:    CONSTITUTIONAL: No weakness, fevers or chills  EYES/ENT: No visual changes;  No vertigo or throat pain   NECK: No pain or stiffness  RESPIRATORY: No cough, wheezing, hemoptysis; No shortness of breath  CARDIOVASCULAR: No chest pain or palpitations  GASTROINTESTINAL: No abdominal or epigastric pain. No nausea, vomiting, or hematemesis; No diarrhea or constipation. No melena or hematochezia.  GENITOURINARY: No dysuria, frequency or hematuria  NEUROLOGICAL: No numbness or weakness  SKIN: No itching, burning, rashes, or lesions   All other review of systems is negative unless indicated above.    MEDICATIONS:  MEDICATIONS  (STANDING):  brivaracetam 100 milliGRAM(s) Oral <User Schedule>  chlorhexidine 2% Cloths 1 Application(s) Topical daily  cloBAZam 10 milliGRAM(s) Oral at bedtime  dexAMETHasone  Injectable   IV Push   enoxaparin Injectable 40 milliGRAM(s) SubCutaneous <User Schedule>  lacosamide 150 milliGRAM(s) Oral <User Schedule>  levothyroxine 88 MICROGram(s) Oral daily  melatonin 1 milliGRAM(s) Oral at bedtime  pantoprazole    Tablet 40 milliGRAM(s) Oral before breakfast  perampanel 4 milliGRAM(s) Oral at bedtime  polyethylene glycol 3350 17 Gram(s) Oral daily  senna 2 Tablet(s) Oral at bedtime      PHYSICAL EXAM:  T(C): 37 (06-20-25 @ 20:03), Max: 37 (06-20-25 @ 20:03)  HR: 78 (06-20-25 @ 20:03) (62 - 88)  BP: 103/67 (06-20-25 @ 20:03) (91/65 - 130/75)  RR: 18 (06-20-25 @ 20:03) (18 - 18)  SpO2: 98% (06-20-25 @ 20:03) (96% - 98%)  Wt(kg): --  I&O's Summary    20 Jun 2025 07:01  -  20 Jun 2025 23:35  --------------------------------------------------------  IN: 570 mL / OUT: 0 mL / NET: 570 mL          Appearance: Normal	  HEENT:  PERRLA   Lymphatic: No lymphadenopathy   Cardiovascular: Normal S1 S2, no JVD  Respiratory: normal effort , clear  Gastrointestinal:  Soft, Non-tender  Skin: No rashes,  warm to touch  Psychiatry:  Mood & affect appropriate  Musculuskeletal: No edema    recent labs, Imaging and EKGs personally reviewed     06-20-25 @ 07:01  -  06-20-25 @ 23:35  --------------------------------------------------------  IN: 570 mL / OUT: 0 mL / NET: 570 mL

## 2025-06-20 NOTE — BH CONSULTATION LIAISON PROGRESS NOTE - NSBHFUPINTERVALCCFT_PSY_A_CORE
anxious, worried
"I want to start rowing."
"I'm ok. I'm preparing myself"
decreased anxiety
Feeling good, very positive
feeling good, very positively
"I have so much help!"

## 2025-06-20 NOTE — BH CONSULTATION LIAISON PROGRESS NOTE - NSBHCONSFOLLOWNEEDS_PSY_ALL_CORE
Needs further psych safety assessment prior to discharge
Needs further psych safety assessment prior to discharge
No psychiatric contraindications to discharge
Needs further psych safety assessment prior to discharge
Needs further psych safety assessment prior to discharge
No psychiatric contraindications to discharge
Needs further psych safety assessment prior to discharge

## 2025-06-20 NOTE — BH CONSULTATION LIAISON PROGRESS NOTE - CURRENT MEDICATION
MEDICATIONS  (STANDING):  brivaracetam  IVPB 100 milliGRAM(s) IV Intermittent <User Schedule>  cloBAZam 10 milliGRAM(s) Oral at bedtime  dexAMETHasone     Tablet 2 milliGRAM(s) Oral <User Schedule>  enoxaparin Injectable 40 milliGRAM(s) SubCutaneous every 24 hours  lacosamide IVPB 150 milliGRAM(s) IV Intermittent <User Schedule>  levothyroxine 88 MICROGram(s) Oral daily  melatonin 1 milliGRAM(s) Oral at bedtime  pantoprazole    Tablet 40 milliGRAM(s) Oral before breakfast  perampanel 4 milliGRAM(s) Oral at bedtime  sodium chloride 0.9%. 1000 milliLiter(s) (75 mL/Hr) IV Continuous <Continuous>    MEDICATIONS  (PRN):  brivaracetam  Injectable 100 milliGRAM(s) IV Push once PRN 2nd line seizure rescue  midazolam Injectable 2 milliGRAM(s) IV Push once PRN seizures  
MEDICATIONS  (STANDING):  brivaracetam  IVPB 100 milliGRAM(s) IV Intermittent <User Schedule>  cloBAZam 10 milliGRAM(s) Oral at bedtime  dexAMETHasone  Injectable 4 milliGRAM(s) IV Push every 8 hours  dexAMETHasone  Injectable   IV Push   enoxaparin Injectable 40 milliGRAM(s) SubCutaneous <User Schedule>  lacosamide IVPB 150 milliGRAM(s) IV Intermittent <User Schedule>  levothyroxine 88 MICROGram(s) Oral daily  melatonin 1 milliGRAM(s) Oral at bedtime  pantoprazole    Tablet 40 milliGRAM(s) Oral before breakfast  perampanel 4 milliGRAM(s) Oral at bedtime  polyethylene glycol 3350 17 Gram(s) Oral daily  senna 2 Tablet(s) Oral at bedtime    MEDICATIONS  (PRN):  acetaminophen     Tablet .. 650 milliGRAM(s) Oral every 6 hours PRN Temp greater or equal to 38C (100.4F), Mild Pain (1 - 3)  albuterol    90 MICROgram(s) HFA Inhaler 2 Puff(s) Inhalation every 6 hours PRN Bronchospasm  brivaracetam  Injectable 100 milliGRAM(s) IV Push once PRN 2nd line seizure rescue  midazolam Injectable 2 milliGRAM(s) IV Push once PRN seizures  ondansetron Injectable 4 milliGRAM(s) IV Push every 6 hours PRN Nausea and/or Vomiting  simethicone 80 milliGRAM(s) Chew daily PRN Gas  
MEDICATIONS  (STANDING):  brivaracetam  IVPB 100 milliGRAM(s) IV Intermittent <User Schedule>  chlorhexidine 4% Liquid 1 Application(s) Topical once  cloBAZam 10 milliGRAM(s) Oral at bedtime  dexAMETHasone     Tablet 2 milliGRAM(s) Oral <User Schedule>  lacosamide IVPB 150 milliGRAM(s) IV Intermittent <User Schedule>  levothyroxine 88 MICROGram(s) Oral daily  melatonin 1 milliGRAM(s) Oral at bedtime  pantoprazole    Tablet 40 milliGRAM(s) Oral before breakfast  perampanel 4 milliGRAM(s) Oral at bedtime  sodium chloride 0.9%. 1000 milliLiter(s) (75 mL/Hr) IV Continuous <Continuous>    MEDICATIONS  (PRN):  brivaracetam  Injectable 100 milliGRAM(s) IV Push once PRN 2nd line seizure rescue  midazolam Injectable 2 milliGRAM(s) IV Push once PRN seizures  
MEDICATIONS  (STANDING):  brivaracetam  IVPB 100 milliGRAM(s) IV Intermittent <User Schedule>  cloBAZam 10 milliGRAM(s) Oral at bedtime  dexAMETHasone  Injectable   IV Push   dexAMETHasone  Injectable 4 milliGRAM(s) IV Push every 6 hours  dexAMETHasone  Injectable 4 milliGRAM(s) IV Push every 8 hours  enoxaparin Injectable 40 milliGRAM(s) SubCutaneous <User Schedule>  lacosamide IVPB 150 milliGRAM(s) IV Intermittent <User Schedule>  levothyroxine 88 MICROGram(s) Oral daily  melatonin 1 milliGRAM(s) Oral at bedtime  pantoprazole    Tablet 40 milliGRAM(s) Oral before breakfast  perampanel 4 milliGRAM(s) Oral at bedtime  polyethylene glycol 3350 17 Gram(s) Oral daily  senna 2 Tablet(s) Oral at bedtime    MEDICATIONS  (PRN):  acetaminophen     Tablet .. 650 milliGRAM(s) Oral every 6 hours PRN Temp greater or equal to 38C (100.4F), Mild Pain (1 - 3)  albuterol    90 MICROgram(s) HFA Inhaler 2 Puff(s) Inhalation every 6 hours PRN Bronchospasm  brivaracetam  Injectable 100 milliGRAM(s) IV Push once PRN 2nd line seizure rescue  midazolam Injectable 2 milliGRAM(s) IV Push once PRN seizures  ondansetron Injectable 4 milliGRAM(s) IV Push every 6 hours PRN Nausea and/or Vomiting  simethicone 80 milliGRAM(s) Chew daily PRN Gas  
MEDICATIONS  (STANDING):  brivaracetam  IVPB 100 milliGRAM(s) IV Intermittent <User Schedule>  cloBAZam 10 milliGRAM(s) Oral at bedtime  dexAMETHasone     Tablet 2 milliGRAM(s) Oral <User Schedule>  enoxaparin Injectable 40 milliGRAM(s) SubCutaneous every 24 hours  lacosamide IVPB 150 milliGRAM(s) IV Intermittent <User Schedule>  levothyroxine 88 MICROGram(s) Oral daily  melatonin 1 milliGRAM(s) Oral at bedtime  pantoprazole    Tablet 40 milliGRAM(s) Oral before breakfast  perampanel 4 milliGRAM(s) Oral at bedtime    MEDICATIONS  (PRN):  brivaracetam  Injectable 100 milliGRAM(s) IV Push once PRN 2nd line seizure rescue  midazolam Injectable 2 milliGRAM(s) IV Push once PRN seizures  
MEDICATIONS  (STANDING):  brivaracetam  IVPB 100 milliGRAM(s) IV Intermittent <User Schedule>  chlorhexidine 2% Cloths 1 Application(s) Topical daily  cloBAZam 10 milliGRAM(s) Oral at bedtime  dexAMETHasone  Injectable 4 milliGRAM(s) IV Push every 8 hours  dexAMETHasone  Injectable   IV Push   enoxaparin Injectable 40 milliGRAM(s) SubCutaneous <User Schedule>  lacosamide IVPB 150 milliGRAM(s) IV Intermittent <User Schedule>  levothyroxine 88 MICROGram(s) Oral daily  melatonin 1 milliGRAM(s) Oral at bedtime  pantoprazole    Tablet 40 milliGRAM(s) Oral before breakfast  perampanel 4 milliGRAM(s) Oral at bedtime  polyethylene glycol 3350 17 Gram(s) Oral daily  senna 2 Tablet(s) Oral at bedtime    MEDICATIONS  (PRN):  acetaminophen     Tablet .. 650 milliGRAM(s) Oral every 6 hours PRN Temp greater or equal to 38C (100.4F), Mild Pain (1 - 3)  albuterol    90 MICROgram(s) HFA Inhaler 2 Puff(s) Inhalation every 6 hours PRN Bronchospasm  brivaracetam  Injectable 100 milliGRAM(s) IV Push once PRN 2nd line seizure rescue  LORazepam     Tablet 1 milliGRAM(s) Oral every 6 hours PRN Combative behavior  LORazepam     Tablet 0.5 milliGRAM(s) Oral every 6 hours PRN Agitation  midazolam Injectable 2 milliGRAM(s) IV Push once PRN seizures  ondansetron Injectable 4 milliGRAM(s) IV Push every 6 hours PRN Nausea and/or Vomiting  simethicone 80 milliGRAM(s) Chew daily PRN Gas  
MEDICATIONS  (STANDING):  brivaracetam 100 milliGRAM(s) Oral <User Schedule>  chlorhexidine 2% Cloths 1 Application(s) Topical daily  cloBAZam 10 milliGRAM(s) Oral at bedtime  dexAMETHasone  Injectable   IV Push   enoxaparin Injectable 40 milliGRAM(s) SubCutaneous <User Schedule>  lacosamide 150 milliGRAM(s) Oral <User Schedule>  levothyroxine 88 MICROGram(s) Oral daily  melatonin 1 milliGRAM(s) Oral at bedtime  pantoprazole    Tablet 40 milliGRAM(s) Oral before breakfast  perampanel 4 milliGRAM(s) Oral at bedtime  polyethylene glycol 3350 17 Gram(s) Oral daily  senna 2 Tablet(s) Oral at bedtime    MEDICATIONS  (PRN):  acetaminophen     Tablet .. 650 milliGRAM(s) Oral every 6 hours PRN Temp greater or equal to 38C (100.4F), Mild Pain (1 - 3)  albuterol    90 MICROgram(s) HFA Inhaler 2 Puff(s) Inhalation every 6 hours PRN Bronchospasm  brivaracetam  Injectable 100 milliGRAM(s) IV Push once PRN 2nd line seizure rescue  LORazepam     Tablet 0.5 milliGRAM(s) Oral every 6 hours PRN Agitation  LORazepam     Tablet 1 milliGRAM(s) Oral every 6 hours PRN Combative behavior  midazolam Injectable 2 milliGRAM(s) IV Push once PRN seizures  ondansetron Injectable 4 milliGRAM(s) IV Push every 6 hours PRN Nausea and/or Vomiting  simethicone 80 milliGRAM(s) Chew daily PRN Gas

## 2025-06-20 NOTE — BH CONSULTATION LIAISON PROGRESS NOTE - NSICDXBHPRIMARYDX_PSY_ALL_CORE
Anxiety disorder   F41.9  
Anxiety disorder   F41.9  
Delirium secondary to multiple medical problems   F05  
Anxiety disorder   F41.9  
Delirium secondary to multiple medical problems   F05

## 2025-06-20 NOTE — BH CONSULTATION LIAISON PROGRESS NOTE - NSBHFUPINTERVALHXFT_PSY_A_CORE
Patient seen today for follow-up of concern for suicidality. Patient has been transferred out of the ICU to the floor. She has not received any PRNs for psychiatric reasons.    On exam today she is alert and fully oriented. She remembers who this writer is. She reports feeling less anxiety now that the mass is out. States her mood is "good, positive, happy" though also admits to being a little scared because she has a lot going on and is anticipating rehab. States she did get some sleep last night with the Melatonin. Patient expresses hopes that her past mental health symptoms may have been due to the mass and that now with it removed her mental health symptoms will have resolved. Discussed that we don't know if that will be the case but time will tell. Her family and significant other are present and discussed with patient and them that she looks a bit elevated, which can happen with the steroids. Her significant other notes that she does get manic-like at times and that this is not abnormal for her. She states "I'm on the wild side but I get shit done". She endorses feeling safe and denies having any suicidal thoughts - "I have too much to do, too many goals". Denies having any HI. Denies having any hallucinations or paranoia.  Family also feel that she is safe.
Patient seen today for follow-up of possible suicidality. Patient underwent L craniotomy and resection of mass with neurosurgery yesterday and is in the ICU recovering today. She is on a dexamethasone taper. Parents and significant other are at the bedside.    On exam patient is awake and alert. She remembers who I am and is agreeable to speaking with me. She feels she has a lot of help and is recovering ok so far. She denies having any suicidal thoughts currently, but talks about her prior suicide attempt as if it happened more recently and I had to clarify that it happened years ago. She begins talking about how 30 years ago the technology did not exist to operate on her and states she's going to write a book about her experiences. She believes she slept ok last night but isn't sure. She is oriented to self, states she's in a hotel in [place], and is oriented to the date/month/year. Notes she is still having word-finding difficulties. She is able to complete DOWB attention task.
Patient seen for follow-up today due to question of suicidality based on comments made at OSH. Patient has not received any psychiatry PRN medications. No behavioral issues.    On exam today patient is awake and alert but endorses confusion and difficulty processing questions and information. Endorses she is feeling anxious and worried about her upcoming surgery. She is oriented to being in the hospital, but states it is [facility] in [place]. She is fully oriented to it being Friday, June 13, 2025. She denies having any suicidal thoughts. States her vision is blurred but otherwise denies hallucinations.    Her significant other and parents are present at the bedside. They state the reported suicidal statements were that when told she has a lesion in her brain and would need surgery she made a comment like 'I can't take this anymore' but did not say she was suicidal. They have not heard her say anything suicidal. Her significant other reports that she did not overdose on Lamictal, but when having seizures would forget if she had taken it or not and would take an extra dose. They have not seen her appearing to respond to internal stimuli. Report that her outpatient psychiatrist is Dr. Sadie Azevedo.
Patient seen today for follow-up of concerns for suicidality. Patient is seen with family and significant other present in the room.  Patient reports she's feeling ready to go to rehab, and now just needs to decide on which one she wants to go to. States as a  she feels very positive about rehab and feels she'll do well. States her mood is very good and she is feeling very positively about things. Denies having any suicidal or homicidal thoughts. 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 Denies having any hallucinations.  No safety concerns from her or family. We discuss that she appears less elevated than yesterday and she states she's trying to focus on getting better before she writes a book or does the many other things she'd like to do. She is oriented to self, location, and month and year, but not day or date. She is able to complete DOWB and MOYB attention tasks.
Pt seen for f/u, oriented to self, place, situation, but not date.  Pt remains disorganized, repeatedly asking "who called you?", states she is "coming and going" mentally and is pending brain surgery.  States she needs to exercise, wants to start rowing.  1:1 at bedside states pt labile, irritable, mildly agitated, at times saying things that are not real sentences.
Patient seen today for follow-up of possible suicidality given reported comments made at outside hospital. Patient has not been any suicidal statements during admission to [facility]. Patient is not currently on any psychiatric medications and did not receive any PRN psychiatric medications. She did receive Melatonin 1mg QHS which she took.    On exam today she states that she's ok, but is preparing herself for surgery which is expected to happen tomorrow. States it's all a bit overwhelming but "I'm ok, I am. I don't want to hurt myself."  She feels a little more clear than when we last spoke on Friday, but acknowledges she still goes "in and out". States she recalls making the statement at OSH that she'd rather not be here than get brain surgery, but states she no longer feels that way. States now she would rather have surgery and have a good life again and get her profession back. States she has been a  for 30 years and really enjoys the work. Repeatedly states she has a lot going on in her mind, but she doesn't want to die. Denies experiencing any hallucinations. She is oriented to being in [facility] in [place], [place], and that it is June 2025, but not the day or day of the week. She is able to complete DOWB attention task rather easily today.
Patient seen today for follow-up of concerns for suicidality. Patient reports she's feeling well and is very positive. She reported recent visit by OT and she says that her movement is improving and she is ready to go to rehab, and now just needs to decide on which one she wants to go to. States as a  she feels very positive about rehab and feels she'll do well. States her mood is very good and she is feeling very positive about things. Denies having any suicidal or homicidal thoughts. 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 Denies having any hallucinations.  No safety concerns from her or family. We discuss that she appears less elevated than yesterday and she states she's trying to focus on getting better before she writes a book or does the many other things she'd like to do. She is oriented to self, location, and month and year, but not day or date. She is able to complete DOWB and MOYB attention tasks.

## 2025-06-20 NOTE — PROGRESS NOTE ADULT - SUBJECTIVE AND OBJECTIVE BOX
55yo RIGHT handed woman with a PMHx significant for Left temporal lobe lesion, bipolar, anxiety transferred from [facility] for EMU admission and concern for focal status in setting of left temporal mass.   Post-op # 4 s/p left craniotomy for resection of tumor    Overnight event: no acute event    Vital Signs Last 24 Hrs  T(C): 36.9 (20 Jun 2025 08:52), Max: 37.4 (19 Jun 2025 12:30)  T(F): 98.5 (20 Jun 2025 08:52), Max: 99.3 (19 Jun 2025 12:30)  HR: 62 (20 Jun 2025 08:52) (62 - 88)  BP: 93/63 (20 Jun 2025 08:52) (92/58 - 130/75)  BP(mean): --  RR: 18 (20 Jun 2025 08:52) (18 - 18)  SpO2: 98% (20 Jun 2025 08:52) (94% - 98%)    Parameters below as of 20 Jun 2025 05:32  Patient On (Oxygen Delivery Method): room air        Stroke Core Measures      DRAIN OUTPUT:     NEUROIMAGING:     PHYSICAL EXAM:    General: No Acute Distress     Neurological: Awake, alert oriented to person, place and time, Following Commands, PERRL, EOMI, Face Symmetrical, Speech Fluent, Moving all extremities, Muscle Strength normal in all four extremities, No Drift, Sensation to Light Touch Intact    Pulmonary: Clear to Auscultation, No Rales, No Rhonchi, No Wheezes     Cardiovascular: S1, S2, Regular Rate and Rhythm     Gastrointestinal: Soft, Nontender, Nondistended     Incision: intact    MEDICATIONS:   Antibiotics:    Neuro:  acetaminophen     Tablet .. 650 milliGRAM(s) Oral every 6 hours PRN Temp greater or equal to 38C (100.4F), Mild Pain (1 - 3)  brivaracetam  Injectable 100 milliGRAM(s) IV Push once PRN 2nd line seizure rescue  brivaracetam  IVPB 100 milliGRAM(s) IV Intermittent <User Schedule>  cloBAZam 10 milliGRAM(s) Oral at bedtime  lacosamide IVPB 150 milliGRAM(s) IV Intermittent <User Schedule>  LORazepam     Tablet 0.5 milliGRAM(s) Oral every 6 hours PRN Agitation  LORazepam     Tablet 1 milliGRAM(s) Oral every 6 hours PRN Combative behavior  melatonin 1 milliGRAM(s) Oral at bedtime  midazolam Injectable 2 milliGRAM(s) IV Push once PRN seizures  ondansetron Injectable 4 milliGRAM(s) IV Push every 6 hours PRN Nausea and/or Vomiting  perampanel 4 milliGRAM(s) Oral at bedtime    Anticoagulation:  enoxaparin Injectable 40 milliGRAM(s) SubCutaneous <User Schedule>    Cardiology:    Endo:   dexAMETHasone  Injectable   IV Push   dexAMETHasone  Injectable 4 milliGRAM(s) IV Push every 8 hours  levothyroxine 88 MICROGram(s) Oral daily    Pulm:  albuterol    90 MICROgram(s) HFA Inhaler 2 Puff(s) Inhalation every 6 hours PRN    GI/:  pantoprazole    Tablet 40 milliGRAM(s) Oral before breakfast  polyethylene glycol 3350 17 Gram(s) Oral daily  senna 2 Tablet(s) Oral at bedtime  simethicone 80 milliGRAM(s) Chew daily PRN    Other:  chlorhexidine 2% Cloths 1 Application(s) Topical daily  albuterol    90 MICROgram(s) HFA Inhaler 2 Puff(s) Inhalation every 6 hours PRN   pantoprazole    Tablet 40 milliGRAM(s) Oral before breakfast  polyethylene glycol 3350 17 Gram(s) Oral daily  senna 2 Tablet(s) Oral at bedtime  simethicone 80 milliGRAM(s) Chew daily PRN

## 2025-06-20 NOTE — BH CONSULTATION LIAISON PROGRESS NOTE - ORIENTATION
oriented to month and year, but not day or date
not oriented to the day of the week or the date, but did know the month and year
not oriented to the day of the week or the date, but did know the month and year
knew she was in the hospital but thought it is [place]
endorses being in a hotel

## 2025-06-20 NOTE — BH CONSULTATION LIAISON PROGRESS NOTE - NSBHCONSULTMEDAGITATION_PSY_A_CORE FT
Ativan 0.5-1mg Q6hrs PRN for agitation or anxiety

## 2025-06-20 NOTE — BH CONSULTATION LIAISON PROGRESS NOTE - NSBHCHARTREVIEWLAB_PSY_A_CORE FT
13.3   10.84 )-----------( 350      ( 14 Jun 2025 07:03 )             39.8     06-14    142  |  107  |  17  ----------------------------<  90  4.1   |  19[L]  |  0.64    Ca    9.6      14 Jun 2025 07:04  Phos  3.3     06-13  Mg     2.1     06-13    TPro  7.1  /  Alb  4.3  /  TBili  0.2  /  DBili  x   /  AST  19  /  ALT  54[H]  /  AlkPhos  71  06-14    Urinalysis Basic - ( 14 Jun 2025 07:04 )    Color: x / Appearance: x / SG: x / pH: x  Gluc: 90 mg/dL / Ketone: x  / Bili: x / Urobili: x   Blood: x / Protein: x / Nitrite: x   Leuk Esterase: x / RBC: x / WBC x   Sq Epi: x / Non Sq Epi: x / Bacteria: x    
no new relevant labs today
no new relevant labs today
Phosphorus (06.13.25 @ 06:42)   Phosphorus: 3.3 mg/dL  Magnesium (06.13.25 @ 06:42)   Magnesium: 2.1  Comprehensive Metabolic Panel (06.13.25 @ 06:42)   Sodium: 140 mmol/L  Potassium: 3.9 mmol/L  Chloride: 103 mmol/L  Carbon Dioxide: 19 mmol/L  Anion Gap: 18 mmol/L  Blood Urea Nitrogen: 20 mg/dL  Creatinine: 0.75 mg/dL  Glucose: 97 mg/dL  Calcium: 9.5 mg/dL  Protein Total: 7.1 g/dL  Albumin: 4.2 g/dL  Bilirubin Total: 0.2 mg/dL  Alkaline Phosphatase: 71 U/L  Aspartate Aminotransferase (AST/SGOT): 20 U/L  Alanine Aminotransferase (ALT/SGPT): 51 U/L  eGFR: 93Complete Blood Count + Automated Diff (06.13.25 @ 06:42)   Auto NRBC: 0 /100 WBCs  WBC Count: 10.74 K/uL  RBC Count: 4.14 M/uL  Hemoglobin: 12.9 g/dL  Hematocrit: 37.4 %  Mean Cell Volume: 90.3 fl  Mean Cell Hemoglobin: 31.2 pg  Mean Cell Hemoglobin Conc: 34.5 g/dL  Red Cell Distrib Width: 12.2 %  Platelet Count - Automated: 339 K/uL  Neutrophil #: 7.05 K/uL  Lymphocyte #: 2.33 K/uL  Monocyte #: 1.03 K/uL  Eosinophil #: 0.05 K/uL  Basophil #: 0.07 K/uL  Neutrophil %: 65.5: Differential percentages must be correlated with absolute numbers for   clinical significance. %  Lymphocyte %: 21.7 %  Monocyte %: 9.6 %  Eosinophil %: 0.5 %  Basophil %: 0.7 %  Auto Immature Granulocyte %: 2.0  
Comprehensive Metabolic Panel in AM (06.15.25 @ 06:19)   Sodium: 140 mmol/L  Potassium: 4.4 mmol/L  Chloride: 105 mmol/L  Carbon Dioxide: 20 mmol/L  Anion Gap: 15 mmol/L  Blood Urea Nitrogen: 20 mg/dL  Creatinine: 0.78 mg/dL  Glucose: 114 mg/dL  Calcium: 9.8 mg/dL  Protein Total: 7.0 g/dL  Albumin: 4.1 g/dL  Bilirubin Total: 0.2 mg/dL  Alkaline Phosphatase: 70 U/L  Aspartate Aminotransferase (AST/SGOT): 19 U/L  Alanine Aminotransferase (ALT/SGPT): 50 U/L  eGFR: 89  Complete Blood Count in AM (06.15.25 @ 06:19)   Auto NRBC: 0 /100 WBCs  WBC Count: 10.67 K/uL  RBC Count: 4.16 M/uL  Hemoglobin: 12.7 g/dL  Hematocrit: 37.9 %  Mean Cell Volume: 91.1 fl  Mean Cell Hemoglobin: 30.5 pg  Mean Cell Hemoglobin Conc: 33.5 g/dL  Red Cell Distrib Width: 12.3 %  Platelet Count - Automated: 346 K/uL  HCG Quantitative, Serum (06.14.25 @ 07:05)   HCG Quantitative, Serum: 7.2
Complete Blood Count STAT (06.18.25 @ 07:35)   Auto NRBC: 0 /100 WBCs  WBC Count: 16.34 K/uL  RBC Count: 3.88 M/uL  Hemoglobin: 11.8 g/dL  Hematocrit: 35.6 %  Mean Cell Volume: 91.8 fl  Mean Cell Hemoglobin: 30.4 pg  Mean Cell Hemoglobin Conc: 33.1 g/dL  Red Cell Distrib Width: 13.0 %  Platelet Count - Automated: 326 K/uL  MPV: 9.8 fL  Auto Nucleated RBC #: 0.00 K/uL  Phosphorus (06.18.25 @ 07:32)   Phosphorus: 2.9 mg/dL  Magnesium (06.18.25 @ 07:32)   Magnesium: 2.1Basic Metabolic Panel - STAT (06.18.25 @ 07:32)   Sodium: 142 mmol/L  Potassium: 4.0 mmol/L  Chloride: 105 mmol/L  Carbon Dioxide: 20 mmol/L  Anion Gap: 17 mmol/L  Blood Urea Nitrogen: 14 mg/dL  Creatinine: 0.58 mg/dL  Glucose: 126 mg/dL  Calcium: 9.3 mg/dL  eGFR: 106  
Basic Metabolic Panel - STAT (06.16.25 @ 23:35)   Sodium: 142 mmol/L  Potassium: 4.1 mmol/L  Chloride: 108 mmol/L  Carbon Dioxide: 19 mmol/L  Anion Gap: 15 mmol/L  Blood Urea Nitrogen: 13 mg/dL  Creatinine: 0.55 mg/dL  Glucose: 152 mg/dL  Calcium: 8.3 mg/dL  eGFR: 108  Complete Blood Count (06.16.25 @ 18:30)   Auto NRBC: 0 /100 WBCs  WBC Count: 11.38 K/uL  RBC Count: 3.95 M/uL  Hemoglobin: 12.0 g/dL  Hematocrit: 35.4 %  Mean Cell Volume: 89.6 fl  Mean Cell Hemoglobin: 30.4 pg  Mean Cell Hemoglobin Conc: 33.9 g/dL  Red Cell Distrib Width: 12.3 %  Platelet Count - Automated: 343 K/uL  Phosphorus (06.16.25 @ 18:30)   Phosphorus: 3.5 mg/dL  Magnesium (06.16.25 @ 18:30)   Magnesium: 2.1

## 2025-06-20 NOTE — BH CONSULTATION LIAISON PROGRESS NOTE - NSBHCHARTREVIEWVS_PSY_A_CORE FT
Vital Signs Last 24 Hrs  T(C): 36.4 (15 Alirio 2025 09:20), Max: 36.6 (14 Jun 2025 14:13)  T(F): 97.5 (15 Alirio 2025 09:20), Max: 97.9 (14 Jun 2025 16:45)  HR: 76 (15 Alirio 2025 09:20) (70 - 76)  BP: 89/61 (15 Alirio 2025 09:20) (88/55 - 112/82)  BP(mean): --  RR: 18 (15 Alirio 2025 09:20) (18 - 20)  SpO2: 97% (15 Alirio 2025 09:20) (97% - 99%)    Parameters below as of 15 Alirio 2025 09:20  Patient On (Oxygen Delivery Method): room air    
Vital Signs Last 24 Hrs  T(C): 36.4 (14 Jun 2025 05:42), Max: 36.8 (13 Jun 2025 08:28)  T(F): 97.6 (14 Jun 2025 05:42), Max: 98.3 (13 Jun 2025 08:28)  HR: 54 (14 Jun 2025 05:42) (54 - 84)  BP: 99/65 (14 Jun 2025 05:42) (88/60 - 112/71)  BP(mean): --  RR: 18 (14 Jun 2025 05:42) (18 - 19)  SpO2: 100% (14 Jun 2025 05:42) (96% - 100%)    Parameters below as of 14 Jun 2025 05:42  Patient On (Oxygen Delivery Method): room air    
Vital Signs Last 24 Hrs  T(C): 37.4 (19 Jun 2025 12:30), Max: 37.4 (19 Jun 2025 12:30)  T(F): 99.3 (19 Jun 2025 12:30), Max: 99.3 (19 Jun 2025 12:30)  HR: 81 (19 Jun 2025 12:30) (61 - 81)  BP: 101/72 (19 Jun 2025 12:30) (90/55 - 101/72)  BP(mean): --  RR: 18 (19 Jun 2025 12:30) (16 - 18)  SpO2: 95% (19 Jun 2025 12:30) (93% - 98%)    Parameters below as of 19 Jun 2025 12:30  Patient On (Oxygen Delivery Method): room air    
Vital Signs Last 24 Hrs  T(C): 36.6 (14 Jun 2025 14:13), Max: 36.7 (13 Jun 2025 16:12)  T(F): 97.8 (14 Jun 2025 14:13), Max: 98.1 (13 Jun 2025 16:12)  HR: 71 (14 Jun 2025 14:13) (54 - 96)  BP: 94/68 (14 Jun 2025 14:13) (88/60 - 102/67)  BP(mean): --  RR: 18 (14 Jun 2025 14:13) (18 - 18)  SpO2: 98% (14 Jun 2025 14:13) (96% - 100%)    Parameters below as of 14 Jun 2025 14:13  Patient On (Oxygen Delivery Method): room air    
Vital Signs Last 24 Hrs  T(C): 36.5 (20 Jun 2025 13:19), Max: 36.9 (19 Jun 2025 19:57)  T(F): 97.7 (20 Jun 2025 13:19), Max: 98.5 (20 Jun 2025 08:52)  HR: 71 (20 Jun 2025 13:19) (62 - 88)  BP: 94/60 (20 Jun 2025 13:19) (92/58 - 130/75)  BP(mean): --  RR: 18 (20 Jun 2025 13:19) (18 - 18)  SpO2: 98% (20 Jun 2025 13:19) (94% - 98%)    Parameters below as of 20 Jun 2025 13:19  Patient On (Oxygen Delivery Method): room air    
Vital Signs Last 24 Hrs  T(C): 37.3 (18 Jun 2025 04:42), Max: 37.3 (18 Jun 2025 04:42)  T(F): 99.1 (18 Jun 2025 04:42), Max: 99.1 (18 Jun 2025 04:42)  HR: 64 (18 Jun 2025 04:42) (55 - 82)  BP: 99/62 (18 Jun 2025 04:42) (99/59 - 115/72)  BP(mean): 74 (17 Jun 2025 19:00) (74 - 74)  RR: 18 (18 Jun 2025 04:42) (17 - 36)  SpO2: 95% (18 Jun 2025 04:42) (95% - 100%)    Parameters below as of 18 Jun 2025 04:42  Patient On (Oxygen Delivery Method): room air    
Vital Signs Last 24 Hrs  T(C): 37 (19 Jun 2025 04:52), Max: 37 (19 Jun 2025 04:52)  T(F): 98.6 (19 Jun 2025 04:52), Max: 98.6 (19 Jun 2025 04:52)  HR: 64 (19 Jun 2025 04:52) (61 - 83)  BP: 90/55 (19 Jun 2025 04:52) (90/55 - 100/64)  BP(mean): --  RR: 18 (19 Jun 2025 04:52) (16 - 18)  SpO2: 93% (19 Jun 2025 04:52) (93% - 98%)    Parameters below as of 19 Jun 2025 04:52  Patient On (Oxygen Delivery Method): room air

## 2025-06-20 NOTE — PROGRESS NOTE ADULT - ASSESSMENT
Patient NAT THOMASON is a 54y (1970) woman with a PMHx significant for Left Temporal lobe lesion, Bipolar, Anxiety transferred from [facility] to [facility] to be evaluated for seizure. History limited as patient is poor historian, see hx below from chart at OSH.     History as per partner (Benson) at bedside. As per partner over the past few weeks patient has been having multiple episodes of partial seizures more often than usual. Despite being on her normal routine, patient often has multiple seizures weekly but always returned to baseline. On  patient had a seizure and afterwards patient appeared more confused, was unable to articulate herself did not seek medical intervention. Since then symptoms have worsen, on  patient was found with a bunch of Lamictal pills around her, reported she might have taken more  pills than usual & patient was brought to [facility] ED.   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   As per partner, patient ha been going under a lot of life changing events. In april she lost her job, 3 weeks ago a family member has ; with everything happening, she has been deteriorating   Also on  patient fell at [facility], got on the plane & went to [place], there she went to the ED and was found to have a concussion   Patient used to be on lexapro, but was discontinued in april by her psychiatrist   Upon evaluation patient is awake, alert, speaking incoherently, pacing around the room, but able to follow commands and re-directable.     Imaging at [facility]   - CT-head-Unremarkable.   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   - EEG: Revealed evidence of electrographic seizure activity  On  - Patient was found to be hyponatremic (Na:127), corrected with latest Na:138 ()    Interval Hx:  Patient was transferred [facility] and had MRI brain with stable temporal lobe lesion favoring diagnosis of malignancy as well as surrounding edema likely correlated to recent seizure activity. Neurology following as EEG still with seizure activity likely related to temporal lobe lesion. Patient transferred to [facility] EMU for further management.     EEG 2025  Clinical Impression:  -Numerous left posterior temporal focal-onset seizures at times with bilateral spread, 2-5 seizures/hour for most of recording, some hours with no seizures. Overall improving during recording. Last seizure captured 12:38 25.   -Risk of left frontal and right frontotemporal focal-onset seizures   -Left hemispheric focal cerebral dysfunction can be structural and/or functional (such as post-ictal) in etiology.     Procedure: s/p left craniotomy for resection of tumor 2025     PLAN    NEURO: Neuro checks q4hrs   post op MRI as above   Dex taper written   Continue on AEDS: Briviact 100mg q12hr, onfi 10qhs , vimpat 150mg BID, fycompa 4mg qhs --> Epilepsy following   pain regiment Tylenol   BH following for prior Suicidal ideation currently denies SI/HI  placed on constant obs per psych de-escalated today to routine supervision     Activity: [x] mobilize as tolerated, PT/OT/PMR rec AR     PULM:   on RA  satting > 94%  encourage incentive spirometer   C/w Albuterol    CV:   Hemodynamically stable    RENAL:   IVL,   voiding independently    GI:   regular diet   PPI while on decadron    continue bowel regimen with miralax and senna   Last BM     ENDO:  Goal euglycemia (-180), \ISS   continue Synthroid    HEME/ONC:  VTE prophylaxis: [x] SCDs, SQL  LED  neg for DVT     ID: afebrile     Dispo: Acute rehab    will discuss plan with Dr. Hernández   Available on Teams

## 2025-06-20 NOTE — BH CONSULTATION LIAISON PROGRESS NOTE - NSBHPSYCHOLCOGORIENT_PSY_A_CORE
Not fully oriented...
Oriented to time, place, person, situation
Not fully oriented...

## 2025-06-20 NOTE — PROGRESS NOTE ADULT - ASSESSMENT
53 YO RIGHT handed woman with PMHx significant for Left temporal lobe lesion, hypothyroidism, PTSD, bipolar, and anxiety presented initially to [facility] for seizures and ultimately transferred for EMU admission, Concern for focal status in setting of left temporal mass. Admitted to neurology and internal medicine consulted for medical management     # Seizures with left temporal brain lesion   - CTH on 6/10 with left mid temporal lobe lesion   - s/p left temporal lobe resection on 6/16  - Post OP CTH 6/17 with lucency in the left medial temporal lobe status post neoplastic resection. Bifrontal pneumocephalus. No hydrocephalus. Minimal postop hemorrhage  - Post OR MR with left temporal postoperative changes after excision of a suspicious nonenhancing lesion. No acute infarcts.  - Continue with dex taper through 6/23 per NSG  - Continue with PPI for GI PPX with steroids  - Continue on AED per neuro  - Pending pathology (can be followed outpatient)  - Pain control per NSG  - Care per Neuro and NSGY    # Leukocytosis   - Likely reactivity from OR and steroids  - If febrile check pan cultures  - Monitor and trend CBC, temp curve, VS and adjust as tolerated    # Pericardial effusion   - TTE with EF 66 with normal LVSF, no WMA, and trace pericardial effusion   - RPT TTE outpatient in 1 month    # Hypothyroidism   - TSH elevated but FT4 normal likely reactive   - RPT TSH coming down   - Continue on home synthroid 88   - Repeat TFTs outpatient    # Anxiety and depression  - Patient reportedly expressed SI at some point at OSH, with concern raised that she may have taken an overdose of Lamictal.  - On  evaluation patient denied having suicidal thoughts or making a suicide attempt, but per records has a history of a suicide attempt and psychiatric hospitalization 11 years ago.   - Continue on ativan PRN per    -  appreciated     # DVT PPX with LVX     # DISPO - PMR recc AR

## 2025-06-20 NOTE — BH CONSULTATION LIAISON PROGRESS NOTE - NSBHCONSULTFOLLOWAFTERCARE_PSY_A_CORE FT
Patient can continue to follow-up with her outpatient psychiatrist at discharge.
Patient can continue to follow-up with her outpatient psychiatrist at discharge.

## 2025-06-20 NOTE — BH CONSULTATION LIAISON PROGRESS NOTE - NSBHCONSULTRECOMMENDOTHER_PSY_A_CORE FT
- Unable to complete safety evaluation at this time due to patient's altered mental status. Will continue to follow and assess as patient's mental status hopefully improves after surgery.  - Would use Ativan 0.5-1mg Q6hrs PRN for any agitation.
- No psychiatric contraindications for discharge  - Would use Ativan 0.5-1mg Q6hrs PRN for any agitation.  
- Unable to complete safety evaluation at this time due to patient's altered mental status. Will continue to follow and assess as patient's mental status hopefully improves after surgery.  - Would use Ativan 0.5-1mg Q6hrs PRN for any agitation.
- Ok to step patient down to enhanced observation  - Would use Ativan 0.5-1mg Q6hrs PRN for any agitation.  - Will continue to follow
- Unable to complete safety evaluation at this time due to patient's altered mental status. Will continue to follow and assess as patient's mental status clears hopefully improves after surgery.  - Would use Ativan 0.5-1mg Q6hrs PRN for any agitation.
- Ok to step patient down to routine observation  - Would use Ativan 0.5-1mg Q6hrs PRN for any agitation.

## 2025-06-20 NOTE — BH CONSULTATION LIAISON PROGRESS NOTE - NSBHMSESPEECH_PSY_A_CORE
Normal volume, rate, productivity, spontaneity and articulation
Abnormal as indicated, otherwise normal...
Normal volume, rate, productivity, spontaneity and articulation
Normal volume, rate, productivity, spontaneity and articulation
Abnormal as indicated, otherwise normal...
Normal volume, rate, productivity, spontaneity and articulation
Normal volume, rate, productivity, spontaneity and articulation

## 2025-06-20 NOTE — BH CONSULTATION LIAISON PROGRESS NOTE - NSBHCHARTREVIEWINVESTIGATE_PSY_A_CORE FT
no new relevant imaging or EKG today.
< from: CT Head No Cont (06.17.25 @ 10:02) >    ACC: [id number] EXAM:  CT BRAIN   ORDERED BY:  ISABELLE CURTIS     PROCEDURE DATE:  06/17/2025          INTERPRETATION:  CLINICAL INFORMATION: left craniotomy for resection of   tumor, portable    COMPARISON: MR 6/12/2025    CONTRAST:  IV Contrast: None      TECHNIQUE:  Serial axial images were obtained from the skull base to the   vertex using multi-slice helical technique. Study was done using the   portable technique    FINDINGS:    VENTRICLES AND SULCI: Normal in size and configuration.  INTRA-AXIAL: Lucency in the left medial temporal lobe status post   neoplastic resection  EXTRA-AXIAL: Bifrontal pneumocephalus status post resection of left   medial temporal region lesion. Minimal extra-axial hemorrhage subjacent   to the craniotomy.    VISUALIZED SINUSES:  Clear.  TYMPANOMASTOID CAVITIES:  Clear.  VISUALIZED ORBITS: Normal.  CALVARIUM: Left temporal craniotomy. Extracalvarial soft tissue swelling   and air.  MISCELLANEOUS: None.      IMPRESSION:  Status post left medial temporal region lesion. Bifrontal pneumocephalus.   No hydrocephalus. Minimal postop hemorrhage        --- End of Report ---            NISHANT THOMAS MD; Attending Radiologist  This document has been electronically signed. Jun 17 2025  9:42AM    < end of copied text >    < from: MR Head w/wo IV Cont (06.17.25 @ 15:58) >    ACC: [id number] EXAM:  MR BRAIN WAW IC   ORDERED BY:  ISABELLE CURTIS     PROCEDURE DATE:  06/17/2025          INTERPRETATION:  CLINICAL INDICATION: Post resection left temporal lesion      Magnetic resonance imaging of the brain was carried out with transaxial   SPGR, FLAIR, fast spin echo T2 weighted images, axial susceptibility   weighted series, diffusion weighted series and sagittal T1 weighted   series on a 3.0 Noreen magnet. Post contrast axial, coronal and sagittal   T1 weighted images were obtained. 5.5 cc of Gadavist were intravenously   injected, 2.0 cc were discarded.    Comparison is made with the prior brain CT of earlier today and MRI   6/12/2025.    There has been a left frontal temporal craniotomy. The heterogeneous mass   in theleft inferolateral temporal lobe seen on the prior examination has   been removed. Postoperative changes are identified with a small amount of   postsurgical material, hemorrhage, edema and air. No acute infarcts are   seen. A small amount of air is identified within the temporal horn of the   lateral ventricle. There is no midline shift. Ventricles and sulci are   otherwise normal in size and position. The sellar and parasellar   structures are unremarkable.    IMPRESSION: Left temporal postoperative changes after excision of a   suspicious nonenhancing lesion since 6/12/2025. No acute infarcts.    --- End of Report ---            BUSHRA STAPLETON MD; Attending Radiologist  This document has been electronically signed. Jun 17 2025  5:17PM    < end of copied text >    
< from: 12 Lead ECG (06.08.25 @ 20:54) >      Ventricular Rate 83 BPM    Atrial Rate 83 BPM    P-R Interval 136 ms    QRS Duration 72 ms    Q-T Interval 340 ms    QTC Calculation(Bazett) 399 ms    P Axis 45 degrees    R Axis 58 degrees    T Axis 13 degrees    Diagnosis Line sinus rhythm with inappropriate pacing spikes.  Suggest repeat ECG    Confirmed by Nicky DING, Speedy ([id number]) on 6/9/2025 12:03:30 AM    < end of copied text >    
< from: MR Head w/wo IV Cont (06.17.25 @ 15:58) >    ACC: [id number] EXAM:  MR BRAIN WAW IC   ORDERED BY:  ISABELLE CURTIS     PROCEDURE DATE:  06/17/2025          INTERPRETATION:  CLINICAL INDICATION: Post resection left temporal lesion      Magnetic resonance imaging of the brain was carried out with transaxial   SPGR, FLAIR, fast spin echo T2 weighted images, axial susceptibility   weighted series, diffusion weighted series and sagittal T1 weighted   series on a 3.0 Noreen magnet. Post contrast axial, coronal and sagittal   T1 weighted images were obtained. 5.5 cc of Gadavist were intravenously   injected, 2.0 cc were discarded.    Comparison is made with the prior brain CT of earlier today and MRI   6/12/2025.    There has been a left frontal temporal craniotomy. The heterogeneous mass   in theleft inferolateral temporal lobe seen on the prior examination has   been removed. Postoperative changes are identified with a small amount of   postsurgical material, hemorrhage, edema and air. No acute infarcts are   seen. A small amount of air is identified within the temporal horn of the   lateral ventricle. There is no midline shift. Ventricles and sulci are   otherwise normal in size and position. The sellar and parasellar   structures are unremarkable.    IMPRESSION: Left temporal postoperative changes after excision of a   suspicious nonenhancing lesion since 6/12/2025. No acute infarcts.    --- End of Report ---            BUSHRA STAPLETON MD; Attending Radiologist  This document has been electronically signed. Jun 17 2025  5:17PM    < end of copied text >    
no new relevant imaging or EKG today.

## 2025-06-20 NOTE — BH CONSULTATION LIAISON PROGRESS NOTE - NSBHASSESSMENTFT_PSY_ALL_CORE
Patient is a 54 year old single, domiciled, recently unemployed woman with an unclear past psychiatric history but possible including bipolar disorder, anxiety, and PTSD, and a medical history significant for seizure disorder and osteoarthritis who is currently admitted for management of seizures with L temporal lobe lesion. Psychiatry is consulted for a safety evaluation as patient reportedly expressed SI at some point at OSH, with concern raised that she may have taken an overdose of Lamictal. She reportedly has had multiple stressors lately including loss of her job and the death of a family member a few weeks ago. Per records it appears that patient's significant other has reported she's had an increased frequency of seizures lately, but would typically return to her cognitive baseline afterwards. However after her seizure 5/30 she has not returned to her baseline. On initial exam patient was alert, but confused, distractible, oddly related, with labile mood and apparent word finding difficulties. She was oriented to self, initially stated she is in a hotel before correcting to a hospital, and did not know the date and looked at her phone despite being asked not to. She was able to complete DOWB after delay but unable to perform simple calculation task. Patient acknowledged not feeling like herself, but also having increased stress and feeling burnt out lately. However she denied having suicidal thoughts or making a suicide attempt. Denied having any symptoms of psychosis. She was unable to provide history regarding whether she is seeing a psychiatrist or therapist, or details of why the Lexapro was stopped a few months ago, but per records has a history of a suicide attempt and psychiatric hospitalization 11 years ago. No known substance abuse. On exam 6/13 patient appeared more confused and less able to answer questions, though still oriented to being in a hospital and the date. Family at the bedside were able to provide a bit more history, including patient's psychiatrist Dr. Joseluis Horner, and that she had been taking extra Lamictal because she could not remember if she had taken it already, because of the seizures. Given her current mental state, we are unable to complete the safety evaluation at this time. Due to her unpredictable behavior would recommend continuing constant observation and we will continue to evaluate.     
Patient is a 54 year old single, domiciled, recently unemployed woman with an unclear past psychiatric history but possible including bipolar disorder, anxiety, and PTSD, and a medical history significant for seizure disorder and osteoarthritis who is currently admitted for management of seizures with L temporal lobe lesion. Psychiatry was consulted for a safety evaluation as patient reportedly expressed SI at some point at OSH, with concern raised that she may have taken an overdose of Lamictal. She reportedly has had multiple stressors lately including loss of her job and the death of a family member a few weeks ago. Per records it appears that patient's significant other has reported she's had an increased frequency of seizures lately, but would typically return to her cognitive baseline afterwards. However after her seizure 5/30 she has not returned to her baseline. On initial exam patient was alert, but confused, distractible, oddly related, with labile mood and apparent word finding difficulties. She was oriented to self, initially stated she is in a hotel before correcting to a hospital, and did not know the date. She was able to complete DOWB after delay but unable to perform simple calculation task. Patient acknowledged not feeling like herself, but also having increased stress and feeling burnt out lately. However she denied having suicidal thoughts or making a suicide attempt. Denied having any symptoms of psychosis. She was unable to provide history regarding whether she is seeing a psychiatrist or therapist, or details of why the Lexapro was stopped a few months ago, but per records has a history of a suicide attempt and psychiatric hospitalization 11 years ago. No known substance abuse. On exam 6/13 patient appeared more confused and less able to answer questions, though still oriented to being in a hospital and the date. Family at the bedside were able to provide more history, including patient's psychiatrist Dr. Joseluis Horner, and that she had been taking extra Lamictal because she could not remember if she had taken it already, because of the seizures. On 6/15 patient recalled making statements at OSH that she would rather not be here than have brain surgery, but no longer feels this way, wants to have the surgery, and wants to live. Not evaluated 6/16 as she was in the OR, but seen 6/17 in the ICU and continued to deny SI but was a bit elevated, distractible, and not fully oriented. On exam 6/18 she still appears a bit elevated but family states that some of that is her baseline, and she is fully oriented without any SI/HI or symptoms of delirium.  It does not appear that she is actively suicidal, and she has not done or said anything during this admission to suggest that, and is accepting of treatment. At this point the risk of suicide appears low and it is reasonable to step down to enhanced observation.    
Patient is a 54 year old single, domiciled, recently unemployed woman with an unclear past psychiatric history but possible including bipolar disorder, anxiety, and PTSD, and a medical history significant for seizure disorder and osteoarthritis who is currently admitted for management of seizures with L temporal lobe lesion. Psychiatry was consulted for a safety evaluation as patient reportedly expressed SI at some point at OSH, with concern raised that she may have taken an overdose of Lamictal. She reportedly has had multiple stressors lately including loss of her job and the death of a family member a few weeks ago. Per records it appears that patient's significant other has reported she's had an increased frequency of seizures lately, but would typically return to her cognitive baseline afterwards. However after her seizure 5/30 she has not returned to her baseline. On initial exam patient was alert, but confused, distractible, oddly related, with labile mood and apparent word finding difficulties. She was oriented to self, initially stated she is in a hotel before correcting to a hospital, and did not know the date. She was able to complete DOWB after delay but unable to perform simple calculation task. Patient acknowledged not feeling like herself, but also having increased stress and feeling burnt out lately. However she denied having suicidal thoughts or making a suicide attempt. Denied having any symptoms of psychosis. She was unable to provide history regarding whether she is seeing a psychiatrist or therapist, or details of why the Lexapro was stopped a few months ago, but per records has a history of a suicide attempt and psychiatric hospitalization 11 years ago. No known substance abuse. On exam 6/13 patient appeared more confused and less able to answer questions, though still oriented to being in a hospital and the date. Family at the bedside were able to provide more history, including patient's psychiatrist Dr. Joseluis Horner, and that she had been taking extra Lamictal because she could not remember if she had taken it already, because of the seizures. On 6/15 patient recalled making statements at OSH that she would rather not be here than have brain surgery, but no longer feels this way, wants to have the surgery, and wants to live. Not evaluated 6/16 as she was in the OR, but seen today 6/17 in the ICU and she continues to deny SI but is a bit elevated, distractible, and not fully oriented. It does not appear that she is actively suicidal, and she has not done or said anything during this admission to suggest that, and is accepting of treatment. However given her current mental state, we are unable to fully complete the safety evaluation at this time. Due to her unpredictable behavior would recommend continuing constant observation and we will continue to evaluate.     
Patient is a 54 year old single, domiciled, recently unemployed woman with an unclear past psychiatric history but possible including bipolar disorder, anxiety, and PTSD, and a medical history significant for seizure disorder and osteoarthritis who is currently admitted for management of seizures with L temporal lobe lesion. Psychiatry was consulted for a safety evaluation as patient reportedly expressed SI at some point at OSH, with concern raised that she may have taken an overdose of Lamictal. She reportedly has had multiple stressors lately including loss of her job and the death of a family member a few weeks ago. Per records it appears that patient's significant other has reported she's had an increased frequency of seizures lately, but would typically return to her cognitive baseline afterwards. However after her seizure 5/30 she has not returned to her baseline. On initial exam patient was alert, but confused, distractible, oddly related, with labile mood and apparent word finding difficulties. She was oriented to self, initially stated she is in a hotel before correcting to a hospital, and did not know the date. She was able to complete DOWB after delay but unable to perform simple calculation task. Patient acknowledged not feeling like herself, but also having increased stress and feeling burnt out lately. However she denied having suicidal thoughts or making a suicide attempt. Denied having any symptoms of psychosis. She was unable to provide history regarding whether she is seeing a psychiatrist or therapist, or details of why the Lexapro was stopped a few months ago, but per records has a history of a suicide attempt and psychiatric hospitalization 11 years ago. No known substance abuse. On exam 6/13 patient appeared more confused and less able to answer questions, though still oriented to being in a hospital and the date. Family at the bedside were able to provide more history, including patient's psychiatrist Dr. Joseluis Horner, and that she had been taking extra Lamictal because she could not remember if she had taken it already, because of the seizures. On 6/15 patient recalled making statements at OSH that she would rather not be here than have brain surgery, but no longer feels this way, wants to have the surgery, and wants to live. It does not appear that she is actively suicidal, and she has not done or said anything during this admission to suggest that, and is accepting of treatment. However given her current mental state, we are unable to fully complete the safety evaluation at this time. Due to her unpredictable behavior would recommend continuing constant observation and we will continue to evaluate.     
Patient is a 54 year old single, domiciled, recently unemployed woman with an unclear past psychiatric history but possible including bipolar disorder, anxiety, and PTSD, and a medical history significant for seizure disorder and osteoarthritis who is currently admitted for management of seizures with L temporal lobe lesion. Psychiatry was consulted for a safety evaluation as patient reportedly expressed SI at some point at OSH, with concern raised that she may have taken an overdose of Lamictal. She reportedly has had multiple stressors lately including loss of her job and the death of a family member a few weeks ago. Per records it appears that patient's significant other has reported she's had an increased frequency of seizures lately, but would typically return to her cognitive baseline afterwards. However after her seizure 5/30 she has not returned to her baseline. On initial exam patient was alert, but confused, distractible, oddly related, with labile mood and apparent word finding difficulties. She was oriented to self, initially stated she is in a hotel before correcting to a hospital, and did not know the date. She was able to complete DOWB after delay but unable to perform simple calculation task. Patient acknowledged not feeling like herself, but also having increased stress and feeling burnt out lately. However she denied having suicidal thoughts or making a suicide attempt. Denied having any symptoms of psychosis. She was unable to provide history regarding whether she is seeing a psychiatrist or therapist, or details of why the Lexapro was stopped a few months ago, but per records has a history of a suicide attempt and psychiatric hospitalization 11 years ago. No known substance abuse. On exam 6/13 patient appeared more confused and less able to answer questions, though still oriented to being in a hospital and the date. Family at the bedside were able to provide more history, including patient's psychiatrist Dr. Joseluis Horner, and that she had been taking extra Lamictal because she could not remember if she had taken it already, because of the seizures. On 6/15 patient recalled making statements at OSH that she would rather not be here than have brain surgery, but no longer feels this way, wants to have the surgery, and wants to live. Not evaluated 6/16 as she was in the OR, but seen 6/17 in the ICU and continued to deny SI but was a bit elevated, distractible, and not fully oriented. On exam 6/18 she still appears a bit elevated but family states that some of that is her baseline, and she is fully oriented without any SI/HI or symptoms of delirium.  It does not appear that she is actively suicidal, and she has not done or said anything during this admission to suggest that, and is accepting of treatment. At this point the risk of suicide appears low and we stepped her down to enhanced observation.  Today on 6/20 she continued to appear stable, without any actions or statements concerning for suicidality. She denies suicidal thoughts, no longer appears acutely delirious, and is future oriented towards getting to rehab and after.     
Patient is a 54 year old single, domiciled, recently unemployed woman with an unclear past psychiatric history but possible including bipolar disorder, anxiety, and PTSD, and a medical history significant for seizure disorder and osteoarthritis who is currently admitted for management of seizures with L temporal lobe lesion. Psychiatry is consulted for a safety evaluation as patient reportedly expressed SI at some point at OSH, with concern raised that she may have taken an overdose of Lamictal. She reportedly has had multiple stressors lately including loss of her job and the death of a family member a few weeks ago. Per records it appears that patient's significant other has reported she's had an increased frequency of seizures lately, but would typically return to her cognitive baseline afterwards. However after her seizure 5/30 she has not returned to her baseline. On initial exam patient was alert, but confused, distractible, oddly related, with labile mood and apparent word finding difficulties. She was oriented to self, initially stated she is in a hotel before correcting to a hospital, and did not know the date and looked at her phone despite being asked not to. She was able to complete DOWB after delay but unable to perform simple calculation task. Patient acknowledged not feeling like herself, but also having increased stress and feeling burnt out lately. However she denied having suicidal thoughts or making a suicide attempt. Denied having any symptoms of psychosis. She was unable to provide history regarding whether she is seeing a psychiatrist or therapist, or details of why the Lexapro was stopped a few months ago, but per records has a history of a suicide attempt and psychiatric hospitalization 11 years ago. No known substance abuse. On exam 6/13 patient appeared more confused and less able to answer questions, though still oriented to being in a hospital and the date. Family at the bedside were able to provide a bit more history, including patient's psychiatrist Dr. Joseluis Horner, and that she had been taking extra Lamictal because she could not remember if she had taken it already, because of the seizures. Given her current mental state, we are unable to complete the safety evaluation at this time. Due to her unpredictable behavior would recommend continuing constant observation and we will continue to evaluate.     
Patient is a 54 year old single, domiciled, recently unemployed woman with an unclear past psychiatric history but possible including bipolar disorder, anxiety, and PTSD, and a medical history significant for seizure disorder and osteoarthritis who is currently admitted for management of seizures with L temporal lobe lesion. Psychiatry was consulted for a safety evaluation as patient reportedly expressed SI at some point at OSH, with concern raised that she may have taken an overdose of Lamictal. She reportedly has had multiple stressors lately including loss of her job and the death of a family member a few weeks ago. Per records it appears that patient's significant other has reported she's had an increased frequency of seizures lately, but would typically return to her cognitive baseline afterwards. However after her seizure 5/30 she has not returned to her baseline. On initial exam patient was alert, but confused, distractible, oddly related, with labile mood and apparent word finding difficulties. She was oriented to self, initially stated she is in a hotel before correcting to a hospital, and did not know the date. She was able to complete DOWB after delay but unable to perform simple calculation task. Patient acknowledged not feeling like herself, but also having increased stress and feeling burnt out lately. However she denied having suicidal thoughts or making a suicide attempt. Denied having any symptoms of psychosis. She was unable to provide history regarding whether she is seeing a psychiatrist or therapist, or details of why the Lexapro was stopped a few months ago, but per records has a history of a suicide attempt and psychiatric hospitalization 11 years ago. No known substance abuse. On exam 6/13 patient appeared more confused and less able to answer questions, though still oriented to being in a hospital and the date. Family at the bedside were able to provide more history, including patient's psychiatrist Dr. Joseluis Horner, and that she had been taking extra Lamictal because she could not remember if she had taken it already, because of the seizures. On 6/15 patient recalled making statements at OSH that she would rather not be here than have brain surgery, but no longer feels this way, wants to have the surgery, and wants to live. Not evaluated 6/16 as she was in the OR, but seen 6/17 in the ICU and continued to deny SI but was a bit elevated, distractible, and not fully oriented. On exam 6/18 she still appears a bit elevated but family states that some of that is her baseline, and she is fully oriented without any SI/HI or symptoms of delirium.  It does not appear that she is actively suicidal, and she has not done or said anything during this admission to suggest that, and is accepting of treatment. At this point the risk of suicide appears low and we stepped her down to enhanced observation.  Today on 6/19 she continued to appear stable, without any actions or statements concerning for suicidality. She denies suicidal thoughts, no longer appears acutely delirious, and is future oriented towards getting to rehab and after. Patient appears safe to step down to routine observation.

## 2025-06-20 NOTE — BH CONSULTATION LIAISON PROGRESS NOTE - NSBHPTASSESSDT_PSY_A_CORE
13-Jun-2025 14:49
19-Jun-2025 14:18
20-Jun-2025 14:21
14-Jun-2025 15:20
15-Alirio-2025 11:24
18-Jun-2025 16:03
17-Jun-2025 17:01

## 2025-06-21 RX ORDER — HYDROCORTISONE 10 MG/G
1 CREAM TOPICAL EVERY 8 HOURS
Refills: 0 | Status: DISCONTINUED | OUTPATIENT
Start: 2025-06-21 | End: 2025-06-23

## 2025-06-21 RX ADMIN — Medication 2 TABLET(S): at 21:20

## 2025-06-21 RX ADMIN — Medication 650 MILLIGRAM(S): at 22:24

## 2025-06-21 RX ADMIN — Medication 40 MILLIGRAM(S): at 05:01

## 2025-06-21 RX ADMIN — LACOSAMIDE 150 MILLIGRAM(S): 150 TABLET, FILM COATED ORAL at 05:01

## 2025-06-21 RX ADMIN — DEXAMETHASONE 4 MILLIGRAM(S): 0.5 TABLET ORAL at 05:01

## 2025-06-21 RX ADMIN — ENOXAPARIN SODIUM 40 MILLIGRAM(S): 100 INJECTION SUBCUTANEOUS at 17:12

## 2025-06-21 RX ADMIN — LACOSAMIDE 150 MILLIGRAM(S): 150 TABLET, FILM COATED ORAL at 13:25

## 2025-06-21 RX ADMIN — Medication 1 APPLICATION(S): at 12:00

## 2025-06-21 RX ADMIN — CLOBAZAM 10 MILLIGRAM(S): 20 TABLET ORAL at 21:24

## 2025-06-21 RX ADMIN — Medication 1 MILLIGRAM(S): at 21:21

## 2025-06-21 RX ADMIN — Medication 650 MILLIGRAM(S): at 21:24

## 2025-06-21 RX ADMIN — DEXAMETHASONE 4 MILLIGRAM(S): 0.5 TABLET ORAL at 17:12

## 2025-06-21 RX ADMIN — LACOSAMIDE 150 MILLIGRAM(S): 150 TABLET, FILM COATED ORAL at 22:10

## 2025-06-21 RX ADMIN — BRIVARACETAM 100 MILLIGRAM(S): 75 TABLET, FILM COATED ORAL at 05:01

## 2025-06-21 RX ADMIN — Medication 650 MILLIGRAM(S): at 01:14

## 2025-06-21 RX ADMIN — PERAMPANEL 4 MILLIGRAM(S): 6 TABLET ORAL at 21:21

## 2025-06-21 RX ADMIN — BRIVARACETAM 100 MILLIGRAM(S): 75 TABLET, FILM COATED ORAL at 22:10

## 2025-06-21 RX ADMIN — Medication 88 MICROGRAM(S): at 05:01

## 2025-06-21 RX ADMIN — BRIVARACETAM 100 MILLIGRAM(S): 75 TABLET, FILM COATED ORAL at 13:25

## 2025-06-21 NOTE — PROGRESS NOTE ADULT - ASSESSMENT
Patient NAT THOMASON is a 54y (1970) woman with a PMHx significant for Left Temporal lobe lesion, Bipolar, Anxiety transferred from [facility] to [facility] to be evaluated for seizure. History limited as patient is poor historian, see hx below from chart at OSH.     History as per partner (Benson) at bedside. As per partner over the past few weeks patient has been having multiple episodes of partial seizures more often than usual. Despite being on her normal routine, patient often has multiple seizures weekly but always returned to baseline. On  patient had a seizure and afterwards patient appeared more confused, was unable to articulate herself did not seek medical intervention. Since then symptoms have worsen, on  patient was found with a bunch of Lamictal pills around her, reported she might have taken more  pills than usual & patient was brought to [facility] ED.   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   As per partner, patient ha been going under a lot of life changing events. In april she lost her job, 3 weeks ago a family member has ; with everything happening, she has been deteriorating   Also on  patient fell at [facility] got on the plane & went to [place], there she went to the ED and was found to have a concussion   Patient used to be on lexapro, but was discontinued in april by her psychiatrist   Upon evaluation patient is awake, alert, speaking incoherently, pacing around the room, but able to follow commands and re-directable.     Imaging at [facility]   - CT-head-Unremarkable.   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   - EEG: Revealed evidence of electrographic seizure activity  On  - Patient was found to be hyponatremic (Na:127), corrected with latest Na:138 ()    Interval Hx:  Patient was transferred [facility] and had MRI brain with stable temporal lobe lesion favoring diagnosis of malignancy as well as surrounding edema likely correlated to recent seizure activity. Neurology following as EEG still with seizure activity likely related to temporal lobe lesion. Patient transferred to [facility] EMU for further management.     EEG 2025  Clinical Impression:  -Numerous left posterior temporal focal-onset seizures at times with bilateral spread, 2-5 seizures/hour for most of recording, some hours with no seizures. Overall improving during recording. Last seizure captured 12:38 25.   -Risk of left frontal and right frontotemporal focal-onset seizures   -Left hemispheric focal cerebral dysfunction can be structural and/or functional (such as post-ictal) in etiology.     Procedure: s/p left craniotomy for resection of tumor 2025     PLAN    NEURO: Neuro checks q4hrs   post op MRI as above   Dex taper written   Continue on AEDS: Briviact 100mg q12hr, onfi 10qhs , vimpat 150mg BID, fycompa 4mg qhs --> Epilepsy following   pain regiment Tylenol   BH following for prior Suicidal ideation currently denies SI/HI  placed on constant obs per psych de-escalated yesterday to routine supervision     Activity: [x] mobilize as tolerated, PT/OT/PMR rec AR     PULM:   on RA  satting > 94%  encourage incentive spirometer   C/w Albuterol    CV:   Hemodynamically stable    RENAL:   IVL,   voiding independently    GI:   regular diet   PPI while on decadron    continue bowel regimen with miralax and senna   Last BM     ENDO:  Goal euglycemia (-180), \ISS   continue Synthroid    HEME/ONC:  VTE prophylaxis: [x] SCDs, SQL  LED  neg for DVT     ID: afebrile     Dispo: Acute rehab    will discuss plan with Dr. Hernández   Available on Teams

## 2025-06-21 NOTE — PROGRESS NOTE ADULT - SUBJECTIVE AND OBJECTIVE BOX
55yo RIGHT handed woman with a PMHx significant for Left temporal lobe lesion, bipolar, anxiety transferred from [facility] for EMU admission and concern for focal status in setting of left temporal mass.   Post-op # 4 s/p left craniotomy for resection of tumor    Overnight event: no acute event    Vital Signs Last 24 Hrs  T(C): 36.9 (20 Jun 2025 08:52), Max: 37.4 (19 Jun 2025 12:30)  T(F): 98.5 (20 Jun 2025 08:52), Max: 99.3 (19 Jun 2025 12:30)  HR: 62 (20 Jun 2025 08:52) (62 - 88)  BP: 93/63 (20 Jun 2025 08:52) (92/58 - 130/75)  BP(mean): --  RR: 18 (20 Jun 2025 08:52) (18 - 18)  SpO2: 98% (20 Jun 2025 08:52) (94% - 98%)    Parameters below as of 20 Jun 2025 05:32  Patient On (Oxygen Delivery Method): room air        Stroke Core Measures      DRAIN OUTPUT:     NEUROIMAGING:     PHYSICAL EXAM:    General: No Acute Distress     Neurological: Awake, alert oriented to person, place and time, Following Commands, PERRL, EOMI, Face Symmetrical, Speech Fluent, Moving all extremities, Muscle Strength normal in all four extremities, No Drift, Sensation to Light Touch Intact    Pulmonary: Clear to Auscultation, No Rales, No Rhonchi, No Wheezes     Cardiovascular: S1, S2, Regular Rate and Rhythm     Gastrointestinal: Soft, Nontender, Nondistended     Incision: intact    MEDICATIONS:   Antibiotics:    Neuro:  acetaminophen     Tablet .. 650 milliGRAM(s) Oral every 6 hours PRN Temp greater or equal to 38C (100.4F), Mild Pain (1 - 3)  brivaracetam  Injectable 100 milliGRAM(s) IV Push once PRN 2nd line seizure rescue  brivaracetam  IVPB 100 milliGRAM(s) IV Intermittent <User Schedule>  cloBAZam 10 milliGRAM(s) Oral at bedtime  lacosamide IVPB 150 milliGRAM(s) IV Intermittent <User Schedule>  LORazepam     Tablet 0.5 milliGRAM(s) Oral every 6 hours PRN Agitation  LORazepam     Tablet 1 milliGRAM(s) Oral every 6 hours PRN Combative behavior  melatonin 1 milliGRAM(s) Oral at bedtime  midazolam Injectable 2 milliGRAM(s) IV Push once PRN seizures  ondansetron Injectable 4 milliGRAM(s) IV Push every 6 hours PRN Nausea and/or Vomiting  perampanel 4 milliGRAM(s) Oral at bedtime    Anticoagulation:  enoxaparin Injectable 40 milliGRAM(s) SubCutaneous <User Schedule>    Cardiology:    Endo:   dexAMETHasone  Injectable   IV Push   dexAMETHasone  Injectable 4 milliGRAM(s) IV Push every 8 hours  levothyroxine 88 MICROGram(s) Oral daily    Pulm:  albuterol    90 MICROgram(s) HFA Inhaler 2 Puff(s) Inhalation every 6 hours PRN    GI/:  pantoprazole    Tablet 40 milliGRAM(s) Oral before breakfast  polyethylene glycol 3350 17 Gram(s) Oral daily  senna 2 Tablet(s) Oral at bedtime  simethicone 80 milliGRAM(s) Chew daily PRN    Other:  chlorhexidine 2% Cloths 1 Application(s) Topical daily  albuterol    90 MICROgram(s) HFA Inhaler 2 Puff(s) Inhalation every 6 hours PRN   pantoprazole    Tablet 40 milliGRAM(s) Oral before breakfast  polyethylene glycol 3350 17 Gram(s) Oral daily  senna 2 Tablet(s) Oral at bedtime  simethicone 80 milliGRAM(s) Chew daily PRN

## 2025-06-21 NOTE — PROGRESS NOTE ADULT - SUBJECTIVE AND OBJECTIVE BOX
Name of Patient : NAT THOMASON  MRN: [id number]      Subjective: Patient seen and examined. No new events except as noted.     REVIEW OF SYSTEMS:    CONSTITUTIONAL: No weakness, fevers or chills  EYES/ENT: No visual changes;  No vertigo or throat pain   NECK: No pain or stiffness  RESPIRATORY: No cough, wheezing, hemoptysis; No shortness of breath  CARDIOVASCULAR: No chest pain or palpitations  GASTROINTESTINAL: No abdominal or epigastric pain. No nausea, vomiting, or hematemesis; No diarrhea or constipation. No melena or hematochezia.  GENITOURINARY: No dysuria, frequency or hematuria  NEUROLOGICAL: No numbness or weakness  SKIN: No itching, burning, rashes, or lesions   All other review of systems is negative unless indicated above.    MEDICATIONS:  MEDICATIONS  (STANDING):  brivaracetam 100 milliGRAM(s) Oral <User Schedule>  chlorhexidine 2% Cloths 1 Application(s) Topical daily  cloBAZam 10 milliGRAM(s) Oral at bedtime  dexAMETHasone  Injectable   IV Push   enoxaparin Injectable 40 milliGRAM(s) SubCutaneous <User Schedule>  lacosamide 150 milliGRAM(s) Oral <User Schedule>  levothyroxine 88 MICROGram(s) Oral daily  melatonin 1 milliGRAM(s) Oral at bedtime  pantoprazole    Tablet 40 milliGRAM(s) Oral before breakfast  perampanel 4 milliGRAM(s) Oral at bedtime  polyethylene glycol 3350 17 Gram(s) Oral daily  senna 2 Tablet(s) Oral at bedtime      PHYSICAL EXAM:  T(C): 36.3 (06-21-25 @ 20:00), Max: 36.9 (06-21-25 @ 12:51)  HR: 76 (06-21-25 @ 20:00) (62 - 76)  BP: 93/61 (06-21-25 @ 20:00) (81/64 - 95/67)  RR: 18 (06-21-25 @ 20:00) (18 - 18)  SpO2: 99% (06-21-25 @ 20:00) (94% - 99%)  Wt(kg): --  I&O's Summary    20 Jun 2025 07:01  -  21 Jun 2025 07:00  --------------------------------------------------------  IN: 570 mL / OUT: 0 mL / NET: 570 mL    21 Jun 2025 07:01 - 22 Jun 2025 00:03  --------------------------------------------------------  IN: 720 mL / OUT: 0 mL / NET: 720 mL          Appearance: Normal	  HEENT:  PERRLA   Lymphatic: No lymphadenopathy   Cardiovascular: Normal S1 S2, no JVD  Respiratory: normal effort , clear  Gastrointestinal:  Soft, Non-tender  Skin: No rashes,  warm to touch  Psychiatry:  Mood & affect appropriate  Musculuskeletal: No edema    recent labs, Imaging and EKGs personally reviewed   CODE status discussed with the patient in detail            06-20-25 @ 07:01  -  06-21-25 @ 07:00  --------------------------------------------------------  IN: 570 mL / OUT: 0 mL / NET: 570 mL    06-21-25 @ 07:01  -  06-22-25 @ 00:03  --------------------------------------------------------  IN: 720 mL / OUT: 0 mL / NET: 720 mL

## 2025-06-21 NOTE — PROGRESS NOTE ADULT - ASSESSMENT
55 YO RIGHT handed woman with PMHx significant for Left temporal lobe lesion, hypothyroidism, PTSD, bipolar, and anxiety presented initially to [facility] for seizures and ultimately transferred for EMU admission, Concern for focal status in setting of left temporal mass. Admitted to neurology and internal medicine consulted for medical management     # Seizures with left temporal brain lesion   - CTH on 6/10 with left mid temporal lobe lesion   - s/p left temporal lobe resection on 6/16  - Post OP CTH 6/17 with lucency in the left medial temporal lobe status post neoplastic resection. Bifrontal pneumocephalus. No hydrocephalus. Minimal postop hemorrhage  - Post OR MR with left temporal postoperative changes after excision of a suspicious nonenhancing lesion. No acute infarcts.  - Continue with dex taper through 6/23 per NSG  - Continue with PPI for GI PPX with steroids  - Continue on AED per neuro  - Pending pathology (can be followed outpatient)  - Pain control per NSG  - Care per Neuro and NSGY    # Leukocytosis   - Likely reactivity from OR and steroids  - If febrile check pan cultures  - Monitor and trend CBC, temp curve, VS and adjust as tolerated    # Pericardial effusion   - TTE with EF 66 with normal LVSF, no WMA, and trace pericardial effusion   - RPT TTE outpatient in 1 month    # Hypothyroidism   - TSH elevated but FT4 normal likely reactive   - RPT TSH coming down   - Continue on home synthroid 88   - Repeat TFTs outpatient    # Anxiety and depression  - Patient reportedly expressed SI at some point at OSH, with concern raised that she may have taken an overdose of Lamictal.  - On  evaluation patient denied having suicidal thoughts or making a suicide attempt, but per records has a history of a suicide attempt and psychiatric hospitalization 11 years ago.   - Continue on ativan PRN per    -  appreciated     # DVT PPX with LVX     # DISPO - PMR recc AR

## 2025-06-22 RX ADMIN — Medication 650 MILLIGRAM(S): at 06:28

## 2025-06-22 RX ADMIN — Medication 88 MICROGRAM(S): at 06:08

## 2025-06-22 RX ADMIN — LACOSAMIDE 150 MILLIGRAM(S): 150 TABLET, FILM COATED ORAL at 06:08

## 2025-06-22 RX ADMIN — BRIVARACETAM 100 MILLIGRAM(S): 75 TABLET, FILM COATED ORAL at 21:16

## 2025-06-22 RX ADMIN — CLOBAZAM 10 MILLIGRAM(S): 20 TABLET ORAL at 21:17

## 2025-06-22 RX ADMIN — Medication 40 MILLIGRAM(S): at 06:08

## 2025-06-22 RX ADMIN — LACOSAMIDE 150 MILLIGRAM(S): 150 TABLET, FILM COATED ORAL at 21:16

## 2025-06-22 RX ADMIN — LACOSAMIDE 150 MILLIGRAM(S): 150 TABLET, FILM COATED ORAL at 13:00

## 2025-06-22 RX ADMIN — Medication 1 MILLIGRAM(S): at 21:16

## 2025-06-22 RX ADMIN — Medication 650 MILLIGRAM(S): at 07:02

## 2025-06-22 RX ADMIN — PERAMPANEL 4 MILLIGRAM(S): 6 TABLET ORAL at 21:17

## 2025-06-22 RX ADMIN — BRIVARACETAM 100 MILLIGRAM(S): 75 TABLET, FILM COATED ORAL at 06:08

## 2025-06-22 RX ADMIN — DEXAMETHASONE 3 MILLIGRAM(S): 0.5 TABLET ORAL at 06:08

## 2025-06-22 RX ADMIN — DEXAMETHASONE 3 MILLIGRAM(S): 0.5 TABLET ORAL at 17:07

## 2025-06-22 RX ADMIN — BRIVARACETAM 100 MILLIGRAM(S): 75 TABLET, FILM COATED ORAL at 13:01

## 2025-06-22 RX ADMIN — ENOXAPARIN SODIUM 40 MILLIGRAM(S): 100 INJECTION SUBCUTANEOUS at 17:07

## 2025-06-22 RX ADMIN — Medication 1 APPLICATION(S): at 13:04

## 2025-06-22 NOTE — PROGRESS NOTE ADULT - ASSESSMENT
Patient NAT THOMASON is a 54y (1970) woman with a PMHx significant for Left Temporal lobe lesion, Bipolar, Anxiety transferred from [place] to [facility] to be evaluated for seizure. History limited as patient is poor historian, see hx below from chart at OSH.     History as per partner (Benson) at bedside. As per partner over the past few weeks patient has been having multiple episodes of partial seizures more often than usual. Despite being on her normal routine, patient often has multiple seizures weekly but always returned to baseline. On  patient had a seizure and afterwards patient appeared more confused, was unable to articulate herself did not seek medical intervention. Since then symptoms have worsen, on  patient was found with a bunch of Lamictal pills around her, reported she might have taken more  pills than usual & patient was brought to [place] ED.   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   As per partner, patient ha been going under a lot of life changing events. In april she lost her job, 3 weeks ago a family member has ; with everything happening, she has been deteriorating   Also on  patient fell at [facility], got on the plane & went to [place], there she went to the ED and was found to have a concussion   Patient used to be on lexapro, but was discontinued in april by her psychiatrist   Upon evaluation patient is awake, alert, speaking incoherently, pacing around the room, but able to follow commands and re-directable.     Imaging at [place]   - CT-head-Unremarkable.   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   - EEG: Revealed evidence of electrographic seizure activity  On  - Patient was found to be hyponatremic (Na:127), corrected with latest Na:138 ()    Interval Hx:  Patient was transferred [facility] and had MRI brain with stable temporal lobe lesion favoring diagnosis of malignancy as well as surrounding edema likely correlated to recent seizure activity. Neurology following as EEG still with seizure activity likely related to temporal lobe lesion. Patient transferred to [facility] EMU for further management.     EEG 2025  Clinical Impression:  -Numerous left posterior temporal focal-onset seizures at times with bilateral spread, 2-5 seizures/hour for most of recording, some hours with no seizures. Overall improving during recording. Last seizure captured 12:38 25.   -Risk of left frontal and right frontotemporal focal-onset seizures   -Left hemispheric focal cerebral dysfunction can be structural and/or functional (such as post-ictal) in etiology.     Procedure: s/p left craniotomy for resection of tumor 2025     PLAN    NEURO: Neuro checks q4hrs   post op MRI as above   Dex taper written   Continue on AEDS: Briviact 100mg q12hr, onfi 10qhs , vimpat 150mg BID, fycompa 4mg qhs --> Epilepsy following   pain regiment Tylenol   BH following for prior Suicidal ideation currently denies SI/HI  placed on constant obs per psych de-escalated yesterday to routine supervision     Activity: [x] mobilize as tolerated, PT/OT/PMR rec AR     PULM:   on RA  satting > 94%  encourage incentive spirometer   C/w Albuterol    CV:   Hemodynamically stable    RENAL:   IVL,   voiding independently    GI:   regular diet   PPI while on decadron    continue bowel regimen with miralax and senna   Last BM     ENDO:  Goal euglycemia (-180), \ISS   continue Synthroid    HEME/ONC:  VTE prophylaxis: [x] SCDs, SQL  LED  neg for DVT     ID: afebrile     Dispo: Acute rehab    will discuss plan with Dr. Hernández   Available on Teams

## 2025-06-22 NOTE — PROGRESS NOTE ADULT - ASSESSMENT
53 YO RIGHT handed woman with PMHx significant for Left temporal lobe lesion, hypothyroidism, PTSD, bipolar, and anxiety presented initially to [facility] for seizures and ultimately transferred for EMU admission, Concern for focal status in setting of left temporal mass. Admitted to neurology and internal medicine consulted for medical management     # Seizures with left temporal brain lesion   - CTH on 6/10 with left mid temporal lobe lesion   - s/p left temporal lobe resection on 6/16  - Post OP CTH 6/17 with lucency in the left medial temporal lobe status post neoplastic resection. Bifrontal pneumocephalus. No hydrocephalus. Minimal postop hemorrhage  - Post OR MR with left temporal postoperative changes after excision of a suspicious nonenhancing lesion. No acute infarcts.  - Continue with dex taper through 6/23 per NSG  - Continue with PPI for GI PPX with steroids  - Continue on AED per neuro  - Pending pathology (can be followed outpatient)  - Pain control per NSG  - Care per Neuro and NSGY    # Leukocytosis   - Likely reactivity from OR and steroids  - If febrile check pan cultures  - Monitor and trend CBC, temp curve, VS and adjust as tolerated    # Pericardial effusion   - TTE with EF 66 with normal LVSF, no WMA, and trace pericardial effusion   - RPT TTE outpatient in 1 month    # Hypothyroidism   - TSH elevated but FT4 normal likely reactive   - RPT TSH coming down   - Continue on home synthroid 88   - Repeat TFTs outpatient    # Anxiety and depression  - Patient reportedly expressed SI at some point at OSH, with concern raised that she may have taken an overdose of Lamictal.  - On  evaluation patient denied having suicidal thoughts or making a suicide attempt, but per records has a history of a suicide attempt and psychiatric hospitalization 11 years ago.   - Continue on ativan PRN per    -  appreciated     # DVT PPX with LVX     # DISPO - PMR recc AR

## 2025-06-22 NOTE — PROGRESS NOTE ADULT - SUBJECTIVE AND OBJECTIVE BOX
Name of Patient : NAT THOMASON  MRN: [id number]  Date of visit: 06-22-25       Subjective: Patient seen and examined. No new events except as noted.     REVIEW OF SYSTEMS:    CONSTITUTIONAL: No weakness, fevers or chills  EYES/ENT: No visual changes;  No vertigo or throat pain   NECK: No pain or stiffness  RESPIRATORY: No cough, wheezing, hemoptysis; No shortness of breath  CARDIOVASCULAR: No chest pain or palpitations  GASTROINTESTINAL: No abdominal or epigastric pain. No nausea, vomiting, or hematemesis; No diarrhea or constipation. No melena or hematochezia.  GENITOURINARY: No dysuria, frequency or hematuria  NEUROLOGICAL: No numbness or weakness  SKIN: No itching, burning, rashes, or lesions   All other review of systems is negative unless indicated above.    MEDICATIONS:  MEDICATIONS  (STANDING):  brivaracetam 100 milliGRAM(s) Oral <User Schedule>  chlorhexidine 2% Cloths 1 Application(s) Topical daily  cloBAZam 10 milliGRAM(s) Oral at bedtime  dexAMETHasone  Injectable   IV Push   enoxaparin Injectable 40 milliGRAM(s) SubCutaneous <User Schedule>  lacosamide 150 milliGRAM(s) Oral <User Schedule>  levothyroxine 88 MICROGram(s) Oral daily  melatonin 1 milliGRAM(s) Oral at bedtime  pantoprazole    Tablet 40 milliGRAM(s) Oral before breakfast  perampanel 4 milliGRAM(s) Oral at bedtime  polyethylene glycol 3350 17 Gram(s) Oral daily  senna 2 Tablet(s) Oral at bedtime      PHYSICAL EXAM:  T(C): 36.4 (06-22-25 @ 20:06), Max: 37 (06-22-25 @ 04:33)  HR: 89 (06-22-25 @ 20:06) (59 - 89)  BP: 92/60 (06-22-25 @ 20:06) (92/60 - 97/68)  RR: 18 (06-22-25 @ 20:06) (18 - 18)  SpO2: 94% (06-22-25 @ 20:06) (94% - 98%)  Wt(kg): --  I&O's Summary    21 Jun 2025 07:01  -  22 Jun 2025 07:00  --------------------------------------------------------  IN: 720 mL / OUT: 0 mL / NET: 720 mL    22 Jun 2025 07:01 - 22 Jun 2025 22:53  --------------------------------------------------------  IN: 1000 mL / OUT: 0 mL / NET: 1000 mL          Appearance: Normal	  HEENT:  PERRLA   Lymphatic: No lymphadenopathy   Cardiovascular: Normal S1 S2, no JVD  Respiratory: normal effort , clear  Gastrointestinal:  Soft, Non-tender  Skin: No rashes,  warm to touch  Psychiatry:  Mood & affect appropriate  Musculuskeletal: No edema    recent labs, Imaging and EKGs personally reviewed   CODE status discussed with the patient in detail    06-21-25 @ 07:01  -  06-22-25 @ 07:00  --------------------------------------------------------  IN: 720 mL / OUT: 0 mL / NET: 720 mL    06-22-25 @ 07:01  -  06-22-25 @ 22:53  --------------------------------------------------------  IN: 1000 mL / OUT: 0 mL / NET: 1000 mL

## 2025-06-22 NOTE — PROGRESS NOTE ADULT - SUBJECTIVE AND OBJECTIVE BOX
53yo RIGHT handed woman with a PMHx significant for Left temporal lobe lesion, bipolar, anxiety transferred from [facility] for EMU admission and concern for focal status in setting of left temporal mass.   Post-op # 6 s/p left craniotomy for resection of tumor    Overnight event: no acute event    Vital Signs Last 24 Hrs  T(C): 37 (22 Jun 2025 04:33), Max: 37 (22 Jun 2025 04:33)  T(F): 98.6 (22 Jun 2025 04:33), Max: 98.6 (22 Jun 2025 04:33)  HR: 65 (22 Jun 2025 04:33) (65 - 76)  BP: 97/68 (22 Jun 2025 04:33) (90/60 - 97/68)  BP(mean): --  RR: 18 (22 Jun 2025 04:33) (18 - 18)  SpO2: 98% (22 Jun 2025 04:33) (94% - 99%)    Parameters below as of 22 Jun 2025 04:33  Patient On (Oxygen Delivery Method): room air          Stroke Core Measures      DRAIN OUTPUT:     NEUROIMAGING:     PHYSICAL EXAM:    General: No Acute Distress     Neurological: Awake, alert oriented to person, place and time, Following Commands, PERRL, EOMI, Face Symmetrical, Speech Fluent, Moving all extremities, Muscle Strength normal in all four extremities, No Drift, Sensation to Light Touch Intact    Pulmonary: Clear to Auscultation, No Rales, No Rhonchi, No Wheezes     Cardiovascular: S1, S2, Regular Rate and Rhythm     Gastrointestinal: Soft, Nontender, Nondistended     Incision: intact    MEDICATIONS  (STANDING):  brivaracetam 100 milliGRAM(s) Oral <User Schedule>  chlorhexidine 2% Cloths 1 Application(s) Topical daily  cloBAZam 10 milliGRAM(s) Oral at bedtime  dexAMETHasone  Injectable   IV Push   dexAMETHasone  Injectable 3 milliGRAM(s) IV Push every 12 hours  enoxaparin Injectable 40 milliGRAM(s) SubCutaneous <User Schedule>  lacosamide 150 milliGRAM(s) Oral <User Schedule>  levothyroxine 88 MICROGram(s) Oral daily  melatonin 1 milliGRAM(s) Oral at bedtime  pantoprazole    Tablet 40 milliGRAM(s) Oral before breakfast  perampanel 4 milliGRAM(s) Oral at bedtime  polyethylene glycol 3350 17 Gram(s) Oral daily  senna 2 Tablet(s) Oral at bedtime    MEDICATIONS  (PRN):  acetaminophen     Tablet .. 650 milliGRAM(s) Oral every 6 hours PRN Temp greater or equal to 38C (100.4F), Mild Pain (1 - 3)  albuterol    90 MICROgram(s) HFA Inhaler 2 Puff(s) Inhalation every 6 hours PRN Bronchospasm  brivaracetam  Injectable 100 milliGRAM(s) IV Push once PRN 2nd line seizure rescue  hydrocortisone 2.5% Lotion 1 Application(s) Topical every 8 hours PRN Itching  LORazepam     Tablet 0.5 milliGRAM(s) Oral every 6 hours PRN Agitation  LORazepam     Tablet 1 milliGRAM(s) Oral every 6 hours PRN Combative behavior  midazolam Injectable 2 milliGRAM(s) IV Push once PRN seizures  ondansetron Injectable 4 milliGRAM(s) IV Push every 6 hours PRN Nausea and/or Vomiting  simethicone 80 milliGRAM(s) Chew daily PRN Gas

## 2025-06-23 ENCOUNTER — TRANSCRIPTION ENCOUNTER (OUTPATIENT)
Age: 55
End: 2025-06-23

## 2025-06-23 ENCOUNTER — INPATIENT (INPATIENT)
Facility: HOSPITAL | Age: 55
LOS: 3 days | Discharge: HOME | DRG: 55 | End: 2025-06-27
Attending: PHYSICAL MEDICINE & REHABILITATION | Admitting: PHYSICAL MEDICINE & REHABILITATION
Payer: MEDICAID

## 2025-06-23 VITALS
OXYGEN SATURATION: 100 % | HEIGHT: 62 IN | TEMPERATURE: 98 F | RESPIRATION RATE: 15 BRPM | WEIGHT: 130.29 LBS | SYSTOLIC BLOOD PRESSURE: 89 MMHG | HEART RATE: 65 BPM | DIASTOLIC BLOOD PRESSURE: 53 MMHG

## 2025-06-23 VITALS
TEMPERATURE: 98 F | OXYGEN SATURATION: 97 % | SYSTOLIC BLOOD PRESSURE: 130 MMHG | HEART RATE: 83 BPM | RESPIRATION RATE: 18 BRPM | DIASTOLIC BLOOD PRESSURE: 83 MMHG

## 2025-06-23 DIAGNOSIS — Z41.1 ENCOUNTER FOR COSMETIC SURGERY: Chronic | ICD-10-CM

## 2025-06-23 DIAGNOSIS — D32.0 BENIGN NEOPLASM OF CEREBRAL MENINGES: ICD-10-CM

## 2025-06-23 DIAGNOSIS — Z98.890 OTHER SPECIFIED POSTPROCEDURAL STATES: Chronic | ICD-10-CM

## 2025-06-23 LAB
ANION GAP SERPL CALC-SCNC: 14 MMOL/L — SIGNIFICANT CHANGE UP (ref 5–17)
BUN SERPL-MCNC: 27 MG/DL — HIGH (ref 7–23)
CALCIUM SERPL-MCNC: 9.6 MG/DL — SIGNIFICANT CHANGE UP (ref 8.4–10.5)
CHLORIDE SERPL-SCNC: 102 MMOL/L — SIGNIFICANT CHANGE UP (ref 96–108)
CO2 SERPL-SCNC: 23 MMOL/L — SIGNIFICANT CHANGE UP (ref 22–31)
CREAT SERPL-MCNC: 0.79 MG/DL — SIGNIFICANT CHANGE UP (ref 0.5–1.3)
EGFR: 88 ML/MIN/1.73M2 — SIGNIFICANT CHANGE UP
EGFR: 88 ML/MIN/1.73M2 — SIGNIFICANT CHANGE UP
GLUCOSE SERPL-MCNC: 119 MG/DL — HIGH (ref 70–99)
HCT VFR BLD CALC: 35.8 % — SIGNIFICANT CHANGE UP (ref 34.5–45)
HGB BLD-MCNC: 12 G/DL — SIGNIFICANT CHANGE UP (ref 11.5–15.5)
MCHC RBC-ENTMCNC: 31.3 PG — SIGNIFICANT CHANGE UP (ref 27–34)
MCHC RBC-ENTMCNC: 33.5 G/DL — SIGNIFICANT CHANGE UP (ref 32–36)
MCV RBC AUTO: 93.5 FL — SIGNIFICANT CHANGE UP (ref 80–100)
NRBC # BLD AUTO: 0 K/UL — SIGNIFICANT CHANGE UP (ref 0–0)
NRBC # FLD: 0 K/UL — SIGNIFICANT CHANGE UP (ref 0–0)
NRBC BLD AUTO-RTO: 0 /100 WBCS — SIGNIFICANT CHANGE UP (ref 0–0)
PLATELET # BLD AUTO: 282 K/UL — SIGNIFICANT CHANGE UP (ref 150–400)
PMV BLD: 9.3 FL — SIGNIFICANT CHANGE UP (ref 7–13)
POTASSIUM SERPL-MCNC: 4.5 MMOL/L — SIGNIFICANT CHANGE UP (ref 3.5–5.3)
POTASSIUM SERPL-SCNC: 4.5 MMOL/L — SIGNIFICANT CHANGE UP (ref 3.5–5.3)
RBC # BLD: 3.83 M/UL — SIGNIFICANT CHANGE UP (ref 3.8–5.2)
RBC # FLD: 12.9 % — SIGNIFICANT CHANGE UP (ref 10.3–14.5)
SARS-COV-2 RNA SPEC QL NAA+PROBE: SIGNIFICANT CHANGE UP
SODIUM SERPL-SCNC: 139 MMOL/L — SIGNIFICANT CHANGE UP (ref 135–145)
WBC # BLD: 11.31 K/UL — HIGH (ref 3.8–10.5)
WBC # FLD AUTO: 11.31 K/UL — HIGH (ref 3.8–10.5)

## 2025-06-23 PROCEDURE — 84100 ASSAY OF PHOSPHORUS: CPT

## 2025-06-23 PROCEDURE — 86901 BLOOD TYPING SEROLOGIC RH(D): CPT

## 2025-06-23 PROCEDURE — 86900 BLOOD TYPING SEROLOGIC ABO: CPT

## 2025-06-23 PROCEDURE — A9585: CPT

## 2025-06-23 PROCEDURE — 86850 RBC ANTIBODY SCREEN: CPT

## 2025-06-23 PROCEDURE — 97161 PT EVAL LOW COMPLEX 20 MIN: CPT

## 2025-06-23 PROCEDURE — C1894: CPT

## 2025-06-23 PROCEDURE — 36415 COLL VENOUS BLD VENIPUNCTURE: CPT

## 2025-06-23 PROCEDURE — 95700 EEG CONT REC W/VID EEG TECH: CPT

## 2025-06-23 PROCEDURE — 80048 BASIC METABOLIC PNL TOTAL CA: CPT

## 2025-06-23 PROCEDURE — 95716 VEEG EA 12-26HR CONT MNTR: CPT

## 2025-06-23 PROCEDURE — 70553 MRI BRAIN STEM W/O & W/DYE: CPT

## 2025-06-23 PROCEDURE — 85027 COMPLETE CBC AUTOMATED: CPT

## 2025-06-23 PROCEDURE — 84702 CHORIONIC GONADOTROPIN TEST: CPT

## 2025-06-23 PROCEDURE — 85025 COMPLETE CBC W/AUTO DIFF WBC: CPT

## 2025-06-23 PROCEDURE — 88331 PATH CONSLTJ SURG 1 BLK 1SPC: CPT

## 2025-06-23 PROCEDURE — C1713: CPT

## 2025-06-23 PROCEDURE — 88333 PATH CONSLTJ SURG CYTO XM 1: CPT

## 2025-06-23 PROCEDURE — 85610 PROTHROMBIN TIME: CPT

## 2025-06-23 PROCEDURE — 88342 IMHCHEM/IMCYTCHM 1ST ANTB: CPT

## 2025-06-23 PROCEDURE — C1751: CPT

## 2025-06-23 PROCEDURE — 93970 EXTREMITY STUDY: CPT

## 2025-06-23 PROCEDURE — 97116 GAIT TRAINING THERAPY: CPT

## 2025-06-23 PROCEDURE — 83735 ASSAY OF MAGNESIUM: CPT

## 2025-06-23 PROCEDURE — 93306 TTE W/DOPPLER COMPLETE: CPT

## 2025-06-23 PROCEDURE — 88360 TUMOR IMMUNOHISTOCHEM/MANUAL: CPT

## 2025-06-23 PROCEDURE — 97166 OT EVAL MOD COMPLEX 45 MIN: CPT

## 2025-06-23 PROCEDURE — 87640 STAPH A DNA AMP PROBE: CPT

## 2025-06-23 PROCEDURE — 97129 THER IVNTJ 1ST 15 MIN: CPT

## 2025-06-23 PROCEDURE — C9254: CPT

## 2025-06-23 PROCEDURE — 82962 GLUCOSE BLOOD TEST: CPT

## 2025-06-23 PROCEDURE — 97110 THERAPEUTIC EXERCISES: CPT

## 2025-06-23 PROCEDURE — 70450 CT HEAD/BRAIN W/O DYE: CPT

## 2025-06-23 PROCEDURE — 36569 INSJ PICC 5 YR+ W/O IMAGING: CPT

## 2025-06-23 PROCEDURE — 88341 IMHCHEM/IMCYTCHM EA ADD ANTB: CPT

## 2025-06-23 PROCEDURE — 82550 ASSAY OF CK (CPK): CPT

## 2025-06-23 PROCEDURE — 85730 THROMBOPLASTIN TIME PARTIAL: CPT

## 2025-06-23 PROCEDURE — 88307 TISSUE EXAM BY PATHOLOGIST: CPT

## 2025-06-23 PROCEDURE — 80053 COMPREHEN METABOLIC PANEL: CPT

## 2025-06-23 PROCEDURE — 97530 THERAPEUTIC ACTIVITIES: CPT

## 2025-06-23 PROCEDURE — C1889: CPT

## 2025-06-23 PROCEDURE — 87641 MR-STAPH DNA AMP PROBE: CPT

## 2025-06-23 RX ORDER — LORAZEPAM 4 MG/ML
1 VIAL (ML) INJECTION EVERY 6 HOURS
Refills: 0 | Status: DISCONTINUED | OUTPATIENT
Start: 2025-06-23 | End: 2025-06-23

## 2025-06-23 RX ORDER — POLYETHYLENE GLYCOL 3350 17 G/17G
17 POWDER, FOR SOLUTION ORAL
Qty: 0 | Refills: 0 | DISCHARGE
Start: 2025-06-23

## 2025-06-23 RX ORDER — POLYETHYLENE GLYCOL 3350 17 G/17G
17 POWDER, FOR SOLUTION ORAL DAILY
Refills: 0 | Status: DISCONTINUED | OUTPATIENT
Start: 2025-06-24 | End: 2025-06-25

## 2025-06-23 RX ORDER — BRIVARACETAM 75 MG/1
100 TABLET, FILM COATED ORAL
Refills: 0 | Status: DISCONTINUED | OUTPATIENT
Start: 2025-06-23 | End: 2025-06-27

## 2025-06-23 RX ORDER — LORAZEPAM 4 MG/ML
0.5 VIAL (ML) INJECTION EVERY 6 HOURS
Refills: 0 | Status: DISCONTINUED | OUTPATIENT
Start: 2025-06-23 | End: 2025-06-27

## 2025-06-23 RX ORDER — ACETAMINOPHEN 500 MG/5ML
2 LIQUID (ML) ORAL
Qty: 0 | Refills: 0 | DISCHARGE
Start: 2025-06-23

## 2025-06-23 RX ORDER — ENOXAPARIN SODIUM 100 MG/ML
40 INJECTION SUBCUTANEOUS ONCE
Refills: 0 | Status: DISCONTINUED | OUTPATIENT
Start: 2025-06-23 | End: 2025-06-23

## 2025-06-23 RX ORDER — MELATONIN 5 MG
1.5 TABLET ORAL AT BEDTIME
Refills: 0 | Status: DISCONTINUED | OUTPATIENT
Start: 2025-06-23 | End: 2025-06-27

## 2025-06-23 RX ORDER — DEXAMETHASONE 0.5 MG/1
2 TABLET ORAL EVERY 12 HOURS
Refills: 0 | Status: COMPLETED | OUTPATIENT
Start: 2025-06-23 | End: 2025-06-24

## 2025-06-23 RX ORDER — SIMETHICONE 80 MG
1 TABLET,CHEWABLE ORAL
Qty: 0 | Refills: 0 | DISCHARGE
Start: 2025-06-23

## 2025-06-23 RX ORDER — LORAZEPAM 4 MG/ML
1 VIAL (ML) INJECTION
Qty: 0 | Refills: 0 | DISCHARGE
Start: 2025-06-23

## 2025-06-23 RX ORDER — CLOBAZAM 20 MG/1
10 TABLET ORAL AT BEDTIME
Refills: 0 | Status: DISCONTINUED | OUTPATIENT
Start: 2025-06-23 | End: 2025-06-27

## 2025-06-23 RX ORDER — CLOBAZAM 20 MG/1
1 TABLET ORAL
Qty: 0 | Refills: 0 | DISCHARGE
Start: 2025-06-23

## 2025-06-23 RX ORDER — LACOSAMIDE 150 MG/1
150 TABLET, FILM COATED ORAL
Refills: 0 | Status: DISCONTINUED | OUTPATIENT
Start: 2025-06-23 | End: 2025-06-27

## 2025-06-23 RX ORDER — DEXAMETHASONE 0.5 MG/1
1 TABLET ORAL
Qty: 0 | Refills: 0 | DISCHARGE
Start: 2025-06-23

## 2025-06-23 RX ORDER — MELATONIN 5 MG
1 TABLET ORAL
Qty: 0 | Refills: 0 | DISCHARGE
Start: 2025-06-23

## 2025-06-23 RX ORDER — DEXAMETHASONE 0.5 MG/1
2 TABLET ORAL ONCE
Refills: 0 | Status: COMPLETED | OUTPATIENT
Start: 2025-06-23 | End: 2025-06-23

## 2025-06-23 RX ORDER — PERAMPANEL 6 MG/1
4 TABLET ORAL AT BEDTIME
Refills: 0 | Status: DISCONTINUED | OUTPATIENT
Start: 2025-06-23 | End: 2025-06-27

## 2025-06-23 RX ORDER — ALBUTEROL SULFATE 2.5 MG/3ML
2 VIAL, NEBULIZER (ML) INHALATION EVERY 6 HOURS
Refills: 0 | Status: DISCONTINUED | OUTPATIENT
Start: 2025-06-23 | End: 2025-06-27

## 2025-06-23 RX ORDER — ENOXAPARIN SODIUM 100 MG/ML
40 INJECTION SUBCUTANEOUS
Qty: 0 | Refills: 0 | DISCHARGE
Start: 2025-06-23

## 2025-06-23 RX ORDER — ENOXAPARIN SODIUM 100 MG/ML
40 INJECTION SUBCUTANEOUS
Refills: 0 | Status: DISCONTINUED | OUTPATIENT
Start: 2025-06-23 | End: 2025-06-27

## 2025-06-23 RX ORDER — SENNA 187 MG
2 TABLET ORAL
Qty: 0 | Refills: 0 | DISCHARGE
Start: 2025-06-23

## 2025-06-23 RX ORDER — SENNA 187 MG
2 TABLET ORAL AT BEDTIME
Refills: 0 | Status: DISCONTINUED | OUTPATIENT
Start: 2025-06-23 | End: 2025-06-25

## 2025-06-23 RX ORDER — BRIVARACETAM 75 MG/1
1 TABLET, FILM COATED ORAL
Qty: 0 | Refills: 0 | DISCHARGE
Start: 2025-06-23

## 2025-06-23 RX ORDER — PERAMPANEL 6 MG/1
1 TABLET ORAL
Qty: 0 | Refills: 0 | DISCHARGE
Start: 2025-06-23

## 2025-06-23 RX ORDER — LEVOTHYROXINE SODIUM 300 MCG
88 TABLET ORAL DAILY
Refills: 0 | Status: DISCONTINUED | OUTPATIENT
Start: 2025-06-24 | End: 2025-06-27

## 2025-06-23 RX ORDER — LACOSAMIDE 150 MG/1
1 TABLET, FILM COATED ORAL
Qty: 0 | Refills: 0 | DISCHARGE
Start: 2025-06-23

## 2025-06-23 RX ORDER — ACETAMINOPHEN 500 MG/5ML
650 LIQUID (ML) ORAL EVERY 6 HOURS
Refills: 0 | Status: DISCONTINUED | OUTPATIENT
Start: 2025-06-23 | End: 2025-06-27

## 2025-06-23 RX ORDER — DEXAMETHASONE 0.5 MG/1
2 TABLET ORAL EVERY 12 HOURS
Refills: 0 | Status: DISCONTINUED | OUTPATIENT
Start: 2025-06-23 | End: 2025-06-23

## 2025-06-23 RX ORDER — HYDROCORTISONE 10 MG/G
1 CREAM TOPICAL
Qty: 0 | Refills: 0 | DISCHARGE
Start: 2025-06-23

## 2025-06-23 RX ORDER — SIMETHICONE 80 MG
80 TABLET,CHEWABLE ORAL DAILY
Refills: 0 | Status: DISCONTINUED | OUTPATIENT
Start: 2025-06-23 | End: 2025-06-25

## 2025-06-23 RX ADMIN — Medication 40 MILLIGRAM(S): at 05:29

## 2025-06-23 RX ADMIN — LACOSAMIDE 150 MILLIGRAM(S): 150 TABLET, FILM COATED ORAL at 13:29

## 2025-06-23 RX ADMIN — Medication 650 MILLIGRAM(S): at 21:05

## 2025-06-23 RX ADMIN — BRIVARACETAM 100 MILLIGRAM(S): 75 TABLET, FILM COATED ORAL at 20:08

## 2025-06-23 RX ADMIN — LACOSAMIDE 150 MILLIGRAM(S): 150 TABLET, FILM COATED ORAL at 21:37

## 2025-06-23 RX ADMIN — BRIVARACETAM 100 MILLIGRAM(S): 75 TABLET, FILM COATED ORAL at 13:28

## 2025-06-23 RX ADMIN — BRIVARACETAM 100 MILLIGRAM(S): 75 TABLET, FILM COATED ORAL at 05:29

## 2025-06-23 RX ADMIN — Medication 0.5 MILLIGRAM(S): at 20:09

## 2025-06-23 RX ADMIN — Medication 88 MICROGRAM(S): at 05:29

## 2025-06-23 RX ADMIN — LACOSAMIDE 150 MILLIGRAM(S): 150 TABLET, FILM COATED ORAL at 05:29

## 2025-06-23 RX ADMIN — DEXAMETHASONE 2 MILLIGRAM(S): 0.5 TABLET ORAL at 23:01

## 2025-06-23 RX ADMIN — Medication 1.5 MILLIGRAM(S): at 21:37

## 2025-06-23 RX ADMIN — Medication 1 APPLICATION(S): at 13:31

## 2025-06-23 RX ADMIN — PERAMPANEL 4 MILLIGRAM(S): 6 TABLET ORAL at 23:01

## 2025-06-23 RX ADMIN — ENOXAPARIN SODIUM 40 MILLIGRAM(S): 100 INJECTION SUBCUTANEOUS at 23:01

## 2025-06-23 RX ADMIN — Medication 650 MILLIGRAM(S): at 20:09

## 2025-06-23 RX ADMIN — DEXAMETHASONE 2 MILLIGRAM(S): 0.5 TABLET ORAL at 05:29

## 2025-06-23 RX ADMIN — CLOBAZAM 10 MILLIGRAM(S): 20 TABLET ORAL at 21:36

## 2025-06-23 NOTE — PROGRESS NOTE ADULT - ASSESSMENT
Patient NAT THOMASON is a 54y (1970) woman with a PMHx significant for Left Temporal lobe lesion, Bipolar, Anxiety transferred from [place] to [facility] to be evaluated for seizure. History limited as patient is poor historian, see hx below from chart at OSH.     History as per partner (Benson) at bedside. As per partner over the past few weeks patient has been having multiple episodes of partial seizures more often than usual. Despite being on her normal routine, patient often has multiple seizures weekly but always returned to baseline. On  patient had a seizure and afterwards patient appeared more confused, was unable to articulate herself did not seek medical intervention. Since then symptoms have worsen, on  patient was found with a bunch of Lamictal pills around her, reported she might have taken more  pills than usual & patient was brought to [place] ED.   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   As per partner, patient ha been going under a lot of life changing events. In april she lost her job, 3 weeks ago a family member has ; with everything happening, she has been deteriorating   Also on  patient fell at [facility], got on the plane & went to [place], there she went to the ED and was found to have a concussion   Patient used to be on lexapro, but was discontinued in april by her psychiatrist   Upon evaluation patient is awake, alert, speaking incoherently, pacing around the room, but able to follow commands and re-directable.     Imaging at [place]   - CT-head-Unremarkable.   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   - EEG: Revealed evidence of electrographic seizure activity  On  - Patient was found to be hyponatremic (Na:127), corrected with latest Na:138 ()    Interval Hx:  Patient was transferred [facility] and had MRI brain with stable temporal lobe lesion favoring diagnosis of malignancy as well as surrounding edema likely correlated to recent seizure activity. Neurology following as EEG still with seizure activity likely related to temporal lobe lesion. Patient transferred to [facility] EMU for further management.     EEG 2025  Clinical Impression:  -Numerous left posterior temporal focal-onset seizures at times with bilateral spread, 2-5 seizures/hour for most of recording, some hours with no seizures. Overall improving during recording. Last seizure captured 12:38 25.   -Risk of left frontal and right frontotemporal focal-onset seizures   -Left hemispheric focal cerebral dysfunction can be structural and/or functional (such as post-ictal) in etiology.     Procedure: s/p left craniotomy for resection of tumor 2025     PLAN    NEURO: Neuro checks q4hrs   post op MRI as above   Dex taper written   Continue on AEDS: Briviact 100mg q12hr, onfi 10qhs , vimpat 150mg BID, fycompa 4mg qhs --> Epilepsy following   pain regiment Tylenol   BH following for prior Suicidal ideation currently denies SI/HI  placed on constant obs per psych de-escalated yesterday to routine supervision     Activity: [x] mobilize as tolerated, PT/OT/PMR rec AR     PULM:   on RA  satting > 94%  encourage incentive spirometer   C/w Albuterol    CV:   Hemodynamically stable    RENAL:   IVL,   voiding independently    GI:   regular diet   PPI while on decadron    continue bowel regimen with miralax and senna   Last BM     ENDO:  Goal euglycemia (-180), \ISS   continue Synthroid    HEME/ONC:  VTE prophylaxis: [x] SCDs, SQL  LED  neg for DVT     ID: afebrile     Dispo: Acute rehab    will discuss plan with Dr. Hernández   Available on Teams

## 2025-06-23 NOTE — H&P ADULT - HISTORY OF PRESENT ILLNESS
54F with a PMHx of Left temporal lobe lesion, Bipolar, Anxiety, Psychogenic seizures, Concussion, presented to [facility] on 6/6 for worsening AMS after seizure episode, aphasia, and possible overdose on her antiseizure medications. As per partner over the past few weeks patient has been having multiple episodes of partial seizures more often than usual. Despite being on her normal routine, patient often has multiple seizures weekly but always returned to baseline. MRI brain with stable temporal lobe lesion favoring diagnosis of malignancy as well as surrounding edema likely correlated to recent seizure activity. vEEG revealed evidence of electrographic seizure activity in the left frontotemporal lobe. Patient was urgently transferred to [facility] on 6/7 for further evaluation of seizure activity. Neurology consulted and suspected seizures related to temporal lobe lesion and patient was transferred to [facility] on 6/11/25 for management of seizures.  was consulted for previous OSH suicidal ideation, but currently denies. Neurology recommended adjustments to medications to stop Lamictal, start Onfi, continue steroid taper, Fycompa, Vriviact, and Vimpat. S/p left craniotomy for neoplastic resection on 6/16/25 with Dr. Hernández. Hospital course significant for elevated TSH, reactive leukocytosis, pericardial effusion. Patient now admitted on 6/23/25 for a multidisciplinary rehab program for gait instability, ADL impairments, and functional impairments.         54F with a PMHx of Left temporal lobe lesion, Bipolar, Anxiety, Psychogenic seizures, Concussion, presented to [facility] on 6/6 for worsening AMS after seizure episode, aphasia, and possible overdose on her antiseizure medications. As per partner over the past few weeks patient has been having multiple episodes of partial seizures more often than usual. Despite being on her normal routine, patient often has multiple seizures weekly but always returned to baseline. MRI brain with stable temporal lobe lesion favoring diagnosis of malignancy as well as surrounding edema likely correlated to recent seizure activity. vEEG revealed evidence of electrographic seizure activity in the left frontotemporal lobe. Patient was urgently transferred to [facility] on 6/7 for further evaluation of seizure activity. Neurology consulted and suspected seizures related to temporal lobe lesion and patient was transferred to [facility]U on 6/11/25 for management of seizures.  was consulted for previous OSH suicidal ideation, but currently denies. Neurology recommended adjustments to medications to stop Lamictal, start Onfi, continue steroid taper, Fycompa, Vriviact, and Vimpat. S/p left craniotomy for neoplastic resection on 6/16/25 with Dr. Hernández. Hospital course significant for elevated TSH, reactive leukocytosis, pericardial effusion. Patient now admitted on 6/23/25 for a multidisciplinary rehab program for generalized weakness, gait instability, ADL impairments, and functional impairments.

## 2025-06-23 NOTE — PROGRESS NOTE ADULT - ASSESSMENT
55 YO RIGHT handed woman with PMHx significant for Left temporal lobe lesion, hypothyroidism, PTSD, bipolar, and anxiety presented initially to [facility] for seizures and ultimately transferred for EMU admission, Concern for focal status in setting of left temporal mass. Admitted to neurology and internal medicine consulted for medical management     # Seizures with left temporal brain lesion   - CTH on 6/10 with left mid temporal lobe lesion   - s/p left temporal lobe resection on 6/16  - Post OP CTH 6/17 with lucency in the left medial temporal lobe status post neoplastic resection. Bifrontal pneumocephalus. No hydrocephalus. Minimal postop hemorrhage  - Post OR MR with left temporal postoperative changes after excision of a suspicious nonenhancing lesion. No acute infarcts.  - Continue with dex taper through 6/23 per NSG  - Continue with PPI for GI PPX with steroids  - Continue on AED per neuro  - Pending pathology (can be followed outpatient)  - Pain control per NSG  - Care per Neuro and NSGY    # Leukocytosis   - Likely reactivity from OR and steroids  - If febrile check pan cultures  - Monitor and trend CBC, temp curve, VS and adjust as tolerated    # Pericardial effusion   - TTE with EF 66 with normal LVSF, no WMA, and trace pericardial effusion   - RPT TTE outpatient in 1 month    # Hypothyroidism   - TSH elevated but FT4 normal likely reactive   - RPT TSH coming down   - Continue on home synthroid 88   - Repeat TFTs outpatient    # Anxiety and depression  - Patient reportedly expressed SI at some point at OSH, with concern raised that she may have taken an overdose of Lamictal.  - On  evaluation patient denied having suicidal thoughts or making a suicide attempt, but per records has a history of a suicide attempt and psychiatric hospitalization 11 years ago.   - Continue on ativan PRN per    -  appreciated     # DVT PPX with LVX     # DISPO - PMR recc AR    Case discussed with attending

## 2025-06-23 NOTE — H&P ADULT - NSHPSOCIALHISTORY_GEN_ALL_CORE
SOCIAL HISTORY  Smoking - denies  EtOH - denies  Drugs - denies    FUNCTIONAL HISTORY  Patient lives in apt with mother. No steps to enter. Prior to admission, patient was Independent with all functional mobility and ADLs without AD. Patient states working as a .     CURRENT FUNCTIONAL STATUS  - Bed Mobility: CG; 1PA   - Transfers: CG; 1PA; FWB  - Gait: Min A; 1PA; FWB; 15 feet x 2 with RW    - ADLs:  -Grooming: CG; 1PA SOCIAL HISTORY  Smoking - denies  EtOH - denies  Drugs - denies    Occupation:      FUNCTIONAL HISTORY  Patient lives in [facility] with mother. No steps to enter. Prior to admission, patient was Independent with all functional mobility and ADLs without AD. Patient states working as a .     CURRENT FUNCTIONAL STATUS  - Bed Mobility: CG; 1PA   - Transfers: CG; 1PA; FWB  - Gait: Min A; 1PA; FWB; 15 feet x 2 with RW    - ADLs:  -Grooming: CG; 1PA

## 2025-06-23 NOTE — H&P ADULT - NSHPPHYSICALEXAM_GEN_ALL_CORE
PHYSICAL EXAM  VITALS  T(C): 36.4 (06-23-25 @ 18:41), Max: 36.7 (06-23-25 @ 08:45)  HR: 65 (06-23-25 @ 18:41) (65 - 91)  BP: 89/53 (06-23-25 @ 18:41) (89/53 - 99/52)  RR: 15 (06-23-25 @ 18:41) (15 - 18)  SpO2: 100% (06-23-25 @ 18:41) (94% - 100%)    Gen - NAD, Comfortable  HEENT - EOMI, MMM, Normal Conjunctivae, L pupil>R, +L crani incision with edema , +L periorbital ecchymosis   Neck - Supple, No limited ROM  Pulm - CTAB  Cardiovascular - RRR, S1S2, No murmurs  Chest - good chest expansion, good respiratory effort  Abdomen - Soft, NT/ND, +BS  Extremities - No C/C, no calf tenderness, +trace ankle edema  Neuro-     Cognitive - awake, alert, oriented to person, place, date, year, and situation.  Able  to follow commands     Communication - Fluent, Comprehensible, No dysarthria, No aphasia , +repetition intact      Attention: Intact, able to state days of week chronologically and backwards. Able to perform simple additions and subtractions     Memory: Recall 3 objects immediate and 3 min later,      Cranial Nerves -No facial asymmetry, Tongue midline, EOMI, Shoulder shrug intact     Motor -                     LEFT    UE - ShAB 5/5, EF 5/5, EE 5/5,  5/5                    RIGHT UE - ShAB 5/5, EF 5/5, EE 5/5,   5/5                    LEFT    LE - HF 5/5, KE 5/5, DF 5/5, PF 5/5                    RIGHT LE - HF 4/5, KE 5/5, DF 5/5, PF 5/5        Sensory - Intact  to LT      Coordination - FTN/HTS intact      Tone - normal  MSK: No limited ROM  Psychiatric - Mood stable, Affect WNL  Skin:  L crani incision with steri strips, LUE ecchymosis, L periorbital ecchymosis, Otherwise, skin intact PHYSICAL EXAM  VITALS  T(C): 36.4 (06-23-25 @ 18:41), Max: 36.7 (06-23-25 @ 08:45)  HR: 65 (06-23-25 @ 18:41) (65 - 91)  BP: 89/53 (06-23-25 @ 18:41) (89/53 - 99/52)  RR: 15 (06-23-25 @ 18:41) (15 - 18)  SpO2: 100% (06-23-25 @ 18:41) (94% - 100%)    Gen - NAD, Comfortable  HEENT - EOMI, MMM, Normal Conjunctivae, L pupil>R, +L crani incision with edema , +L periorbital ecchymosis   Neck - Supple, No limited ROM  Pulm - CTAB  Cardiovascular - RRR, S1S2, No murmurs  Chest - good chest expansion, good respiratory effort  Abdomen - Soft, NT/ND, +BS  Extremities - No C/C, no calf tenderness, +trace ankle edema  Neuro-     Cognitive - awake, alert, oriented to person, place, date, year, and situation.  Able  to follow commands     Communication - Fluent, Comprehensible, No dysarthria, No aphasia , +repetition intact      Attention: Intact, able to state days of week chronologically and backwards. Able to perform simple additions and subtractions     Memory: Recall 3 objects immediate and 3 min later,      Cranial Nerves -No facial asymmetry, Tongue midline, EOMI, Shoulder shrug intact     Motor -                     LEFT    UE - ShAB 5/5, EF 5/5, EE 5/5,  5/5                    RIGHT UE - ShAB 5/5, EF 5/5, EE 5/5,   5/5                    LEFT    LE - HF 5/5, KE 5/5, DF 5/5, PF 5/5                    RIGHT LE - HF 4/5, KE 5/5, DF 5/5, PF 5/5        Sensory - Intact  to LT      Coordination - FTN/HTS intact      Tone - normal  MSK: No limited ROM  Psychiatric - Mood stable, Affect WNL, speech fast and patient anxious regarding timeline of stay and ability to return to activities  Skin:  L crani incision with steri strips, LUE ecchymosis, L periorbital ecchymosis, Otherwise, skin intact PHYSICAL EXAM  VITALS  T(C): 36.4 (06-23-25 @ 18:41), Max: 36.7 (06-23-25 @ 08:45)  HR: 65 (06-23-25 @ 18:41) (65 - 91)  BP: 89/53 (06-23-25 @ 18:41) (89/53 - 99/52)  RR: 15 (06-23-25 @ 18:41) (15 - 18)  SpO2: 100% (06-23-25 @ 18:41) (94% - 100%)    Gen - NAD, Comfortable  HEENT - EOMI, Normal Conjunctivae, L pupil>R, (+) L crani incision with edema , (+) L periorbital ecchymosis   Neck - Supple, No limited ROM  Pulm - CTAB  Cardiovascular - RRR, S1S2, No murmurs  Chest - good chest expansion, good respiratory effort  Abdomen - Soft, NT, ND, (+) BS  Extremities - No C/C, no calf tenderness, +trace ankle edema  Neuro-     Cognitive - awake, alert, oriented to person, place, date, year, and situation.  Able  to follow commands. Impulsive      Communication - Fluent, Comprehensible, No dysarthria, No aphasia , (+) repetition intact      Attention: Intact, able to state days of week chronologically and backwards. Able to perform simple additions and subtractions     Memory: Recall 3 objects immediate and 3 min later,      Cranial Nerves -No facial asymmetry, Tongue midline, EOMI, Shoulder shrug intact     Motor -                     LEFT    UE - ShAB 5/5, EF 5/5, EE 5/5,  5/5                    RIGHT UE - ShAB 4/5, EF 4/5, EE 4/5,   5/5                    LEFT    LE - HF 5/5, KE 5/5, DF 5/5, PF 5/5                    RIGHT LE - HF 4/5, KE 4/5, DF 5/5, PF 5/5        Sensory - Intact  to LT      Coordination - FTN/HTS intact      Tone - normal  MSK: No limited ROM  Psychiatric - Mood stable, Affect WNL, speech fast and patient anxious regarding timeline of stay and ability to return to activities  Skin:  L crani incision with steri strips, LUE ecchymosis, L periorbital ecchymosis, Otherwise, skin intact

## 2025-06-23 NOTE — PROGRESS NOTE ADULT - NS ATTEND AMEND GEN_ALL_CORE FT
Patient seen and examined by me. patient care and plan discussed and reviewed with PA. Plan as outlined above edited by me to reflect our discussion. Advanced care planning/advanced directives discussed with patient/family. DNR status including forceful chest compressions to attempt to restart the heart, ventilator support/artificial breathing, electric shock, artificial nutrition, health care proxy, Molst form all discussed with pt. More than 50% of the visit was spent counseling and/or coordinating care by the attending physician. Sixteen minutes spent on discussing advanced directives.
Patient seen and examined by me. patient care and plan discussed and reviewed with PA. Plan as outlined above edited by me to reflect our discussion.
Patient seen and examined by me. patient care and plan discussed and reviewed with PA. Plan as outlined above edited by me to reflect our discussion. Advanced care planning/advanced directives discussed with patient/family. DNR status including forceful chest compressions to attempt to restart the heart, ventilator support/artificial breathing, electric shock, artificial nutrition, health care proxy, Molst form all discussed with pt. More than 50% of the visit was spent counseling and/or coordinating care by the attending physician. Sixteen minutes spent on discussing advanced directives.
Patient seen on rounds and discussed with team, Agree with plan as stated above.     Joe Ward MD  Neurology Attending Physician

## 2025-06-23 NOTE — PROVIDER CONTACT NOTE (MEDICATION) - ACTION/TREATMENT ORDERED:
As per MD, pt had the completed dose of Brivaracetam for the day. 2200 dose held at this time due to dosage being completed for the day.

## 2025-06-23 NOTE — DISCHARGE NOTE NURSING/CASE MANAGEMENT/SOCIAL WORK - PATIENT PORTAL LINK FT
You can access the FollowMyHealth Patient Portal offered by [facility] by registering at the following website: [email or web address]. By joining [employer]’s FollowMyHealth portal, you will also be able to view your health information using other applications (apps) compatible with our system.

## 2025-06-23 NOTE — PROVIDER CONTACT NOTE (MEDICATION) - ASSESSMENT
Pt to receive Brivaracetem 100mg 3 times a day. Pt had additional 4th dose at 2200 due. MD notified.

## 2025-06-23 NOTE — H&P ADULT - NSHPADDITIONALINFOADULT_GEN_ALL_CORE
Saw and evaluated the patient, reviewed medical records, took history, examined, started an assessment and management plan. Spent 75 minutes  excluding teaching time

## 2025-06-23 NOTE — PROGRESS NOTE ADULT - PROVIDER SPECIALTY LIST ADULT
Internal Medicine
Neurology
Neurosurgery
Internal Medicine
NSICU
Neurology
Neurology
Neurosurgery
Neurosurgery
Internal Medicine
NSICU
Neurology
Neurosurgery
Internal Medicine
Internal Medicine
NSICU
Neurosurgery
Neurosurgery
Epilepsy
Neurosurgery

## 2025-06-23 NOTE — PROGRESS NOTE ADULT - SUBJECTIVE AND OBJECTIVE BOX
Patient NAT THOMASON is a 54y (1970) woman with a PMHx significant for Left Temporal lobe lesion, Bipolar, Anxiety transferred from [facility] to [facility] to be evaluated for seizure. History limited as patient is poor historian, see hx below from chart at OSH.     History as per partner (Benson) at bedside. As per partner over the past few weeks patient has been having multiple episodes of partial seizures more often than usual. Despite being on her normal routine, patient often has multiple seizures weekly but always returned to baseline. On  patient had a seizure and afterwards patient appeared more confused, was unable to articulate herself did not seek medical intervention. Since then symptoms have worsen, on  patient was found with a bunch of Lamictal pills around her, reported she might have taken more  pills than usual & patient was brought to [facility] ED.   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   As per partner, patient ha been going under a lot of life changing events. In april she lost her job, 3 weeks ago a family member has ; with everything happening, she has been deteriorating   Also on  patient fell at [facility], got on the plane & went to [place], there she went to the ED and was found to have a concussion   Patient used to be on lexapro, but was discontinued in april by her psychiatrist   Upon evaluation patient is awake, alert, speaking incoherently, pacing around the room, but able to follow commands and re-directable.     Imaging at [facility]   - CT-head-Unremarkable.   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   - EEG: Revealed evidence of electrographic seizure activity  On  - Patient was found to be hyponatremic (Na:127), corrected with latest Na:138 ()    Interval Hx:  Patient was transferred [facility] and had MRI brain with stable temporal lobe lesion favoring diagnosis of malignancy as well as surrounding edema likely correlated to recent seizure activity. Neurology following as EEG still with seizure activity likely related to temporal lobe lesion. Patient transferred to [facility] EMU for further management.     EEG 2025  Clinical Impression:  -Numerous left posterior temporal focal-onset seizures at times with bilateral spread, 2-5 seizures/hour for most of recording, some hours with no seizures. Overall improving during recording. Last seizure captured 12:38 25.   -Risk of left frontal and right frontotemporal focal-onset seizures   -Left hemispheric focal cerebral dysfunction can be structural and/or functional (such as post-ictal) in etiology.     Post-op day # 7 s/p left craniotomy for resection of tumor     Overnight event: no acute event      Vital Signs Last 24 Hrs  T(C): 36.6 (2025 04:56), Max: 36.8 (2025 08:00)  T(F): 97.8 (2025 04:56), Max: 98.3 (2025 08:00)  HR: 65 (2025 04:56) (59 - 91)  BP: 99/52 (2025 04:56) (92/60 - 99/52)  BP(mean): --  RR: 18 (2025 04:56) (18 - 18)  SpO2: 96% (2025 04:56) (94% - 98%)    Parameters below as of 2025 04:56  Patient On (Oxygen Delivery Method): room air                Stroke Core Measures      DRAIN OUTPUT:     NEUROIMAGING:     PHYSICAL EXAM:    General: No Acute Distress     Neurological: Awake, alert oriented to person, place and time, Following Commands, PERRL, EOMI, Face Symmetrical, Speech Fluent, Moving all extremities, Muscle Strength normal in all four extremities, No Drift, Sensation to Light Touch Intact    Pulmonary: Clear to Auscultation, No Rales, No Rhonchi, No Wheezes     Cardiovascular: S1, S2, Regular Rate and Rhythm     Gastrointestinal: Soft, Nontender, Nondistended     Incision:     MEDICATIONS:   Antibiotics:    Neuro:  acetaminophen     Tablet .. 650 milliGRAM(s) Oral every 6 hours PRN Temp greater or equal to 38C (100.4F), Mild Pain (1 - 3)  brivaracetam 100 milliGRAM(s) Oral <User Schedule>  brivaracetam  Injectable 100 milliGRAM(s) IV Push once PRN 2nd line seizure rescue  cloBAZam 10 milliGRAM(s) Oral at bedtime  lacosamide 150 milliGRAM(s) Oral <User Schedule>  LORazepam     Tablet 0.5 milliGRAM(s) Oral every 6 hours PRN Agitation  LORazepam     Tablet 1 milliGRAM(s) Oral every 6 hours PRN Combative behavior  melatonin 1 milliGRAM(s) Oral at bedtime  midazolam Injectable 2 milliGRAM(s) IV Push once PRN seizures  ondansetron Injectable 4 milliGRAM(s) IV Push every 6 hours PRN Nausea and/or Vomiting  perampanel 4 milliGRAM(s) Oral at bedtime    Anticoagulation:  enoxaparin Injectable 40 milliGRAM(s) SubCutaneous <User Schedule>    Cardiology:    Endo:   dexAMETHasone  Injectable   IV Push   dexAMETHasone  Injectable 2 milliGRAM(s) IV Push every 12 hours  levothyroxine 88 MICROGram(s) Oral daily    Pulm:  albuterol    90 MICROgram(s) HFA Inhaler 2 Puff(s) Inhalation every 6 hours PRN    GI/:  pantoprazole    Tablet 40 milliGRAM(s) Oral before breakfast  polyethylene glycol 3350 17 Gram(s) Oral daily  senna 2 Tablet(s) Oral at bedtime  simethicone 80 milliGRAM(s) Chew daily PRN    Other:  chlorhexidine 2% Cloths 1 Application(s) Topical daily  hydrocortisone 2.5% Lotion 1 Application(s) Topical every 8 hours PRN Itching  albuterol    90 MICROgram(s) HFA Inhaler 2 Puff(s) Inhalation every 6 hours PRN   pantoprazole    Tablet 40 milliGRAM(s) Oral before breakfast  polyethylene glycol 3350 17 Gram(s) Oral daily  senna 2 Tablet(s) Oral at bedtime  simethicone 80 milliGRAM(s) Chew daily PRN

## 2025-06-23 NOTE — PATIENT PROFILE ADULT - HISTORY OF COVID-19 VACCINATION
[No Acute Distress] : no acute distress Yes [Well Nourished] : well nourished [Well Developed] : well developed [Well-Appearing] : well-appearing [Normal Sclera/Conjunctiva] : normal sclera/conjunctiva [PERRL] : pupils equal round and reactive to light [EOMI] : extraocular movements intact [Normal Outer Ear/Nose] : the outer ears and nose were normal in appearance [Normal Oropharynx] : the oropharynx was normal [Normal TMs] : both tympanic membranes were normal [No JVD] : no jugular venous distention [No Lymphadenopathy] : no lymphadenopathy [Supple] : supple [Thyroid Normal, No Nodules] : the thyroid was normal and there were no nodules present [No Respiratory Distress] : no respiratory distress  [No Accessory Muscle Use] : no accessory muscle use [Clear to Auscultation] : lungs were clear to auscultation bilaterally [Normal Rate] : normal rate  [Regular Rhythm] : with a regular rhythm [Normal S1, S2] : normal S1 and S2 [No Murmur] : no murmur heard [No Abdominal Bruit] : a ~M bruit was not heard ~T in the abdomen [No Varicosities] : no varicosities [Pedal Pulses Present] : the pedal pulses are present [No Edema] : there was no peripheral edema [No Palpable Aorta] : no palpable aorta [No Extremity Clubbing/Cyanosis] : no extremity clubbing/cyanosis [Normal Appearance] : normal in appearance [No Nipple Discharge] : no nipple discharge [No Axillary Lymphadenopathy] : no axillary lymphadenopathy [Soft] : abdomen soft [Non Tender] : non-tender [Non-distended] : non-distended [No Masses] : no abdominal mass palpated [No HSM] : no HSM [Normal Bowel Sounds] : normal bowel sounds [Normal Supraclavicular Nodes] : no supraclavicular lymphadenopathy [Normal Axillary Nodes] : no axillary lymphadenopathy [Normal Posterior Cervical Nodes] : no posterior cervical lymphadenopathy [Normal Anterior Cervical Nodes] : no anterior cervical lymphadenopathy [No CVA Tenderness] : no CVA  tenderness [No Spinal Tenderness] : no spinal tenderness [No Joint Swelling] : no joint swelling [Grossly Normal Strength/Tone] : grossly normal strength/tone [No Rash] : no rash [Coordination Grossly Intact] : coordination grossly intact [No Focal Deficits] : no focal deficits [Normal Gait] : normal gait [Speech Grossly Normal] : speech grossly normal [Normal Affect] : the affect was normal [Normal Mood] : the mood was normal [Normal Insight/Judgement] : insight and judgment were intact

## 2025-06-23 NOTE — H&P ADULT - ASSESSMENT
54F with a PMHx of Left temporal lobe lesion, Bipolar, Anxiety, Psychogenic seizures, Asthma Concussion, presented to [facility] as a transfer from [facility] on 6/11 for worsening AMS 2/2 worsening seizures. S/p left craniotomy for neoplastic resection on 6/16/25 with Dr. Hernández. Patient now admitted on 6/23/25 for a multidisciplinary rehab program for gait instability, ADL impairments, and functional impairments.    Primary Rehabilitation Diagnosis     #Left temporal brain lesion   - Deficits: generalized weakness   - Gait Instability, ADL impairments and functional impairments  - Comprehensive Multidisciplinary Rehab Program: PT/OT/SLP - 3 hours a day, 5 days a week. P&O as needed   - MR Head- stable temporal lobe lesion favoring diagnosis of malignancy as well as surrounding edema likely correlated to recent seizure activity  - Pertinent history: Emotional distress, persistent episodes focal seizures; s/p L craniotomy for neoplastic tumor resections with Dr. Hernández 6/16  - EEG 6/11- -Numerous left posterior temporal focal-onset seizures at times with bilateral spread, 2-5 seizures/hour for most of recording, some hours with no seizures. Risk of left frontal and right frontotemporal focal-onset seizures, Left hemispheric focal cerebral dysfunction can    be structural and/or functional (such as post-ictal) in etiology.   - AEDS: Briviact 100mg q12hr, Onfi 10qhs , Vimpat 150mg BID, Fycompa 4mg qhs  - Dexamethasone 2mg BID (needs 1 more dose to finish on 6/23)    ________________________________     Secondary Rehab Related Concerns    # Pain  - Tylenol PRN     # Mood / Cognition  # Anxiety and depression  - Patient reportedly expressed SI at some point at OSH, with concern raised that she may have taken an overdose of Lamictal.  - On  evaluation patient denied having suicidal thoughts or making a suicide attempt, but per records has a history of a suicide attempt and psychiatric hospitalization 11 years ago.   - Continue Ativan 0.5mg PRN   - Constant obs --> routine supervision     # Sleep  - Melatonin 3 PRN to maximize participation in therapy during the day     # GI / Bowel Management / Constipation  - Senna QHS   - Miralax QD  - Simethicone daily PRN gas   - GI ppx: Protonix 40mg daily      # Skin  - Skin assessment on admission performed: __  - Nursing to monitor skin qShift  - Hydrocortisone TID PRN itch      # Diet  - Diet Consistency: Regular   - Nutrition consult  - Dysphagia: SLP for swallow function evaluation and treatment      # Precautions / PROPHYLAXIS:   - Precautons: Falls, Seizure   - Pressure injury/Skin:  OOB to Chair, PT/OT    - DVT ppx: Lovenox 40mg daily   - Weight bearing status: No Weight Bearing Restrictions   ___________________________________________     Concurrent Medical Problems     #Reactive Leukocytosis (2/2 steroids)  - Monitor and trend CBC, fever curve     # Pericardial effusion   - TTE 6/16 with EF 66% with normal LVSF, no WMA, and trace pericardial effusion   - Repeat TTE outpatient in 1 month    # Hypothyroidism   - TSH elevated (improving) but FT4 normal likely reactive   - Synthroid 88mcg daily   - Repeat TFTs outpatient    #Asthma  -Albuterol PRN      Code Status: Full Code      ___________________________________________     Discharge Planning:  Pending initial interdisciplinary team meeting.   __________________________________________     Outpatient Follow-up:  Specialty and Name of physician    Scooter Hernández  Neurological Surgery  [address] [place] [address], [address] [place] [address]  Phone: [phone]  Fax: [phone]  ___________________________________________     Goals: Safe discharge to home  Estimated Length of Stay: 10-14 days   Rehab Potential: Good  Medical Prognosis: Good  Estimated Disposition: Home with home care 54F with a PMHx of Left temporal lobe lesion, Bipolar, Anxiety, Psychogenic seizures, Asthma Concussion, presented to [facility] as a transfer from [facility] on 6/11 for worsening AMS 2/2 worsening seizures. S/p left craniotomy for neoplastic resection on 6/16/25 with Dr. Hernández. Patient now admitted on 6/23/25 for a multidisciplinary rehab program for generalized weakness, gait instability, ADL impairments, and functional impairments.    Primary Rehabilitation Diagnosis     #Left temporal brain lesion   - Deficits: generalized weakness   - Gait Instability, ADL impairments and functional impairments  - Comprehensive Multidisciplinary Rehab Program: PT/OT/SLP - 3 hours a day, 5 days a week. P&O as needed   - MR Head- stable temporal lobe lesion favoring diagnosis of malignancy as well as surrounding edema likely correlated to recent seizure activity  - Pertinent history: Emotional distress, persistent episodes focal seizures; s/p L craniotomy for neoplastic tumor resections with Dr. Hernández 6/16  - EEG 6/11- -Numerous left posterior temporal focal-onset seizures at times with bilateral spread, 2-5 seizures/hour for most of recording, some hours with no seizures. Risk of left frontal and right frontotemporal focal-onset seizures, Left hemispheric focal cerebral dysfunction can    be structural and/or functional (such as post-ictal) in etiology.   - AEDS: Briviact 100mg q12hr, Onfi 10qhs , Vimpat 150mg BID, Fycompa 4mg qhs  - Dexamethasone 2mg BID (needs 1 more dose to finish on 6/23)    ________________________________     Secondary Rehab Related Concerns    # Pain  - Tylenol PRN     # Mood / Cognition  # Anxiety and depression  - Patient reportedly expressed SI at some point at OSH, with concern raised that she may have taken an overdose of Lamictal.  - On  evaluation patient denied having suicidal thoughts or making a suicide attempt, but per records has a history of a suicide attempt and psychiatric hospitalization 11 years ago.   - Continue Ativan 0.5mg PRN   - Constant obs --> routine supervision     # Sleep  - Melatonin 3 PRN to maximize participation in therapy during the day     # GI / Bowel Management / Constipation  - Senna QHS   - Miralax QD  - Simethicone daily PRN gas   - GI ppx: Protonix 40mg daily      # Skin  - Skin assessment on admission performed: __  - Nursing to monitor skin qShift  - Hydrocortisone TID PRN itch      # Diet  - Diet Consistency: Regular   - Nutrition consult  - Dysphagia: SLP for swallow function evaluation and treatment      # Precautions / PROPHYLAXIS:   - Precautons: Falls, Seizure   - Pressure injury/Skin:  OOB to Chair, PT/OT    - DVT ppx: Lovenox 40mg daily   - Weight bearing status: No Weight Bearing Restrictions   ___________________________________________     Concurrent Medical Problems     #Reactive Leukocytosis (2/2 steroids)  - Monitor and trend CBC, fever curve     # Pericardial effusion   - TTE 6/16 with EF 66% with normal LVSF, no WMA, and trace pericardial effusion   - Repeat TTE outpatient in 1 month    # Hypothyroidism   - TSH elevated (improving) but FT4 normal likely reactive   - Synthroid 88mcg daily   - Repeat TFTs outpatient    #Asthma  -Albuterol PRN      Code Status: Full Code      ___________________________________________     Discharge Planning:  Pending initial interdisciplinary team meeting.   __________________________________________     Outpatient Follow-up:  Specialty and Name of physician    Scooter Hernández  Neurological Surgery  [address] [place] [address], [address] [address]  Phone: [phone]  Fax: [phone]  ___________________________________________     Goals: Safe discharge to home  Estimated Length of Stay: 10-14 days   Rehab Potential: Good  Medical Prognosis: Good  Estimated Disposition: Home with home care 54F with a PMHx of Left temporal lobe lesion, Bipolar, Anxiety, Psychogenic seizures, Asthma Concussion, presented to [facility] as a transfer from [facility] on 6/11 for worsening AMS 2/2 worsening seizures. S/p left craniotomy for neoplastic resection on 6/16/25 with Dr. Hernández. Patient now admitted on 6/23/25 for a multidisciplinary rehab program for generalized weakness, gait instability, ADL impairments, and functional impairments.    Primary Rehabilitation Diagnosis     #Left temporal brain lesion   - Deficits: generalized weakness   - Gait Instability, ADL impairments and functional impairments  - Comprehensive Multidisciplinary Rehab Program: PT/OT/SLP - 3 hours a day, 5 days a week. P&O as needed   - MR Head- stable temporal lobe lesion favoring diagnosis of malignancy as well as surrounding edema likely correlated to recent seizure activity  - Pertinent history: Emotional distress, persistent episodes focal seizures; s/p L craniotomy for neoplastic tumor resections with Dr. Hernández 6/16  - EEG 6/11- -Numerous left posterior temporal focal-onset seizures at times with bilateral spread, 2-5 seizures/hour for most of recording, some hours with no seizures. Risk of left frontal and right frontotemporal focal-onset seizures, Left hemispheric focal cerebral dysfunction can    be structural and/or functional (such as post-ictal) in etiology.   - AEDS: Briviact 100mg q12hr, Onfi 10qhs , Vimpat 150mg BID, Fycompa 4mg qhs  - Dexamethasone 2mg BID (needs 1 more dose to finish on 6/23)    ________________________________     Secondary Rehab Related Concerns    # Pain  - Tylenol PRN     # Mood / Cognition  # Anxiety and depression  - Patient reportedly expressed SI at some point at OSH, with concern raised that she may have taken an overdose of Lamictal.  - On  evaluation patient denied having suicidal thoughts or making a suicide attempt, but per records has a history of a suicide attempt and psychiatric hospitalization 11 years ago.   - Continue Ativan 0.5mg PRN   - Constant obs --> routine supervision     # Sleep  - Melatonin 1.5mg HS to maximize participation in therapy during the day     # GI / Bowel Management / Constipation  - Senna QHS   - Miralax QD  - Simethicone daily PRN gas   - GI ppx: Protonix 40mg daily      # Skin  - Skin assessment on admission performed:  - Nursing to monitor skin qShift     # Diet  - Diet Consistency: Regular   - Nutrition consult  - SLP for swallow function evaluation and treatment      # Precautions / PROPHYLAXIS:   - Precautons: Falls, Seizure   - Pressure injury/Skin:  OOB to Chair, PT/OT    - DVT ppx: Lovenox 40mg daily   - Weight bearing status: No Weight Bearing Restrictions   ___________________________________________     Concurrent Medical Problems     #Reactive Leukocytosis (2/2 steroids)  - Monitor and trend CBC, fever curve     # Pericardial effusion   - TTE 6/16 with EF 66% with normal LVSF, no WMA, and trace pericardial effusion   - Repeat TTE outpatient in 1 month    # Hypothyroidism   - TSH elevated (improving) but FT4 normal likely reactive   - Synthroid 88mcg daily   - Repeat TFTs outpatient    #Asthma  -Albuterol PRN      Code Status: Full Code      ___________________________________________     Discharge Planning:  Pending initial interdisciplinary team meeting.   __________________________________________     Outpatient Follow-up:  Specialty and Name of physician    Scooter Hernández  Neurological Surgery  [address] [place] [address], [address]  [place] [address]  Phone: [phone]  Fax: [phone]  ___________________________________________     Goals: Safe discharge to home  Estimated Length of Stay: 10-14 days   Rehab Potential: Good  Medical Prognosis: Good  Estimated Disposition: Home with home care 54F with a PMHx of Left temporal lobe lesion, Bipolar, Anxiety, Psychogenic seizures, Asthma Concussion, presented to [facility] as a transfer from [facility] on 6/11 for worsening AMS 2/2 worsening seizures. S/p left craniotomy for neoplastic resection on 6/16/25 with Dr. Hernández. Patient now admitted on 6/23/25 for a multidisciplinary rehab program for generalized weakness, gait instability, ADL impairments, and functional impairments.    Primary Rehabilitation Diagnosis     #Left temporal brain lesion   - Deficits: generalized weakness   - Gait Instability, ADL impairments and functional impairments  - Comprehensive Multidisciplinary Rehab Program: PT/OT/SLP - 3 hours a day, 5 days a week. P&O as needed   - MR Head- stable temporal lobe lesion favoring diagnosis of malignancy as well as surrounding edema likely correlated to recent seizure activity  - Pertinent history: Emotional distress, persistent episodes focal seizures; s/p L craniotomy for neoplastic tumor resections with Dr. Hernández 6/16  - EEG 6/11- -Numerous left posterior temporal focal-onset seizures at times with bilateral spread, 2-5 seizures/hour for most of recording, some hours with no seizures. Risk of left frontal and right frontotemporal focal-onset seizures, Left hemispheric focal cerebral dysfunction can    be structural and/or functional (such as post-ictal) in etiology.   - AEDS: Briviact 100mg q12hr, Onfi 10qhs , Vimpat 150mg BID, Fycompa 4mg qhs  - Dexamethasone 2mg BID (needs 1 more dose to finish on 6/23)    ________________________________     Secondary Rehab Related Concerns    # Pain  - Tylenol PRN     # Mood / Cognition  # Anxiety and depression  - Patient reportedly expressed SI at some point at OSH, with concern raised that she may have taken an overdose of Lamictal.  - On  evaluation patient denied having suicidal thoughts or making a suicide attempt, but per records has a history of a suicide attempt and psychiatric hospitalization 11 years ago.   - Continue Ativan 0.5mg PRN   - Constant obs --> routine supervision     # Sleep  - Melatonin 1.5mg HS to maximize participation in therapy during the day     # GI / Bowel Management / Constipation  - Senna QHS   - Miralax QD  - Simethicone daily PRN gas   - GI ppx: Protonix 40mg daily      # Skin  - Skin assessment on admission performed: L crani incision with steri strips, LUE ecchymosis, L periorbital ecchymosis, Otherwise, skin intact   - Nursing to monitor skin qShift     # Diet  - Diet Consistency: Regular   - Nutrition consult  - SLP for swallow function evaluation and treatment      # Precautions / PROPHYLAXIS:   - Precautons: Falls, Seizure   - Pressure injury/Skin:  OOB to Chair, PT/OT    - DVT ppx: Lovenox 40mg daily   - Weight bearing status: No Weight Bearing Restrictions   ___________________________________________     Concurrent Medical Problems     #Reactive Leukocytosis (2/2 steroids)  - Monitor and trend CBC, fever curve     # Pericardial effusion   - TTE 6/16 with EF 66% with normal LVSF, no WMA, and trace pericardial effusion   - Repeat TTE outpatient in 1 month    # Hypothyroidism   - TSH elevated (improving) but FT4 normal likely reactive   - Synthroid 88mcg daily   - Repeat TFTs outpatient    #Asthma  -Albuterol PRN      Code Status: Full Code      ___________________________________________     Discharge Planning:  Pending initial interdisciplinary team meeting.   __________________________________________     Outpatient Follow-up:  Specialty and Name of physician    Scooter Hernández  Neurological Surgery  [address] [place] [address], [address]  [place] [address]  Phone: [phone]  Fax: [phone]  ___________________________________________     Goals: Safe discharge to home  Estimated Length of Stay: 10-14 days   Rehab Potential: Good  Medical Prognosis: Good  Estimated Disposition: Home with home care 54F with a PMHx of Left temporal lobe lesion, Bipolar, Anxiety, Psychogenic seizures, Asthma Concussion, presented to [facility] as a transfer from [facility] on 6/11 for worsening AMS 2/2 worsening seizures. S/p left craniotomy for neoplastic resection on 6/16/25 with Dr. Hernández. Patient now admitted on 6/23/25 for a multidisciplinary rehab program for generalized weakness, gait instability, ADL impairments, and functional impairments.    Primary Rehabilitation Diagnosis     #Left temporal brain lesion   - Deficits: generalized weakness   - Gait Instability, ADL impairments and functional impairments  - Comprehensive Multidisciplinary Rehab Program: PT/OT/SLP - 3 hours a day, 5 days a week. P&O as needed   - MR Head- stable temporal lobe lesion favoring diagnosis of malignancy as well as surrounding edema likely correlated to recent seizure activity  - Pertinent history: Emotional distress, persistent episodes focal seizures; s/p L craniotomy for neoplastic tumor resections with Dr. Hernández 6/16  - EEG 6/11- -Numerous left posterior temporal focal-onset seizures at times with bilateral spread, 2-5 seizures/hour for most of recording, some hours with no seizures. Risk of left frontal and right frontotemporal focal-onset seizures, Left hemispheric focal cerebral dysfunction can    be structural and/or functional (such as post-ictal) in etiology.   - AEDS: Briviact 100mg q12hr, Onfi 10qhs , Vimpat 150mg BID, Fycompa 4mg qhs  - Dexamethasone 2mg BID (needs 1 more dose to finish on 6/23)    ________________________________     Secondary Rehab Related Concerns    # Pain  - Tylenol PRN     # Mood / Cognition  # Anxiety and depression  - Patient reportedly expressed SI at some point at OSH, with concern raised that she may have taken an overdose of Lamictal.  - On  evaluation patient denied having suicidal thoughts or making a suicide attempt, but per records has a history of a suicide attempt and psychiatric hospitalization 11 years ago.   - Continue Ativan 0.5mg PRN   - Constant obs --> routine supervision     # Sleep  - Melatonin 1.5mg HS to maximize participation in therapy during the day     # GI / Bowel Management / Constipation  - Senna QHS   - Miralax QD  - Simethicone daily PRN gas   - GI ppx: Protonix 40mg daily      # Skin  - Skin assessment on admission performed: L crani incision with steri strips, LUE ecchymosis, L periorbital ecchymosis, Otherwise, skin intact   - Nursing to monitor skin qShift     # Diet  - Diet Consistency: Regular   - Nutrition consult  - SLP for swallow function evaluation and treatment      # Precautions / PROPHYLAXIS:   - Precautons: Falls, Seizure   - Pressure injury/Skin:  OOB to Chair, PT/OT    - DVT ppx: Lovenox 40mg daily   - Weight bearing status: No Weight Bearing Restrictions   ___________________________________________     Concurrent Medical Problems     #Reactive Leukocytosis (2/2 steroids)  - Monitor and trend CBC, fever curve     # Pericardial effusion   - TTE 6/16 with EF 66% with normal LVSF, no WMA, and trace pericardial effusion   - Repeat TTE outpatient in 1 month    # Hypothyroidism   - TSH elevated (improving) but FT4 normal likely reactive   - Synthroid 88mcg daily   - Repeat TFTs outpatient    #Asthma  -Albuterol PRN      Code Status: Full Code      ___________________________________________     Discharge Planning:  Pending initial interdisciplinary team meeting.   __________________________________________     Outpatient Follow-up:  Specialty and Name of physician    Scooter Hernández  Neurological Surgery  [address] [place] [address], [address]  [place] [address]  Phone: [phone]  Fax: [phone]  ___________________________________________     Goals: Safe discharge to home  Estimated Length of Stay: 7-10 days   Rehab Potential: Good  Medical Prognosis: Good  Estimated Disposition: Home with home care RHD 54F with a PMHx of Left temporal lobe lesion, Bipolar, Anxiety, Psychogenic seizures, Asthma Concussion, presented to [facility] as a transfer from [facility] on 6/11 for worsening AMS 2/2 worsening seizures. S/p left craniotomy for neoplastic resection on 6/16/25 with Dr. Hernández. Patient now admitted on 6/23/25 for a multidisciplinary rehab program for generalized weakness, gait instability, ADL impairments, and functional impairments.    #Left temporal brain lesion   - Deficits: generalized weakness, right sided hemiparesis   - Gait Instability, ADL impairments and functional impairments  - Comprehensive Multidisciplinary Rehab Program: PT/OT/SLP - 3 hours a day, 5 days a week. P&O as needed   - MR Head- stable temporal lobe lesion favoring diagnosis of malignancy as well as surrounding edema likely correlated to recent seizure activity  - Pertinent history: Emotional distress, persistent episodes focal seizures; s/p L craniotomy for neoplastic tumor resections with Dr. Hernández 6/16  - EEG 6/11- -Numerous left posterior temporal focal-onset seizures at times with bilateral spread, 2-5 seizures/hour for most of recording, some hours with no seizures. Risk of left frontal and right frontotemporal focal-onset seizures, Left hemispheric focal cerebral dysfunction can    be structural and/or functional (such as post-ictal) in etiology.   - AEDS: Briviact 100mg q12hr, Onfi 10qhs , Vimpat 150mg BID, Fycompa 4mg qhs  - Dexamethasone 2mg BID (needs 1 more dose to finish on 6/23)    ________________________________   # Pain  - Tylenol PRN     # Mood / Cognition  # Anxiety and depression  - Patient reportedly expressed SI at some point at OSH, with concern raised that she may have taken an overdose of Lamictal.  - On  evaluation patient denied having suicidal thoughts or making a suicide attempt, but per records has a history of a suicide attempt and psychiatric hospitalization 11 years ago.   - Continue Ativan 0.5mg PRN   - Constant obs --> routine supervision     # Sleep  - Melatonin 1.5mg HS to maximize participation in therapy during the day     # GI / Bowel Management / Constipation  - Senna QHS   - Miralax QD  - Simethicone daily PRN gas   - GI ppx: Protonix 40mg daily      # Skin  - Skin assessment on admission performed: L crani incision with steri strips, LUE ecchymosis, L periorbital ecchymosis, Otherwise, skin intact   - Nursing to monitor skin qShift     # Diet  - Diet Consistency: Regular   - Nutrition consult  - SLP for swallow function evaluation and treatment      # Precautions / PROPHYLAXIS:   - Precautons: Falls, Seizure   - Pressure injury/Skin:  OOB to Chair, PT/OT    - DVT ppx: Lovenox 40mg daily   - Weight bearing status: No Weight Bearing Restrictions   ___________________________________________     Concurrent Medical Problems     #Reactive Leukocytosis (2/2 steroids)  - Monitor and trend CBC, fever curve     # Pericardial effusion   - TTE 6/16 with EF 66% with normal LVSF, no WMA, and trace pericardial effusion   - Repeat TTE outpatient in 1 month    # Hypothyroidism   - TSH elevated (improving) but FT4 normal likely reactive   - Synthroid 88mcg daily   - Repeat TFTs outpatient    #Asthma  -Albuterol PRN      Code Status: Full Code      ___________________________________________     Discharge Planning:  Pending initial interdisciplinary team meeting.   __________________________________________     Outpatient Follow-up:  Specialty and Name of physician    Scooter Hernández  Neurological Surgery  [address] [place] [address], [address]  [place] [address]  Phone: [phone]  Fax: [phone]  ___________________________________________     Goals: Safe discharge to home  Estimated Length of Stay: 7-10 days   Rehab Potential: Good  Medical Prognosis: Good  Estimated Disposition: Home with home care RHD 54F with a PMHx of Left temporal lobe lesion, Bipolar, Anxiety, Psychogenic seizures, Asthma Concussion, presented to [facility] as a transfer from [facility] on 6/11 for worsening AMS 2/2 worsening seizures. S/p left craniotomy for neoplastic resection on 6/16/25 with Dr. Hernándze. Patient now admitted on 6/23/25 for a multidisciplinary rehab program for generalized weakness, gait instability, ADL impairments, and functional impairments.    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 P&O as needed   - MR Head- stable temporal lobe lesion favoring diagnosis of malignancy as well as surrounding edema likely correlated to recent seizure activity  - Pertinent history: Emotional distress, persistent episodes focal seizures; s/p L craniotomy for neoplastic tumor resections with Dr. Hernández 6/16  - EEG 6/11- -Numerous left posterior temporal focal-onset seizures at times with bilateral spread, 2-5 seizures/hour for most of recording, some hours with no seizures. Risk of left frontal and right frontotemporal focal-onset seizures, Left hemispheric focal cerebral dysfunction can    be structural and/or functional (such as post-ictal) in etiology.   - AEDS: Briviact 100mg q12hr, Onfi 10qhs , Vimpat 150mg BID, Fycompa 4mg qhs  - Dexamethasone 2mg BID (needs 1 more dose to finish on 6/23)    ________________________________       # Pain  - Tylenol PRN     # Mood / Cognition  # Anxiety and depression  - Patient reportedly expressed SI at some point at OSH, with concern raised that she may have taken an overdose of Lamictal.  - On  evaluation patient denied having suicidal thoughts or making a suicide attempt, but per records has a history of a suicide attempt and psychiatric hospitalization 11 years ago.   - Continue Ativan 0.5mg PRN   - Constant obs --> routine supervision  - Neuropsychology consult PRN      # Sleep  - Melatonin 1.5mg HS to maximize participation in therapy during the day     # GI / Bowel Management / Constipation  - Senna QHS   - Miralax QD  - Simethicone daily PRN gas   - GI ppx: Protonix 40mg daily      # Skin  - Skin assessment on admission performed: L crani incision with steri strips, LUE ecchymosis, L periorbital ecchymosis, Otherwise, skin intact   - Nursing to monitor skin qShift     # Diet  - Diet Consistency: Regular   - Nutrition consult  - SLP for swallow function evaluation and treatment      # Precautions / PROPHYLAXIS:   - Precautons: Falls, Seizure   - Pressure injury/Skin:  OOB to Chair, PT/OT    - DVT ppx: Lovenox 40mg daily   - Weight bearing status: No Weight Bearing Restrictions   ___________________________________________     Concurrent Medical Problems     #Reactive Leukocytosis (2/2 steroids)  - Monitor and trend CBC, fever curve     # Pericardial effusion   - TTE 6/16 with EF 66% with normal LVSF, no WMA, and trace pericardial effusion   - Repeat TTE outpatient in 1 month    # Hypothyroidism   - TSH elevated (improving) but FT4 normal likely reactive   - Synthroid 88mcg daily   - Repeat TFTs outpatient    #Asthma  -Albuterol PRN      Code Status: Full Code      ___________________________________________     Discharge Planning:  Pending initial interdisciplinary team meeting.   __________________________________________     Outpatient Follow-up:  Specialty and Name of physician    Scooter Hernández  Neurological Surgery  [address] [place] [address], [address]  [place] [address]  Phone: [phone]  Fax: [phone]  ___________________________________________     Goals: Safe discharge to home  Estimated Length of Stay: 7-10 days   Rehab Potential: Good  Medical Prognosis: Good  Estimated Disposition: Home with home care

## 2025-06-23 NOTE — PATIENT PROFILE ADULT - FALL HARM RISK - HARM RISK INTERVENTIONS
Assistance with ambulation/Assistance OOB with selected safe patient handling equipment/Communicate Risk of Fall with Harm to all staff/Discuss with provider need for PT consult/Monitor gait and stability/Reinforce activity limits and safety measures with patient and family/Tailored Fall Risk Interventions/Visual Cue: Yellow wristband and red socks/Bed in lowest position, wheels locked, appropriate side rails in place/Call bell, personal items and telephone in reach/Instruct patient to call for assistance before getting out of bed or chair/Non-slip footwear when patient is out of bed/Vanduser to call system/Physically safe environment - no spills, clutter or unnecessary equipment/Purposeful Proactive Rounding/Room/bathroom lighting operational, light cord in reach

## 2025-06-23 NOTE — DISCHARGE NOTE NURSING/CASE MANAGEMENT/SOCIAL WORK - FINANCIAL ASSISTANCE
[facility] provides services at a reduced cost to those who are determined to be eligible through [facility]’s financial assistance program. Information regarding [facility]’s financial assistance program can be found by going to [email or web address] or by calling [phone].

## 2025-06-23 NOTE — PATIENT PROFILE ADULT - FALL HARM RISK - ATTEMPT OOB
Daughter reports pt has blisters on his right leg, right foot swollen. Cindi reports pt has pain in his leg, skin is warm to touch. Asking for advice.   No

## 2025-06-23 NOTE — PATIENT PROFILE ADULT - FUNCTIONAL ASSESSMENT - BASIC MOBILITY 6.
3-calculated by average/Not able to assess (calculate score using [facility] averaging method)

## 2025-06-23 NOTE — DISCHARGE NOTE NURSING/CASE MANAGEMENT/SOCIAL WORK - NSDCPEFALRISK_GEN_ALL_CORE
For information on Fall & Injury Prevention, visit: [email or web address] OR  [email or web address] OR  https://www.cdc.gov/steadi/patient.html

## 2025-06-23 NOTE — H&P ADULT - REASON FOR ADMISSION
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

## 2025-06-23 NOTE — H&P ADULT - NSHPREVIEWOFSYSTEMS_GEN_ALL_CORE
REVIEW OF SYSTEMS  Constitutional: No fever, No Chills, + fatigue  HEENT: No eye pain, No visual disturbances, No difficulty hearing, +sensitive to sound  Pulm: No cough,  No shortness of breath  Cardio: No chest pain, No palpitations  GI:  No abdominal pain, No nausea, No vomiting, No diarrhea, No constipation  : No dysuria, No frequency, No hematuria  Neuro: + headaches, No memory loss, No loss of strength, No numbness, No tremors  Skin: No itching, No rashes, No lesions   Endo: No temperature intolerance  MSK: No calf pain,  No Neck pain,  No back pain  Psych:  No depression, No anxiety REVIEW OF SYSTEMS  Constitutional: No fever, No Chills, + fatigue  HEENT: No eye pain, No visual disturbances, No difficulty hearing, +sensitive to sound  Pulm: No cough,  No shortness of breath  Cardio: No chest pain, No palpitations  GI:  No abdominal pain, No nausea, No vomiting, No diarrhea, No constipation  : No dysuria, No frequency, No hematuria  Neuro: + headaches, No memory loss, No loss of strength, No numbness, No tremors  Skin: No itching, No rashes, No lesions   Endo: No temperature intolerance  MSK: No calf pain,  No Neck pain,  No back pain  Psych:  No depression, No anxiety, No suicidal/homicidal ideations REVIEW OF SYSTEMS  Constitutional: No fever, No Chills, (+) fatigue  HEENT: No eye pain, No visual disturbances, No difficulty hearing, (+) sensitive to sound  Pulm: No cough,  No shortness of breath  Cardio: No chest pain, No palpitations  GI:  No abdominal pain, No nausea, No vomiting, No diarrhea, No constipation  : No dysuria, No frequency, No hematuria  Neuro: (+)  headaches, No memory loss, No loss of strength, No numbness, No tremors  Skin: No itching, No rashes, No lesions   Endo: No temperature intolerance  MSK: No calf pain,  No Neck pain,  No back pain  Psych:  No depression, No anxiety, No suicidal/homicidal ideations

## 2025-06-23 NOTE — PROGRESS NOTE ADULT - SUBJECTIVE AND OBJECTIVE BOX
INTERNAL MEDICINE PROGRESS NOTE     NAME OF PATIENT: NAT THOMASON  MRN: [id number]  DATE OF VISIT: 06-23-25 @ 15:13    SUBJECTIVE/ ROS:  - Patient seen and examined by bedside     OBJECTIVE:  ICU Vital Signs Last 24 Hrs  T(C): 36.6 (23 Jun 2025 12:00), Max: 36.7 (22 Jun 2025 16:15)  T(F): 97.9 (23 Jun 2025 12:00), Max: 98 (22 Jun 2025 16:15)  HR: 73 (23 Jun 2025 12:00) (65 - 91)  BP: 90/63 (23 Jun 2025 12:00) (90/63 - 99/52)  BP(mean): --  ABP: --  ABP(mean): --  RR: 18 (23 Jun 2025 12:00) (18 - 18)  SpO2: 96% (23 Jun 2025 12:00) (94% - 97%)    O2 Parameters below as of 23 Jun 2025 12:00  Patient On (Oxygen Delivery Method): room air          06-23-25 @ 15:13  T(C): 36.6 (06-23-25 @ 12:00), Max: 36.7 (06-22-25 @ 16:15)  HR: 73 (06-23-25 @ 12:00) (65 - 91)  BP: 90/63 (06-23-25 @ 12:00) (90/63 - 99/52)  RR: 18 (06-23-25 @ 12:00) (18 - 18)  SpO2: 96% (06-23-25 @ 12:00) (94% - 97%)  Wt(kg): --  CAPILLARY BLOOD GLUCOSE          HOSPITAL MEDICATIONS:  MEDICATIONS  (STANDING):  brivaracetam 100 milliGRAM(s) Oral <User Schedule>  chlorhexidine 2% Cloths 1 Application(s) Topical daily  cloBAZam 10 milliGRAM(s) Oral at bedtime  dexAMETHasone     Tablet 2 milliGRAM(s) Oral every 12 hours  enoxaparin Injectable 40 milliGRAM(s) SubCutaneous <User Schedule>  lacosamide 150 milliGRAM(s) Oral <User Schedule>  levothyroxine 88 MICROGram(s) Oral daily  melatonin 1 milliGRAM(s) Oral at bedtime  pantoprazole    Tablet 40 milliGRAM(s) Oral before breakfast  perampanel 4 milliGRAM(s) Oral at bedtime  polyethylene glycol 3350 17 Gram(s) Oral daily  senna 2 Tablet(s) Oral at bedtime    MEDICATIONS  (PRN):  acetaminophen     Tablet .. 650 milliGRAM(s) Oral every 6 hours PRN Temp greater or equal to 38C (100.4F), Mild Pain (1 - 3)  albuterol    90 MICROgram(s) HFA Inhaler 2 Puff(s) Inhalation every 6 hours PRN Bronchospasm  brivaracetam  Injectable 100 milliGRAM(s) IV Push once PRN 2nd line seizure rescue  hydrocortisone 2.5% Lotion 1 Application(s) Topical every 8 hours PRN Itching  LORazepam     Tablet 0.5 milliGRAM(s) Oral every 6 hours PRN Agitation  LORazepam     Tablet 1 milliGRAM(s) Oral every 6 hours PRN Combative behavior  midazolam Injectable 2 milliGRAM(s) IV Push once PRN seizures  ondansetron Injectable 4 milliGRAM(s) IV Push every 6 hours PRN Nausea and/or Vomiting  simethicone 80 milliGRAM(s) Chew daily PRN Gas      PHYSICAL EXAMINATION:  General: NAD   Cardiology: S1/S2 with no murmur   Respiratory: CTA BL with no wheeze   GI: Soft and NTND    LABS:                        12.0   11.31 )-----------( 282      ( 23 Jun 2025 07:51 )             35.8     06-23    139  |  102  |  27[H]  ----------------------------<  119[H]  4.5   |  23  |  0.79    Ca    9.6      23 Jun 2025 07:49            MICROBIOLOGY:     RADIOLOGY:    CARDIOLOGY:

## 2025-06-23 NOTE — H&P ADULT - NSHPLABSRESULTS_GEN_ALL_CORE
Labs                12.0   11.31 )-----------( 282      ( 23 Jun 2025 07:51 )             35.8     06-23    139  |  102  |  27[H]  ----------------------------<  119[H]  4.5   |  23  |  0.79    Ca    9.6      23 Jun 2025 07:49    Urinalysis Basic - ( 23 Jun 2025 07:49 )    Color: x / Appearance: x / SG: x / pH: x  Gluc: 119 mg/dL / Ketone: x  / Bili: x / Urobili: x   Blood: x / Protein: x / Nitrite: x   Leuk Esterase: x / RBC: x / WBC x   Sq Epi: x / Non Sq Epi: x / Bacteria: x    < from: MR Head w/wo IV Cont (06.17.25 @ 15:58) >    IMPRESSION: Left temporal postoperative changes after excision of a   suspicious nonenhancing lesion since 6/12/2025. No acute infarcts.    < from: CT Head No Cont (06.17.25 @ 10:02) >    IMPRESSION:  Status post left medial temporal region lesion. Bifrontal pneumocephalus.   No hydrocephalus. Minimal postop hemorrhage    < from: VA Duplex Lower Ext Vein Scan, Bilat (06.13.25 @ 16:19) >      IMPRESSION:  No evidence of deep venous thrombosis in either lower extremity.    < from: MR Head w/wo IV Cont (06.12.25 @ 22:02) >    IMPRESSION:  Stable nonenhancing left temporal lesion with imaging characteristics   favoring a low-grade neoplasm. No significant mass effect.    Interval resolution of left temporal cortical restricted diffusion.   Persistent left temporal moderate cortical swelling and sulcal effacement   likely compatible with expected post seizure changes.    < from: CT Head w/ IV Cont (06.10.25 @ 12:29) >    IMPRESSION:    CT brain:  Region of low density in the left mid temporal lobe along the lateral   aspect of the left temporal horn corresponds to previously seen   nonspecific T2 and FLAIR hyperintense lesion.    No acute intracranial hemorrhage.    CTA brain:  No flow-limiting stenosis or vascular aneurysm. No AVM.    < from: MR Head No Cont (06.06.25 @ 14:53) >    IMPRESSION:  Left temporal lobe lesion as described stable compared to 4/11/2019.   Low-grade neoplasm favored.  Edema and diffusion restriction involving the cortex of the left temporal   lobe. Favor related to recent seizure activity. Correlate with   presentation and EEG.  Follow-up as clinically warranted.

## 2025-06-23 NOTE — PROGRESS NOTE ADULT - REASON FOR ADMISSION
Seizure

## 2025-06-24 DIAGNOSIS — D49.6 NEOPLASM OF UNSPECIFIED BEHAVIOR OF BRAIN: ICD-10-CM

## 2025-06-24 LAB
ALBUMIN SERPL ELPH-MCNC: 2.9 G/DL — LOW (ref 3.3–5)
ALP SERPL-CCNC: 67 U/L — SIGNIFICANT CHANGE UP (ref 40–120)
ALT FLD-CCNC: 77 U/L — HIGH (ref 10–45)
ANION GAP SERPL CALC-SCNC: 9 MMOL/L — SIGNIFICANT CHANGE UP (ref 5–17)
AST SERPL-CCNC: 23 U/L — SIGNIFICANT CHANGE UP (ref 10–40)
BASOPHILS # BLD AUTO: 0.05 K/UL — SIGNIFICANT CHANGE UP (ref 0–0.2)
BASOPHILS NFR BLD AUTO: 0.6 % — SIGNIFICANT CHANGE UP (ref 0–2)
BILIRUB SERPL-MCNC: 0.3 MG/DL — SIGNIFICANT CHANGE UP (ref 0.2–1.2)
BUN SERPL-MCNC: 25 MG/DL — HIGH (ref 7–23)
CALCIUM SERPL-MCNC: 9.3 MG/DL — SIGNIFICANT CHANGE UP (ref 8.4–10.5)
CHLORIDE SERPL-SCNC: 103 MMOL/L — SIGNIFICANT CHANGE UP (ref 96–108)
CO2 SERPL-SCNC: 27 MMOL/L — SIGNIFICANT CHANGE UP (ref 22–31)
CREAT SERPL-MCNC: 0.59 MG/DL — SIGNIFICANT CHANGE UP (ref 0.5–1.3)
EGFR: 106 ML/MIN/1.73M2 — SIGNIFICANT CHANGE UP
EGFR: 106 ML/MIN/1.73M2 — SIGNIFICANT CHANGE UP
EOSINOPHIL # BLD AUTO: 0.05 K/UL — SIGNIFICANT CHANGE UP (ref 0–0.5)
EOSINOPHIL NFR BLD AUTO: 0.6 % — SIGNIFICANT CHANGE UP (ref 0–6)
GLUCOSE SERPL-MCNC: 128 MG/DL — HIGH (ref 70–99)
HCT VFR BLD CALC: 35.7 % — SIGNIFICANT CHANGE UP (ref 34.5–45)
HGB BLD-MCNC: 12 G/DL — SIGNIFICANT CHANGE UP (ref 11.5–15.5)
IMM GRANULOCYTES NFR BLD AUTO: 5.1 % — HIGH (ref 0–0.9)
LYMPHOCYTES # BLD AUTO: 1.41 K/UL — SIGNIFICANT CHANGE UP (ref 1–3.3)
LYMPHOCYTES # BLD AUTO: 16.1 % — SIGNIFICANT CHANGE UP (ref 13–44)
MCHC RBC-ENTMCNC: 30.9 PG — SIGNIFICANT CHANGE UP (ref 27–34)
MCHC RBC-ENTMCNC: 33.6 G/DL — SIGNIFICANT CHANGE UP (ref 32–36)
MCV RBC AUTO: 92 FL — SIGNIFICANT CHANGE UP (ref 80–100)
MONOCYTES # BLD AUTO: 0.53 K/UL — SIGNIFICANT CHANGE UP (ref 0–0.9)
MONOCYTES NFR BLD AUTO: 6.1 % — SIGNIFICANT CHANGE UP (ref 2–14)
NEUTROPHILS # BLD AUTO: 6.26 K/UL — SIGNIFICANT CHANGE UP (ref 1.8–7.4)
NEUTROPHILS NFR BLD AUTO: 71.5 % — SIGNIFICANT CHANGE UP (ref 43–77)
NRBC BLD AUTO-RTO: 0 /100 WBCS — SIGNIFICANT CHANGE UP (ref 0–0)
PLATELET # BLD AUTO: 250 K/UL — SIGNIFICANT CHANGE UP (ref 150–400)
POTASSIUM SERPL-MCNC: 4.2 MMOL/L — SIGNIFICANT CHANGE UP (ref 3.5–5.3)
POTASSIUM SERPL-SCNC: 4.2 MMOL/L — SIGNIFICANT CHANGE UP (ref 3.5–5.3)
PROT SERPL-MCNC: 7 G/DL — SIGNIFICANT CHANGE UP (ref 6–8.3)
RBC # BLD: 3.88 M/UL — SIGNIFICANT CHANGE UP (ref 3.8–5.2)
RBC # FLD: 13.2 % — SIGNIFICANT CHANGE UP (ref 10.3–14.5)
SODIUM SERPL-SCNC: 139 MMOL/L — SIGNIFICANT CHANGE UP (ref 135–145)
SURGICAL PATHOLOGY STUDY: SIGNIFICANT CHANGE UP
WBC # BLD: 8.75 K/UL — SIGNIFICANT CHANGE UP (ref 3.8–10.5)
WBC # FLD AUTO: 8.75 K/UL — SIGNIFICANT CHANGE UP (ref 3.8–10.5)

## 2025-06-24 PROCEDURE — 99255 IP/OBS CONSLTJ NEW/EST HI 80: CPT

## 2025-06-24 PROCEDURE — 85025 COMPLETE CBC W/AUTO DIFF WBC: CPT

## 2025-06-24 PROCEDURE — 36415 COLL VENOUS BLD VENIPUNCTURE: CPT

## 2025-06-24 PROCEDURE — 80053 COMPREHEN METABOLIC PANEL: CPT

## 2025-06-24 PROCEDURE — 87635 SARS-COV-2 COVID-19 AMP PRB: CPT

## 2025-06-24 PROCEDURE — 99223 1ST HOSP IP/OBS HIGH 75: CPT | Mod: GC

## 2025-06-24 RX ADMIN — LACOSAMIDE 150 MILLIGRAM(S): 150 TABLET, FILM COATED ORAL at 13:04

## 2025-06-24 RX ADMIN — Medication 40 MILLIGRAM(S): at 05:02

## 2025-06-24 RX ADMIN — BRIVARACETAM 100 MILLIGRAM(S): 75 TABLET, FILM COATED ORAL at 21:15

## 2025-06-24 RX ADMIN — Medication 88 MICROGRAM(S): at 05:00

## 2025-06-24 RX ADMIN — BRIVARACETAM 100 MILLIGRAM(S): 75 TABLET, FILM COATED ORAL at 05:00

## 2025-06-24 RX ADMIN — DEXAMETHASONE 2 MILLIGRAM(S): 0.5 TABLET ORAL at 05:00

## 2025-06-24 RX ADMIN — Medication 2 TABLET(S): at 21:23

## 2025-06-24 RX ADMIN — BRIVARACETAM 100 MILLIGRAM(S): 75 TABLET, FILM COATED ORAL at 13:04

## 2025-06-24 RX ADMIN — Medication 0.5 MILLIGRAM(S): at 21:16

## 2025-06-24 RX ADMIN — Medication 1.5 MILLIGRAM(S): at 21:16

## 2025-06-24 RX ADMIN — CLOBAZAM 10 MILLIGRAM(S): 20 TABLET ORAL at 21:16

## 2025-06-24 RX ADMIN — LACOSAMIDE 150 MILLIGRAM(S): 150 TABLET, FILM COATED ORAL at 05:00

## 2025-06-24 RX ADMIN — LACOSAMIDE 150 MILLIGRAM(S): 150 TABLET, FILM COATED ORAL at 21:22

## 2025-06-24 RX ADMIN — ENOXAPARIN SODIUM 40 MILLIGRAM(S): 100 INJECTION SUBCUTANEOUS at 17:10

## 2025-06-24 RX ADMIN — PERAMPANEL 4 MILLIGRAM(S): 6 TABLET ORAL at 21:16

## 2025-06-24 NOTE — DIETITIAN INITIAL EVALUATION ADULT - NS FNS DIET ORDER
Regular Diet (IDDSI Level 7) w/ Thin Liquids (IDDSI Level 0)   Education Provided on Proper Nutrition & Need for Increased Fluids/Fiber

## 2025-06-24 NOTE — DIETITIAN INITIAL EVALUATION ADULT - EDUCATION DIETARY MODIFICATIONS
Education Provided on Proper Nutrition & Need for Increased Fluids/Fiber/(1) partially meets; needs review/practice/verbalization

## 2025-06-24 NOTE — DIETITIAN INITIAL EVALUATION ADULT - ORAL INTAKE PTA/DIET HISTORY
Patient Does Not Follow Diet @Home But Tries to Limit Carbohydrates  Consumes 3-5 Meals a Day   Usually Cooks For Self/Family Cooks  Does Take Vitamin/Supplements @Home (Multivitamin, Protein)

## 2025-06-24 NOTE — DIETITIAN INITIAL EVALUATION ADULT - FACTORS AFF FOOD INTAKE
States Good PO Intake/Appetite over Last Week  Patient States Decreased Intake From Prior to Hospitalization (Per Patient)/none

## 2025-06-24 NOTE — DIETITIAN INITIAL EVALUATION ADULT - OTHER INFO
Initial Nutrition Assessment   54yr Old Female ()   Denies Food Allergy/Intolerance  Tolerates Diet Well - No Chewing/Swallowing Complications (Per Patient)  Surgical Incision on Head & No Pressure Ulcers (as Per Nursing Flow Sheets)  No Edema Noted (as Per Nursing Flow Sheets)  No Recent Nausea/Vomiting/Diarrhea & Some Recent Constipation (as Per Patient)

## 2025-06-24 NOTE — DIETITIAN INITIAL EVALUATION ADULT - ADD RECOMMEND
1) Monitor Weights (If Available), Intake, Tolerance, Skin & Labwork  2) Education Provided on Proper Nutrition & Need for Increased Fluids/Fiber    3) Continue Nutrition Plan of Care

## 2025-06-24 NOTE — DIETITIAN INITIAL EVALUATION ADULT - PERTINENT LABORATORY DATA
chest pain
06-24    139  |  103  |  25[H]  ----------------------------<  128[H]  4.2   |  27  |  0.59    Ca    9.3      24 Jun 2025 05:25    TPro  7.0  /  Alb  2.9[L]  /  TBili  0.3  /  DBili  x   /  AST  23  /  ALT  77[H]  /  AlkPhos  67  06-24  A1C with Estimated Average Glucose Result: 5.5 % (06-08-25 @ 07:06)

## 2025-06-24 NOTE — CONSULT NOTE ADULT - ASSESSMENT
54-year-old female   with history of left temporal lobe lesion, bipolar disorder, anxiety, psychogenic seizures, and concussion. Presented to [facility] on 6/6 with altered mental status, aphasia, and suspected antiseizure medication overdose following a seizure. Her partner reported a recent increase in partial seizures. MRI showed stable lesion with edema, suggesting malignancy and recent seizure activity. vEEG confirmed electrographic seizures in the left frontotemporal lobe. She was transferred to [facility] on 6/7, then to [facility] EMU on 6/11/2025 for further seizure management. Neurology adjusted meds (discontinued Lamictal, initiated Onfi; continued steroid taper, Fycompa, Vriviact, Vimpat). She underwent left craniotomy for tumor resection on 6/16/2025. Hospital course complicated by elevated TSH, leukocytosis, and pericardial effusion.   -Admitted to rehab on 6/23/2025 for weakness, gait instability, and ADL limitations.    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 650 milliGRAM(s) Oral every 6 hours PRN    #DVT PPX:  -enoxaparin Injectable 40 milliGRAM(s) SubCutaneous <User Schedule>    GI prohylaxis/Constipation  -pantoprazole    Tablet 40 milliGRAM(s) Oral before breakfast  -polyethylene glycol 3350 17 Gram(s) Oral daily  -senna 2 Tablet(s) Oral at bedtime    MEDICATIONS  (PRN):  albuterol    90 MICROgram(s) HFA Inhaler 2 Puff(s) Inhalation every 6 hours PRN Bronchospasm  simethicone 80 milliGRAM(s) Chew daily PRN Gas

## 2025-06-24 NOTE — DIETITIAN INITIAL EVALUATION ADULT - NSPROEDAABILITYLEARN_GEN_A_NUR
Education Provided on Proper Nutrition & Need for Increased Fluids/Fiber/cognitive limitations/comprehension

## 2025-06-24 NOTE — DIETITIAN INITIAL EVALUATION ADULT - PERTINENT MEDS FT
MEDICATIONS  (STANDING):  brivaracetam 100 milliGRAM(s) Oral <User Schedule>  cloBAZam 10 milliGRAM(s) Oral at bedtime  enoxaparin Injectable 40 milliGRAM(s) SubCutaneous <User Schedule>  lacosamide 150 milliGRAM(s) Oral <User Schedule>  levothyroxine 88 MICROGram(s) Oral daily  melatonin 1.5 milliGRAM(s) Oral at bedtime  pantoprazole    Tablet 40 milliGRAM(s) Oral before breakfast  perampanel 4 milliGRAM(s) Oral at bedtime  polyethylene glycol 3350 17 Gram(s) Oral daily  senna 2 Tablet(s) Oral at bedtime    MEDICATIONS  (PRN):  acetaminophen     Tablet .. 650 milliGRAM(s) Oral every 6 hours PRN Temp greater or equal to 38C (100.4F), Mild Pain (1 - 3)  albuterol    90 MICROgram(s) HFA Inhaler 2 Puff(s) Inhalation every 6 hours PRN Bronchospasm  LORazepam     Tablet 0.5 milliGRAM(s) Oral every 6 hours PRN Agitation  simethicone 80 milliGRAM(s) Chew daily PRN Gas

## 2025-06-24 NOTE — CONSULT NOTE ADULT - SUBJECTIVE AND OBJECTIVE BOX
CC: Patient is a 54y old  Female who presents with a chief complaint of Left temporal lobe lesion s/p craniotomy and resection (24 Jun 2025 12:47)    HPI:  54-year-old female   with history of left temporal lobe lesion, bipolar disorder, anxiety, psychogenic seizures, and concussion. Presented to [facility] on 6/6 with altered mental status, aphasia, and suspected antiseizure medication overdose following a seizure. Her partner reported a recent increase in partial seizures. MRI showed stable lesion with edema, suggesting malignancy and recent seizure activity. vEEG confirmed electrographic seizures in the left frontotemporal lobe. She was transferred to [facility] on 6/7, then to [facility] on 6/11/2025 for further seizure management. Neurology adjusted meds (discontinued Lamictal, initiated Onfi; continued steroid taper, Fycompa, Vriviact, Vimpat). She underwent left craniotomy for tumor resection on 6/16/2025. Hospital course complicated by elevated TSH, leukocytosis, and pericardial effusion.   -Admitted to rehab on 6/23/2025 for weakness, gait instability, and ADL limitations.    Interval History:  Chart reviewed  No major overnight events  No complaints this morning    PAST MEDICAL & SURGICAL HISTORY:  PTSD (post-traumatic stress disorder)  Anxiety  Seizure, first episode 2009 , second episode 2016  Osteoarthritis  Shingles, 2018  H/O rhinoplasty in 1984  H/O umbilical hernia repair, 2016    Social History:  SOCIAL HISTORY  Smoking - denies  EtOH - denies  Drugs - denies    Occupation:      FUNCTIONAL HISTORY  Patient lives in [facility] with mother. No steps to enter. Prior to admission, patient was Independent with all functional mobility and ADLs without AD. Patient states working as a .     CURRENT FUNCTIONAL STATUS  - Bed Mobility: CG; 1PA   - Transfers: CG; 1PA; FWB  - Gait: Min A; 1PA; FWB; 15 feet x 2 with RW    - ADLs:  -Grooming: CG; 1PA (23 Jun 2025 13:10)    FAMILY HISTORY:  Family history of cancer (Father)  melanoma    Family history of arthritis (Mother)    Allergies    No Known Allergies  Intolerances    Home Medications:  acetaminophen 325 mg oral tablet: 2 tab(s) orally every 6 hours As needed Temp greater or equal to 38C (100.4F), Mild Pain (1 - 3) (23 Jun 2025 12:12)  Albuterol (Eqv-ProAir HFA) 90 mcg/inh inhalation aerosol: 2 puff(s) inhaled every 6 hours as needed for  bronchospasm (09 Jun 2025 14:55)  brivaracetam 100 mg oral tablet: 1 tab(s) orally 3 times a day 6AM, 14PM AND 22PM (23 Jun 2025 13:39)  cloBAZam 10 mg oral tablet: 1 tab(s) orally once a day (at bedtime) (23 Jun 2025 13:39)  dexAMETHasone 2 mg oral tablet: 1 tab(s) orally every 12 hours x 1 more dose (23 Jun 2025 12:12)  enoxaparin: 40 milligram(s) subcutaneous once a day (at bedtime) (23 Jun 2025 13:39)  hydrocortisone 2.5% topical lotion: 1 Apply topically to affected area every 8 hours As needed Itching (23 Jun 2025 13:39)  lacosamide 150 mg oral tablet: 1 tab(s) orally 3 times a day 5AM,13 PM AND 21PM (23 Jun 2025 13:39)  LORazepam 0.5 mg oral tablet: 1 tab(s) orally every 6 hours As needed Agitation (23 Jun 2025 13:39)  melatonin 1 mg oral tablet: 1 tab(s) orally once a day (at bedtime) (23 Jun 2025 13:39)  perampanel 4 mg oral tablet: 1 tab(s) orally once a day (at bedtime) (23 Jun 2025 13:39)  polyethylene glycol 3350 oral powder for reconstitution: 17 gram(s) orally once a day (23 Jun 2025 12:12)  senna leaf extract oral tablet: 2 tab(s) orally once a day (at bedtime) (23 Jun 2025 12:12)  simethicone 80 mg oral tablet, chewable: 1 tab(s) orally once a day As needed Gas (23 Jun 2025 12:12)  Synthroid 88 mcg (0.088 mg) oral tablet: 1 tab(s) orally once a day (09 Jun 2025 14:55)    Care Discussed with Consultants/Other Providers: Yes    Vital Signs Last 24 Hrs  T(F): 97.6 (24 Jun 2025 08:36), Max: 98.1 (23 Jun 2025 16:07)  HR: 71 (24 Jun 2025 08:36) (65 - 76)  BP: 99/62 (24 Jun 2025 08:36) (89/53 - 99/62)  RR: 15 (24 Jun 2025 08:36) (15 - 18)  SpO2: 100% (24 Jun 2025 08:36) (95% - 100%)  I&O's Summary    BMI (kg/m2): 23.8 (06-23-25 @ 18:41)  PHYSICAL EXAM:  GENERAL: NAD  HEENT: EOMI, PERRLA  Neck: Supple  NERVOUS SYSTEM:  CN grossly intact; follows commands  HEART: s1s2, Regular rate and rhythm; No high grade murmurs, No pitting edema  CHEST/LUNG: Clear to ascultation bilaterally; No high grade adventitious sounds; normal respiratory effort, no intercostal retractions  ABDOMEN: Soft, Nontender, Bowel sounds present, no guarding  MUSCULOSKELETAL/EXTREMITIES:  No clubbing or digital cyanosis  PSYCH: Appropriate affect, Alert    Labs & Rads personally reviewed, of note...                        12.0   8.75  )-----------( 250      ( 24 Jun 2025 05:25 )             35.7       06-24    139  |  103  |  25  ----------------------------<  128  4.2   |  27  |  0.59    Ca    9.3      24 Jun 2025 05:25    TPro  7.0  /  Alb  2.9  /  TBili  0.3  /  DBili  x   /  AST  23  /  ALT  77  /  AlkPhos  67  06-24     Advanced imaging  Stable nonenhancing left temporal lesion with imaging characteristics   favoring a low-grade neoplasm. No significant mass effect.    Interval resolution of left temporal cortical restricted diffusion.   Persistent left temporal moderate cortical swelling and sulcal effacement   likely compatible with expected post seizure changes.                            CC: Patient is a 54y old  Female who presents with a chief complaint of Left temporal lobe lesion s/p craniotomy and resection (24 Jun 2025 12:47)    HPI:  54-year-old female   with history of left temporal lobe lesion, bipolar disorder, anxiety, psychogenic seizures, and concussion. Presented to [facility] on 6/6 with altered mental status, aphasia, and suspected antiseizure medication overdose following a seizure. Her partner reported a recent increase in partial seizures. MRI showed stable lesion with edema, suggesting malignancy and recent seizure activity. vEEG confirmed electrographic seizures in the left frontotemporal lobe. She was transferred to [facility] on 6/7, then to [facility] on 6/11/2025 for further seizure management. Neurology adjusted meds (discontinued Lamictal, initiated Onfi; continued steroid taper, Fycompa, Vriviact, Vimpat). She underwent left craniotomy for tumor resection on 6/16/2025. Hospital course complicated by elevated TSH, leukocytosis, and pericardial effusion.   -Admitted to rehab on 6/23/2025 for weakness, gait instability, and ADL limitations.    ROS:   -No fevers, no chills, no chest pain, no diarrhea  -Besides above Rest of 14 ROS are negative    Interval History:  Chart reviewed  No major overnight events  No complaints this morning    PAST MEDICAL & SURGICAL HISTORY:  PTSD (post-traumatic stress disorder)  Anxiety  Seizure, first episode 2009 , second episode 2016  Osteoarthritis  Shingles, 2018  H/O rhinoplasty in 1984  H/O umbilical hernia repair, 2016    Social History:  SOCIAL HISTORY  Smoking - denies  EtOH - denies  Drugs - denies    Occupation:      FUNCTIONAL HISTORY  Patient lives in [facility] with mother. No steps to enter. Prior to admission, patient was Independent with all functional mobility and ADLs without AD. Patient states working as a .     CURRENT FUNCTIONAL STATUS  - Bed Mobility: CG; 1PA   - Transfers: CG; 1PA; FWB  - Gait: Min A; 1PA; FWB; 15 feet x 2 with RW    - ADLs:  -Grooming: CG; 1PA (23 Jun 2025 13:10)    FAMILY HISTORY:  Family history of cancer (Father)  melanoma    Family history of arthritis (Mother)    Allergies    No Known Allergies  Intolerances    Home Medications:  acetaminophen 325 mg oral tablet: 2 tab(s) orally every 6 hours As needed Temp greater or equal to 38C (100.4F), Mild Pain (1 - 3) (23 Jun 2025 12:12)  Albuterol (Eqv-ProAir HFA) 90 mcg/inh inhalation aerosol: 2 puff(s) inhaled every 6 hours as needed for  bronchospasm (09 Jun 2025 14:55)  brivaracetam 100 mg oral tablet: 1 tab(s) orally 3 times a day 6AM, 14PM AND 22PM (23 Jun 2025 13:39)  cloBAZam 10 mg oral tablet: 1 tab(s) orally once a day (at bedtime) (23 Jun 2025 13:39)  dexAMETHasone 2 mg oral tablet: 1 tab(s) orally every 12 hours x 1 more dose (23 Jun 2025 12:12)  enoxaparin: 40 milligram(s) subcutaneous once a day (at bedtime) (23 Jun 2025 13:39)  hydrocortisone 2.5% topical lotion: 1 Apply topically to affected area every 8 hours As needed Itching (23 Jun 2025 13:39)  lacosamide 150 mg oral tablet: 1 tab(s) orally 3 times a day 5AM,13 PM AND 21PM (23 Jun 2025 13:39)  LORazepam 0.5 mg oral tablet: 1 tab(s) orally every 6 hours As needed Agitation (23 Jun 2025 13:39)  melatonin 1 mg oral tablet: 1 tab(s) orally once a day (at bedtime) (23 Jun 2025 13:39)  perampanel 4 mg oral tablet: 1 tab(s) orally once a day (at bedtime) (23 Jun 2025 13:39)  polyethylene glycol 3350 oral powder for reconstitution: 17 gram(s) orally once a day (23 Jun 2025 12:12)  senna leaf extract oral tablet: 2 tab(s) orally once a day (at bedtime) (23 Jun 2025 12:12)  simethicone 80 mg oral tablet, chewable: 1 tab(s) orally once a day As needed Gas (23 Jun 2025 12:12)  Synthroid 88 mcg (0.088 mg) oral tablet: 1 tab(s) orally once a day (09 Jun 2025 14:55)    Care Discussed with Consultants/Other Providers: Yes    Vital Signs Last 24 Hrs  T(F): 97.6 (24 Jun 2025 08:36), Max: 98.1 (23 Jun 2025 16:07)  HR: 71 (24 Jun 2025 08:36) (65 - 76)  BP: 99/62 (24 Jun 2025 08:36) (89/53 - 99/62)  RR: 15 (24 Jun 2025 08:36) (15 - 18)  SpO2: 100% (24 Jun 2025 08:36) (95% - 100%)  I&O's Summary    BMI (kg/m2): 23.8 (06-23-25 @ 18:41)  PHYSICAL EXAM:  GENERAL: NAD  HEENT: EOMI, PERRLA  Neck: Supple  NERVOUS SYSTEM:  CN grossly intact; follows commands  HEART: s1s2, Regular rate and rhythm; No high grade murmurs, No pitting edema  CHEST/LUNG: Clear to ascultation bilaterally; No high grade adventitious sounds; normal respiratory effort, no intercostal retractions  ABDOMEN: Soft, Nontender, Bowel sounds present, no guarding  MUSCULOSKELETAL/EXTREMITIES:  No clubbing or digital cyanosis  PSYCH: Appropriate affect, Alert    Labs & Rads personally reviewed, of note...                        12.0   8.75  )-----------( 250      ( 24 Jun 2025 05:25 )             35.7       06-24    139  |  103  |  25  ----------------------------<  128  4.2   |  27  |  0.59    Ca    9.3      24 Jun 2025 05:25    TPro  7.0  /  Alb  2.9  /  TBili  0.3  /  DBili  x   /  AST  23  /  ALT  77  /  AlkPhos  67  06-24     Advanced imaging  Stable nonenhancing left temporal lesion with imaging characteristics   favoring a low-grade neoplasm. No significant mass effect.    Interval resolution of left temporal cortical restricted diffusion.   Persistent left temporal moderate cortical swelling and sulcal effacement   likely compatible with expected post seizure changes.

## 2025-06-25 PROCEDURE — 99232 SBSQ HOSP IP/OBS MODERATE 35: CPT | Mod: GC

## 2025-06-25 PROCEDURE — 99232 SBSQ HOSP IP/OBS MODERATE 35: CPT

## 2025-06-25 RX ADMIN — Medication 40 MILLIGRAM(S): at 06:41

## 2025-06-25 RX ADMIN — LACOSAMIDE 150 MILLIGRAM(S): 150 TABLET, FILM COATED ORAL at 05:42

## 2025-06-25 RX ADMIN — ENOXAPARIN SODIUM 40 MILLIGRAM(S): 100 INJECTION SUBCUTANEOUS at 17:13

## 2025-06-25 RX ADMIN — Medication 1.5 MILLIGRAM(S): at 21:40

## 2025-06-25 RX ADMIN — PERAMPANEL 4 MILLIGRAM(S): 6 TABLET ORAL at 21:40

## 2025-06-25 RX ADMIN — BRIVARACETAM 100 MILLIGRAM(S): 75 TABLET, FILM COATED ORAL at 21:38

## 2025-06-25 RX ADMIN — BRIVARACETAM 100 MILLIGRAM(S): 75 TABLET, FILM COATED ORAL at 14:01

## 2025-06-25 RX ADMIN — LACOSAMIDE 150 MILLIGRAM(S): 150 TABLET, FILM COATED ORAL at 12:51

## 2025-06-25 RX ADMIN — Medication 0.5 MILLIGRAM(S): at 21:38

## 2025-06-25 RX ADMIN — CLOBAZAM 10 MILLIGRAM(S): 20 TABLET ORAL at 21:40

## 2025-06-25 RX ADMIN — Medication 88 MICROGRAM(S): at 05:39

## 2025-06-25 RX ADMIN — LACOSAMIDE 150 MILLIGRAM(S): 150 TABLET, FILM COATED ORAL at 21:38

## 2025-06-25 RX ADMIN — BRIVARACETAM 100 MILLIGRAM(S): 75 TABLET, FILM COATED ORAL at 05:41

## 2025-06-25 NOTE — PROGRESS NOTE ADULT - SUBJECTIVE AND OBJECTIVE BOX
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    ALLERGIES:  No Known Allergies    MEDICATIONS  (STANDING):  brivaracetam 100 milliGRAM(s) Oral <User Schedule>  cloBAZam 10 milliGRAM(s) Oral at bedtime  enoxaparin Injectable 40 milliGRAM(s) SubCutaneous <User Schedule>  lacosamide 150 milliGRAM(s) Oral <User Schedule>  levothyroxine 88 MICROGram(s) Oral daily  melatonin 1.5 milliGRAM(s) Oral at bedtime  pantoprazole    Tablet 40 milliGRAM(s) Oral before breakfast  perampanel 4 milliGRAM(s) Oral at bedtime  polyethylene glycol 3350 17 Gram(s) Oral daily  senna 2 Tablet(s) Oral at bedtime    MEDICATIONS  (PRN):  acetaminophen     Tablet .. 650 milliGRAM(s) Oral every 6 hours PRN Temp greater or equal to 38C (100.4F), Mild Pain (1 - 3)  albuterol    90 MICROgram(s) HFA Inhaler 2 Puff(s) Inhalation every 6 hours PRN Bronchospasm  LORazepam     Tablet 0.5 milliGRAM(s) Oral every 6 hours PRN Agitation  simethicone 80 milliGRAM(s) Chew daily PRN Gas    Vital Signs Last 24 Hrs  T(F): 98.5 (25 Jun 2025 08:30), Max: 98.5 (25 Jun 2025 08:30)  HR: 71 (25 Jun 2025 08:30) (60 - 71)  BP: 86/55 (25 Jun 2025 08:30) (86/55 - 95/62)  RR: 15 (25 Jun 2025 08:30) (15 - 15)  SpO2: 95% (25 Jun 2025 08:30) (95% - 97%)  I&O's Summary    BMI (kg/m2): 23.8 (06-23-25 @ 18:41)  PHYSICAL EXAM:  General: NAD, A/O   ENT: MMM, no scleral icterus  Neck: Supple, No JVD, no thyroidomegaly  Lungs: Clear to auscultation bilaterally, no wheezes, no rales, no rhonchi, good inspiratory effort  Cardio: RRR, S1/S2, No murmurs  Abdomen: Soft, Nontender, Nondistended; Bowel sounds present  Extremities: No calf tenderness, No pitting edema, no skin changes    LABS:                        12.0   8.75  )-----------( 250      ( 24 Jun 2025 05:25 )             35.7       06-24    139  |  103  |  25  ----------------------------<  128  4.2   |  27  |  0.59    Ca    9.3      24 Jun 2025 05:25    TPro  7.0  /  Alb  2.9  /  TBili  0.3  /  DBili  x   /  AST  23  /  ALT  77  /  AlkPhos  67  06-24 06-08 Chol 178 mg/dL LDL -- HDL 69 mg/dL Trig 128 mg/dL    Urinalysis Basic - ( 24 Jun 2025 05:25 )    Color: x / Appearance: x / SG: x / pH: x  Gluc: 128 mg/dL / Ketone: x  / Bili: x / Urobili: x   Blood: x / Protein: x / Nitrite: x   Leuk Esterase: x / RBC: x / WBC x   Sq Epi: x / Non Sq Epi: x / Bacteria: x    COVID-19 PCR: NotDetec (06-23-25 @ 20:00)  COVID-19 PCR: NotDetec (06-23-25 @ 20:00)

## 2025-06-25 NOTE — PROGRESS NOTE ADULT - SUBJECTIVE AND OBJECTIVE BOX
HPI:  54F with a PMHx of Left temporal lobe lesion, Bipolar, Anxiety, Psychogenic seizures, Concussion, presented to [facility] on 6/6 for worsening AMS after seizure episode, aphasia, and possible overdose on her antiseizure medications. As per partner over the past few weeks patient has been having multiple episodes of partial seizures more often than usual. Despite being on her normal routine, patient often has multiple seizures weekly but always returned to baseline. MRI brain with stable temporal lobe lesion favoring diagnosis of malignancy as well as surrounding edema likely correlated to recent seizure activity. vEEG revealed evidence of electrographic seizure activity in the left frontotemporal lobe. Patient was urgently transferred to [facility] on 6/7 for further evaluation of seizure activity. Neurology consulted and suspected seizures related to temporal lobe lesion and patient was transferred to [facility]U on 6/11/25 for management of seizures.  was consulted for previous OSH suicidal ideation, but currently denies. Neurology recommended adjustments to medications to stop Lamictal, start Onfi, continue steroid taper, Fycompa, Vriviact, and Vimpat. S/p left craniotomy for neoplastic resection on 6/16/25 with Dr. Hernández. Hospital course significant for elevated TSH, reactive leukocytosis, pericardial effusion. Patient now admitted on 6/23/25 for a multidisciplinary rehab program for generalized weakness, gait instability, ADL impairments, and functional impairments.    Allergies  No Known Allergies    Subjective:  - Patient was seen and examined at bedside, NAD  - Sleeping well at night, no events  - Pain is well controlled   - LBM (06/25), voiding without issues  - Tolerating daily therapy, motivated  - BP is running low, asymptomatic   - Her goal is to go home ASAP.    ROS:  - Denies CP, palpitation, SOB, cough, fever, chills, headache, dizziness, visual changes, abdominal pain, N/V/D/C, dysuria, hematuria, joint pain or swelling     MEDICATIONS  (STANDING):  brivaracetam 100 milliGRAM(s) Oral <User Schedule>  cloBAZam 10 milliGRAM(s) Oral at bedtime  enoxaparin Injectable 40 milliGRAM(s) SubCutaneous <User Schedule>  lacosamide 150 milliGRAM(s) Oral <User Schedule>  levothyroxine 88 MICROGram(s) Oral daily  melatonin 1.5 milliGRAM(s) Oral at bedtime  pantoprazole    Tablet 40 milliGRAM(s) Oral before breakfast  perampanel 4 milliGRAM(s) Oral at bedtime  polyethylene glycol 3350 17 Gram(s) Oral daily  senna 2 Tablet(s) Oral at bedtime    MEDICATIONS  (PRN):  acetaminophen     Tablet .. 650 milliGRAM(s) Oral every 6 hours PRN Temp greater or equal to 38C (100.4F), Mild Pain (1 - 3)  albuterol    90 MICROgram(s) HFA Inhaler 2 Puff(s) Inhalation every 6 hours PRN Bronchospasm  LORazepam     Tablet 0.5 milliGRAM(s) Oral every 6 hours PRN Agitation  simethicone 80 milliGRAM(s) Chew daily PRN Gas      Recent Labs:                        12.0   8.75  )-----------( 250      ( 24 Jun 2025 05:25 )             35.7     06-24    139  |  103  |  25[H]  ----------------------------<  128[H]  4.2   |  27  |  0.59    Ca    9.3      24 Jun 2025 05:25    TPro  7.0  /  Alb  2.9[L]  /  TBili  0.3  /  DBili  x   /  AST  23  /  ALT  77[H]  /  AlkPhos  67  06-24    LIVER FUNCTIONS - ( 24 Jun 2025 05:25 )  Alb: 2.9 g/dL / Pro: 7.0 g/dL / ALK PHOS: 67 U/L / ALT: 77 U/L / AST: 23 U/L / GGT: x             Physical Exam:  Vital Signs Last 24 Hrs  T(C): 36.9 (25 Jun 2025 08:30), Max: 36.9 (25 Jun 2025 08:30)  T(F): 98.5 (25 Jun 2025 08:30), Max: 98.5 (25 Jun 2025 08:30)  HR: 71 (25 Jun 2025 08:30) (60 - 71)  BP: 86/55 (25 Jun 2025 08:30) (86/55 - 95/62)  RR: 15 (25 Jun 2025 08:30) (15 - 15)  SpO2: 95% (25 Jun 2025 08:30) (95% - 97%)  Parameters below as of 25 Jun 2025 08:30  Patient On (Oxygen Delivery Method): room air      Gen - NAD, Comfortable  HEENT - EOMI,  L pupil>R, (+) L crani incision with edema , (+) L periorbital ecchymosis   Neck - Supple, No limited ROM  Pulm - CTAB  Cardiovascular - RRR, S1S2   Chest - good chest expansion, good respiratory effort  Abdomen - Soft, NT, ND   Extremities - No C/C/E, no calf tenderness   Neuro- awake, alert, oriented to person, place, date, year, and situation.  Able  to follow commands. Impulsive, (+) memory and attention deficits, mild right sided weakness when compared to left side   Psychiatric - Mood stable, Affect WNL, speech fast and patient anxious regarding timeline of stay and ability to return to activities  Skin:  L crani incision with steri strips, LUE ecchymosis, L periorbital ecchymosis, Otherwise, skin intact

## 2025-06-25 NOTE — PROGRESS NOTE ADULT - ASSESSMENT
RHD 54F with a PMHx of Left temporal lobe lesion, Bipolar, Anxiety, Psychogenic seizures, Asthma Concussion, presented to [facility] as a transfer from [facility] on 6/11 for worsening AMS 2/2 worsening seizures. S/p left craniotomy for neoplastic resection on 6/16/25 with Dr. Hernández. Patient now admitted on 6/23/25 for a multidisciplinary rehab program for generalized weakness, gait instability, ADL impairments, and functional impairments.    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 P&O as needed   - MR Head- stable temporal lobe lesion favoring diagnosis of malignancy as well as surrounding edema likely correlated to recent seizure activity  - Pertinent history: Emotional distress, persistent episodes focal seizures; s/p L craniotomy for neoplastic tumor resections with Dr. Hernández 6/16  - EEG 6/11- -Numerous left posterior temporal focal-onset seizures at times with bilateral spread, 2-5 seizures/hour for most of recording, some hours with no seizures. Risk of left frontal and right frontotemporal focal-onset seizures, Left hemispheric focal cerebral dysfunction can    be structural and/or functional (such as post-ictal) in etiology.   - AEDS: Briviact 100mg q12hr, Onfi 10qhs , Vimpat 150mg BID, Fycompa 4mg qhs  - Dexamethasone 2mg BID (needs 1 more dose to finish on 6/23)    ________________________________       # Pain  - Tylenol PRN     # Mood / Cognition/ PTSD  # Anxiety and depression/ Bipolar  - Patient reportedly expressed SI at some point at OSH, with concern raised that she may have taken an overdose of Lamictal.  - On  evaluation patient denied having suicidal thoughts or making a suicide attempt, but per records has a history of a suicide attempt and psychiatric hospitalization 11 years ago.   - Continue Ativan 0.5mg PRN   - Constant obs --> routine supervision  - Neuropsychology consult PRN      # Sleep  - Melatonin 1.5mg HS to maximize participation in therapy during the day     # GI / Bowel Management / Constipation  - Senna QHS --> D/C per pt's request  (06/25)   - Miralax QD --> D/C per pt's request    - Simethicone daily PRN gas--> D/C per pt's request     - GI ppx: Protonix 40mg daily-->  D/C per pt's request       # Skin  - Skin assessment on admission performed: L crani incision with steri strips, LUE ecchymosis, L periorbital ecchymosis, Otherwise, skin intact   - Nursing to monitor skin qShift     # Diet  - Diet Consistency: Regular   - Nutrition F/U  - SLP ongoing treatment      # Precautions / PROPHYLAXIS:   - Precautons: Falls, Seizure   - Pressure injury/Skin:  OOB to Chair, PT/OT    - DVT ppx: Lovenox 40mg daily   - Weight bearing status: No Weight Bearing Restrictions     ___________________________________________     Concurrent Medical Problems     #Reactive Leukocytosis (2/2 steroids)  - Monitor and trend CBC, fever curve     # Pericardial effusion   - TTE 6/16 with EF 66% with normal LVSF, no WMA, and trace pericardial effusion   - Repeat TTE outpatient in 1 month    # Hypothyroidism   - TSH elevated (improving) but FT4 normal likely reactive   - Synthroid 88mcg daily   - Repeat TFTs outpatient    #Asthma  -Albuterol PRN      #Patient Education  - Education provided on the following:      * Admitting diagnosis and functional implications      * Functional goals      * Bladder management      * Bowel management      * Skin care management      * Intensity of service and scheduling of rehab disciplines      * Plan of care and role of interdisciplinary team conference in discharge planning      * Reconciliation of medications from prior institution       Outpatient Follow-up:  Specialty and Name of physician    Scooter Hernández  Neurological Surgery  [address] [place] [address], [address]  [place] [address]  Phone: [phone]  Fax: [phone]  ____________________________

## 2025-06-25 NOTE — PROGRESS NOTE ADULT - ASSESSMENT
54-year-old female with history of left temporal lobe lesion, bipolar disorder, anxiety, psychogenic seizures, and concussion. Presented to [facility] on 6/6 with altered mental status, aphasia, and suspected antiseizure medication overdose following a seizure. Her partner reported a recent increase in partial seizures. MRI showed stable lesion with edema, suggesting malignancy and recent seizure activity. vEEG confirmed electrographic seizures in the left frontotemporal lobe. She was transferred to [facility] on 6/7, then to [facility] EMU on 6/11/2025 for further seizure management. Neurology adjusted meds (discontinued Lamictal, initiated Onfi; continued steroid taper, Fycompa, Vriviact, Vimpat). She underwent left craniotomy for tumor resection on 6/16/2025. Hospital course complicated by elevated TSH, leukocytosis, and pericardial effusion.     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

## 2025-06-26 ENCOUNTER — TRANSCRIPTION ENCOUNTER (OUTPATIENT)
Age: 55
End: 2025-06-26

## 2025-06-26 LAB
ALBUMIN SERPL ELPH-MCNC: 3.1 G/DL — LOW (ref 3.3–5)
ALP SERPL-CCNC: 75 U/L — SIGNIFICANT CHANGE UP (ref 40–120)
ALT FLD-CCNC: 92 U/L — HIGH (ref 10–45)
ANION GAP SERPL CALC-SCNC: 11 MMOL/L — SIGNIFICANT CHANGE UP (ref 5–17)
AST SERPL-CCNC: 29 U/L — SIGNIFICANT CHANGE UP (ref 10–40)
BASOPHILS # BLD AUTO: 0 K/UL — SIGNIFICANT CHANGE UP (ref 0–0.2)
BASOPHILS NFR BLD AUTO: 0 % — SIGNIFICANT CHANGE UP (ref 0–2)
BILIRUB SERPL-MCNC: 0.3 MG/DL — SIGNIFICANT CHANGE UP (ref 0.2–1.2)
BUN SERPL-MCNC: 26 MG/DL — HIGH (ref 7–23)
CALCIUM SERPL-MCNC: 9.1 MG/DL — SIGNIFICANT CHANGE UP (ref 8.4–10.5)
CHLORIDE SERPL-SCNC: 103 MMOL/L — SIGNIFICANT CHANGE UP (ref 96–108)
CO2 SERPL-SCNC: 26 MMOL/L — SIGNIFICANT CHANGE UP (ref 22–31)
CREAT SERPL-MCNC: 0.71 MG/DL — SIGNIFICANT CHANGE UP (ref 0.5–1.3)
EGFR: 99 ML/MIN/1.73M2 — SIGNIFICANT CHANGE UP
EGFR: 99 ML/MIN/1.73M2 — SIGNIFICANT CHANGE UP
EOSINOPHIL # BLD AUTO: 0.06 K/UL — SIGNIFICANT CHANGE UP (ref 0–0.5)
EOSINOPHIL NFR BLD AUTO: 1 % — SIGNIFICANT CHANGE UP (ref 0–6)
GLUCOSE SERPL-MCNC: 93 MG/DL — SIGNIFICANT CHANGE UP (ref 70–99)
HCT VFR BLD CALC: 38.1 % — SIGNIFICANT CHANGE UP (ref 34.5–45)
HGB BLD-MCNC: 12.7 G/DL — SIGNIFICANT CHANGE UP (ref 11.5–15.5)
LYMPHOCYTES # BLD AUTO: 1.37 K/UL — SIGNIFICANT CHANGE UP (ref 1–3.3)
LYMPHOCYTES # BLD AUTO: 23 % — SIGNIFICANT CHANGE UP (ref 13–44)
MANUAL SMEAR VERIFICATION: SIGNIFICANT CHANGE UP
MCHC RBC-ENTMCNC: 30.8 PG — SIGNIFICANT CHANGE UP (ref 27–34)
MCHC RBC-ENTMCNC: 33.3 G/DL — SIGNIFICANT CHANGE UP (ref 32–36)
MCV RBC AUTO: 92.3 FL — SIGNIFICANT CHANGE UP (ref 80–100)
MONOCYTES # BLD AUTO: 0.65 K/UL — SIGNIFICANT CHANGE UP (ref 0–0.9)
MONOCYTES NFR BLD AUTO: 11 % — SIGNIFICANT CHANGE UP (ref 2–14)
MYELOCYTES NFR BLD: 2 % — HIGH (ref 0–0)
NEUTROPHILS # BLD AUTO: 3.74 K/UL — SIGNIFICANT CHANGE UP (ref 1.8–7.4)
NEUTROPHILS NFR BLD AUTO: 60 % — SIGNIFICANT CHANGE UP (ref 43–77)
NEUTS BAND # BLD: 3 % — SIGNIFICANT CHANGE UP (ref 0–8)
NEUTS BAND NFR BLD: 3 % — SIGNIFICANT CHANGE UP (ref 0–8)
NRBC # BLD: 1 /100 WBCS — HIGH (ref 0–0)
NRBC BLD-RTO: 1 /100 WBCS — HIGH (ref 0–0)
PLAT MORPH BLD: NORMAL — SIGNIFICANT CHANGE UP
PLATELET # BLD AUTO: 245 K/UL — SIGNIFICANT CHANGE UP (ref 150–400)
POTASSIUM SERPL-MCNC: 3.3 MMOL/L — LOW (ref 3.5–5.3)
POTASSIUM SERPL-SCNC: 3.3 MMOL/L — LOW (ref 3.5–5.3)
PROT SERPL-MCNC: 7.2 G/DL — SIGNIFICANT CHANGE UP (ref 6–8.3)
RBC # BLD: 4.13 M/UL — SIGNIFICANT CHANGE UP (ref 3.8–5.2)
RBC # FLD: 13.1 % — SIGNIFICANT CHANGE UP (ref 10.3–14.5)
RBC BLD AUTO: NORMAL — SIGNIFICANT CHANGE UP
SODIUM SERPL-SCNC: 140 MMOL/L — SIGNIFICANT CHANGE UP (ref 135–145)
WBC # BLD: 5.94 K/UL — SIGNIFICANT CHANGE UP (ref 3.8–10.5)
WBC # FLD AUTO: 5.94 K/UL — SIGNIFICANT CHANGE UP (ref 3.8–10.5)

## 2025-06-26 PROCEDURE — 87635 SARS-COV-2 COVID-19 AMP PRB: CPT

## 2025-06-26 PROCEDURE — 97165 OT EVAL LOW COMPLEX 30 MIN: CPT | Mod: GO

## 2025-06-26 PROCEDURE — 92507 TX SP LANG VOICE COMM INDIV: CPT | Mod: GN

## 2025-06-26 PROCEDURE — 97110 THERAPEUTIC EXERCISES: CPT | Mod: GO

## 2025-06-26 PROCEDURE — 92610 EVALUATE SWALLOWING FUNCTION: CPT | Mod: GN

## 2025-06-26 PROCEDURE — 97530 THERAPEUTIC ACTIVITIES: CPT | Mod: GP

## 2025-06-26 PROCEDURE — 36415 COLL VENOUS BLD VENIPUNCTURE: CPT

## 2025-06-26 PROCEDURE — 97161 PT EVAL LOW COMPLEX 20 MIN: CPT | Mod: GP

## 2025-06-26 PROCEDURE — 80053 COMPREHEN METABOLIC PANEL: CPT

## 2025-06-26 PROCEDURE — 97116 GAIT TRAINING THERAPY: CPT | Mod: GP

## 2025-06-26 PROCEDURE — 92523 SPEECH SOUND LANG COMPREHEN: CPT | Mod: GN

## 2025-06-26 PROCEDURE — 85025 COMPLETE CBC W/AUTO DIFF WBC: CPT

## 2025-06-26 PROCEDURE — 99233 SBSQ HOSP IP/OBS HIGH 50: CPT | Mod: GC

## 2025-06-26 RX ORDER — ALBUTEROL SULFATE 2.5 MG/3ML
2 VIAL, NEBULIZER (ML) INHALATION
Qty: 1 | Refills: 0
Start: 2025-06-26

## 2025-06-26 RX ORDER — BRIVARACETAM 75 MG/1
1 TABLET, FILM COATED ORAL
Qty: 0 | Refills: 0 | DISCHARGE
Start: 2025-06-26

## 2025-06-26 RX ORDER — LEVOTHYROXINE SODIUM 300 MCG
1 TABLET ORAL
Qty: 30 | Refills: 0
Start: 2025-06-26 | End: 2025-07-25

## 2025-06-26 RX ORDER — LACOSAMIDE 150 MG/1
1 TABLET, FILM COATED ORAL
Qty: 0 | Refills: 0 | DISCHARGE
Start: 2025-06-26

## 2025-06-26 RX ORDER — ALBUTEROL SULFATE 2.5 MG/3ML
2 VIAL, NEBULIZER (ML) INHALATION
Refills: 0 | DISCHARGE

## 2025-06-26 RX ORDER — CLOBAZAM 20 MG/1
1 TABLET ORAL
Qty: 0 | Refills: 0 | DISCHARGE
Start: 2025-06-26

## 2025-06-26 RX ORDER — LORAZEPAM 4 MG/ML
1 VIAL (ML) INJECTION
Qty: 0 | Refills: 0 | DISCHARGE
Start: 2025-06-26

## 2025-06-26 RX ORDER — LEVOTHYROXINE SODIUM 300 MCG
1 TABLET ORAL
Refills: 0 | DISCHARGE

## 2025-06-26 RX ORDER — PERAMPANEL 6 MG/1
1 TABLET ORAL
Qty: 0 | Refills: 0 | DISCHARGE
Start: 2025-06-26

## 2025-06-26 RX ADMIN — BRIVARACETAM 100 MILLIGRAM(S): 75 TABLET, FILM COATED ORAL at 21:38

## 2025-06-26 RX ADMIN — Medication 650 MILLIGRAM(S): at 18:33

## 2025-06-26 RX ADMIN — LACOSAMIDE 150 MILLIGRAM(S): 150 TABLET, FILM COATED ORAL at 05:19

## 2025-06-26 RX ADMIN — Medication 1.5 MILLIGRAM(S): at 21:38

## 2025-06-26 RX ADMIN — LACOSAMIDE 150 MILLIGRAM(S): 150 TABLET, FILM COATED ORAL at 13:24

## 2025-06-26 RX ADMIN — Medication 650 MILLIGRAM(S): at 06:00

## 2025-06-26 RX ADMIN — ENOXAPARIN SODIUM 40 MILLIGRAM(S): 100 INJECTION SUBCUTANEOUS at 17:16

## 2025-06-26 RX ADMIN — BRIVARACETAM 100 MILLIGRAM(S): 75 TABLET, FILM COATED ORAL at 05:18

## 2025-06-26 RX ADMIN — BRIVARACETAM 100 MILLIGRAM(S): 75 TABLET, FILM COATED ORAL at 13:24

## 2025-06-26 RX ADMIN — LACOSAMIDE 150 MILLIGRAM(S): 150 TABLET, FILM COATED ORAL at 21:39

## 2025-06-26 RX ADMIN — Medication 88 MICROGRAM(S): at 05:18

## 2025-06-26 RX ADMIN — PERAMPANEL 4 MILLIGRAM(S): 6 TABLET ORAL at 21:38

## 2025-06-26 RX ADMIN — Medication 650 MILLIGRAM(S): at 05:18

## 2025-06-26 RX ADMIN — CLOBAZAM 10 MILLIGRAM(S): 20 TABLET ORAL at 21:39

## 2025-06-26 NOTE — DISCHARGE NOTE PROVIDER - PROVIDER TOKENS
PROVIDER:[TOKEN:[[id number]],FOLLOWUP:[1 week]],PROVIDER:[TOKEN:[[id number]],FOLLOWUP:[1 month]],PROVIDER:[TOKEN:[[id number]],FOLLOWUP:[1 week]]

## 2025-06-26 NOTE — DISCHARGE NOTE PROVIDER - HOSPITAL COURSE
54F with a PMHx of Left temporal lobe lesion, Bipolar, Anxiety, Psychogenic seizures, Concussion, presented to [facility] on 6/6 for worsening AMS after seizure episode, aphasia, and possible overdose on her antiseizure medications. As per partner over the past few weeks patient has been having multiple episodes of partial seizures more often than usual. Despite being on her normal routine, patient often has multiple seizures weekly but always returned to baseline. MRI brain with stable temporal lobe lesion favoring diagnosis of malignancy as well as surrounding edema likely correlated to recent seizure activity. vEEG revealed evidence of electrographic seizure activity in the left frontotemporal lobe. Patient was urgently transferred to [facility] on 6/7 for further evaluation of seizure activity. Neurology consulted and suspected seizures related to temporal lobe lesion and patient was transferred to [facility] EMU on 6/11/25 for management of seizures.  was consulted for previous OSH suicidal ideation, but currently denies. Neurology recommended adjustments to medications to stop Lamictal, start Onfi, continue steroid taper, Fycompa, Vriviact, and Vimpat. S/p left craniotomy for neoplastic resection on 6/16/25 with Dr. Hernández. Hospital course significant for elevated TSH, reactive leukocytosis, pericardial effusion. Patient now admitted on 6/23/25 for a multidisciplinary rehab program for generalized weakness, gait instability, ADL impairments, and functional impairments.    Patient participated in daily therapies and made good functional gains.  Rehab course notable for:  -Completion of decadron taper.  -Patient impulsive throughout hospitalization despite reminders to maintain safety.  -Patient requested all non essential medications be discontinued which they were.  -Leukocytosis resolved by day of discharge.    Patient tolerated course of inpatient PT/OT/SLP rehab with significant functional improvements. Patient seen and examined on day of discharge.  Medications, medication side effects, and discharge instructions were reviewed with the patient, who expressed understanding of all information.  Patient was medically and functionally optimized and cleared for discharge.  Patient to continue therapies outpatient.   54F with a PMHx of Left temporal lobe lesion, Bipolar, Anxiety, Psychogenic seizures, Concussion, presented to [facility] on 6/6 for worsening AMS after seizure episode, aphasia, and possible overdose on her antiseizure medications. As per partner over the past few weeks patient has been having multiple episodes of partial seizures more often than usual. Despite being on her normal routine, patient often has multiple seizures weekly but always returned to baseline. MRI brain with stable temporal lobe lesion favoring diagnosis of malignancy as well as surrounding edema likely correlated to recent seizure activity. vEEG revealed evidence of electrographic seizure activity in the left frontotemporal lobe. Patient was urgently transferred to [facility] on 6/7 for further evaluation of seizure activity. Neurology consulted and suspected seizures related to temporal lobe lesion and patient was transferred to [facility] EMU on 6/11/25 for management of seizures.  was consulted for previous OSH suicidal ideation, but currently denies. Neurology recommended adjustments to medications to stop Lamictal, start Onfi, continue steroid taper, Fycompa, Vriviact, and Vimpat. S/p left craniotomy for neoplastic resection on 6/16/25 with Dr. Hernández. Hospital course significant for elevated TSH, reactive leukocytosis, pericardial effusion. Patient now admitted on 6/23/25 for a multidisciplinary rehab program for generalized weakness, gait instability, ADL impairments, and functional impairments.    Patient participated in daily therapies and made good functional gains.  Rehab course notable for:  -Completion of decadron taper.  -Patient impulsive throughout hospitalization despite reminders to maintain safety.  -Patient requested all non essential medications be discontinued which they were.  -Leukocytosis resolved by day of discharge.  -Discharge delayed due to prior auths required for briviact and perampanel which cannot be adjusted     Patient tolerated course of inpatient PT/OT/SLP rehab with significant functional improvements. Patient seen and examined on day of discharge.  Medications, medication side effects, and discharge instructions were reviewed with the patient, who expressed understanding of all information.  Patient was medically and functionally optimized and cleared for discharge.  Patient to continue therapies outpatient.

## 2025-06-26 NOTE — PROGRESS NOTE ADULT - SUBJECTIVE AND OBJECTIVE BOX
HPI:  54F with a PMHx of Left temporal lobe lesion, Bipolar, Anxiety, Psychogenic seizures, Concussion, presented to [facility] on 6/6 for worsening AMS after seizure episode, aphasia, and possible overdose on her antiseizure medications. As per partner over the past few weeks patient has been having multiple episodes of partial seizures more often than usual. Despite being on her normal routine, patient often has multiple seizures weekly but always returned to baseline. MRI brain with stable temporal lobe lesion favoring diagnosis of malignancy as well as surrounding edema likely correlated to recent seizure activity. vEEG revealed evidence of electrographic seizure activity in the left frontotemporal lobe. Patient was urgently transferred to [facility] on 6/7 for further evaluation of seizure activity. Neurology consulted and suspected seizures related to temporal lobe lesion and patient was transferred to [facility] EMU on 6/11/25 for management of seizures.  was consulted for previous OSH suicidal ideation, but currently denies. Neurology recommended adjustments to medications to stop Lamictal, start Onfi, continue steroid taper, Fycompa, Vriviact, and Vimpat. S/p left craniotomy for neoplastic resection on 6/16/25 with Dr. Hernández. Hospital course significant for elevated TSH, reactive leukocytosis, pericardial effusion. Patient now admitted on 6/23/25 for a multidisciplinary rehab program for generalized weakness, gait instability, ADL impairments, and functional impairments.    Allergies  No Known Allergies    Subjective:  - Patient was seen and examined at in therapy, NAD  - Patient performing left shoulder straight raises with a dumbbell  - She states she is doing well overall, but she is eager to learn about her discharge date. Explained that IDT will be occurring today  - Sleeping well at night, no events  - Pain is well controlled   - LBM (06/24), voiding without issues  - Tolerating daily therapy, motivated  - BP is running low, asymptomatic   - Her goal is to go home ASAP.  - Case discussed at IDT meeting today for progress and discharge plan    ROS:  - Denies CP, palpitation, SOB, cough, fever, chills, headache, dizziness, visual changes, abdominal pain, N/V/D/C, dysuria, hematuria, joint pain or swelling     MEDICATIONS  (STANDING):  brivaracetam 100 milliGRAM(s) Oral <User Schedule>  cloBAZam 10 milliGRAM(s) Oral at bedtime  enoxaparin Injectable 40 milliGRAM(s) SubCutaneous <User Schedule>  lacosamide 150 milliGRAM(s) Oral <User Schedule>  levothyroxine 88 MICROGram(s) Oral daily  melatonin 1.5 milliGRAM(s) Oral at bedtime  perampanel 4 milliGRAM(s) Oral at bedtime    MEDICATIONS  (PRN):  acetaminophen     Tablet .. 650 milliGRAM(s) Oral every 6 hours PRN Temp greater or equal to 38C (100.4F), Mild Pain (1 - 3)  albuterol    90 MICROgram(s) HFA Inhaler 2 Puff(s) Inhalation every 6 hours PRN Bronchospasm  LORazepam     Tablet 0.5 milliGRAM(s) Oral every 6 hours PRN Agitation      Recent Labs:                        12.0   8.75  )-----------( 250      ( 24 Jun 2025 05:25 )             35.7     06-24    139  |  103  |  25[H]  ----------------------------<  128[H]  4.2   |  27  |  0.59    Ca    9.3      24 Jun 2025 05:25    TPro  7.0  /  Alb  2.9[L]  /  TBili  0.3  /  DBili  x   /  AST  23  /  ALT  77[H]  /  AlkPhos  67  06-24    LIVER FUNCTIONS - ( 24 Jun 2025 05:25 )  Alb: 2.9 g/dL / Pro: 7.0 g/dL / ALK PHOS: 67 U/L / ALT: 77 U/L / AST: 23 U/L / GGT: x             Physical Exam:  Vital Signs Last 24 Hrs  T(C): 37.1 (25 Jun 2025 19:00), Max: 37.1 (25 Jun 2025 19:00)  T(F): 98.7 (25 Jun 2025 19:00), Max: 98.7 (25 Jun 2025 19:00)  HR: 84 (25 Jun 2025 19:00) (84 - 84)  BP: 97/62 (25 Jun 2025 19:00) (97/62 - 97/62)  RR: 15 (25 Jun 2025 19:00) (15 - 15)  SpO2: 95% (25 Jun 2025 19:00) (95% - 95%)  Patient On (Oxygen Delivery Method): room air    Gen - NAD, Comfortable  HEENT - EOMI,  L pupil>R, (+) L crani incision with edema , (+) L periorbital ecchymosis   Neck - Supple, No limited ROM  Pulm - CTAB  Cardiovascular - RRR, S1S2   Chest - good chest expansion, good respiratory effort  Abdomen - Soft, NT, ND   Extremities - No C/C/E, no calf tenderness   Neuro- awake, alert, oriented to person, place, date, year, and situation.  Able  to follow commands. Impulsive, (+) memory and attention deficits, mild right sided weakness when compared to left side   Psychiatric - Mood stable, Affect WNL, speech fast and patient anxious regarding timeline of stay and ability to return to activities  Skin:  L crani incision with steri strips, LUE ecchymosis, L periorbital ecchymosis, Otherwise, skin intact             HPI:  54F with a PMHx of Left temporal lobe lesion, Bipolar, Anxiety, Psychogenic seizures, Concussion, presented to [facility] on 6/6 for worsening AMS after seizure episode, aphasia, and possible overdose on her antiseizure medications. As per partner over the past few weeks patient has been having multiple episodes of partial seizures more often than usual. Despite being on her normal routine, patient often has multiple seizures weekly but always returned to baseline. MRI brain with stable temporal lobe lesion favoring diagnosis of malignancy as well as surrounding edema likely correlated to recent seizure activity. vEEG revealed evidence of electrographic seizure activity in the left frontotemporal lobe. Patient was urgently transferred to [facility] on 6/7 for further evaluation of seizure activity. Neurology consulted and suspected seizures related to temporal lobe lesion and patient was transferred to [facility] EMU on 6/11/25 for management of seizures.  was consulted for previous OSH suicidal ideation, but currently denies. Neurology recommended adjustments to medications to stop Lamictal, start Onfi, continue steroid taper, Fycompa, Vriviact, and Vimpat. S/p left craniotomy for neoplastic resection on 6/16/25 with Dr. Hernández. Hospital course significant for elevated TSH, reactive leukocytosis, pericardial effusion. Patient now admitted on 6/23/25 for a multidisciplinary rehab program for generalized weakness, gait instability, ADL impairments, and functional impairments.    Allergies  No Known Allergies    Subjective:  - Patient was seen and examined at in therapy, NAD  - Patient performing left shoulder straight raises with a dumbbell  - She states she is doing well overall, but she is eager to learn about her discharge date. Explained that IDT will be occurring today  - Sleeping well at night, no events  - Pain is well controlled   - LBM (06/24), voiding without issues. Continent x 2  - Tolerating daily therapy, motivated  - BP is running low, asymptomatic   - Her goal is to go home ASAP.  - Case discussed at IDT meeting today for progress and discharge plan    ROS:  - Denies CP, palpitation, SOB, cough, fever, chills, headache, dizziness, visual changes, abdominal pain, N/V/D/C, dysuria, hematuria, joint pain or swelling     MEDICATIONS  (STANDING):  brivaracetam 100 milliGRAM(s) Oral <User Schedule>  cloBAZam 10 milliGRAM(s) Oral at bedtime  enoxaparin Injectable 40 milliGRAM(s) SubCutaneous <User Schedule>  lacosamide 150 milliGRAM(s) Oral <User Schedule>  levothyroxine 88 MICROGram(s) Oral daily  melatonin 1.5 milliGRAM(s) Oral at bedtime  perampanel 4 milliGRAM(s) Oral at bedtime    MEDICATIONS  (PRN):  acetaminophen     Tablet .. 650 milliGRAM(s) Oral every 6 hours PRN Temp greater or equal to 38C (100.4F), Mild Pain (1 - 3)  albuterol    90 MICROgram(s) HFA Inhaler 2 Puff(s) Inhalation every 6 hours PRN Bronchospasm  LORazepam     Tablet 0.5 milliGRAM(s) Oral every 6 hours PRN Agitation      Recent Labs:                        12.7   5.94  )-----------( 245      ( 26 Jun 2025 09:42 )             38.1       Physical Exam:  Vital Signs Last 24 Hrs  T(C): 37.1 (25 Jun 2025 19:00), Max: 37.1 (25 Jun 2025 19:00)  T(F): 98.7 (25 Jun 2025 19:00), Max: 98.7 (25 Jun 2025 19:00)  HR: 84 (25 Jun 2025 19:00) (84 - 84)  BP: 97/62 (25 Jun 2025 19:00) (97/62 - 97/62)  RR: 15 (25 Jun 2025 19:00) (15 - 15)  SpO2: 95% (25 Jun 2025 19:00) (95% - 95%)  Patient On (Oxygen Delivery Method): room air    Gen - NAD, Comfortable  HEENT - EOMI,  L pupil>R, (+) L crani incision with edema , (+) L periorbital ecchymosis --> Improving   Neck - Supple, No limited ROM  Pulm - CTAB  Cardiovascular - RRR, S1S2   Chest - good chest expansion, good respiratory effort  Abdomen - Soft, NT, ND   Extremities - No C/C/E, no calf tenderness   Neuro- awake, alert, oriented to person, place, date, year, and situation.  Able  to follow commands. Impulsive, (+) memory and attention deficits, mild right sided weakness when compared to left side --> Improved   Psychiatric - Mood stable, Affect WNL, speech fast and patient anxious regarding timeline of stay and ability to return to activities  Skin:  L crani incision with steri strips, LUE ecchymosis, L periorbital ecchymosis, Otherwise, skin intact             HPI:  54F with a PMHx of Left temporal lobe lesion, Bipolar, Anxiety, Psychogenic seizures, Concussion, presented to [facility] on 6/6 for worsening AMS after seizure episode, aphasia, and possible overdose on her antiseizure medications. As per partner over the past few weeks patient has been having multiple episodes of partial seizures more often than usual. Despite being on her normal routine, patient often has multiple seizures weekly but always returned to baseline. MRI brain with stable temporal lobe lesion favoring diagnosis of malignancy as well as surrounding edema likely correlated to recent seizure activity. vEEG revealed evidence of electrographic seizure activity in the left frontotemporal lobe. Patient was urgently transferred to [facility] on 6/7 for further evaluation of seizure activity. Neurology consulted and suspected seizures related to temporal lobe lesion and patient was transferred to [facility] EMU on 6/11/25 for management of seizures.  was consulted for previous OSH suicidal ideation, but currently denies. Neurology recommended adjustments to medications to stop Lamictal, start Onfi, continue steroid taper, Fycompa, Vriviact, and Vimpat. S/p left craniotomy for neoplastic resection on 6/16/25 with Dr. Hernández. Hospital course significant for elevated TSH, reactive leukocytosis, pericardial effusion. Patient now admitted on 6/23/25 for a multidisciplinary rehab program for generalized weakness, gait instability, ADL impairments, and functional impairments.    Allergies  No Known Allergies    Subjective:  - Patient was seen and examined at in therapy, NAD  - Patient performing left shoulder straight raises with a dumbbell  - She states she is doing well overall, but she is eager to learn about her discharge date. Explained that IDT will be occurring today  - Sleeping well at night, no events  - Pain is well controlled   - LBM (06/24), voiding without issues. Continent x 2  - Tolerating daily therapy, motivated  - BP is running low, asymptomatic   - Her goal is to go home ASAP. Get back to her baseline, prefers to start working out   - (+) Expressive language, anomia, cognitive deficits with memory and attention, impulsive   - Brain tumor education was provided   - Case discussed at IDT meeting today for progress and discharge plan  - (06/27) Home     ROS:  - Denies CP, palpitation, SOB, cough, fever, chills, headache, dizziness, visual changes, abdominal pain, N/V/D/C, dysuria, hematuria, joint pain or swelling     MEDICATIONS  (STANDING):  brivaracetam 100 milliGRAM(s) Oral <User Schedule>  cloBAZam 10 milliGRAM(s) Oral at bedtime  enoxaparin Injectable 40 milliGRAM(s) SubCutaneous <User Schedule>  lacosamide 150 milliGRAM(s) Oral <User Schedule>  levothyroxine 88 MICROGram(s) Oral daily  melatonin 1.5 milliGRAM(s) Oral at bedtime  perampanel 4 milliGRAM(s) Oral at bedtime    MEDICATIONS  (PRN):  acetaminophen     Tablet .. 650 milliGRAM(s) Oral every 6 hours PRN Temp greater or equal to 38C (100.4F), Mild Pain (1 - 3)  albuterol    90 MICROgram(s) HFA Inhaler 2 Puff(s) Inhalation every 6 hours PRN Bronchospasm  LORazepam     Tablet 0.5 milliGRAM(s) Oral every 6 hours PRN Agitation      Recent Labs:                        12.7   5.94  )-----------( 245      ( 26 Jun 2025 09:42 )             38.1       Physical Exam:  Vital Signs Last 24 Hrs  T(C): 37.1 (25 Jun 2025 19:00), Max: 37.1 (25 Jun 2025 19:00)  T(F): 98.7 (25 Jun 2025 19:00), Max: 98.7 (25 Jun 2025 19:00)  HR: 84 (25 Jun 2025 19:00) (84 - 84)  BP: 97/62 (25 Jun 2025 19:00) (97/62 - 97/62)  RR: 15 (25 Jun 2025 19:00) (15 - 15)  SpO2: 95% (25 Jun 2025 19:00) (95% - 95%)  Patient On (Oxygen Delivery Method): room air    Gen - NAD, Comfortable  HEENT - EOMI,  L pupil>R, (+) L crani incision with edema , (+) L periorbital ecchymosis --> Improving   Neck - Supple, No limited ROM  Pulm - CTAB  Cardiovascular - RRR, S1S2   Chest - good chest expansion, good respiratory effort  Abdomen - Soft, NT, ND   Extremities - No C/C/E, no calf tenderness   Neuro- awake, alert, oriented to person, place, date, year, and situation.  Able  to follow commands. Impulsive, (+) memory and attention deficits, mild right sided weakness when compared to left side --> Improved   Psychiatric - Mood stable, Affect WNL, speech fast and patient anxious regarding timeline of stay and ability to return to activities  Skin:  L crani incision with steri strips, LUE ecchymosis, L periorbital ecchymosis, Otherwise, skin intact

## 2025-06-26 NOTE — DISCHARGE NOTE PROVIDER - CARE PROVIDER_API CALL
Scooter Hernández  Neurological Surgery  [address], [address]  [place] [address]  Phone: [phone]  Fax: [phone]  Follow Up Time: 1 week    Alia Morton  Physical Medicine & Rehabilitation  [address] [place] [address]  Phone: [phone]  Fax: [phone]  Follow Up Time: 1 month    Tracy Vasquez  Internal Medicine  [address] [place] [address]  Phone: [phone]  Fax: [phone]  Follow Up Time: 1 week

## 2025-06-26 NOTE — DISCHARGE NOTE PROVIDER - NSDCMRMEDTOKEN_GEN_ALL_CORE_FT
brivaracetam 100 mg oral tablet: 1 tab(s) orally every 8 hours  cloBAZam 10 mg oral tablet: 1 tab(s) orally once a day (at bedtime)  lacosamide 150 mg oral tablet: 1 tab(s) orally every 8 hours  levothyroxine 88 mcg (0.088 mg) oral tablet: 1 tab(s) orally once a day  LORazepam 0.5 mg oral tablet: 1 tab(s) orally 2 times a day as needed for Restlessness  perampanel 4 mg oral tablet: 1 tab(s) orally once a day (at bedtime)  Proventil HFA 90 mcg/inh inhalation aerosol: 2 inhaled every 6 hours   brivaracetam 100 mg oral tablet: 1 tab(s) orally every 8 hours MDD: 3 tabs  cloBAZam 10 mg oral tablet: 1 tab(s) orally once a day (at bedtime) MDD: 1 tab  lacosamide 150 mg oral tablet: 1 tab(s) orally every 8 hours MDD: 3 tabs  levothyroxine 88 mcg (0.088 mg) oral tablet: 1 tab(s) orally once a day  LORazepam 0.5 mg oral tablet: 1 tab(s) orally 2 times a day as needed for Restlessness MDD: 2 tabs  perampanel 4 mg oral tablet: 1 tab(s) orally once a day (at bedtime) MDD: 1 tab  Proventil HFA 90 mcg/inh inhalation aerosol: 2 inhaled every 6 hours   brivaracetam 100 mg oral tablet: 1 tab(s) orally every 8 hours MDD: 3 tabs  cloBAZam 10 mg oral tablet: 1 tab(s) orally once a day (at bedtime) MDD: 1 tab  lacosamide 150 mg oral tablet: 1 tab(s) orally every 8 hours MDD: 3 tabs  levothyroxine 88 mcg (0.088 mg) oral tablet: 1 tab(s) orally once a day  perampanel 4 mg oral tablet: 1 tab(s) orally once a day (at bedtime) MDD: 1 tab  Proventil HFA 90 mcg/inh inhalation aerosol: 2 inhaled every 6 hours   albuterol 90 mcg/inh inhalation aerosol: 2 puff(s) inhaled every 6 hours As needed Bronchospasm  brivaracetam 100 mg oral tablet: 1 tab(s) orally 3 times a day  cloBAZam 10 mg oral tablet: 1 tab(s) orally once a day (at bedtime)  Fycompa 4 mg oral tablet: 1 tab(s) orally once a day (at bedtime) MDD: 4 mg  lacosamide 150 mg oral tablet: 1 tab(s) orally 3 times a day  levothyroxine 88 mcg (0.088 mg) oral tablet: 1 tab(s) orally once a day  perampanel 4 mg oral tablet: 1 tab(s) orally once a day (at bedtime)

## 2025-06-26 NOTE — DISCHARGE NOTE NURSING/CASE MANAGEMENT/SOCIAL WORK - PATIENT PORTAL LINK FT
You can access the FollowMyHealth Patient Portal offered by [facility] by registering at the following website: [email or web address]. By joining [employer]’s FollowMyHealth portal, you will also be able to view your health information using other applications (apps) compatible with our system.

## 2025-06-26 NOTE — DISCHARGE NOTE NURSING/CASE MANAGEMENT/SOCIAL WORK - NSDCPEFALRISK_GEN_ALL_CORE
For information on Fall & Injury Prevention, visit: [email or web address] OR  [email or web address] OR  https://www.cdc.gov/steadi/patient.html

## 2025-06-26 NOTE — PROGRESS NOTE ADULT - ASSESSMENT
RHD 54F with a PMHx of Left temporal lobe lesion, Bipolar, Anxiety, Psychogenic seizures, Asthma Concussion, presented to [facility] as a transfer from [facility] on 6/11 for worsening AMS 2/2 worsening seizures. S/p left craniotomy for neoplastic resection on 6/16/25 with Dr. Hernández. Patient now admitted on 6/23/25 for a multidisciplinary rehab program for generalized weakness, gait instability, ADL impairments, and functional impairments.    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 P&O as needed   - MR Head- stable temporal lobe lesion favoring diagnosis of malignancy as well as surrounding edema likely correlated to recent seizure activity  - Pertinent history: Emotional distress, persistent episodes focal seizures; s/p L craniotomy for neoplastic tumor resections with Dr. Hernández 6/16  - EEG 6/11- -Numerous left posterior temporal focal-onset seizures at times with bilateral spread, 2-5 seizures/hour for most of recording, some hours with no seizures. Risk of left frontal and right frontotemporal focal-onset seizures, Left hemispheric focal cerebral dysfunction can    be structural and/or functional (such as post-ictal) in etiology.   - AEDS: Briviact 100mg q12hr, Onfi 10qhs , Vimpat 150mg BID, Fycompa 4mg qhs  - Dexamethasone 2mg BID (needs 1 more dose to finish on 6/23)    ________________________________       # Pain  - Tylenol PRN     # Mood / Cognition/ PTSD  # Anxiety and depression/ Bipolar  - Patient reportedly expressed SI at some point at OSH, with concern raised that she may have taken an overdose of Lamictal.  - On  evaluation patient denied having suicidal thoughts or making a suicide attempt, but per records has a history of a suicide attempt and psychiatric hospitalization 11 years ago.   - Continue Ativan 0.5mg PRN   - Constant obs --> routine supervision  - Neuropsychology consult PRN      # Sleep  - Melatonin 1.5mg HS to maximize participation in therapy during the day     # GI / Bowel Management / Constipation  - Senna QHS --> D/C per pt's request  (06/25)   - Miralax QD --> D/C per pt's request    - Simethicone daily PRN gas--> D/C per pt's request     - GI ppx: Protonix 40mg daily-->  D/C per pt's request       # Skin  - Skin assessment on admission performed: L crani incision with steri strips, LUE ecchymosis, L periorbital ecchymosis, Otherwise, skin intact   - Nursing to monitor skin qShift     # Diet  - Diet Consistency: Regular   - Nutrition F/U  - SLP ongoing treatment      # Precautions / PROPHYLAXIS:   - Precautons: Falls, Seizure   - Pressure injury/Skin:  OOB to Chair, PT/OT    - DVT ppx: Lovenox 40mg daily   - Weight bearing status: No Weight Bearing Restrictions     ___________________________________________     Concurrent Medical Problems     #Reactive Leukocytosis (2/2 steroids)  - Monitor and trend CBC, fever curve     # Pericardial effusion   - TTE 6/16 with EF 66% with normal LVSF, no WMA, and trace pericardial effusion   - Repeat TTE outpatient in 1 month    # Hypothyroidism   - TSH elevated (improving) but FT4 normal likely reactive   - Synthroid 88mcg daily   - Repeat TFTs outpatient    #Asthma  -Albuterol PRN      #Patient Education  - Education provided on the following:      * Admitting diagnosis and functional implications      * Functional goals      * Bladder management      * Bowel management      * Skin care management      * Intensity of service and scheduling of rehab disciplines      * Plan of care and role of interdisciplinary team conference in discharge planning      * Reconciliation of medications from prior institution       Outpatient Follow-up:  Specialty and Name of physician    Scooter Hernández  Neurological Surgery  [address] [place] [address], [address]  [place] [address]  Phone: [phone]  Fax: [phone]  ____________________________ RHD 54F with a PMHx of Left temporal lobe lesion, Bipolar, Anxiety, Psychogenic seizures, Asthma Concussion, presented to [facility] as a transfer from [facility] on 6/11 for worsening AMS 2/2 worsening seizures. S/p left craniotomy for neoplastic resection on 6/16/25 with Dr. Hernández. Patient now admitted on 6/23/25 for a multidisciplinary rehab program for generalized weakness, gait instability, ADL impairments, and functional impairments.    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 P&O as needed   - MR Head- stable temporal lobe lesion favoring diagnosis of malignancy as well as surrounding edema likely correlated to recent seizure activity  - Pertinent history: Emotional distress, persistent episodes focal seizures; s/p L craniotomy for neoplastic tumor resections with Dr. Hernández 6/16  - EEG 6/11- -Numerous left posterior temporal focal-onset seizures at times with bilateral spread, 2-5 seizures/hour for most of recording, some hours with no seizures. Risk of left frontal and right frontotemporal focal-onset seizures, Left hemispheric focal cerebral dysfunction can    be structural and/or functional (such as post-ictal) in etiology.   - AEDS: Briviact 100mg q12hr, Onfi 10qhs , Vimpat 150mg BID, Fycompa 4mg qhs  - Dexamethasone 2mg BID (needs 1 more dose to finish on 6/23)    ________________________________    #Elevated ALT:  - ALT level (06/26) 77  - Limit Tylenol use  - Monitor  - Hospitalist F/U      #Pain  - Tylenol PRN     # Mood / Cognition/ PTSD  # Anxiety and depression/ Bipolar  - Patient reportedly expressed SI at some point at OSH, with concern raised that she may have taken an overdose of Lamictal.  - On  evaluation patient denied having suicidal thoughts or making a suicide attempt, but per records has a history of a suicide attempt and psychiatric hospitalization 11 years ago.   - Continue Ativan 0.5mg PRN   - Constant obs --> routine supervision  - Neuropsychology consult PRN      # Sleep  - Melatonin 1.5mg HS to maximize participation in therapy during the day     # GI / Bowel Management / Constipation  - Senna QHS --> D/C per pt's request  (06/25)   - Miralax QD --> D/C per pt's request    - Simethicone daily PRN gas--> D/C per pt's request     - GI ppx: Protonix 40mg daily-->  D/C per pt's request       # Skin  - Skin assessment on admission performed: L crani incision with steri strips, LUE ecchymosis, L periorbital ecchymosis, Otherwise, skin intact   - Nursing to monitor skin qShift     # Diet  - Diet Consistency: Regular   - Nutrition F/U  - SLP ongoing treatment      # Precautions / PROPHYLAXIS:   - Precautons: Falls, Seizure   - Pressure injury/Skin:  OOB to Chair, PT/OT    - DVT ppx: Lovenox 40mg daily   - Weight bearing status: No Weight Bearing Restrictions     ___________________________________________     Concurrent Medical Problems     #Reactive Leukocytosis (2/2 steroids)  - Monitor and trend CBC, fever curve     # Pericardial effusion   - TTE 6/16 with EF 66% with normal LVSF, no WMA, and trace pericardial effusion   - Repeat TTE outpatient in 1 month    # Hypothyroidism   - TSH elevated (improving) but FT4 normal likely reactive   - Synthroid 88mcg daily   - Repeat TFTs outpatient    #Asthma  -Albuterol PRN      #Patient Education  - Education provided on the following:      * Admitting diagnosis and functional implications      * Functional goals      * Bladder management      * Bowel management      * Skin care management      * Intensity of service and scheduling of rehab disciplines      * Plan of care and role of interdisciplinary team conference in discharge planning      * Reconciliation of medications from prior institution       Outpatient Follow-up:  Specialty and Name of physician    Scooter Hernández  Neurological Surgery  [address] [place] [address], [address]  [place] [address]  Phone: [phone]  Fax: [phone]  ____________________________

## 2025-06-26 NOTE — DISCHARGE NOTE PROVIDER - NSDCFUSCHEDAPPT_GEN_ALL_CORE_FT
Jose Miguel Knight  [facility] Physician [facility]  NEUROSURG [address] [place] [address]  Scheduled Appointment: 07/03/2025     Scooter Hernández  [facility] Physician [facility]  NEUROSURG [address] [place] [address]  Scheduled Appointment: 07/03/2025     Scooter Hernández  [facility] Physician [facility]  NEUROSURG [address] [place] [address]  Scheduled Appointment: 10/03/2025

## 2025-06-26 NOTE — DISCHARGE NOTE NURSING/CASE MANAGEMENT/SOCIAL WORK - FINANCIAL ASSISTANCE
[facility] provides services at a reduced cost to those who are determined to be eligible through [facility]’s financial assistance program. Information regarding [facility]’s financial assistance program can be found by going to [email or web address] or by calling [phone].

## 2025-06-26 NOTE — DISCHARGE NOTE PROVIDER - CARE PROVIDERS DIRECT ADDRESSES
[email or web address]

## 2025-06-26 NOTE — DISCHARGE NOTE PROVIDER - NSDCCPCAREPLAN_GEN_ALL_CORE_FT
PRINCIPAL DISCHARGE DIAGNOSIS  Diagnosis: Mass of left temporal lobe  Assessment and Plan of Treatment: You were originally admitted to [facility] for rehab after having a mass in the left temporal lobe of the brain removed. Please continue taking briviact, onfi, vimpat, and fycompa to prevent recurrent seizures. You will follow up with Dr. Hernández. Please call office and schedule an appointment. You will follow up with Dr. Morton in 1 month. Prescriptions have been provided for you to continue therapies in the outpatient setting.      SECONDARY DISCHARGE DIAGNOSES  Diagnosis: Acute pericardial effusion  Assessment and Plan of Treatment: You were found to have trace fluid around the heart while in the hospital. Please follow up with your primary care physician for a repeat echocardiogram to ensure resolution of the effusion.    Diagnosis: Hypothyroidism  Assessment and Plan of Treatment: Continue taking your levothyroxine for hypothyroidism and follow up with your primary care physician.    Diagnosis: Asthma  Assessment and Plan of Treatment: You have been prescribed an as needed inhaler to take if you have wheezing or shortness of breath once discharged from the hospital.

## 2025-06-27 VITALS
HEART RATE: 78 BPM | RESPIRATION RATE: 16 BRPM | TEMPERATURE: 98 F | OXYGEN SATURATION: 98 % | DIASTOLIC BLOOD PRESSURE: 56 MMHG | SYSTOLIC BLOOD PRESSURE: 97 MMHG

## 2025-06-27 PROCEDURE — 99239 HOSP IP/OBS DSCHRG MGMT >30: CPT | Mod: GC

## 2025-06-27 PROCEDURE — 92610 EVALUATE SWALLOWING FUNCTION: CPT | Mod: GN

## 2025-06-27 PROCEDURE — 97161 PT EVAL LOW COMPLEX 20 MIN: CPT | Mod: GP

## 2025-06-27 PROCEDURE — 80053 COMPREHEN METABOLIC PANEL: CPT

## 2025-06-27 PROCEDURE — 87635 SARS-COV-2 COVID-19 AMP PRB: CPT

## 2025-06-27 PROCEDURE — 92523 SPEECH SOUND LANG COMPREHEN: CPT | Mod: GN

## 2025-06-27 PROCEDURE — 97110 THERAPEUTIC EXERCISES: CPT | Mod: GP

## 2025-06-27 PROCEDURE — 97165 OT EVAL LOW COMPLEX 30 MIN: CPT | Mod: GO

## 2025-06-27 PROCEDURE — 99232 SBSQ HOSP IP/OBS MODERATE 35: CPT

## 2025-06-27 PROCEDURE — 85025 COMPLETE CBC W/AUTO DIFF WBC: CPT

## 2025-06-27 PROCEDURE — 97530 THERAPEUTIC ACTIVITIES: CPT | Mod: GO

## 2025-06-27 PROCEDURE — 92507 TX SP LANG VOICE COMM INDIV: CPT | Mod: GN

## 2025-06-27 PROCEDURE — 97116 GAIT TRAINING THERAPY: CPT | Mod: GP

## 2025-06-27 PROCEDURE — 97535 SELF CARE MNGMENT TRAINING: CPT | Mod: GO

## 2025-06-27 PROCEDURE — 97112 NEUROMUSCULAR REEDUCATION: CPT | Mod: GP

## 2025-06-27 PROCEDURE — 36415 COLL VENOUS BLD VENIPUNCTURE: CPT

## 2025-06-27 RX ORDER — BRIVARACETAM 75 MG/1
1 TABLET, FILM COATED ORAL
Qty: 90 | Refills: 0
Start: 2025-06-27 | End: 2025-07-26

## 2025-06-27 RX ORDER — PERAMPANEL 6 MG/1
1 TABLET ORAL
Qty: 30 | Refills: 0
Start: 2025-06-27 | End: 2025-07-26

## 2025-06-27 RX ORDER — LORAZEPAM 4 MG/ML
1 VIAL (ML) INJECTION
Qty: 14 | Refills: 0
Start: 2025-06-27 | End: 2025-07-03

## 2025-06-27 RX ORDER — CLOBAZAM 20 MG/1
1 TABLET ORAL
Qty: 30 | Refills: 0
Start: 2025-06-27 | End: 2025-07-26

## 2025-06-27 RX ORDER — LACOSAMIDE 150 MG/1
1 TABLET, FILM COATED ORAL
Qty: 90 | Refills: 0
Start: 2025-06-27 | End: 2025-07-26

## 2025-06-27 RX ADMIN — ENOXAPARIN SODIUM 40 MILLIGRAM(S): 100 INJECTION SUBCUTANEOUS at 17:43

## 2025-06-27 RX ADMIN — LACOSAMIDE 150 MILLIGRAM(S): 150 TABLET, FILM COATED ORAL at 13:35

## 2025-06-27 RX ADMIN — BRIVARACETAM 100 MILLIGRAM(S): 75 TABLET, FILM COATED ORAL at 13:35

## 2025-06-27 RX ADMIN — BRIVARACETAM 100 MILLIGRAM(S): 75 TABLET, FILM COATED ORAL at 05:25

## 2025-06-27 RX ADMIN — Medication 650 MILLIGRAM(S): at 05:24

## 2025-06-27 RX ADMIN — LACOSAMIDE 150 MILLIGRAM(S): 150 TABLET, FILM COATED ORAL at 05:25

## 2025-06-27 RX ADMIN — Medication 88 MICROGRAM(S): at 05:25

## 2025-06-27 NOTE — PROGRESS NOTE ADULT - ASSESSMENT
RHD 54F with a PMHx of Left temporal lobe lesion, Bipolar, Anxiety, Psychogenic seizures, Asthma Concussion, presented to [facility] as a transfer from [facility] on 6/11 for worsening AMS 2/2 worsening seizures. S/p left craniotomy for neoplastic resection on 6/16/25 with Dr. Hernández. Patient now admitted on 6/23/25 for a multidisciplinary rehab program for generalized weakness, gait instability, ADL impairments, and functional impairments.    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 P&O as needed   - MR Head- stable temporal lobe lesion favoring diagnosis of malignancy as well as surrounding edema likely correlated to recent seizure activity  - Pertinent history: Emotional distress, persistent episodes focal seizures; s/p L craniotomy for neoplastic tumor resections with Dr. Hernández 6/16  - EEG 6/11- -Numerous left posterior temporal focal-onset seizures at times with bilateral spread, 2-5 seizures/hour for most of recording, some hours with no seizures. Risk of left frontal and right frontotemporal focal-onset seizures, Left hemispheric focal cerebral dysfunction can    be structural and/or functional (such as post-ictal) in etiology.   - AEDS: Briviact 100mg q12hr, Onfi 10qhs , Vimpat 150mg BID, Fycompa 4mg qhs  - Dexamethasone 2mg BID (needs 1 more dose to finish on 6/23)    ________________________________    #Elevated ALT:  - ALT level (06/26) 77  - Limit Tylenol use  - Monitor  - Hospitalist F/U      #Pain  - Tylenol PRN     # Mood / Cognition/ PTSD  # Anxiety and depression/ Bipolar  - Patient reportedly expressed SI at some point at OSH, with concern raised that she may have taken an overdose of Lamictal.  - On  evaluation patient denied having suicidal thoughts or making a suicide attempt, but per records has a history of a suicide attempt and psychiatric hospitalization 11 years ago.   - Continue Ativan 0.5mg PRN   - Constant obs --> routine supervision  - Neuropsychology consult PRN      # Sleep  - Melatonin 1.5mg HS to maximize participation in therapy during the day     # GI / Bowel Management / Constipation  - Senna QHS --> D/C per pt's request  (06/25)   - Miralax QD --> D/C per pt's request    - Simethicone daily PRN gas--> D/C per pt's request     - GI ppx: Protonix 40mg daily-->  D/C per pt's request       # Skin  - Skin assessment on admission performed: L crani incision with steri strips, LUE ecchymosis, L periorbital ecchymosis, Otherwise, skin intact   - Nursing to monitor skin qShift     # Diet  - Diet Consistency: Regular   - Nutrition F/U  - SLP ongoing treatment      # Precautions / PROPHYLAXIS:   - Precautons: Falls, Seizure   - Pressure injury/Skin:  OOB to Chair, PT/OT    - DVT ppx: Lovenox 40mg daily   - Weight bearing status: No Weight Bearing Restrictions     ___________________________________________     Concurrent Medical Problems     #Reactive Leukocytosis (2/2 steroids)/RESOLVED  - Monitor and trend CBC, fever curve     # Pericardial effusion   - TTE 6/16 with EF 66% with normal LVSF, no WMA, and trace pericardial effusion   - Repeat TTE outpatient in 1 month    # Hypothyroidism   - TSH elevated (improving) but FT4 normal likely reactive   - Synthroid 88mcg daily   - Repeat TFTs outpatient    #Asthma  -Albuterol PRN      #Patient Education  - Education provided on the following:      * Admitting diagnosis and functional implications      * Functional goals      * Bladder management      * Bowel management      * Skin care management      * Intensity of service and scheduling of rehab disciplines      * Plan of care and role of interdisciplinary team conference in discharge planning      * Reconciliation of medications from prior institution       Outpatient Follow-up:  Specialty and Name of physician    Scooter Hernández  Neurological Surgery  [address] [place] [address], [address]  [place] [address]  Phone: [phone]  Fax: [phone]  ____________________________ RHD 54F with a PMHx of Left temporal lobe lesion, Bipolar, Anxiety, Psychogenic seizures, Asthma Concussion, presented to [facility] as a transfer from [facility] on 6/11 for worsening AMS 2/2 worsening seizures. S/p left craniotomy for neoplastic resection on 6/16/25 with Dr. Hernández. Patient now admitted on 6/23/25 for a multidisciplinary rehab program for generalized weakness, gait instability, ADL impairments, and functional impairments.    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 P&O as needed   - MR Head- stable temporal lobe lesion favoring diagnosis of malignancy as well as surrounding edema likely correlated to recent seizure activity  - Pertinent history: Emotional distress, persistent episodes focal seizures; s/p L craniotomy for neoplastic tumor resections with Dr. Hernández 6/16  - EEG 6/11- -Numerous left posterior temporal focal-onset seizures at times with bilateral spread, 2-5 seizures/hour for most of recording, some hours with no seizures. Risk of left frontal and right frontotemporal focal-onset seizures, Left hemispheric focal cerebral dysfunction can    be structural and/or functional (such as post-ictal) in etiology.   - AEDS: Briviact 100mg q12hr, Onfi 10qhs , Vimpat 150mg BID, Fycompa 4mg qhs  - Dexamethasone 2mg BID (needs 1 more dose to finish on 6/23)    ________________________________    #Elevated ALT:  - ALT level (06/26) 77  - Limit Tylenol use  - Monitor  - Outpatient PCP F/U      #Pain  - Tylenol PRN     # Mood / Cognition/ PTSD  # Anxiety and depression/ Bipolar  - Patient reportedly expressed SI at some point at OSH, with concern raised that she may have taken an overdose of Lamictal.  - On  evaluation patient denied having suicidal thoughts or making a suicide attempt, but per records has a history of a suicide attempt and psychiatric hospitalization 11 years ago.   - Continue Ativan 0.5mg PRN   - Neuropsychology consult PRN      # Sleep  - Melatonin 1.5mg HS to maximize participation in therapy during the day     # GI / Bowel Management / Constipation  - Senna QHS --> D/C per pt's request  (06/25)   - Miralax QD --> D/C per pt's request    - Simethicone daily PRN gas--> D/C per pt's request     - GI ppx: Protonix 40mg daily-->  D/C per pt's request       # Skin  - Skin assessment on admission performed: L crani incision with steri strips, LUE ecchymosis, L periorbital ecchymosis, Otherwise, skin intact   - Nursing to monitor skin q Shift     # Diet  - Diet Consistency: Regular      # Precautions / PROPHYLAXIS:   - Precautons: Falls, Seizure   - Pressure injury/Skin:  OOB to Chair, PT/OT    - DVT ppx: Lovenox 40mg daily--> D/C    - Weight bearing status: No Weight Bearing Restrictions     ___________________________________________     Concurrent Medical Problems     #Reactive Leukocytosis (2/2 steroids)/RESOLVED  - Monitor and trend CBC, fever curve     # Pericardial effusion   - TTE 6/16 with EF 66% with normal LVSF, no WMA, and trace pericardial effusion   - Repeat TTE outpatient in 1 month  - Outpatient PCP F/U     # Hypothyroidism   - TSH elevated (improving) but FT4 normal likely reactive   - Synthroid 88mcg daily   - Repeat TFTs outpatient    #Asthma  -Albuterol PRN      Outpatient Follow-up:  Specialty and Name of physician    Scooter Hernández  Neurological Surgery  [address] [place] [address], [address]  [place] [address]  Phone: [phone]  Fax: [phone]  ____________________________

## 2025-06-27 NOTE — CHART NOTE - NSCHARTNOTEFT_GEN_A_CORE
Patient requires prior authorization for briviact and perampanel. Patient's authorization has not been approved at this time. Patient unable to leave without these medications given seizure history of location of mass removal in the left temporal lobe. Patient discharge will be delayed through the weekend. Patient to resume therapies for makeup on Saturday if possible given no therapies on Friday. Will reevaluate medication status come Monday. Patient is medically stable to discharge as soon as medications are approved from insurance. Patient requires prior authorization for briviact and perampanel. Patient's authorization has not been approved at this time. Patient unable to leave without these medications given seizure history of location of mass removal in the left temporal lobe. Patient discharge will be delayed through the weekend. Patient to resume therapies for makeup on Saturday if possible given no therapies on Friday. Will reevaluate medication status come Monday. Patient is medically stable to discharge as soon as medications are approved from insurance.    Update 1600:  -clinical pharmacist informed team that prior auth confirmed and accepted. Medications will be available. Patient ready to discharge home. Discharge order placed.

## 2025-06-27 NOTE — PROGRESS NOTE ADULT - SUBJECTIVE AND OBJECTIVE BOX
Patient is a 54y old  Female who presents with a chief complaint of Left temporal lobe suspicious neoplastic lesion with cerebral edema s/p craniotomy and resection     No events overnight  Denies chest pain, SOB  Patient seen and examined at bedside.    ALLERGIES:  No Known Allergies    MEDICATIONS  (STANDING):  brivaracetam 100 milliGRAM(s) Oral <User Schedule>  cloBAZam 10 milliGRAM(s) Oral at bedtime  enoxaparin Injectable 40 milliGRAM(s) SubCutaneous <User Schedule>  lacosamide 150 milliGRAM(s) Oral <User Schedule>  levothyroxine 88 MICROGram(s) Oral daily  melatonin 1.5 milliGRAM(s) Oral at bedtime  perampanel 4 milliGRAM(s) Oral at bedtime    MEDICATIONS  (PRN):  acetaminophen     Tablet .. 650 milliGRAM(s) Oral every 6 hours PRN Temp greater or equal to 38C (100.4F), Mild Pain (1 - 3)  albuterol    90 MICROgram(s) HFA Inhaler 2 Puff(s) Inhalation every 6 hours PRN Bronchospasm  LORazepam     Tablet 0.5 milliGRAM(s) Oral every 6 hours PRN Agitation    Vital Signs Last 24 Hrs  T(F): 97.5 (27 Jun 2025 08:07), Max: 98 (26 Jun 2025 20:09)  HR: 93 (27 Jun 2025 08:07) (79 - 93)  BP: 97/59 (27 Jun 2025 08:07) (96/62 - 97/59)  RR: 17 (27 Jun 2025 08:07) (16 - 17)  SpO2: 99% (27 Jun 2025 08:07) (95% - 99%)  I&O's Summary    BMI (kg/m2): 23.8 (06-27-25 @ 08:07)  PHYSICAL EXAM:  General: NAD, A/O   ENT: MMM, no scleral icterus  Neck: Supple, No JVD, no thyroidomegaly  Lungs: Clear to auscultation bilaterally, no wheezes, no rales, no rhonchi, good inspiratory effort  Cardio: RRR, S1/S2, No murmurs  Abdomen: Soft, Nontender, Nondistended; Bowel sounds present  Extremities: No calf tenderness, No pitting edema, no skin changes    LABS:                        12.7   5.94  )-----------( 245      ( 26 Jun 2025 09:42 )             38.1       06-26    140  |  103  |  26  ----------------------------<  93  3.3   |  26  |  0.71    Ca    9.1      26 Jun 2025 09:42    TPro  7.2  /  Alb  3.1  /  TBili  0.3  /  DBili  x   /  AST  29  /  ALT  92  /  AlkPhos  75  06-26     06-08 Chol 178 mg/dL LDL -- HDL 69 mg/dL Trig 128 mg/dL    Urinalysis Basic - ( 26 Jun 2025 09:42 )    Color: x / Appearance: x / SG: x / pH: x  Gluc: 93 mg/dL / Ketone: x  / Bili: x / Urobili: x   Blood: x / Protein: x / Nitrite: x   Leuk Esterase: x / RBC: x / WBC x   Sq Epi: x / Non Sq Epi: x / Bacteria: x    COVID-19 PCR: NotDetec (06-23-25 @ 20:00)  COVID-19 PCR: NotDetec (06-23-25 @ 20:00)

## 2025-06-27 NOTE — PROGRESS NOTE ADULT - REASON FOR ADMISSION
Left temporal lobe suspicious neoplastic lesion with cerebral edema s/p craniotomy and resection

## 2025-06-27 NOTE — PROGRESS NOTE ADULT - NSPROGADDITIONALINFOA_GEN_ALL_CORE
Spent 35 minutes on face-face visit, evaluate, review labs, examine and treat
Spent 1 hour on evaluating, examining and with team meeting where we discussed patient's progress and discharge plan excluding teaching time
Spent 45 minutes on face-face visit, evaluating, examining, preparing discharge, discuss discharge meds and F/U visits excluding teaching time

## 2025-06-27 NOTE — PROGRESS NOTE ADULT - ASSESSMENT
54-year-old female with history of left temporal lobe lesion, bipolar disorder, anxiety, psychogenic seizures, and concussion. Presented to [facility] on 6/6 with altered mental status, aphasia, and suspected antiseizure medication overdose following a seizure. Her partner reported a recent increase in partial seizures. MRI showed stable lesion with edema, suggesting malignancy and recent seizure activity. vEEG confirmed electrographic seizures in the left frontotemporal lobe. She was transferred to [facility] on 6/7, then to [facility] on 6/11/2025 for further seizure management. Neurology adjusted meds (discontinued Lamictal, initiated Onfi; continued steroid taper, Fycompa, Vriviact, Vimpat). She underwent left craniotomy for tumor resection on 6/16/2025. Hospital course complicated by elevated TSH, leukocytosis, and pericardial effusion.     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

## 2025-06-27 NOTE — PROGRESS NOTE ADULT - SUBJECTIVE AND OBJECTIVE BOX
HPI:  54F with a PMHx of Left temporal lobe lesion, Bipolar, Anxiety, Psychogenic seizures, Concussion, presented to [facility] on 6/6 for worsening AMS after seizure episode, aphasia, and possible overdose on her antiseizure medications. As per partner over the past few weeks patient has been having multiple episodes of partial seizures more often than usual. Despite being on her normal routine, patient often has multiple seizures weekly but always returned to baseline. MRI brain with stable temporal lobe lesion favoring diagnosis of malignancy as well as surrounding edema likely correlated to recent seizure activity. vEEG revealed evidence of electrographic seizure activity in the left frontotemporal lobe. Patient was urgently transferred to [facility] on 6/7 for further evaluation of seizure activity. Neurology consulted and suspected seizures related to temporal lobe lesion and patient was transferred to [facility] EMU on 6/11/25 for management of seizures.  was consulted for previous OSH suicidal ideation, but currently denies. Neurology recommended adjustments to medications to stop Lamictal, start Onfi, continue steroid taper, Fycompa, Vriviact, and Vimpat. S/p left craniotomy for neoplastic resection on 6/16/25 with Dr. Hernández. Hospital course significant for elevated TSH, reactive leukocytosis, pericardial effusion. Patient now admitted on 6/23/25 for a multidisciplinary rehab program for generalized weakness, gait instability, ADL impairments, and functional impairments.    Allergies  No Known Allergies    Subjective:  - Patient was seen and examined at bedside, NAD  - Patient is eager to discharge home today  - Slept well, NAOEN  - Pain is well controlled   - LBM (06/24), voiding without issues. Continent x 2  - Tolerating daily therapy, motivated  - Asymptomatic borderline hypotension this morning  - Her goal is to go home ASAP. Get back to her baseline, prefers to start working out   - (+) Expressive language, anomia, cognitive deficits with memory and attention, impulsive   - Brain tumor education was provided   - (06/27) Home     ROS:  - Denies CP, palpitation, SOB, cough, fever, chills, headache, dizziness, visual changes, abdominal pain, N/V/D/C, dysuria, hematuria, joint pain or swelling     MEDICATIONS  (STANDING):  brivaracetam 100 milliGRAM(s) Oral <User Schedule>  cloBAZam 10 milliGRAM(s) Oral at bedtime  enoxaparin Injectable 40 milliGRAM(s) SubCutaneous <User Schedule>  lacosamide 150 milliGRAM(s) Oral <User Schedule>  levothyroxine 88 MICROGram(s) Oral daily  melatonin 1.5 milliGRAM(s) Oral at bedtime  perampanel 4 milliGRAM(s) Oral at bedtime    MEDICATIONS  (PRN):  acetaminophen     Tablet .. 650 milliGRAM(s) Oral every 6 hours PRN Temp greater or equal to 38C (100.4F), Mild Pain (1 - 3)  albuterol    90 MICROgram(s) HFA Inhaler 2 Puff(s) Inhalation every 6 hours PRN Bronchospasm  LORazepam     Tablet 0.5 milliGRAM(s) Oral every 6 hours PRN Agitation      Recent Labs:                        12.7   5.94  )-----------( 245      ( 26 Jun 2025 09:42 )             38.1     06-26    140  |  103  |  26[H]  ----------------------------<  93  3.3[L]   |  26  |  0.71    Ca    9.1      26 Jun 2025 09:42    TPro  7.2  /  Alb  3.1[L]  /  TBili  0.3  /  DBili  x   /  AST  29  /  ALT  92[H]  /  AlkPhos  75  06-26      Physical Exam:  Vital Signs Last 24 Hrs  T(C): 36.7 (26 Jun 2025 20:09), Max: 36.7 (26 Jun 2025 20:09)  T(F): 98 (26 Jun 2025 20:09), Max: 98 (26 Jun 2025 20:09)  HR: 79 (26 Jun 2025 20:09) (79 - 79)  BP: 96/62 (26 Jun 2025 20:09) (96/62 - 96/62)  RR: 16 (26 Jun 2025 20:09) (16 - 16)  SpO2: 95% (26 Jun 2025 20:09) (95% - 95%)  Patient On (Oxygen Delivery Method): room air    Gen - NAD, Comfortable  HEENT - EOMI,  L pupil>R, (+) L crani incision with edema , (+) L periorbital ecchymosis --> Improving   Neck - Supple, No limited ROM  Pulm - CTAB  Cardiovascular - RRR, S1S2   Chest - good chest expansion, good respiratory effort  Abdomen - Soft, NT, ND   Extremities - No C/C/E, no calf tenderness   Neuro- awake, alert, oriented to person, place, date, year, and situation.  Able  to follow commands. Impulsive, (+) memory and attention deficits, mild right sided weakness when compared to left side --> Improved   Psychiatric - Mood stable, Affect WNL, speech fast and patient anxious regarding timeline of stay and ability to return to activities  Skin:  L crani incision with steri strips, LUE ecchymosis, L periorbital ecchymosis, Otherwise, skin intact             HPI:  54F with a PMHx of Left temporal lobe lesion, Bipolar, Anxiety, Psychogenic seizures, Concussion, presented to [facility] on 6/6 for worsening AMS after seizure episode, aphasia, and possible overdose on her antiseizure medications. As per partner over the past few weeks patient has been having multiple episodes of partial seizures more often than usual. Despite being on her normal routine, patient often has multiple seizures weekly but always returned to baseline. MRI brain with stable temporal lobe lesion favoring diagnosis of malignancy as well as surrounding edema likely correlated to recent seizure activity. vEEG revealed evidence of electrographic seizure activity in the left frontotemporal lobe. Patient was urgently transferred to [facility] on 6/7 for further evaluation of seizure activity. Neurology consulted and suspected seizures related to temporal lobe lesion and patient was transferred to [facility] EMU on 6/11/25 for management of seizures.  was consulted for previous OSH suicidal ideation, but currently denies. Neurology recommended adjustments to medications to stop Lamictal, start Onfi, continue steroid taper, Fycompa, Vriviact, and Vimpat. S/p left craniotomy for neoplastic resection on 6/16/25 with Dr. Hernández. Hospital course significant for elevated TSH, reactive leukocytosis, pericardial effusion. Patient now admitted on 6/23/25 for a multidisciplinary rehab program for generalized weakness, gait instability, ADL impairments, and functional impairments.    Allergies  No Known Allergies    Subjective:  - Patient was seen and examined at bedside, NAD  - Patient is eager to discharge home today  - Slept well, NAOEN  - Pain is well controlled   - LBM (06/24), voiding without issues. Continent x 2  - Tolerating daily therapy, motivated  - Asymptomatic borderline hypotension this morning  - (06/27) Home     ROS:  - Denies CP, palpitation, SOB, cough, fever, chills, headache, dizziness, visual changes, abdominal pain, N/V/D/C, dysuria, hematuria, joint pain or swelling     MEDICATIONS  (STANDING):  brivaracetam 100 milliGRAM(s) Oral <User Schedule>  cloBAZam 10 milliGRAM(s) Oral at bedtime  enoxaparin Injectable 40 milliGRAM(s) SubCutaneous <User Schedule>  lacosamide 150 milliGRAM(s) Oral <User Schedule>  levothyroxine 88 MICROGram(s) Oral daily  melatonin 1.5 milliGRAM(s) Oral at bedtime  perampanel 4 milliGRAM(s) Oral at bedtime    MEDICATIONS  (PRN):  acetaminophen     Tablet .. 650 milliGRAM(s) Oral every 6 hours PRN Temp greater or equal to 38C (100.4F), Mild Pain (1 - 3)  albuterol    90 MICROgram(s) HFA Inhaler 2 Puff(s) Inhalation every 6 hours PRN Bronchospasm  LORazepam     Tablet 0.5 milliGRAM(s) Oral every 6 hours PRN Agitation      Recent Labs:                        12.7   5.94  )-----------( 245      ( 26 Jun 2025 09:42 )             38.1     06-26    140  |  103  |  26[H]  ----------------------------<  93  3.3[L]   |  26  |  0.71    Ca    9.1      26 Jun 2025 09:42    TPro  7.2  /  Alb  3.1[L]  /  TBili  0.3  /  DBili  x   /  AST  29  /  ALT  92[H]  /  AlkPhos  75  06-26      Physical Exam:  Vital Signs Last 24 Hrs  T(C): 36.7 (26 Jun 2025 20:09), Max: 36.7 (26 Jun 2025 20:09)  T(F): 98 (26 Jun 2025 20:09), Max: 98 (26 Jun 2025 20:09)  HR: 79 (26 Jun 2025 20:09) (79 - 79)  BP: 96/62 (26 Jun 2025 20:09) (96/62 - 96/62)  RR: 16 (26 Jun 2025 20:09) (16 - 16)  SpO2: 95% (26 Jun 2025 20:09) (95% - 95%)  Patient On (Oxygen Delivery Method): room air    Gen - NAD, Comfortable  HEENT - EOMI,  L pupil>R, (+) L crani incision with edema , (+) L periorbital ecchymosis --> Improving   Neck - Supple, No limited ROM  Pulm - CTAB  Cardiovascular - RRR, S1S2   Chest - good chest expansion, good respiratory effort  Abdomen - Soft, NT, ND   Extremities - No C/C/E, no calf tenderness   Neuro- awake, alert, oriented to person, place, date, year, and situation.  Able  to follow commands. Impulsive, (+) memory and attention deficits, mild right sided weakness when compared to left side --> Improved   Psychiatric - Mood stable, Affect WNL, speech fast and patient anxious regarding timeline of stay and ability to return to activities  Skin:  L crani incision with steri strips, LUE ecchymosis, L periorbital ecchymosis, Otherwise, skin intact

## 2025-06-28 ENCOUNTER — INPATIENT (INPATIENT)
Facility: HOSPITAL | Age: 55
LOS: 2 days | Discharge: HOME | DRG: 101 | End: 2025-07-01
Attending: STUDENT IN AN ORGANIZED HEALTH CARE EDUCATION/TRAINING PROGRAM | Admitting: INTERNAL MEDICINE
Payer: MEDICAID

## 2025-06-28 VITALS
DIASTOLIC BLOOD PRESSURE: 64 MMHG | TEMPERATURE: 97 F | OXYGEN SATURATION: 98 % | RESPIRATION RATE: 18 BRPM | WEIGHT: 119.93 LBS | SYSTOLIC BLOOD PRESSURE: 94 MMHG | HEIGHT: 62 IN | HEART RATE: 67 BPM

## 2025-06-28 DIAGNOSIS — Z78.9 OTHER SPECIFIED HEALTH STATUS: ICD-10-CM

## 2025-06-28 DIAGNOSIS — Z41.1 ENCOUNTER FOR COSMETIC SURGERY: Chronic | ICD-10-CM

## 2025-06-28 DIAGNOSIS — Z98.890 OTHER SPECIFIED POSTPROCEDURAL STATES: Chronic | ICD-10-CM

## 2025-06-28 PROCEDURE — 99285 EMERGENCY DEPT VISIT HI MDM: CPT

## 2025-06-28 PROCEDURE — 99223 1ST HOSP IP/OBS HIGH 75: CPT

## 2025-06-28 RX ORDER — LEVOTHYROXINE SODIUM 300 MCG
88 TABLET ORAL DAILY
Refills: 0 | Status: DISCONTINUED | OUTPATIENT
Start: 2025-06-28 | End: 2025-06-28

## 2025-06-28 RX ORDER — LACOSAMIDE 150 MG/1
150 TABLET, FILM COATED ORAL
Refills: 0 | Status: DISCONTINUED | OUTPATIENT
Start: 2025-06-28 | End: 2025-06-28

## 2025-06-28 RX ORDER — PERAMPANEL 6 MG/1
4 TABLET ORAL AT BEDTIME
Refills: 0 | Status: DISCONTINUED | OUTPATIENT
Start: 2025-06-28 | End: 2025-06-28

## 2025-06-28 RX ORDER — ACETAMINOPHEN 500 MG/5ML
650 LIQUID (ML) ORAL EVERY 6 HOURS
Refills: 0 | Status: DISCONTINUED | OUTPATIENT
Start: 2025-06-28 | End: 2025-07-01

## 2025-06-28 RX ORDER — CLOBAZAM 20 MG/1
10 TABLET ORAL AT BEDTIME
Refills: 0 | Status: DISCONTINUED | OUTPATIENT
Start: 2025-06-28 | End: 2025-06-28

## 2025-06-28 RX ORDER — ALBUTEROL SULFATE 2.5 MG/3ML
2 VIAL, NEBULIZER (ML) INHALATION EVERY 6 HOURS
Refills: 0 | Status: DISCONTINUED | OUTPATIENT
Start: 2025-06-28 | End: 2025-07-01

## 2025-06-28 RX ORDER — OXYCODONE HYDROCHLORIDE 30 MG/1
5 TABLET ORAL ONCE
Refills: 0 | Status: DISCONTINUED | OUTPATIENT
Start: 2025-06-28 | End: 2025-06-28

## 2025-06-28 RX ORDER — BRIVARACETAM 75 MG/1
1 TABLET, FILM COATED ORAL
Qty: 90 | Refills: 5
Start: 2025-06-28 | End: 2025-12-24

## 2025-06-28 RX ORDER — LACOSAMIDE 150 MG/1
150 TABLET, FILM COATED ORAL
Refills: 0 | Status: DISCONTINUED | OUTPATIENT
Start: 2025-06-28 | End: 2025-07-01

## 2025-06-28 RX ORDER — PERAMPANEL 6 MG/1
1 TABLET ORAL
Qty: 30 | Refills: 0
Start: 2025-06-28 | End: 2025-07-27

## 2025-06-28 RX ORDER — LEVOTHYROXINE SODIUM 300 MCG
88 TABLET ORAL DAILY
Refills: 0 | Status: DISCONTINUED | OUTPATIENT
Start: 2025-06-28 | End: 2025-07-01

## 2025-06-28 RX ORDER — BRIVARACETAM 75 MG/1
100 TABLET, FILM COATED ORAL
Refills: 0 | Status: DISCONTINUED | OUTPATIENT
Start: 2025-06-28 | End: 2025-07-01

## 2025-06-28 RX ORDER — BUTALBITAL, ACETAMINOPHEN AND CAFFEINE 50; 325; 40 MG/1; MG/1; MG/1
1 TABLET ORAL ONCE
Refills: 0 | Status: COMPLETED | OUTPATIENT
Start: 2025-06-28 | End: 2025-06-28

## 2025-06-28 RX ORDER — BRIVARACETAM 75 MG/1
100 TABLET, FILM COATED ORAL
Refills: 0 | Status: DISCONTINUED | OUTPATIENT
Start: 2025-06-28 | End: 2025-06-28

## 2025-06-28 RX ORDER — PERAMPANEL 6 MG/1
4 TABLET ORAL ONCE
Refills: 0 | Status: DISCONTINUED | OUTPATIENT
Start: 2025-06-28 | End: 2025-06-28

## 2025-06-28 RX ORDER — BRIVARACETAM 75 MG/1
100 TABLET, FILM COATED ORAL ONCE
Refills: 0 | Status: DISCONTINUED | OUTPATIENT
Start: 2025-06-28 | End: 2025-06-28

## 2025-06-28 RX ADMIN — Medication 88 MICROGRAM(S): at 08:18

## 2025-06-28 RX ADMIN — LACOSAMIDE 150 MILLIGRAM(S): 150 TABLET, FILM COATED ORAL at 15:54

## 2025-06-28 RX ADMIN — Medication 650 MILLIGRAM(S): at 16:43

## 2025-06-28 RX ADMIN — BUTALBITAL, ACETAMINOPHEN AND CAFFEINE 1 TABLET(S): 50; 325; 40 TABLET ORAL at 18:02

## 2025-06-28 RX ADMIN — BRIVARACETAM 100 MILLIGRAM(S): 75 TABLET, FILM COATED ORAL at 02:03

## 2025-06-28 RX ADMIN — PERAMPANEL 4 MILLIGRAM(S): 6 TABLET ORAL at 02:03

## 2025-06-28 RX ADMIN — BRIVARACETAM 100 MILLIGRAM(S): 75 TABLET, FILM COATED ORAL at 15:55

## 2025-06-28 RX ADMIN — LACOSAMIDE 150 MILLIGRAM(S): 150 TABLET, FILM COATED ORAL at 08:18

## 2025-06-28 RX ADMIN — Medication 650 MILLIGRAM(S): at 17:13

## 2025-06-28 RX ADMIN — OXYCODONE HYDROCHLORIDE 5 MILLIGRAM(S): 30 TABLET ORAL at 21:40

## 2025-06-28 RX ADMIN — BUTALBITAL, ACETAMINOPHEN AND CAFFEINE 1 TABLET(S): 50; 325; 40 TABLET ORAL at 19:00

## 2025-06-28 RX ADMIN — BRIVARACETAM 100 MILLIGRAM(S): 75 TABLET, FILM COATED ORAL at 08:18

## 2025-06-28 NOTE — H&P ADULT - ASSESSMENT
54F with focal epilepsy s/p L craniotomy for neoplastic lesion, readmitted due to unavailability of AEDs after discharge:    1)Seizure disorder – medication nonadherence due to pharmacy stock issue  Re-initiate home antiseizure regimen as ordered  [June28] Rehab and SW aware. All pharmacies locally out of stock of briviact x 48hrs.   Per pharm note PA approved on 27th # = brivaracetam - [id number] perampanel - [id number]  Used covermymeds to verify with BIN [id number]/PCN [id number] provided by pt-website says inactive  Needs fu in AM                      Recent craniotomy for neoplastic lesion (L temporal)  Wound healing appropriately    Mood disorder  Follows psych, cont AEDs  BH consult PRN  Mood stable JOSE    Hypothyroidism  Continue levothyroxine 88 mcg daily  Recheck TSH/FT4 outpatient    DVT  Ambulatory

## 2025-06-28 NOTE — ED ADULT TRIAGE NOTE - HOW PATIENT ADDRESSED, PROFILE
Alert-The patient is alert, awake and responds to voice. The patient is oriented to time, place, and person. The triage nurse is able to obtain subjective information. Layla

## 2025-06-28 NOTE — ED PROVIDER NOTE - CLINICAL SUMMARY MEDICAL DECISION MAKING FREE TEXT BOX
54-year-old female with history of left temporal lesion, bipolar, anxiety, psychogenic seizures was admitted at [facility] June 6 for AMS and seizure, status post left craniotomy for neoplastic resection June 16 at [facility], admitted to rehab June 23 and discharged tonight.  They were prescribed Briviact and perampanel but pharmacy does not have in stock today and they are unable to get it elsewhere.  Patient denies any complaints.  No headache.  No seizures during her rehab stay.    Patient well-appearing, in no distress.  Vitals unremarkable.  Neuroexam normal.  Discussed with nurse manager.  We will write her evening dose of Briviact and perampanel.  Will keep overnight in ED until social work/ can assist in finding availability of medication for discharge 54-year-old female with history of left temporal lesion, bipolar, anxiety, psychogenic seizures was admitted at [facility] June 6 for AMS and seizure, status post left craniotomy for neoplastic resection June 16 at [facility], admitted to rehab June 23 and discharged tonight.  They were prescribed Briviact and perampanel but pharmacy does not have in stock today and they are unable to get it elsewhere.  Patient denies any complaints.  No headache.  No seizures during her rehab stay.    Patient well-appearing, in no distress.  Vitals unremarkable.  Neuroexam normal.  Discussed with nurse manager.  We will give pt her evening dose of Briviact and perampanel.  Will keep overnight in ED until social work/ can assist in finding availability of medication for discharge.    pt slept overnight without issues. case signed out to Dr Regan 0700. pending SW to help coordinate availability of outpt seizure meds 54-year-old female with history of left temporal lesion, bipolar, anxiety, psychogenic seizures was admitted at [facility] June 6 for AMS and seizure, status post left craniotomy for neoplastic resection June 16 at [facility], admitted to rehab June 23 and discharged tonight.  They were prescribed Briviact and perampanel but pharmacy does not have in stock today and they are unable to get it elsewhere.  Patient denies any complaints.  No headache.  No seizures during her rehab stay.    Patient well-appearing, in no distress.  Vitals unremarkable.  Neuroexam normal.  Discussed with nurse manager.  We will give pt her evening dose of Briviact and perampanel.  Will keep overnight in ED until social work/ can assist in finding availability of medication for discharge.      Gabby Regan MD, ED Attending:  -6/28/25 @0700; Pt signed out to me by Dr. Villarreal pending SW evaluation to evaluate access to pt's seizure meds (Brivaracetam 100mg TID, Perampanel 4mg daily).    pt slept overnight without issues. case signed out to Dr Regan 0700. pending SW to help coordinate availability of outpt seizure meds 54-year-old female with history of left temporal lesion, bipolar, anxiety, psychogenic seizures was admitted at [facility] June 6 for AMS and seizure, status post left craniotomy for neoplastic resection June 16 at [facility], admitted to rehab June 23 and discharged tonight.  They were prescribed Briviact and perampanel but pharmacy does not have in stock today and they are unable to get it elsewhere.  Patient denies any complaints.  No headache.  No seizures during her rehab stay.    Patient well-appearing, in no distress.  Vitals unremarkable.  Neuroexam normal.  Discussed with nurse manager.  We will give pt her evening dose of Briviact and perampanel.  Will keep overnight in ED until social work/ can assist in finding availability of medication for discharge.  patient slept overnight without issues.  Case signed out to Dr. Regan at 0700 pending social work to help coordinate availability of outpatient seizure medications.    Gabby Regan MD, ED Attending:  -6/28/25 @0700; Pt signed out to me by Dr. Villarreal pending SW evaluation to evaluate access to pt's seizure meds (Brivaracetam 100mg TID, Perampanel 4mg daily).  -Discussed w/ SW who called pharmacy; pharmacy doesn't have Brivaracetam until Monday 6/30/25. Perampenal is not covered by her insurance and is $1600/month; pharmacy asking if MD can switch seizure medication  -@1100: discussed w/ neuro/Dr. Head; recommends discharging with 1 week of meds and having pt followup with epileptologist on Monday for medication adjustment. If unable to do so, then admit to give seizure meds.  -discussed w/ pharmacy if this can be done; they need to reach out to supervisor. 54-year-old female with history of left temporal lesion, bipolar, anxiety, psychogenic seizures was admitted at [facility] June 6 for AMS and seizure, status post left craniotomy for neoplastic resection June 16 at [facility], admitted to rehab June 23 and discharged tonight.  They were prescribed Briviact and perampanel but pharmacy does not have in stock today and they are unable to get it elsewhere.  Patient denies any complaints.  No headache.  No seizures during her rehab stay.    Patient well-appearing, in no distress.  Vitals unremarkable.  Neuroexam normal.  Discussed with nurse manager.  We will give pt her evening dose of Briviact and perampanel.  Will keep overnight in ED until social work/ can assist in finding availability of medication for discharge.  patient slept overnight without issues.  Case signed out to Dr. Regan at 0700 pending social work to help coordinate availability of outpatient seizure medications.    Gabby Regan MD, ED Attending:  -6/28/25 @0700; Pt signed out to me by Dr. Villarreal pending SW evaluation to evaluate access to pt's seizure meds (Brivaracetam 100mg TID, Perampanel 4mg daily).  -Discussed w/ SW who called pharmacy; pharmacy doesn't have Brivaracetam until Monday 6/30/25. Perampenal is not covered by her insurance and is $1600/month; pharmacy asking if MD can switch seizure medication  -@1100: discussed w/ neuro/Dr. Head for medication adjustments; recommends discharging with 1 week of meds and having pt followup with epileptologist on Monday for medication adjustment. If unable to do so, then admit to give seizure meds.  -discussed w/ pharmacy to see if this can be done; they need to reach out to supervisor as they are controlled substances  -pharmacy unable to dispense 1 week worth of meds for DC  -discussed w/ medicine and admitted to their service for ongoing care.

## 2025-06-28 NOTE — ED ADULT TRIAGE NOTE - CHIEF COMPLAINT QUOTE
Pt states she is here due to issues with her medications. Pt states she was discharged on 6/27 at approximately 5pm with medications sent to the pharmacy. However, the pharmacy does not have 2 of the medications which are for seizures. Clobazam 10mg 1 tab QHS and Brivaracetam 100mg 1 tab Q8hrs.

## 2025-06-28 NOTE — PATIENT PROFILE ADULT - STATED REASON FOR ADMISSION
No care due was identified.  [facility] Embedded Care Due Messages. Reference number: [id number].   2/14/2024 11:47:05 AM CST   social admit/medication administration

## 2025-06-28 NOTE — ED PROVIDER NOTE - PHYSICAL EXAMINATION
exam:   General: well appearing, NAD.   HEENT: eyes perrl, nose normal, OP no erythema/exudate/swelling.   cor: RRR, s1s2, 2+rad pulses.   lungs: ctabl, no resp distress.   abd: soft, ntnd.   neuro: a&ox3, cn2-12 intact, CONWAY, 5/5 strength c nl sensation all extremities, nl coordination.   MSK: no extremity swelling.

## 2025-06-28 NOTE — ED PROVIDER NOTE - OBJECTIVE STATEMENT
54-year-old female with history of left temporal lesion, bipolar, anxiety, psychogenic seizures was admitted at [facility] June 6 for AMS and seizure, status post left craniotomy for neoplastic resection June 16 at [facility], admitted to rehab June 23 and discharged tonight.  They were prescribed Briviact and perampanel but pharmacy does not have in stock today and they are unable to get it elsewhere.  Patient denies any complaints.  No headache.  No seizures during her rehab stay.

## 2025-06-28 NOTE — CHART NOTE - NSCHARTNOTEFT_GEN_A_CORE
SW met the pt at bedside to assess for social work needs with obtaining medication from the Pharmacy.  Pt is a alert and reported that she was d/c home from [facility] yesterday and unable to get two important medications from [facility], [facility].  Worker contacted the [facility] [phone] and spoke to the pharmacist who informed that there were two Medications unavailable: Briviact: currently out of stock, will be available with in 48 hours.  Perampanel: medication is not covered my the insurance, therefore, the co-pay will be 1600/- because it is "non formulary drug".  Pt has HIP Medicaid and as per the pharmacist the prescriber may have to contact the insurance to rectify.  Worker attempted to reach the HIP Medicaid [phone] and the office is closed for the week ends.  Worker has discussed the finding with the team and the pt about the findings and will do the follow up as needed. SW met the pt at bedside to assess for social work needs with obtaining medication from the Pharmacy.  Pt is a alert and reported that she was d/c home from [facility] yesterday and unable to get two important medications from [facility], [facility].  Worker contacted the [facility] [phone] and spoke to the pharmacist who informed that there were two Medications unavailable: Briviact: currently out of stock, will be available with in 48 hours.  Perampanel: medication is not covered my the insurance, therefore, the co-pay will be 1600/- because it is "non formulary drug".  Pt has HIP Medicaid and as per the pharmacist the prescriber may have to contact the insurance to rectify.  Worker attempted to reach the HIP Medicaid [phone] and the office is closed for the week ends.    Worker also reached out the [facility] at [place]  [phone] who requested a prescription to  find about insurance coverage of it.  The request was communicated to the ADN and covering Nurse.  Worker has discussed the finding with the team and the pt about the findings and will do the follow up as needed.

## 2025-06-28 NOTE — H&P ADULT - HISTORY OF PRESENT ILLNESS
54F from home with Seizure DO, Hypothyroidism, Mood DO SP remote suicide attempt Recent Lt TemporaL LOBE NEOPLASTIC RESECTION (June16) SP recent acute rehab stay,  Here due to medication unavailability. Although prior auth was obtained for her AED, and availabilty was confirmed over phone on DC and extensively documented - briviact was not in supply at her local pharmacy and was backordered for 2 days. she was advised to return to ED for medication by her neuro surgeon.  . Denies witnessed generalized seizures, but endorses anxiety and concern about breakthrough seizure activity. Denies fever, chest pain, SOB, or other acute complaints.

## 2025-06-28 NOTE — ED ADULT NURSE NOTE - OBJECTIVE STATEMENT
Pt states the pharmacy does not have her seizure medications available. Pt denies any symptoms or pain at this time.

## 2025-06-28 NOTE — ED ADULT NURSE REASSESSMENT NOTE - NS ED NURSE REASSESS COMMENT FT1
Pt ambulated out of bed and room independently. Pt instruction and education provided on call bell use and RN/PCA support/assistance offered. As per Pt, none needed at this time. Pt's belongings present at bedside included blanket, cell phone, charging chord, and shopping reusable bag.

## 2025-06-29 PROCEDURE — 99232 SBSQ HOSP IP/OBS MODERATE 35: CPT

## 2025-06-29 RX ORDER — PERAMPANEL 6 MG/1
4 TABLET ORAL AT BEDTIME
Refills: 0 | Status: DISCONTINUED | OUTPATIENT
Start: 2025-06-29 | End: 2025-07-01

## 2025-06-29 RX ORDER — PERAMPANEL 6 MG/1
1 TABLET ORAL
Qty: 0 | Refills: 0 | DISCHARGE
Start: 2025-06-29

## 2025-06-29 RX ORDER — LACOSAMIDE 150 MG/1
1 TABLET, FILM COATED ORAL
Qty: 0 | Refills: 0 | DISCHARGE
Start: 2025-06-29

## 2025-06-29 RX ORDER — LEVOTHYROXINE SODIUM 300 MCG
1 TABLET ORAL
Qty: 0 | Refills: 0 | DISCHARGE
Start: 2025-06-29

## 2025-06-29 RX ORDER — CLOBAZAM 20 MG/1
10 TABLET ORAL AT BEDTIME
Refills: 0 | Status: DISCONTINUED | OUTPATIENT
Start: 2025-06-29 | End: 2025-07-01

## 2025-06-29 RX ORDER — OXYCODONE HYDROCHLORIDE 30 MG/1
5 TABLET ORAL EVERY 6 HOURS
Refills: 0 | Status: DISCONTINUED | OUTPATIENT
Start: 2025-06-29 | End: 2025-07-01

## 2025-06-29 RX ORDER — BRIVARACETAM 75 MG/1
1 TABLET, FILM COATED ORAL
Qty: 0 | Refills: 0 | DISCHARGE
Start: 2025-06-29

## 2025-06-29 RX ORDER — CLOBAZAM 20 MG/1
1 TABLET ORAL
Qty: 0 | Refills: 0 | DISCHARGE
Start: 2025-06-29

## 2025-06-29 RX ORDER — ALBUTEROL SULFATE 2.5 MG/3ML
2 VIAL, NEBULIZER (ML) INHALATION
Qty: 0 | Refills: 0 | DISCHARGE
Start: 2025-06-29

## 2025-06-29 RX ORDER — BUTALBITAL, ACETAMINOPHEN AND CAFFEINE 50; 325; 40 MG/1; MG/1; MG/1
2 TABLET ORAL ONCE
Refills: 0 | Status: DISCONTINUED | OUTPATIENT
Start: 2025-06-29 | End: 2025-07-01

## 2025-06-29 RX ADMIN — LACOSAMIDE 150 MILLIGRAM(S): 150 TABLET, FILM COATED ORAL at 08:03

## 2025-06-29 RX ADMIN — BRIVARACETAM 100 MILLIGRAM(S): 75 TABLET, FILM COATED ORAL at 08:03

## 2025-06-29 RX ADMIN — LACOSAMIDE 150 MILLIGRAM(S): 150 TABLET, FILM COATED ORAL at 16:40

## 2025-06-29 RX ADMIN — BRIVARACETAM 100 MILLIGRAM(S): 75 TABLET, FILM COATED ORAL at 16:40

## 2025-06-29 RX ADMIN — Medication 650 MILLIGRAM(S): at 08:25

## 2025-06-29 RX ADMIN — Medication 88 MICROGRAM(S): at 08:02

## 2025-06-29 RX ADMIN — PERAMPANEL 4 MILLIGRAM(S): 6 TABLET ORAL at 23:06

## 2025-06-29 RX ADMIN — OXYCODONE HYDROCHLORIDE 5 MILLIGRAM(S): 30 TABLET ORAL at 21:33

## 2025-06-29 RX ADMIN — OXYCODONE HYDROCHLORIDE 5 MILLIGRAM(S): 30 TABLET ORAL at 13:03

## 2025-06-29 RX ADMIN — Medication 650 MILLIGRAM(S): at 02:20

## 2025-06-29 RX ADMIN — BRIVARACETAM 100 MILLIGRAM(S): 75 TABLET, FILM COATED ORAL at 00:14

## 2025-06-29 RX ADMIN — CLOBAZAM 10 MILLIGRAM(S): 20 TABLET ORAL at 23:06

## 2025-06-29 RX ADMIN — LACOSAMIDE 150 MILLIGRAM(S): 150 TABLET, FILM COATED ORAL at 00:07

## 2025-06-29 RX ADMIN — OXYCODONE HYDROCHLORIDE 5 MILLIGRAM(S): 30 TABLET ORAL at 14:03

## 2025-06-29 RX ADMIN — Medication 650 MILLIGRAM(S): at 09:25

## 2025-06-29 RX ADMIN — OXYCODONE HYDROCHLORIDE 5 MILLIGRAM(S): 30 TABLET ORAL at 20:33

## 2025-06-29 NOTE — DIETITIAN INITIAL EVALUATION ADULT - ORAL INTAKE PTA/DIET HISTORY
Pt reported PTA consumed a regular diet. Pt is a , tries to eat healthy and keeps within a healthy weight. Pt had good appetite. NKFA. Pt does not take supplements, takes MVI daily.

## 2025-06-29 NOTE — PROGRESS NOTE ADULT - ASSESSMENT
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  #Seizure Disorder, Psychogenic Seizures  - Apparently, patient discharged Friday evening however returned due to all pharmacies locally being out of stock of briviact   - I called her pharmacy [facility] [phone]; they will re-stock on Monday.....please call prior to discharge to make sure they have it for   - Will ask SW to follow up   - For now, will c/w brivaracetam 100 mg TID, lacosamide 150mg TID, perampanel 4mg qhs, cloBAZam 10mg qhs  -seizure precautions as needed  - Tylenol, oxycodone prn pain           #Mood disorder, PTSD, Bipolar  Follows psych, cont AEDs    #Hypothyroidism  Continue levothyroxine 88 mcg daily  Recheck TSH/FT4 outpatient    DVT: Ambulatory  DISP: d/c home once medications available at pharmacy

## 2025-06-29 NOTE — CHART NOTE - NSCHARTNOTEFT_GEN_A_CORE
Informed by BLAISE Lamas pt complaining of headache. Assessed pt at bedside. Pt states that this is the same headache that she's been having. Located on the top of the head, right-sided. Had relief with Esgic earlier in the night. Will order another dose of Esgic. Informed by BLAISE Lamas pt complaining of headache. Assessed pt at bedside. Pt states that this is the same headache that she's been having. Located on the top of the head, right-sided. Had relief with Esgic earlier in the night. Will order another dose of Esgic.    Update: checked on pt after Esgic, resting comfortably, sleeping

## 2025-06-29 NOTE — DISCHARGE NOTE PROVIDER - NSDCCPCAREPLAN_GEN_ALL_CORE_FT
denies pain/discomfort (Rating = 0) PRINCIPAL DISCHARGE DIAGNOSIS  Diagnosis: Patient unable to obtain medication  Assessment and Plan of Treatment:      PRINCIPAL DISCHARGE DIAGNOSIS  Diagnosis: Patient unable to obtain medication  Assessment and Plan of Treatment: -continue your home medications  -follow up with your Neurologist and Neursurgeon

## 2025-06-29 NOTE — DISCHARGE NOTE PROVIDER - NSDCFUSCHEDAPPT_GEN_ALL_CORE_FT
Scooter Hernández  [facility] Physician [facility]  NEUROSURG [address] [place] [address]  Scheduled Appointment: 07/03/2025

## 2025-06-29 NOTE — DISCHARGE NOTE PROVIDER - HOSPITAL COURSE
Hospital Course  HPI:  54F from home with Seizure DO, Hypothyroidism, Mood DO SP remote suicide attempt Recent Lt TemporaL LOBE NEOPLASTIC RESECTION (June16) SP recent acute rehab stay,  Here due to medication unavailability. Although prior auth was obtained for her AED, and availabilty was confirmed over phone on DC and extensively documented - briviact was not in supply at her local pharmacy and was backordered for 2 days. she was advised to return to ED for medication by her neuro surgeon.  . Denies witnessed generalized seizures, but endorses anxiety and concern about breakthrough seizure activity. Denies fever, chest pain, SOB, or other acute complaints.     Admitted to medicine due to unavailability of her seizure medications  NO events during hospital course  SW/CM consulted    Stable for discharge  F/U neurosurgery/neurology     Hospital Course  HPI:  54F from home with Seizure DO, Hypothyroidism, Mood DO SP remote suicide attempt Recent Lt TemporaL LOBE NEOPLASTIC RESECTION (June16) SP recent acute rehab stay,  Here due to medication unavailability. Although prior auth was obtained for her AED, and availability was confirmed over phone on DC and extensively documented - briviact was not in supply at her local pharmacy and was backordered for 2 days.  She was advised to return to ED for medication by her neuro surgeon. Denies witnessed generalized seizures, but endorses anxiety and concern about breakthrough seizure activity. Denies fever, chest pain, SOB, or other acute complaints.     Admitted to medicine due to unavailability of her seizure medications  NO events during hospital course  SW/CM consulted, she now has the medications and stable for D/C home, she will f/u neurosurgery/neurology.    Discharging provider:  LAZARO Jacobsen [phone]     Hospital Course  HPI:  54F from home with Seizure DO, Hypothyroidism, Mood DO SP remote suicide attempt Recent Lt TemporaL LOBE NEOPLASTIC RESECTION (June16) SP recent acute rehab stay,  Here due to medication unavailability. Although prior auth was obtained for her AED, and availability was confirmed over phone on DC and extensively documented - briviact was not in supply at her local pharmacy and was backordered for 2 days.  She was advised to return to ED for medication by her neuro surgeon. Denies witnessed generalized seizures, but endorses anxiety and concern about breakthrough seizure activity. Denies fever, chest pain, SOB, or other acute complaints.     Admitted to medicine due to unavailability of her seizure medications  NO events during hospital course  SW/CM consulted, she now has the medications and stable for D/C home, she will f/u neurosurgery/neurology.    Discharging provider:  LAZARO Jacobsen [phone]    Time Spent: 45 minutes

## 2025-06-29 NOTE — DISCHARGE NOTE PROVIDER - NSDCMRMEDTOKEN_GEN_ALL_CORE_FT
albuterol 90 mcg/inh inhalation aerosol: 2 puff(s) inhaled every 6 hours As needed Bronchospasm  brivaracetam 100 mg oral tablet: 1 tab(s) orally 3 times a day  cloBAZam 10 mg oral tablet: 1 tab(s) orally once a day (at bedtime)  lacosamide 150 mg oral tablet: 1 tab(s) orally 3 times a day  levothyroxine 88 mcg (0.088 mg) oral tablet: 1 tab(s) orally once a day  perampanel 4 mg oral tablet: 1 tab(s) orally once a day (at bedtime)   albuterol 90 mcg/inh inhalation aerosol: 2 puff(s) inhaled every 6 hours As needed Bronchospasm  brivaracetam 100 mg oral tablet: 1 tab(s) orally 3 times a day  cloBAZam 10 mg oral tablet: 1 tab(s) orally once a day (at bedtime)  Fycompa 4 mg oral tablet: 1 tab(s) orally once a day (at bedtime) MDD: 4 mg  lacosamide 150 mg oral tablet: 1 tab(s) orally 3 times a day  levothyroxine 88 mcg (0.088 mg) oral tablet: 1 tab(s) orally once a day  perampanel 4 mg oral tablet: 1 tab(s) orally once a day (at bedtime)

## 2025-06-29 NOTE — DISCHARGE NOTE PROVIDER - CARE PROVIDER_API CALL
Scooter Hernández  Neurological Surgery  [address], [address]  [place] [address]  Phone: [phone]  Fax: [phone]  Follow Up Time:

## 2025-06-29 NOTE — PROGRESS NOTE ADULT - SUBJECTIVE AND OBJECTIVE BOX
Patient is a 54y old  Female who presents with a chief complaint of Seizure DO       Patient seen and examined at bedside.    ALLERGIES:  No Known Allergies    MEDICATIONS  (STANDING):  brivaracetam 100 milliGRAM(s) Oral <User Schedule>  lacosamide 150 milliGRAM(s) Oral <User Schedule>  levothyroxine 88 MICROGram(s) Oral daily    MEDICATIONS  (PRN):  acetaminophen     Tablet .. 650 milliGRAM(s) Oral every 6 hours PRN Temp greater or equal to 38C (100.4F), Mild Pain (1 - 3)  acetaminophen 325 mG/butalbital 50 mG/caffeine 40 mG 2 Tablet(s) Oral once PRN Headache/Migraine  albuterol    90 MICROgram(s) HFA Inhaler 2 Puff(s) Inhalation every 6 hours PRN Bronchospasm    Vital Signs Last 24 Hrs  T(F): 97.5 (29 Jun 2025 06:00), Max: 97.8 (28 Jun 2025 23:03)  HR: 60 (29 Jun 2025 06:00) (60 - 82)  BP: 108/73 (29 Jun 2025 06:00) (90/62 - 108/73)  RR: 16 (29 Jun 2025 06:00) (14 - 16)  SpO2: 96% (29 Jun 2025 06:00) (96% - 99%)  I&O's Summary    BMI (kg/m2): 21.9 (06-28-25 @ 00:47), 23.8 (06-27-25 @ 08:07)  PHYSICAL EXAM:  General: NAD, A/O x 3  ENT: MMM, no scleral icterus  Neck: Supple, No JVD, no thyroidomegaly  Lungs: Clear to auscultation bilaterally, no wheezes, no rales, no rhonchi, good inspiratory effort  Cardio: RRR, S1/S2, No murmurs  Abdomen: Soft, Nontender, Nondistended; Bowel sounds present  Extremities: No calf tenderness, No pitting edema, no skin changes    LABS:                        12.7   5.94  )-----------( 245      ( 26 Jun 2025 09:42 )             38.1       06-26    140  |  103  |  26  ----------------------------<  93  3.3   |  26  |  0.71    Ca    9.1      26 Jun 2025 09:42    TPro  7.2  /  Alb  3.1  /  TBili  0.3  /  DBili  x   /  AST  29  /  ALT  92  /  AlkPhos  75  06-26 06-08 Chol 178 mg/dL LDL -- HDL 69 mg/dL Trig 128 mg/dL    Urinalysis Basic - ( 26 Jun 2025 09:42 )    Color: x / Appearance: x / SG: x / pH: x  Gluc: 93 mg/dL / Ketone: x  / Bili: x / Urobili: x   Blood: x / Protein: x / Nitrite: x   Leuk Esterase: x / RBC: x / WBC x   Sq Epi: x / Non Sq Epi: x / Bacteria: x    COVID-19 PCR: NotDetec (06-23-25 @ 20:00)  COVID-19 PCR: NotDetec (06-23-25 @ 20:00)         Patient is a 54y old  Female who presents with a chief complaint of Seizure DO   REports headache  Denies chest pain, SOB  TOlerating diet    Patient seen and examined at bedside.    ALLERGIES:  No Known Allergies    MEDICATIONS  (STANDING):  brivaracetam 100 milliGRAM(s) Oral <User Schedule>  lacosamide 150 milliGRAM(s) Oral <User Schedule>  levothyroxine 88 MICROGram(s) Oral daily    MEDICATIONS  (PRN):  acetaminophen     Tablet .. 650 milliGRAM(s) Oral every 6 hours PRN Temp greater or equal to 38C (100.4F), Mild Pain (1 - 3)  acetaminophen 325 mG/butalbital 50 mG/caffeine 40 mG 2 Tablet(s) Oral once PRN Headache/Migraine  albuterol    90 MICROgram(s) HFA Inhaler 2 Puff(s) Inhalation every 6 hours PRN Bronchospasm    Vital Signs Last 24 Hrs  T(F): 97.5 (29 Jun 2025 06:00), Max: 97.8 (28 Jun 2025 23:03)  HR: 60 (29 Jun 2025 06:00) (60 - 82)  BP: 108/73 (29 Jun 2025 06:00) (90/62 - 108/73)  RR: 16 (29 Jun 2025 06:00) (14 - 16)  SpO2: 96% (29 Jun 2025 06:00) (96% - 99%)  I&O's Summary    BMI (kg/m2): 21.9 (06-28-25 @ 00:47), 23.8 (06-27-25 @ 08:07)  PHYSICAL EXAM:  General: NAD, A/O x 3  ENT: MMM, no scleral icterus  Neck: Supple, No JVD, no thyroidomegaly  Lungs: Clear to auscultation bilaterally, no wheezes, no rales, no rhonchi, good inspiratory effort  Cardio: RRR, S1/S2, No murmurs  Abdomen: Soft, Nontender, Nondistended; Bowel sounds present  Extremities: No calf tenderness, No pitting edema, no skin changes    LABS:                        12.7   5.94  )-----------( 245      ( 26 Jun 2025 09:42 )             38.1       06-26    140  |  103  |  26  ----------------------------<  93  3.3   |  26  |  0.71    Ca    9.1      26 Jun 2025 09:42    TPro  7.2  /  Alb  3.1  /  TBili  0.3  /  DBili  x   /  AST  29  /  ALT  92  /  AlkPhos  75  06-26 06-08 Chol 178 mg/dL LDL -- HDL 69 mg/dL Trig 128 mg/dL    Urinalysis Basic - ( 26 Jun 2025 09:42 )    Color: x / Appearance: x / SG: x / pH: x  Gluc: 93 mg/dL / Ketone: x  / Bili: x / Urobili: x   Blood: x / Protein: x / Nitrite: x   Leuk Esterase: x / RBC: x / WBC x   Sq Epi: x / Non Sq Epi: x / Bacteria: x    COVID-19 PCR: NotDetec (06-23-25 @ 20:00)  COVID-19 PCR: NotDetec (06-23-25 @ 20:00)

## 2025-06-29 NOTE — DIETITIAN INITIAL EVALUATION ADULT - PERTINENT MEDS FT
MEDICATIONS  (STANDING):  brivaracetam 100 milliGRAM(s) Oral <User Schedule>  cloBAZam 10 milliGRAM(s) Oral at bedtime  lacosamide 150 milliGRAM(s) Oral <User Schedule>  levothyroxine 88 MICROGram(s) Oral daily  perampanel 4 milliGRAM(s) Oral at bedtime    MEDICATIONS  (PRN):  acetaminophen     Tablet .. 650 milliGRAM(s) Oral every 6 hours PRN Temp greater or equal to 38C (100.4F), Mild Pain (1 - 3)  acetaminophen 325 mG/butalbital 50 mG/caffeine 40 mG 2 Tablet(s) Oral once PRN Headache/Migraine  albuterol    90 MICROgram(s) HFA Inhaler 2 Puff(s) Inhalation every 6 hours PRN Bronchospasm  oxyCODONE    IR 5 milliGRAM(s) Oral every 6 hours PRN Moderate Pain (4 - 6)

## 2025-06-29 NOTE — DIETITIAN INITIAL EVALUATION ADULT - OTHER INFO
H&P: 55 y/o F with focal epilepsy s/p L craniotomy for neoplastic lesion, readmitted due to unavailability of AEDs after discharge, Seizure Disorder, Psychogenic Seizures.     Pt reports fair appetite. PO intake at breakfast 0%, pt was sleeping. Pt feeds self, reports no chewing/swallowing difficulties. NKFA. Denies N/V/C/D. Last BM: 6/26. UBW: 120 Lbs, current weight 120 Lbs. No edema. Skin WDL except ecchymotic. No muscle/fat wasting visually noted. Food preferences updated. Menu provided and ordering procedures reviewed.

## 2025-06-29 NOTE — DIETITIAN INITIAL EVALUATION ADULT - ETIOLOGY
Patient's daughter called stating that [facility] sent over some paperwork that needed to be completed. She stated that it was for some incontinence/medical supplies the patient need. Daughter would like to be contacted at your earliest convenience once paperwork is competed. Thanks! decreased appetite

## 2025-06-30 ENCOUNTER — TRANSCRIPTION ENCOUNTER (OUTPATIENT)
Age: 55
End: 2025-06-30

## 2025-06-30 PROBLEM — G93.9 BRAIN LESION: Status: ACTIVE | Noted: 2025-06-30

## 2025-06-30 PROBLEM — Z98.890 S/P CRANIOTOMY: Status: ACTIVE | Noted: 2025-06-30

## 2025-06-30 LAB
ANION GAP SERPL CALC-SCNC: 9 MMOL/L — SIGNIFICANT CHANGE UP (ref 5–17)
BUN SERPL-MCNC: 17 MG/DL — SIGNIFICANT CHANGE UP (ref 7–23)
CALCIUM SERPL-MCNC: 8.9 MG/DL — SIGNIFICANT CHANGE UP (ref 8.4–10.5)
CHLORIDE SERPL-SCNC: 105 MMOL/L — SIGNIFICANT CHANGE UP (ref 96–108)
CO2 SERPL-SCNC: 27 MMOL/L — SIGNIFICANT CHANGE UP (ref 22–31)
CREAT SERPL-MCNC: 0.61 MG/DL — SIGNIFICANT CHANGE UP (ref 0.5–1.3)
EGFR: 105 ML/MIN/1.73M2 — SIGNIFICANT CHANGE UP
EGFR: 105 ML/MIN/1.73M2 — SIGNIFICANT CHANGE UP
GLUCOSE SERPL-MCNC: 104 MG/DL — HIGH (ref 70–99)
MAGNESIUM SERPL-MCNC: 1.9 MG/DL — SIGNIFICANT CHANGE UP (ref 1.6–2.6)
POTASSIUM SERPL-MCNC: 4 MMOL/L — SIGNIFICANT CHANGE UP (ref 3.5–5.3)
POTASSIUM SERPL-SCNC: 4 MMOL/L — SIGNIFICANT CHANGE UP (ref 3.5–5.3)
SODIUM SERPL-SCNC: 141 MMOL/L — SIGNIFICANT CHANGE UP (ref 135–145)

## 2025-06-30 PROCEDURE — 83735 ASSAY OF MAGNESIUM: CPT

## 2025-06-30 PROCEDURE — 99232 SBSQ HOSP IP/OBS MODERATE 35: CPT

## 2025-06-30 PROCEDURE — 80048 BASIC METABOLIC PNL TOTAL CA: CPT

## 2025-06-30 PROCEDURE — 36415 COLL VENOUS BLD VENIPUNCTURE: CPT

## 2025-06-30 RX ORDER — PERAMPANEL 6 MG/1
1 TABLET ORAL
Qty: 30 | Refills: 0
Start: 2025-06-30 | End: 2025-07-29

## 2025-06-30 RX ADMIN — OXYCODONE HYDROCHLORIDE 5 MILLIGRAM(S): 30 TABLET ORAL at 17:36

## 2025-06-30 RX ADMIN — BRIVARACETAM 100 MILLIGRAM(S): 75 TABLET, FILM COATED ORAL at 16:36

## 2025-06-30 RX ADMIN — Medication 88 MICROGRAM(S): at 05:38

## 2025-06-30 RX ADMIN — PERAMPANEL 4 MILLIGRAM(S): 6 TABLET ORAL at 22:13

## 2025-06-30 RX ADMIN — LACOSAMIDE 150 MILLIGRAM(S): 150 TABLET, FILM COATED ORAL at 16:35

## 2025-06-30 RX ADMIN — CLOBAZAM 10 MILLIGRAM(S): 20 TABLET ORAL at 22:14

## 2025-06-30 RX ADMIN — BRIVARACETAM 100 MILLIGRAM(S): 75 TABLET, FILM COATED ORAL at 00:32

## 2025-06-30 RX ADMIN — OXYCODONE HYDROCHLORIDE 5 MILLIGRAM(S): 30 TABLET ORAL at 16:36

## 2025-06-30 RX ADMIN — BRIVARACETAM 100 MILLIGRAM(S): 75 TABLET, FILM COATED ORAL at 08:18

## 2025-06-30 RX ADMIN — Medication 650 MILLIGRAM(S): at 23:36

## 2025-06-30 RX ADMIN — LACOSAMIDE 150 MILLIGRAM(S): 150 TABLET, FILM COATED ORAL at 00:32

## 2025-06-30 RX ADMIN — LACOSAMIDE 150 MILLIGRAM(S): 150 TABLET, FILM COATED ORAL at 08:18

## 2025-06-30 NOTE — DISCHARGE NOTE NURSING/CASE MANAGEMENT/SOCIAL WORK - FINANCIAL ASSISTANCE
[facility] provides services at a reduced cost to those who are determined to be eligible through [facility]’s financial assistance program. Information regarding [facility]’s financial assistance program can be found by going to [email or web address] or by calling [phone]. 10-Jul-2023 19:37

## 2025-06-30 NOTE — DISCHARGE NOTE NURSING/CASE MANAGEMENT/SOCIAL WORK - NSDCPEFALRISK_GEN_ALL_CORE
For information on Fall & Injury Prevention, visit: [email or web address] OR  [email or web address] OR  https://www.cdc.gov/steadi/patient.html

## 2025-06-30 NOTE — DISCHARGE NOTE NURSING/CASE MANAGEMENT/SOCIAL WORK - PATIENT PORTAL LINK FT
You can access the FollowMyHealth Patient Portal offered by [facility] by registering at the following website: [email or web address]. By joining [facility]’s FollowMyHealth portal, you will also be able to view your health information using other applications (apps) compatible with our system.

## 2025-06-30 NOTE — DISCHARGE NOTE NURSING/CASE MANAGEMENT/SOCIAL WORK - NSTRANSFERBELONGINGSRESP_GEN_A_NUR
yes [Initial] : an initial consultation for [FreeTextEntry2] : a pelvic sonogram finding of a thickened endometrium.

## 2025-06-30 NOTE — PROGRESS NOTE ADULT - ASSESSMENT
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 HOWEVER, fycompa script needed to be resent as sent as brand, and must be approved and earliest this would be is 7/2. i sent fycompa 4mg qhs x30 days to [facility] today.            #Mood disorder, PTSD, Bipolar  Follows psych, cont AEDs    #Hypothyroidism  Continue levothyroxine 88 mcg daily  Recheck TSH/FT4 outpatient    DVT: Ambulatory  DISP: d/c home once medications available at pharmacy

## 2025-06-30 NOTE — PROGRESS NOTE ADULT - SUBJECTIVE AND OBJECTIVE BOX
Tarsha Skinner MD  [facility] Division of Hospital Medicine    SUBJECTIVE / OVERNIGHT EVENTS:  - no events overnight, this morning in good spirits. has no acute complaints, no nausea, vomiting, headaches.     MEDICATIONS  (STANDING):  brivaracetam 100 milliGRAM(s) Oral <User Schedule>  cloBAZam 10 milliGRAM(s) Oral at bedtime  lacosamide 150 milliGRAM(s) Oral <User Schedule>  levothyroxine 88 MICROGram(s) Oral daily  perampanel 4 milliGRAM(s) Oral at bedtime    MEDICATIONS  (PRN):  acetaminophen     Tablet .. 650 milliGRAM(s) Oral every 6 hours PRN Temp greater or equal to 38C (100.4F), Mild Pain (1 - 3)  acetaminophen 325 mG/butalbital 50 mG/caffeine 40 mG 2 Tablet(s) Oral once PRN Headache/Migraine  albuterol    90 MICROgram(s) HFA Inhaler 2 Puff(s) Inhalation every 6 hours PRN Bronchospasm  oxyCODONE    IR 5 milliGRAM(s) Oral every 6 hours PRN Moderate Pain (4 - 6)      I&O's Summary      PHYSICAL EXAM:  Vital Signs Last 24 Hrs  T(C): 36.9 (30 Jun 2025 10:30), Max: 36.9 (30 Jun 2025 10:30)  T(F): 98.4 (30 Jun 2025 10:30), Max: 98.4 (30 Jun 2025 10:30)  HR: 76 (30 Jun 2025 10:30) (60 - 83)  BP: 101/69 (30 Jun 2025 10:30) (90/64 - 113/72)  BP(mean): --  RR: 16 (30 Jun 2025 10:30) (15 - 16)  SpO2: 98% (30 Jun 2025 10:30) (96% - 98%)    Parameters below as of 30 Jun 2025 10:30  Patient On (Oxygen Delivery Method): room air      CONSTITUTIONAL: NAD, well-developed, well-groomed  RESPIRATORY: Normal respiratory effort; lungs are clear to auscultation bilaterally  CARDIOVASCULAR: Regular rate and rhythm, normal S1 and S2, no murmur/rub/gallop; No lower extremity edema;   ABDOMEN: Nontender to palpation, normoactive bowel sounds, no rebound/guarding;   MUSCULOSKELETAL: no clubbing or cyanosis of digits; no joint swelling or tenderness to palpation  PSYCH: A+O to person, place, and time; affect appropriate  SKIN: No rashes; no palpable lesions    LABS:    06-30    141  |  105  |  17  ----------------------------<  104[H]  4.0   |  27  |  0.61    Ca    8.9      30 Jun 2025 06:57  Mg     1.9     06-30            Urinalysis Basic - ( 30 Jun 2025 06:57 )    Color: x / Appearance: x / SG: x / pH: x  Gluc: 104 mg/dL / Ketone: x  / Bili: x / Urobili: x   Blood: x / Protein: x / Nitrite: x   Leuk Esterase: x / RBC: x / WBC x   Sq Epi: x / Non Sq Epi: x / Bacteria: x        COVID-19 PCR: NotDetec (23 Jun 2025 20:00)

## 2025-07-01 VITALS
OXYGEN SATURATION: 100 % | RESPIRATION RATE: 17 BRPM | SYSTOLIC BLOOD PRESSURE: 106 MMHG | HEART RATE: 72 BPM | DIASTOLIC BLOOD PRESSURE: 73 MMHG | TEMPERATURE: 97 F

## 2025-07-01 PROCEDURE — 83735 ASSAY OF MAGNESIUM: CPT

## 2025-07-01 PROCEDURE — 80048 BASIC METABOLIC PNL TOTAL CA: CPT

## 2025-07-01 PROCEDURE — 99285 EMERGENCY DEPT VISIT HI MDM: CPT | Mod: 25

## 2025-07-01 PROCEDURE — 36415 COLL VENOUS BLD VENIPUNCTURE: CPT

## 2025-07-01 PROCEDURE — 99239 HOSP IP/OBS DSCHRG MGMT >30: CPT

## 2025-07-01 RX ORDER — SIMETHICONE 80 MG
80 TABLET,CHEWABLE ORAL DAILY
Refills: 0 | Status: DISCONTINUED | OUTPATIENT
Start: 2025-07-01 | End: 2025-07-01

## 2025-07-01 RX ORDER — PERAMPANEL 6 MG/1
1 TABLET ORAL
Qty: 30 | Refills: 0
Start: 2025-07-01 | End: 2025-07-30

## 2025-07-01 RX ADMIN — BRIVARACETAM 100 MILLIGRAM(S): 75 TABLET, FILM COATED ORAL at 00:33

## 2025-07-01 RX ADMIN — Medication 88 MICROGRAM(S): at 05:24

## 2025-07-01 RX ADMIN — LACOSAMIDE 150 MILLIGRAM(S): 150 TABLET, FILM COATED ORAL at 00:33

## 2025-07-01 RX ADMIN — LACOSAMIDE 150 MILLIGRAM(S): 150 TABLET, FILM COATED ORAL at 08:08

## 2025-07-01 RX ADMIN — BRIVARACETAM 100 MILLIGRAM(S): 75 TABLET, FILM COATED ORAL at 08:08

## 2025-07-01 NOTE — PHARMACOTHERAPY INTERVENTION NOTE - COMMENTS
[id number] for Fycompa delivered to pt bedside, all questions/concerns addressed, pt verbalized understanding of anti-epileptic drug regimen, Briviact to be picked up from [facility] pharmacy today

## 2025-07-01 NOTE — PROGRESS NOTE ADULT - TIME BILLING
- Ordering, reviewing, and interpreting labs, testing, and imaging.  - Independently obtaining a review of systems and performing a physical exam  - Reviewing consultant documentation/recommendations.  - Counselling and educating patient regarding interpretation of aforementioned items and plan of care.
- Ordering, reviewing, and interpreting labs, testing, and imaging.  - Independently obtaining a review of systems and performing a physical exam  - Reviewing consultant documentation/recommendations in addition to discussing plan of care with consultants.
Time spent includes direct patient care  (interview and examination of patient), discussion with other providers, support staff and/or patient's family members, review of medical records, ordering diagnostic tests and analyzing results, and documentation.

## 2025-07-01 NOTE — PROGRESS NOTE ADULT - NS ATTEND AMEND GEN_ALL_CORE FT
55 y/o F with focal epilepsy s/p L craniotomy for neoplastic lesion, readmitted due to unavailability of meds after discharge. Pharmacy appreciated. meds available at bedside at confirmed at pharmacy. medically optimized for discharge.

## 2025-07-01 NOTE — PROGRESS NOTE ADULT - ASSESSMENT
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 HOWEVER, fycompa script needed to be resent as sent as brand, and must be approved and earliest this would be is 7/2.  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

## 2025-07-01 NOTE — PROGRESS NOTE ADULT - SUBJECTIVE AND OBJECTIVE BOX
Patient is a 54y old  Female who presents with a chief complaint of Seizure DO (30 Jun 2025 15:11)      Patient seen and examined at bedside. No overnight events reported.     ALLERGIES:  No Known Allergies    MEDICATIONS  (STANDING):  brivaracetam 100 milliGRAM(s) Oral <User Schedule>  cloBAZam 10 milliGRAM(s) Oral at bedtime  lacosamide 150 milliGRAM(s) Oral <User Schedule>  levothyroxine 88 MICROGram(s) Oral daily  perampanel 4 milliGRAM(s) Oral at bedtime    MEDICATIONS  (PRN):  acetaminophen     Tablet .. 650 milliGRAM(s) Oral every 6 hours PRN Temp greater or equal to 38C (100.4F), Mild Pain (1 - 3)  acetaminophen 325 mG/butalbital 50 mG/caffeine 40 mG 2 Tablet(s) Oral once PRN Headache/Migraine  albuterol    90 MICROgram(s) HFA Inhaler 2 Puff(s) Inhalation every 6 hours PRN Bronchospasm  oxyCODONE    IR 5 milliGRAM(s) Oral every 6 hours PRN Moderate Pain (4 - 6)  simethicone 80 milliGRAM(s) Chew daily PRN Upset Stomach    Vital Signs Last 24 Hrs  T(F): 98.2 (01 Jul 2025 05:08), Max: 98.6 (30 Jun 2025 18:00)  HR: 75 (01 Jul 2025 05:08) (61 - 80)  BP: 91/60 (01 Jul 2025 05:08) (91/57 - 101/69)  RR: 16 (01 Jul 2025 05:08) (16 - 16)  SpO2: 94% (01 Jul 2025 05:08) (94% - 99%)  I&O's Summary    PHYSICAL EXAM:  General: NAD, A/O x 3  ENT: No gross hearing impairment, Moist mucous membranes, no thrush  Neck: Supple, No JVD  Lungs: Clear to auscultation bilaterally, good air entry, non-labored breathing  Cardio: RRR, S1/S2, No murmur  Abdomen: Soft, Nontender, Nondistended; Bowel sounds present  Extremities: No calf tenderness, No cyanosis, No pitting edema  Psych: Appropriate mood and affect    LABS:    06-30    141  |  105  |  17  ----------------------------<  104  4.0   |  27  |  0.61    Ca    8.9      30 Jun 2025 06:57  Mg     1.9     06-30 06-08 Chol 178 mg/dL LDL -- HDL 69 mg/dL Trig 128 mg/dL                  Urinalysis Basic - ( 30 Jun 2025 06:57 )    Color: x / Appearance: x / SG: x / pH: x  Gluc: 104 mg/dL / Ketone: x  / Bili: x / Urobili: x   Blood: x / Protein: x / Nitrite: x   Leuk Esterase: x / RBC: x / WBC x   Sq Epi: x / Non Sq Epi: x / Bacteria: x        COVID-19 PCR: NotDetec (06-23-25 @ 20:00)    RADIOLOGY & ADDITIONAL TESTS:    Care Discussed with Consultants/Other Providers:    Patient is a 54y old  Female who presents with a chief complaint of Seizure DO (30 Jun 2025 15:11)      Patient seen and examined at bedside. No overnight events reported.   She has no complaints.     ALLERGIES:  No Known Allergies    MEDICATIONS  (STANDING):  brivaracetam 100 milliGRAM(s) Oral <User Schedule>  cloBAZam 10 milliGRAM(s) Oral at bedtime  lacosamide 150 milliGRAM(s) Oral <User Schedule>  levothyroxine 88 MICROGram(s) Oral daily  perampanel 4 milliGRAM(s) Oral at bedtime    MEDICATIONS  (PRN):  acetaminophen     Tablet .. 650 milliGRAM(s) Oral every 6 hours PRN Temp greater or equal to 38C (100.4F), Mild Pain (1 - 3)  acetaminophen 325 mG/butalbital 50 mG/caffeine 40 mG 2 Tablet(s) Oral once PRN Headache/Migraine  albuterol    90 MICROgram(s) HFA Inhaler 2 Puff(s) Inhalation every 6 hours PRN Bronchospasm  oxyCODONE    IR 5 milliGRAM(s) Oral every 6 hours PRN Moderate Pain (4 - 6)  simethicone 80 milliGRAM(s) Chew daily PRN Upset Stomach    Vital Signs Last 24 Hrs  T(F): 98.2 (01 Jul 2025 05:08), Max: 98.6 (30 Jun 2025 18:00)  HR: 75 (01 Jul 2025 05:08) (61 - 80)  BP: 91/60 (01 Jul 2025 05:08) (91/57 - 101/69)  RR: 16 (01 Jul 2025 05:08) (16 - 16)  SpO2: 94% (01 Jul 2025 05:08) (94% - 99%)  I&O's Summary    PHYSICAL EXAM:  General: NAD, A/O x 3  ENT: No gross hearing impairment, Moist mucous membranes, no thrush  Neck: Supple, No JVD  Lungs: Clear to auscultation bilaterally, good air entry, non-labored breathing  Cardio: RRR, S1/S2, No murmur  Abdomen: Soft, Nontender, Nondistended; Bowel sounds present  Extremities: No calf tenderness, No cyanosis, No pitting edema  Psych: Appropriate mood and affect    LABS:    06-30    141  |  105  |  17  ----------------------------<  104  4.0   |  27  |  0.61    Ca    8.9      30 Jun 2025 06:57  Mg     1.9     06-30                      06-08 Chol 178 mg/dL LDL -- HDL 69 mg/dL Trig 128 mg/dL                  Urinalysis Basic - ( 30 Jun 2025 06:57 )    Color: x / Appearance: x / SG: x / pH: x  Gluc: 104 mg/dL / Ketone: x  / Bili: x / Urobili: x   Blood: x / Protein: x / Nitrite: x   Leuk Esterase: x / RBC: x / WBC x   Sq Epi: x / Non Sq Epi: x / Bacteria: x        COVID-19 PCR: NotDetec (06-23-25 @ 20:00)    RADIOLOGY & ADDITIONAL TESTS:    Care Discussed with Consultants/Other Providers:

## 2025-07-03 ENCOUNTER — NON-APPOINTMENT (OUTPATIENT)
Age: 55
End: 2025-07-03

## 2025-07-03 ENCOUNTER — APPOINTMENT (OUTPATIENT)
Dept: NEUROSURGERY | Facility: CLINIC | Age: 55
End: 2025-07-03
Payer: MEDICAID

## 2025-07-03 VITALS
BODY MASS INDEX: 22.08 KG/M2 | HEIGHT: 62 IN | WEIGHT: 120 LBS | DIASTOLIC BLOOD PRESSURE: 60 MMHG | OXYGEN SATURATION: 99 % | SYSTOLIC BLOOD PRESSURE: 92 MMHG | HEART RATE: 87 BPM

## 2025-07-03 PROCEDURE — 99024 POSTOP FOLLOW-UP VISIT: CPT

## 2025-07-16 ENCOUNTER — NON-APPOINTMENT (OUTPATIENT)
Age: 55
End: 2025-07-16

## 2025-09-17 ENCOUNTER — APPOINTMENT (OUTPATIENT)
Dept: ORTHOPEDIC SURGERY | Facility: CLINIC | Age: 55
End: 2025-09-17
Payer: MEDICAID

## 2025-09-17 ENCOUNTER — APPOINTMENT (OUTPATIENT)
Dept: MRI IMAGING | Facility: CLINIC | Age: 55
End: 2025-09-17

## 2025-09-17 DIAGNOSIS — M17.0 BILATERAL PRIMARY OSTEOARTHRITIS OF KNEE: ICD-10-CM

## 2025-09-17 PROCEDURE — 99214 OFFICE O/P EST MOD 30 MIN: CPT

## 2025-09-17 RX ORDER — HYALURONATE SODIUM, STABILIZED 88 MG/4 ML
88 SYRINGE (ML) INTRAARTICULAR
Qty: 1 | Refills: 1 | Status: ACTIVE | COMMUNITY
Start: 2025-09-17 | End: 1900-01-01

## (undated) DEVICE — DRAIN JACKSON PRATT 10MM FLAT FULL NO TROCAR

## (undated) DEVICE — STRYKER SONOPET IQ TUBING SET

## (undated) DEVICE — ELCTR BOVIE TIP BLADE INSULATED 2.75" EDGE

## (undated) DEVICE — DRAIN JACKSON PRATT 7FR ROUND END NO TROCAR

## (undated) DEVICE — MIDAS REX MR7 LUBRICANT DIFFUSER CARTRIDGE

## (undated) DEVICE — GLV 7.5 PROTEXIS (WHITE)

## (undated) DEVICE — FOLEY TRAY 16FR LF URINE METER SURESTEP

## (undated) DEVICE — SPECIMEN CONTAINER 100ML

## (undated) DEVICE — MARKING PEN W RULER

## (undated) DEVICE — TUBING BIPOLAR IRRIGATOR AND CORD SET

## (undated) DEVICE — DRAPE MICROSCOPE ZEISS OPMI VISIONGUARD 154 X 52"

## (undated) DEVICE — Device

## (undated) DEVICE — DRAPE 3/4 SHEET W REINFORCEMENT 56X77"

## (undated) DEVICE — STRYKER SONOPET IQ TIP 12CM MICRO

## (undated) DEVICE — STAPLER SKIN VISI-STAT 35 WIDE

## (undated) DEVICE — INTRODUCER MEDTRONIC TIP PASSIVE CATHETER

## (undated) DEVICE — DRSG TELFA 3 X 8

## (undated) DEVICE — TAPE SILK 3"

## (undated) DEVICE — BIPOLAR FORCEP STRYKER STANDARD 8" X 0.5MM (YELLOW)

## (undated) DEVICE — ELCTR GROUNDING PAD ADULT COVIDIEN

## (undated) DEVICE — DRSG XEROFORM 1 X 8"

## (undated) DEVICE — WARMING BLANKET FULL ADULT

## (undated) DEVICE — SOL IRR POUR NS 0.9% 500ML

## (undated) DEVICE — POSITIONER FOAM EGG CRATE ULNAR 2PCS (PINK)

## (undated) DEVICE — SUT NUROLON 4-0 8-18" TF (POP-OFF)

## (undated) DEVICE — VENODYNE/SCD SLEEVE CALF MEDIUM

## (undated) DEVICE — DRAPE MICROSCOPE OPMI VISIONGUARD 48X118"

## (undated) DEVICE — AESCULAP SCALPFIX 10 CLIPS

## (undated) DEVICE — SUT VICRYL PLUS 2-0 18" CP-2 UNDYED (POP-OFF)

## (undated) DEVICE — DRAPE CAMERA VIDEO 7"X96"

## (undated) DEVICE — GOWN TRIMAX LG

## (undated) DEVICE — PREP CHLORAPREP HI-LITE ORANGE 26ML

## (undated) DEVICE — MIDAS REX LEGEND TAPERED SM BORE 1.1MM X 8CM

## (undated) DEVICE — SOL IRR POUR H2O 250ML

## (undated) DEVICE — POSITIONER HEAD REST PRONE

## (undated) DEVICE — GLV 8 PROTEXIS (WHITE)

## (undated) DEVICE — CANISTER SUCTION 2000CC

## (undated) DEVICE — DRAPE 1/2 SHEET 40X57"

## (undated) DEVICE — SUT VICRYL PLUS 3-0 18" X-1 (POP-OFF)

## (undated) DEVICE — DRAPE TOWEL BLUE 17" X 24"

## (undated) DEVICE — SNAP ON SPHERZ 5 PACK

## (undated) DEVICE — MIDAS REX LEGEND TAPERED SM BORE 2.3MM X 8CM

## (undated) DEVICE — DRAIN JACKSON PRATT 7MM FLAT FULL NO TROCAR

## (undated) DEVICE — CODMAN PERFORATOR 14MM (BLUE)

## (undated) DEVICE — DRSG CURITY GAUZE SPONGE 4 X 4" 12-PLY

## (undated) DEVICE — SYR LUER LOK 20CC

## (undated) DEVICE — ELCTR BOVIE PENCIL SMOKE EVACUATION

## (undated) DEVICE — MEDICATION LABELS W MARKER